# Patient Record
Sex: FEMALE | Race: BLACK OR AFRICAN AMERICAN | Employment: OTHER | URBAN - METROPOLITAN AREA
[De-identification: names, ages, dates, MRNs, and addresses within clinical notes are randomized per-mention and may not be internally consistent; named-entity substitution may affect disease eponyms.]

---

## 2017-01-18 ENCOUNTER — HOSPITAL ENCOUNTER (OUTPATIENT)
Dept: MAMMOGRAPHY | Age: 67
Discharge: HOME | End: 2017-01-18
Attending: INTERNAL MEDICINE
Payer: MEDICARE

## 2017-01-18 DIAGNOSIS — Z12.31 VISIT FOR SCREENING MAMMOGRAM: ICD-10-CM

## 2017-01-18 PROCEDURE — 77067 SCR MAMMO BI INCL CAD: CPT

## 2017-01-19 ENCOUNTER — HOSPITAL ENCOUNTER (OUTPATIENT)
Dept: MAMMOGRAPHY | Age: 67
Discharge: HOME | End: 2017-01-19
Attending: INTERNAL MEDICINE
Payer: MEDICARE

## 2017-01-19 DIAGNOSIS — Z87.898 HX OF ABNORMAL MAMMOGRAM: ICD-10-CM

## 2017-01-19 PROCEDURE — 77065 DX MAMMO INCL CAD UNI: CPT

## 2017-02-15 RX ORDER — METFORMIN HYDROCHLORIDE 500 MG/1
TABLET, EXTENDED RELEASE ORAL
Qty: 360 TAB | Refills: 95 | Status: SHIPPED | OUTPATIENT
Start: 2017-02-15 | End: 2017-03-23 | Stop reason: SDUPTHER

## 2017-03-02 ENCOUNTER — HOSPITAL ENCOUNTER (EMERGENCY)
Age: 67
Discharge: HOME | End: 2017-03-02
Attending: FAMILY MEDICINE

## 2017-03-02 ENCOUNTER — APPOINTMENT (OUTPATIENT)
Dept: GENERAL RADIOLOGY | Age: 67
End: 2017-03-02
Attending: FAMILY MEDICINE

## 2017-03-02 VITALS
OXYGEN SATURATION: 100 % | SYSTOLIC BLOOD PRESSURE: 147 MMHG | WEIGHT: 213.3 LBS | BODY MASS INDEX: 37.79 KG/M2 | TEMPERATURE: 97.9 F | HEIGHT: 63 IN | HEART RATE: 80 BPM | DIASTOLIC BLOOD PRESSURE: 67 MMHG | RESPIRATION RATE: 18 BRPM

## 2017-03-02 DIAGNOSIS — J20.9 ACUTE BRONCHITIS, UNSPECIFIED ORGANISM: Primary | ICD-10-CM

## 2017-03-02 RX ORDER — DOXYCYCLINE 100 MG/1
100 CAPSULE ORAL EVERY 12 HOURS
Qty: 20 CAP | Refills: 0 | Status: SHIPPED | OUTPATIENT
Start: 2017-03-02 | End: 2017-03-12

## 2017-03-02 RX ORDER — ALBUTEROL SULFATE 90 UG/1
2 AEROSOL, METERED RESPIRATORY (INHALATION)
Qty: 1 INHALER | Refills: 0 | Status: SHIPPED | OUTPATIENT
Start: 2017-03-02

## 2017-03-02 NOTE — UC PROVIDER NOTE
Patient is a 73 y.o. female presenting with cough. The history is provided by the patient. Cough   This is a new problem. Episode onset: 2 weeks ago. The problem occurs every few minutes. The problem has not changed since onset. The cough is productive of sputum. There has been no fever. Associated symptoms include rhinorrhea and shortness of breath (slight over the past week). Pertinent negatives include no chest pain, no chills, no sweats, no ear congestion, no ear pain, no headaches, no sore throat, no myalgias, no wheezing, no nausea and no vomiting. She is not a smoker. Past Medical History:   Diagnosis Date    Diabetes ([address] 1995    Hypercholesterolemia     Hypertension     LBP (low back pain)     chiorpactor    Lumbar stenosis     Obesity     S/P colonoscopy 12-18-12        Past Surgical History:   Procedure Laterality Date    HX BREAST BIOPSY Right 1990    negative    HX COLONOSCOPY           Family History   Problem Relation Age of Onset    Cancer Mother     No Known Problems Father         Social History     Social History    Marital status: SINGLE     Spouse name: N/A    Number of children: N/A    Years of education: N/A     Occupational History    Not on file. Social History Main Topics    Smoking status: Never Smoker    Smokeless tobacco: Never Used    Alcohol use No    Drug use: No    Sexual activity: Not on file     Other Topics Concern    Not on file     Social History Narrative                ALLERGIES: Review of patient's allergies indicates no known allergies. Review of Systems   Constitutional: Negative for chills and fever. HENT: Positive for congestion and rhinorrhea. Negative for ear pain and sore throat. Respiratory: Positive for cough and shortness of breath (slight over the past week). Negative for wheezing. Cardiovascular: Negative for chest pain and palpitations. Gastrointestinal: Negative for abdominal pain, nausea and vomiting. Musculoskeletal: Negative for myalgias. Skin: Negative for rash. Neurological: Negative for dizziness and headaches. Vitals:    03/02/17 1030 03/02/17 1031   BP:  147/67   Pulse:  80   Resp:  18   Temp:  97.9 °F (36.6 °C)   SpO2:  100%   Weight: 96.8 kg (213 lb 4.8 oz)    Height: 5' 3\" (1.6 m)        Physical Exam   Constitutional: She appears well-developed and well-nourished. No distress. HENT:   Right Ear: Tympanic membrane, external ear and ear canal normal.   Left Ear: Tympanic membrane, external ear and ear canal normal.   Nose: Rhinorrhea present. Right sinus exhibits no maxillary sinus tenderness and no frontal sinus tenderness. Left sinus exhibits no maxillary sinus tenderness and no frontal sinus tenderness. Mouth/Throat: Oropharynx is clear and moist and mucous membranes are normal.   Cardiovascular: Normal rate, regular rhythm and normal heart sounds. Pulmonary/Chest: Effort normal and breath sounds normal. No respiratory distress. She has no wheezes. She has no rales. Lymphadenopathy:     She has cervical adenopathy. Neurological: She is alert. Skin: She is not diaphoretic. Psychiatric: She has a normal mood and affect. Her behavior is normal. Judgment and thought content normal.   Nursing note and vitals reviewed. CXR Results  (Last 48 hours)               03/02/17 1113  XR CHEST PA LAT Final result    Impression:  IMPRESSION:       There is no acute process. Narrative:  EXAM:  XR CHEST PA LAT       INDICATION:  Cough for 2 weeks. COMPARISON: None       TECHNIQUE: PA and lateral chest views       FINDINGS: The cardiomediastinal and hilar contours are within normal limits. The   pulmonary vasculature is within normal limits. The lungs and pleural spaces are clear. The visualized bones and upper abdomen   are age-appropriate.                  MDM     Differential Diagnosis; Clinical Impression; Plan:     CLINICAL IMPRESSION:  Acute bronchitis, unspecified organism  (primary encounter diagnosis)    Plan:  1. Doxycycline  2. Albuterol prn  3. PCP as needed  Amount and/or Complexity of Data Reviewed:   Tests in the radiology section of CPT®:  Ordered and reviewed  Risk of Significant Complications, Morbidity, and/or Mortality:   Presenting problems: Moderate  Diagnostic procedures: Moderate  Management options:   Moderate  Progress:   Patient progress:  Stable      Procedures

## 2017-03-02 NOTE — DISCHARGE INSTRUCTIONS
Bronchitis: Care Instructions  Your Care Instructions    Bronchitis is inflammation of the bronchial tubes, which carry air to the lungs. The tubes swell and produce mucus, or phlegm. The mucus and inflamed bronchial tubes make you cough. You may have trouble breathing. Most cases of bronchitis are caused by viruses like those that cause colds. Antibiotics usually do not help and they may be harmful. Bronchitis usually develops rapidly and lasts about 2 to 3 weeks in otherwise healthy people. Follow-up care is a key part of your treatment and safety. Be sure to make and go to all appointments, and call your doctor if you are having problems. It's also a good idea to know your test results and keep a list of the medicines you take. How can you care for yourself at home? · Take all medicines exactly as prescribed. Call your doctor if you think you are having a problem with your medicine. · Get some extra rest.  · Take an over-the-counter pain medicine, such as acetaminophen (Tylenol), ibuprofen (Advil, Motrin), or naproxen (Aleve) to reduce fever and relieve body aches. Read and follow all instructions on the label. · Do not take two or more pain medicines at the same time unless the doctor told you to. Many pain medicines have acetaminophen, which is Tylenol. Too much acetaminophen (Tylenol) can be harmful. · Take an over-the-counter cough medicine that contains dextromethorphan to help quiet a dry, hacking cough so that you can sleep. Avoid cough medicines that have more than one active ingredient. Read and follow all instructions on the label. · Breathe moist air from a humidifier, hot shower, or sink filled with hot water. The heat and moisture will thin mucus so you can cough it out. · Do not smoke. Smoking can make bronchitis worse. If you need help quitting, talk to your doctor about stop-smoking programs and medicines. These can increase your chances of quitting for good.   When should you call for help? Call 911 anytime you think you may need emergency care. For example, call if:  · You have severe trouble breathing. Call your doctor now or seek immediate medical care if:  · You have new or worse trouble breathing. · You cough up dark brown or bloody mucus (sputum). · You have a new or higher fever. · You have a new rash. Watch closely for changes in your health, and be sure to contact your doctor if:  · You cough more deeply or more often, especially if you notice more mucus or a change in the color of your mucus. · You are not getting better as expected. Where can you learn more? Go to http://king-charles.info/. Enter H333 in the search box to learn more about \"Bronchitis: Care Instructions. \"  Current as of: May 23, 2016  Content Version: 11.1  © 4299-3760 [employer]. Care instructions adapted under license by [employer] (which disclaims liability or warranty for this information). If you have questions about a medical condition or this instruction, always ask your healthcare professional. [address] any warranty or liability for your use of this information.

## 2017-03-23 ENCOUNTER — OFFICE VISIT (OUTPATIENT)
Dept: INTERNAL MEDICINE CLINIC | Age: 67
End: 2017-03-23

## 2017-03-23 VITALS
RESPIRATION RATE: 20 BRPM | OXYGEN SATURATION: 99 % | TEMPERATURE: 97.3 F | BODY MASS INDEX: 37.39 KG/M2 | HEART RATE: 84 BPM | WEIGHT: 211 LBS | DIASTOLIC BLOOD PRESSURE: 64 MMHG | HEIGHT: 63 IN | SYSTOLIC BLOOD PRESSURE: 144 MMHG

## 2017-03-23 DIAGNOSIS — Z79.4 TYPE 2 DIABETES MELLITUS WITHOUT COMPLICATION, WITH LONG-TERM CURRENT USE OF INSULIN (HCC): Primary | ICD-10-CM

## 2017-03-23 DIAGNOSIS — E11.9 TYPE 2 DIABETES MELLITUS WITHOUT COMPLICATION, WITH LONG-TERM CURRENT USE OF INSULIN (HCC): Primary | ICD-10-CM

## 2017-03-23 DIAGNOSIS — I10 ESSENTIAL HYPERTENSION: ICD-10-CM

## 2017-03-23 DIAGNOSIS — E78.00 HYPERCHOLESTEROLEMIA: ICD-10-CM

## 2017-03-23 RX ORDER — METFORMIN HYDROCHLORIDE 500 MG/1
1000 TABLET, EXTENDED RELEASE ORAL 2 TIMES DAILY WITH MEALS
Qty: 360 TAB | Refills: 95 | Status: SHIPPED | OUTPATIENT
Start: 2017-03-23 | End: 2018-03-02 | Stop reason: SDUPTHER

## 2017-03-23 RX ORDER — LANCETS
EACH MISCELLANEOUS
Qty: 1 EACH | Refills: 11 | Status: SHIPPED | OUTPATIENT
Start: 2017-03-23 | End: 2018-03-07

## 2017-03-23 RX ORDER — PNEUMOCOCCAL 13-VALENT CONJUGATE VACCINE 2.2; 2.2; 2.2; 2.2; 2.2; 4.4; 2.2; 2.2; 2.2; 2.2; 2.2; 2.2; 2.2 UG/.5ML; UG/.5ML; UG/.5ML; UG/.5ML; UG/.5ML; UG/.5ML; UG/.5ML; UG/.5ML; UG/.5ML; UG/.5ML; UG/.5ML; UG/.5ML; UG/.5ML
INJECTION, SUSPENSION INTRAMUSCULAR
Refills: 0 | COMMUNITY
Start: 2017-01-23 | End: 2017-06-11

## 2017-03-23 RX ORDER — AMLODIPINE BESYLATE 5 MG/1
5 TABLET ORAL DAILY
Qty: 90 TAB | Refills: 3 | Status: ON HOLD | OUTPATIENT
Start: 2017-03-23 | End: 2018-03-10

## 2017-03-23 RX ORDER — QUINAPRIL 40 MG/1
40 TABLET ORAL DAILY
Qty: 90 TAB | Refills: 11 | Status: SHIPPED | OUTPATIENT
Start: 2017-03-23 | End: 2018-03-02 | Stop reason: SDUPTHER

## 2017-03-23 RX ORDER — SIMVASTATIN 40 MG/1
40 TABLET, FILM COATED ORAL
Qty: 90 TAB | Refills: 10 | Status: SHIPPED | OUTPATIENT
Start: 2017-03-23 | End: 2018-03-02 | Stop reason: SDUPTHER

## 2017-03-23 RX ORDER — METOPROLOL SUCCINATE 25 MG/1
25 TABLET, EXTENDED RELEASE ORAL DAILY
Qty: 90 TAB | Refills: 11 | Status: SHIPPED | OUTPATIENT
Start: 2017-03-23 | End: 2018-03-02 | Stop reason: SDUPTHER

## 2017-03-23 RX ORDER — INSULIN GLARGINE 100 [IU]/ML
30 INJECTION, SOLUTION SUBCUTANEOUS DAILY
Qty: 3 EACH | Refills: 11 | Status: SHIPPED | OUTPATIENT
Start: 2017-03-23 | End: 2017-10-26 | Stop reason: CLARIF

## 2017-03-23 RX ORDER — PEN NEEDLE, DIABETIC 29 G X1/2"
NEEDLE, DISPOSABLE MISCELLANEOUS
Qty: 100 PEN NEEDLE | Refills: 11 | Status: SHIPPED | OUTPATIENT
Start: 2017-03-23 | End: 2018-03-02 | Stop reason: SDUPTHER

## 2017-03-23 RX ORDER — FUROSEMIDE 40 MG/1
40 TABLET ORAL DAILY
Qty: 90 TAB | Refills: 10 | Status: SHIPPED | OUTPATIENT
Start: 2017-03-23 | End: 2018-03-02 | Stop reason: SDUPTHER

## 2017-03-23 RX ORDER — HYDRALAZINE HYDROCHLORIDE 25 MG/1
25 TABLET, FILM COATED ORAL 2 TIMES DAILY
Qty: 180 TAB | Refills: 10 | Status: SHIPPED | OUTPATIENT
Start: 2017-03-23 | End: 2017-12-27

## 2017-03-23 NOTE — PROGRESS NOTES
SPORTS MEDICINE AND PRIMARY CARE  Lavell Bush MD, [address] [address]  Phone:  [phone]  Fax: [phone]      Chief Complaint   Patient presents with    Diabetes         SUBECTIVE:    Nette Castro is a 73 y.o. female Patient returns today alert and appropriate and has the capacity to give an accurate history. She is ambulatory with a known history of dyslipidemia, primary hypertension, and diabetes whose control has been good with hemoglobin A1C three months ago 6.5. Patient returns today voicing no new complaints except the fact she forgot her blood sugar sheets. Apparently they have been running pretty good. She is now on Medicare and has a new pharmacy for which we will send her prescriptions to the appropriate pharmacist.  Specific complaints are denied. Patient is seen for evaluation. Current Outpatient Prescriptions   Medication Sig Dispense Refill    PREVNAR 13, PF, 0.5 mL syrg injection TO BE ADMINISTERED BY PHARMACIST FOR IMMUNIZATION  0    metFORMIN ER (GLUCOPHAGE XR) 500 mg tablet Take 2 Tabs by mouth two (2) times daily (with meals). 360 Tab 95    furosemide (LASIX) 40 mg tablet Take 1 Tab by mouth daily. 90 Tab 10    metoprolol succinate (TOPROL-XL) 25 mg XL tablet Take 1 Tab by mouth daily. 90 Tab 11    simvastatin (ZOCOR) 40 mg tablet Take 1 Tab by mouth nightly. 90 Tab 10    SITagliptin (JANUVIA) 100 mg tablet Take 1 Tab by mouth daily. 90 Tab 3    amLODIPine (NORVASC) 5 mg tablet Take 1 Tab by mouth daily. 90 Tab 3    quinapril (ACCUPRIL) 40 mg tablet Take 1 Tab by mouth daily. 90 Tab 11    insulin glargine (LANTUS SOLOSTAR) 100 unit/mL (3 mL) pen 30 Units by SubCUTAneous route daily. 3 Each 11    hydrALAZINE (APRESOLINE) 25 mg tablet Take 1 Tab by mouth two (2) times a day.  180 Tab 10    Lancets misc delica one touch 1 Each 11    Insulin Needles, Disposable, (BD INSULIN PEN NEEDLE UF ORIG) 29 gauge x 1/2\" ndle tid 100 Pen Needle 11    glucose blood VI test strips (ONETOUCH ULTRA TEST) strip tid 100 Strip 9    albuterol (PROVENTIL HFA, VENTOLIN HFA, PROAIR HFA) 90 mcg/actuation inhaler Take 2 Puffs by inhalation every four (4) hours as needed for Wheezing. 1 Inhaler 0    FLUVIRIN 8927-7470 susp injection ADM 0.5ML IM UTD  0    ONE TOUCH DELICA 33 gauge misc       Blood-Glucose Meter (ONETOUCH ULTRA SYSTEM KIT) monitoring kit Use daily 1 Kit 0    ONETOUCH ULTRASOFT LANCETS misc USE TO TEST THREE TIMES A  Each 11    Blood-Glucose Meter (ONETOUCH ULTRA SYSTEM KIT) monitoring kit Test 3 times a day 1 Kit 0    FLUARIX QUAD 8150-2033, PF, syrg injection   0    clobetasol (TEMOVATE) 0.05 % ointment Apply 1 Tube to affected area two (2) times a day. 60 g 11    omeprazole (PRILOSEC) 40 mg capsule Take 40 mg by mouth daily.  AFLURIA [id number] 45 mcg (15 mcg x 3)/0.5 mL susp   0    HYDROcodone-acetaminophen (NORCO) 5-325 mg per tablet   0     Past Medical History:   Diagnosis Date    Diabetes ([address] 1995    Hypercholesterolemia     Hypertension     LBP (low back pain)     chiorpactor    Lumbar stenosis     Obesity     S/P colonoscopy 12-18-12     Past Surgical History:   Procedure Laterality Date    HX BREAST BIOPSY Right 1990    negative    HX COLONOSCOPY       No Known Allergies    REVIEW OF SYSTEMS:   Patient has no pain in her right knee but all of a sudden it will just give away and on two occasions she fell because it gave away. However \"I do not want to take any medicine. \"        Social History     Social History    Marital status: SINGLE     Spouse name: N/A    Number of children: N/A    Years of education: N/A     Social History Main Topics    Smoking status: Never Smoker    Smokeless tobacco: Never Used    Alcohol use No    Drug use: No    Sexual activity: Not Asked     Other Topics Concern    None     Social History Narrative    Medical History: Diverticulosis 2002DM 1995primary HTN 1995Obesity Dyslipidemia January 2012 moderate central stenosis L3-L4,    mild to moderate central stenosis at L4-L5 MRI    Gyn History: Last mammogram date 2013. Last menstrual perioddate hysterectomy  FOR FIBROIDS, PT W ITH ONE OVARY. Last    papdate . Menopausal hot flashes. Sexually active not currently sexually active. History of abnormal pap smears none. History of STDs    none. Vaginal discharge or odor none. HysterectomyDate NONE.    OB History: Total pregnancies 1. Pre term deliveries (>20-36.6 w eeks) 1. Surgical History: [facility] right breast bx , kidney stones colonoscopy     Hospitalization/Major Diagnostic Procedure: [facility] rig breast bx , kidney stones     Family History: Mother:  52 yrs, skin d/oFather: deceasedAunt: alive, lung cancerAdopted: alive    Social History: Alcohol Use Patient does not use alcohol. Smoking Status Patient is a never smoker. Marital Status: Single. Lives w ith:    alone. Education/School: has masters degree-[employer].   r  Family History   Problem Relation Age of Onset    Cancer Mother     No Known Problems Father        OBJECTIVE:  Visit Vitals    /64 (BP 1 Location: Right arm, BP Patient Position: Sitting)    Pulse 84    Temp 97.3 °F (36.3 °C) (Oral)    Resp 20    Ht 5' 3\" (1.6 m)    Wt 211 lb (95.7 kg)    LMP  (LMP Unknown)    SpO2 99%    BMI 37.38 kg/m2     ENT: perrla,  eom intact  NECK: supple. Thyroid normal  CHEST: clear to ascultation and percussion   HEART: regular rate and rhythm  ABD: soft, bowel sounds active  EXTREMITIES: no edema, pulse 1+     No visits with results within 3 Month(s) from this visit.   Latest known visit with results is:    Office Visit on 2016   Component Date Value Ref Range Status    Glucose POC 2016 122  mg/dL Final    Hemoglobin A1c 2016 6.9* 4.8 - 5.6 % Final    Comment:          Pre-diabetes: 5.7 - 6.4           Diabetes: >6.4           Glycemic control for adults with diabetes: <7.0      Estimated average glucose 12/20/2016 151  mg/dL Final          ASSESSMENT:  1. Type 2 diabetes mellitus without complication, with long-term current use of insulin ([address]    2. Hypercholesterolemia    3. Essential hypertension      Patient's medical status is clinically stable. Blood pressure is at the upper limits of normal.  At home I suspect it is much better than this. Therefore no adjustment will be made. Her blood sugars will be assessed with a hemoglobin A1C. Her BMI is 37.38 which reflects a two pound weight loss so we congratulate her. We continue to encourage physical activity for 30 minutes five days a week and suggest a knee brace for the right knee but remind her that she should be exercising and doing muscle strengthening exercises on that particular side particularly. She will return to the office in four months, sooner if she has any problems. PLAN:  .  Orders Placed This Encounter    LIPID PANEL    HEMOGLOBIN A1C WITH EAG    PREVNAR 13, PF, 0.5 mL syrg injection    metFORMIN ER (GLUCOPHAGE XR) 500 mg tablet    furosemide (LASIX) 40 mg tablet    metoprolol succinate (TOPROL-XL) 25 mg XL tablet    simvastatin (ZOCOR) 40 mg tablet    SITagliptin (JANUVIA) 100 mg tablet    amLODIPine (NORVASC) 5 mg tablet    quinapril (ACCUPRIL) 40 mg tablet    insulin glargine (LANTUS SOLOSTAR) 100 unit/mL (3 mL) pen    hydrALAZINE (APRESOLINE) 25 mg tablet    Lancets misc    Insulin Needles, Disposable, (BD INSULIN PEN NEEDLE UF ORIG) 29 gauge x 1/2\" ndle    glucose blood VI test strips (ONETOUCH ULTRA TEST) strip       Follow-up Disposition:  Return in about 4 months (around 7/23/2017). ATTENTION:   This medical record was transcribed using an electronic medical records system. Although proofread, it may and can contain electronic and spelling errors. Other human spelling and other errors may be present.   Corrections may be executed at a later time. Please feel free to contact us for any clarifications as needed.

## 2017-03-23 NOTE — MR AVS SNAPSHOT
Visit Information Date & Time Provider Department Dept. Phone Encounter #  
 3/23/2017  2:30 PM Belen Otto MD SPORTS MED AND PRIMARY CARE - [facility] [phone] [id number] Follow-up Instructions Return in about 4 months (around 7/23/2017). Follow-up and Disposition History Upcoming Health Maintenance Date Due  
 EYE EXAM RETINAL OR DILATED Q1 3/24/2016 FOOT EXAM Q1 5/10/2017 HEMOGLOBIN A1C Q6M 6/20/2017 MICROALBUMIN Q1 8/23/2017 FOBT Q 1 YEAR AGE 50-75 12/20/2017 LIPID PANEL Q1 3/23/2018 MEDICARE YEARLY EXAM 3/24/2018 GLAUCOMA SCREENING Q2Y 1/3/2019 BREAST CANCER SCRN MAMMOGRAM 1/19/2019 Pneumococcal 65+ Low/Medium Risk (2 of 2 - PPSV23) 9/18/2019 DTaP/Tdap/Td series (2 - Td) 12/20/2026 Allergies as of 3/23/2017  Review Complete On: 3/23/2017 By: Belen Otto MD  
 No Known Allergies Current Immunizations  Never Reviewed Name Date Influenza Vaccine 10/10/2016, 9/18/2014 Pneumococcal Vaccine (Unspecified Type) 9/18/2014 Not reviewed this visit You Were Diagnosed With   
  
 Codes Comments Type 2 diabetes mellitus without complication, with long-term current use of insulin (HCC)    -  Primary ICD-10-CM: E11.9, Z79.4 ICD-9-CM: 250.00, V58.67 Hypercholesterolemia     ICD-10-CM: E78.00 ICD-9-CM: 272.0 Essential hypertension     ICD-10-CM: I10 
ICD-9-CM: 401.9 Vitals BP Pulse Temp Resp Height(growth percentile) Weight(growth percentile) 144/64 (BP 1 Location: Right arm, BP Patient Position: Sitting) 84 97.3 °F (36.3 °C) (Oral) 20 5' 3\" (1.6 m) 211 lb (95.7 kg) LMP SpO2 BMI OB Status Smoking Status (LMP Unknown) 99% 37.38 kg/m2 Hysterectomy Never Smoker BMI and BSA Data Body Mass Index Body Surface Area  
 37.38 kg/m 2 2.06 m 2 Preferred Pharmacy Pharmacy Name Phone [facility] [address] [phone] Your Updated Medication List  
  
   
This list is accurate as of: 3/23/17  3:58 PM.  Always use your most recent med list.  
  
  
  
  
 AFLURIA [id number] 45 mcg (15 mcg x 3)/0.5 mL Susp Generic drug:  flu vaccine ts 2014-15 (5 yr+)  
  
 albuterol 90 mcg/actuation inhaler Commonly known as:  PROVENTIL HFA, VENTOLIN HFA, PROAIR HFA Take 2 Puffs by inhalation every four (4) hours as needed for Wheezing. amLODIPine 5 mg tablet Commonly known as:  Promise Kathia Take 1 Tab by mouth daily. * Blood-Glucose Meter monitoring kit Commonly known as:  [address]   [address] KIT Test 3 times a day * Blood-Glucose Meter monitoring kit Commonly known as:  [address]   [address] KIT Use daily  
  
 clobetasol 0.05 % ointment Commonly known as:  Fan Lawrence Apply 1 Tube to affected area two (2) times a day. FLUARIX QUAD 8087-9840 (PF) Syrg injection Generic drug:  influenza vaccine 2015-16 (36mos+)(PF)  
  
 FLUVIRIN 9022-8148 Susp injection Generic drug:  influenza vaccine 2016-17 (4 yr+) ADM 0.5ML IM UTD  
  
 furosemide 40 mg tablet Commonly known as:  LASIX Take 1 Tab by mouth daily. glucose blood VI test strips strip Commonly known as:  ONETOUCH ULTRA TEST  
tid  
  
 hydrALAZINE 25 mg tablet Commonly known as:  APRESOLINE Take 1 Tab by mouth two (2) times a day. HYDROcodone-acetaminophen 5-325 mg per tablet Commonly known as:  NORCO  
  
 insulin glargine 100 unit/mL (3 mL) pen Commonly known as:  LANTUS SOLOSTAR  
30 Units by SubCUTAneous route daily. Insulin Needles (Disposable) 29 gauge x 1/2\" Ndle Commonly known as:  BD INSULIN PEN NEEDLE UF ORIG  
tid  
  
 metFORMIN  mg tablet Commonly known as:  GLUCOPHAGE XR Take 2 Tabs by mouth two (2) times daily (with meals). metoprolol succinate 25 mg XL tablet Commonly known as:  TOPROL-XL Take 1 Tab by mouth daily. omeprazole 40 mg capsule Commonly known as:  PRILOSEC  
 Take 40 mg by mouth daily. * ONETOUCH ULTRASOFT LANCETS Misc Generic drug:  Lancets USE TO TEST THREE TIMES A DAY  
  
 * One Touch Delica 33 gauge Misc Generic drug:  lancets * Lancets Misc  
delica one touch PREVNAR 13 (PF) 0.5 mL Syrg injection Generic drug:  pneumococcal 13 dino conj dip TO BE ADMINISTERED BY PHARMACIST FOR IMMUNIZATION  
  
 quinapril 40 mg tablet Commonly known as:  ACCUPRIL Take 1 Tab by mouth daily. simvastatin 40 mg tablet Commonly known as:  ZOCOR Take 1 Tab by mouth nightly. SITagliptin 100 mg tablet Commonly known as:  Liz [place] Take 1 Tab by mouth daily. * Notice: This list has 5 medication(s) that are the same as other medications prescribed for you. Read the directions carefully, and ask your doctor or other care provider to review them with you. Prescriptions Sent to Pharmacy Refills  
 metFORMIN ER (GLUCOPHAGE XR) 500 mg tablet 95 Sig: Take 2 Tabs by mouth two (2) times daily (with meals). Class: Normal  
 Pharmacy:  [address] [facility] Ph #: [phone] Route: Oral  
 furosemide (LASIX) 40 mg tablet 10 Sig: Take 1 Tab by mouth daily. Class: Normal  
 Pharmacy:  [address] [facility] Ph #: [phone] Route: Oral  
 metoprolol succinate (TOPROL-XL) 25 mg XL tablet 11 Sig: Take 1 Tab by mouth daily. Class: Normal  
 Pharmacy:  [address] [facility] Ph #: [phone] Route: Oral  
 simvastatin (ZOCOR) 40 mg tablet 10 Sig: Take 1 Tab by mouth nightly. Class: Normal  
 Pharmacy:  [address] [facility] Ph #: [phone] Route: Oral  
 SITagliptin (JANUVIA) 100 mg tablet 3 Sig: Take 1 Tab by mouth daily. Class: Normal  
 Pharmacy:  [address] [facility] Ph #: [phone]  Route: Oral  
 amLODIPine (NORVASC) 5 mg tablet 3  
 Sig: Take 1 Tab by mouth daily. Class: Normal  
 Pharmacy: [facility] [address] Ph #: [phone] Route: Oral  
 quinapril (ACCUPRIL) 40 mg tablet 11 Sig: Take 1 Tab by mouth daily. Class: Normal  
 Pharmacy:  [address] Ph #: [phone] Route: Oral  
 insulin glargine (LANTUS SOLOSTAR) 100 unit/mL (3 mL) pen 11 Si Units by SubCUTAneous route daily. Class: Normal  
 Pharmacy:  [address] Ph #: [phone] Route: SubCUTAneous  
 hydrALAZINE (APRESOLINE) 25 mg tablet 10 Sig: Take 1 Tab by mouth two (2) times a day. Class: Normal  
 Pharmacy:  [address] Ph #: [phone] Route: Oral  
 Lancets misc 11 Sig: delica one touch Class: Normal  
 Pharmacy: [address] Ph #: [phone] Insulin Needles, Disposable, (BD INSULIN PEN NEEDLE UF ORIG) 29 gauge x 1/2\" ndle 11 Sig: tid Class: Normal  
 Pharmacy:  [address] Ph #: [phone]  
 glucose blood VI test strips (ONETOUCH ULTRA TEST) strip 9 Sig: tid Class: Normal  
 Pharmacy: [facility] [address] Ph #: [phone] We Performed the Following HEMOGLOBIN A1C WITH EAG [68012 CPT(R)] LIPID PANEL [27811 CPT(R)] DC COLLECTION VENOUS BLOOD,VENIPUNCTURE E0203003 CPT(R)] Follow-up Instructions Return in about 4 months (around 2017). Introducing [facility] [place] [facility]! Dear Sonny Stewart: Thank you for requesting a [employer] account. Our records indicate that you already have an active [employer] account. You can access your account anytime at [email or web address] Did you know that you can access your hospital and ER discharge instructions at any time in [employer]? You can also review all of your test results from your hospital stay or ER visit. Additional Information If you have questions, please visit the Frequently Asked Questions section of the [email or web address] website at [email or web address] Remember, [employer] is NOT to be used for urgent needs. For medical emergencies, dial 911. Now available from your iPhone and Android! Please provide this summary of care documentation to your next provider. Your primary care clinician is listed as Marletta Base. If you have any questions after today's visit, please call [phone].

## 2017-03-23 NOTE — PROGRESS NOTES
.1. Have you been to the ER, urgent care clinic since your last visit? Hospitalized since your last visit? Yes Where: Urgent Care    2. Have you seen or consulted any other health care providers outside of the [facility] since your last visit? Include any pap smears or colon screening.  Yes Reason for visit: Bronchitis

## 2017-03-24 LAB
CHOLEST SERPL-MCNC: 137 MG/DL (ref 100–199)
EST. AVERAGE GLUCOSE BLD GHB EST-MCNC: 146 MG/DL
HBA1C MFR BLD: 6.7 % (ref 4.8–5.6)
HDLC SERPL-MCNC: 51 MG/DL
LDLC SERPL CALC-MCNC: 61 MG/DL (ref 0–99)
TRIGL SERPL-MCNC: 123 MG/DL (ref 0–149)
VLDLC SERPL CALC-MCNC: 25 MG/DL (ref 5–40)

## 2017-04-10 RX ORDER — LANCETS
EACH MISCELLANEOUS
Qty: 300 EACH | Refills: 3 | Status: SHIPPED | OUTPATIENT
Start: 2017-04-10 | End: 2017-10-26 | Stop reason: SDUPTHER

## 2017-06-11 ENCOUNTER — HOSPITAL ENCOUNTER (EMERGENCY)
Age: 67
Discharge: HOME | End: 2017-06-11
Attending: FAMILY MEDICINE

## 2017-06-11 VITALS
BODY MASS INDEX: 37.21 KG/M2 | HEIGHT: 63 IN | OXYGEN SATURATION: 97 % | RESPIRATION RATE: 18 BRPM | WEIGHT: 210 LBS | TEMPERATURE: 98.9 F | DIASTOLIC BLOOD PRESSURE: 61 MMHG | HEART RATE: 89 BPM | SYSTOLIC BLOOD PRESSURE: 130 MMHG

## 2017-06-11 DIAGNOSIS — J20.9 ACUTE BRONCHITIS, UNSPECIFIED ORGANISM: Primary | ICD-10-CM

## 2017-06-11 RX ORDER — LEVOFLOXACIN 500 MG/1
500 TABLET, FILM COATED ORAL DAILY
Qty: 7 TAB | Refills: 0 | Status: SHIPPED | OUTPATIENT
Start: 2017-06-11 | End: 2017-06-18

## 2017-06-11 RX ORDER — CODEINE PHOSPHATE AND GUAIFENESIN 10; 100 MG/5ML; MG/5ML
5 SOLUTION ORAL
Qty: 120 ML | Refills: 0 | Status: SHIPPED | OUTPATIENT
Start: 2017-06-11 | End: 2017-07-20 | Stop reason: ALTCHOICE

## 2017-06-11 NOTE — DISCHARGE INSTRUCTIONS
Bronchitis: Care Instructions  Your Care Instructions    Bronchitis is inflammation of the bronchial tubes, which carry air to the lungs. The tubes swell and produce mucus, or phlegm. The mucus and inflamed bronchial tubes make you cough. You may have trouble breathing. Most cases of bronchitis are caused by viruses like those that cause colds. Antibiotics usually do not help and they may be harmful. Bronchitis usually develops rapidly and lasts about 2 to 3 weeks in otherwise healthy people. Follow-up care is a key part of your treatment and safety. Be sure to make and go to all appointments, and call your doctor if you are having problems. It's also a good idea to know your test results and keep a list of the medicines you take. How can you care for yourself at home? · Take all medicines exactly as prescribed. Call your doctor if you think you are having a problem with your medicine. · Get some extra rest.  · Take an over-the-counter pain medicine, such as acetaminophen (Tylenol), ibuprofen (Advil, Motrin), or naproxen (Aleve) to reduce fever and relieve body aches. Read and follow all instructions on the label. · Do not take two or more pain medicines at the same time unless the doctor told you to. Many pain medicines have acetaminophen, which is Tylenol. Too much acetaminophen (Tylenol) can be harmful. · Take an over-the-counter cough medicine that contains dextromethorphan to help quiet a dry, hacking cough so that you can sleep. Avoid cough medicines that have more than one active ingredient. Read and follow all instructions on the label. · Breathe moist air from a humidifier, hot shower, or sink filled with hot water. The heat and moisture will thin mucus so you can cough it out. · Do not smoke. Smoking can make bronchitis worse. If you need help quitting, talk to your doctor about stop-smoking programs and medicines. These can increase your chances of quitting for good.   When should you call for help? Call 911 anytime you think you may need emergency care. For example, call if:  · You have severe trouble breathing. Call your doctor now or seek immediate medical care if:  · You have new or worse trouble breathing. · You cough up dark brown or bloody mucus (sputum). · You have a new or higher fever. · You have a new rash. Watch closely for changes in your health, and be sure to contact your doctor if:  · You cough more deeply or more often, especially if you notice more mucus or a change in the color of your mucus. · You are not getting better as expected. Where can you learn more? Go to http://king-charles.info/. Enter H333 in the search box to learn more about \"Bronchitis: Care Instructions. \"  Current as of: May 23, 2016  Content Version: 11.2  © 7914-7802 [employer]. Care instructions adapted under license by [employer] (which disclaims liability or warranty for this information). If you have questions about a medical condition or this instruction, always ask your healthcare professional. [address] any warranty or liability for your use of this information.

## 2017-06-11 NOTE — UC PROVIDER NOTE
Patient is a 73 y.o. female presenting with cough. The history is provided by the patient. Cough   The current episode started more than 1 week ago. The problem occurs every few minutes. The problem has not changed since onset. The cough is non-productive. There has been no fever. Associated symptoms include headaches, rhinorrhea, sore throat and nausea. Pertinent negatives include no chest pain, no chills, no sweats, no ear pain and no wheezing. She has tried nothing for the symptoms. She is not a smoker. Past Medical History:   Diagnosis Date    Diabetes ([address] 1995    Hypercholesterolemia     Hypertension     LBP (low back pain)     chiorpactor    Lumbar stenosis     Obesity     S/P colonoscopy 12-18-12        Past Surgical History:   Procedure Laterality Date    HX BREAST BIOPSY Right 1990    negative    HX COLONOSCOPY           Family History   Problem Relation Age of Onset    Cancer Mother     No Known Problems Father         Social History     Social History    Marital status: SINGLE     Spouse name: N/A    Number of children: N/A    Years of education: N/A     Occupational History    Not on file. Social History Main Topics    Smoking status: Never Smoker    Smokeless tobacco: Never Used    Alcohol use No    Drug use: No    Sexual activity: Not on file     Other Topics Concern    Not on file     Social History Narrative    Medical History: Diverticulosis 2002DM 1995primary HTN 1995Obesity 1995Dyslipidemia 1995January 9, 2012 moderate central stenosis L3-L4,    mild to moderate central stenosis at L4-L5 MRI    Gyn History: Last mammogram date 11/26/2013. Last menstrual perioddate hysterectomy 1992 FOR FIBROIDS, PT W ITH ONE OVARY. Last    papdate 2012. Menopausal hot flashes. Sexually active not currently sexually active. History of abnormal pap smears none. History of STDs    none. Vaginal discharge or odor none. HysterectomyDate NONE.    OB History: Total pregnancies 1.  Pre term deliveries (>20-36.6 w eeks) 1. Surgical History: [facility] right breast bx , kidney stones colonoscopy     Hospitalization/Major Diagnostic Procedure: [facility] right breast bx , rajanimaritza lora     Family History: Mother:  52 yrs, skin d/oFather: deceasedAunt: alive, lung cancerAdopted: alive    Social History: Alcohol Use Patient does not use alcohol. Smoking Status Patient is a never smoker. Marital Status: Single. Lives w ith:    alone. Education/School: has masters degree-[employer]. ALLERGIES: Review of patient's allergies indicates no known allergies. Review of Systems   Constitutional: Negative for chills. HENT: Positive for rhinorrhea and sore throat. Negative for ear pain. Respiratory: Positive for cough. Negative for wheezing. Cardiovascular: Negative for chest pain. Gastrointestinal: Positive for nausea. Neurological: Positive for headaches. Vitals:    17 0822   BP: 130/61   Pulse: 89   Resp: 18   Temp: 98.9 °F (37.2 °C)   SpO2: 97%   Weight: 95.3 kg (210 lb)   Height: 5' 3\" (1.6 m)       Physical Exam   Constitutional: She is oriented to person, place, and time. She appears well-developed and well-nourished. HENT:   Right Ear: External ear normal.   Left Ear: External ear normal.   Eyes: Conjunctivae and EOM are normal.   Neck: Normal range of motion. Neck supple. Cardiovascular: Normal rate, regular rhythm and normal heart sounds. Pulmonary/Chest: Effort normal and breath sounds normal.   Lymphadenopathy:     She has no cervical adenopathy. Neurological: She is alert and oriented to person, place, and time. Skin: Skin is warm and dry. Psychiatric: She has a normal mood and affect. Her behavior is normal. Judgment and thought content normal.   Nursing note and vitals reviewed.       MDM     Differential Diagnosis; Clinical Impression; Plan:     CLINICAL IMPRESSION:  Acute bronchitis, unspecified organism  (primary encounter diagnosis)    Plan:  1. levaquin  2. Robitussin AC  3. Risk of Significant Complications, Morbidity, and/or Mortality:   Presenting problems: Moderate  Diagnostic procedures: Moderate  Management options:   Moderate  Progress:   Patient progress:  Stable      Procedures

## 2017-07-20 ENCOUNTER — OFFICE VISIT (OUTPATIENT)
Dept: INTERNAL MEDICINE CLINIC | Age: 67
End: 2017-07-20

## 2017-07-20 VITALS
TEMPERATURE: 98.6 F | OXYGEN SATURATION: 96 % | HEART RATE: 79 BPM | BODY MASS INDEX: 37.74 KG/M2 | SYSTOLIC BLOOD PRESSURE: 141 MMHG | DIASTOLIC BLOOD PRESSURE: 60 MMHG | WEIGHT: 213 LBS | HEIGHT: 63 IN | RESPIRATION RATE: 22 BRPM

## 2017-07-20 DIAGNOSIS — Z13.39 SCREENING FOR ALCOHOLISM: ICD-10-CM

## 2017-07-20 DIAGNOSIS — E66.09 NON MORBID OBESITY DUE TO EXCESS CALORIES: ICD-10-CM

## 2017-07-20 DIAGNOSIS — E11.9 TYPE 2 DIABETES MELLITUS WITHOUT COMPLICATION, WITH LONG-TERM CURRENT USE OF INSULIN (HCC): ICD-10-CM

## 2017-07-20 DIAGNOSIS — M48.061 LUMBAR STENOSIS: ICD-10-CM

## 2017-07-20 DIAGNOSIS — Z00.00 ROUTINE GENERAL MEDICAL EXAMINATION AT A HEALTH CARE FACILITY: Primary | ICD-10-CM

## 2017-07-20 DIAGNOSIS — E78.00 HYPERCHOLESTEROLEMIA: ICD-10-CM

## 2017-07-20 DIAGNOSIS — I10 ESSENTIAL HYPERTENSION: ICD-10-CM

## 2017-07-20 DIAGNOSIS — Z79.4 TYPE 2 DIABETES MELLITUS WITHOUT COMPLICATION, WITH LONG-TERM CURRENT USE OF INSULIN (HCC): ICD-10-CM

## 2017-07-20 RX ORDER — LANCETS 30 GAUGE
EACH MISCELLANEOUS
Refills: 3 | COMMUNITY
Start: 2017-07-14 | End: 2018-03-07

## 2017-07-20 NOTE — MR AVS SNAPSHOT
Visit Information Date & Time Provider Department Dept. Phone Encounter #  
 7/20/2017  2:30 PM Tree Lowry MD [facility] - [facility] [phone] [id number] Follow-up Instructions Return in about 3 months (around 10/20/2017). Follow-up and Disposition History Upcoming Health Maintenance Date Due  
 EYE EXAM RETINAL OR DILATED Q1 3/24/2016 INFLUENZA AGE 9 TO ADULT 8/1/2017 MICROALBUMIN Q1 8/23/2017 HEMOGLOBIN A1C Q6M 9/23/2017 FOBT Q 1 YEAR AGE 50-75 12/20/2017 LIPID PANEL Q1 3/23/2018 MEDICARE YEARLY EXAM 3/24/2018 FOOT EXAM Q1 7/20/2018 GLAUCOMA SCREENING Q2Y 1/3/2019 BREAST CANCER SCRN MAMMOGRAM 1/19/2019 Pneumococcal 65+ Low/Medium Risk (2 of 2 - PPSV23) 9/18/2019 DTaP/Tdap/Td series (2 - Td) 12/20/2026 Allergies as of 7/20/2017  Review Complete On: 7/20/2017 By: Tree Lowry MD  
 No Known Allergies Current Immunizations  Never Reviewed Name Date Influenza Vaccine 10/10/2016, 9/18/2014 Pneumococcal Vaccine (Unspecified Type) 9/18/2014 Not reviewed this visit You Were Diagnosed With   
  
 Codes Comments Routine general medical examination at a health care facility    -  Primary ICD-10-CM: Z00.00 ICD-9-CM: V70.0 Screening for alcoholism     ICD-10-CM: Z13.89 ICD-9-CM: V79.1 Type 2 diabetes mellitus without complication, with long-term current use of insulin (HCC)     ICD-10-CM: E11.9, Z79.4 ICD-9-CM: 250.00, V58.67 Hypercholesterolemia     ICD-10-CM: E78.00 ICD-9-CM: 272.0 Essential hypertension     ICD-10-CM: I10 
ICD-9-CM: 401.9 Non morbid obesity due to excess calories     ICD-10-CM: E66.09 
ICD-9-CM: 278.00 Lumbar stenosis     ICD-10-CM: M48.06 
ICD-9-CM: 724.02 Vitals BP Pulse Temp Resp Height(growth percentile) Weight(growth percentile)  141/60 (BP 1 Location: Left arm, BP Patient Position: Sitting) 79 98.6 °F (37 °C) (Oral) 22 5' 3\" (1.6 m) 213 lb (96.6 kg) LMP SpO2 BMI OB Status Smoking Status (LMP Unknown) 96% 37.73 kg/m2 Hysterectomy Never Smoker BMI and BSA Data Body Mass Index Body Surface Area  
 37.73 kg/m 2 2.07 m 2 Preferred Pharmacy Pharmacy Name Phone [id number] Umer Wynn [address] [place] [address] [phone] Your Updated Medication List  
  
   
This list is accurate as of: 7/20/17  4:06 PM.  Always use your most recent med list.  
  
  
  
  
 AFLURIA [id number] 45 mcg (15 mcg x 3)/0.5 mL Susp Generic drug:  flu vaccine [id number](5 yr,up)  
  
 albuterol 90 mcg/actuation inhaler Commonly known as:  PROVENTIL HFA, VENTOLIN HFA, PROAIR HFA Take 2 Puffs by inhalation every four (4) hours as needed for Wheezing. amLODIPine 5 mg tablet Commonly known as:  Sundra [place] Take 1 Tab by mouth daily. * Blood-Glucose Meter monitoring kit Commonly known as:  [address]   [address] KIT Test 3 times a day * Blood-Glucose Meter monitoring kit Commonly known as:  [address]   [address] KIT Use daily  
  
 clobetasol 0.05 % ointment Commonly known as:  Lisabeth Puls Apply 1 Tube to affected area two (2) times a day. furosemide 40 mg tablet Commonly known as:  LASIX Take 1 Tab by mouth daily. glucose blood VI test strips strip Commonly known as:  ONETOUCH ULTRA TEST  
tiuse to test blood sugar three times a day. dx.e11.9  
  
 hydrALAZINE 25 mg tablet Commonly known as:  APRESOLINE Take 1 Tab by mouth two (2) times a day. insulin glargine 100 unit/mL (3 mL) Inpn Commonly known as:  LANTUS SOLOSTAR  
30 Units by SubCUTAneous route daily. Insulin Needles (Disposable) 29 gauge x 1/2\" Ndle Commonly known as:  BD INSULIN PEN NEEDLE UF ORIG  
tid  
  
 metFORMIN  mg tablet Commonly known as:  GLUCOPHAGE XR Take 2 Tabs by mouth two (2) times daily (with meals). metoprolol succinate 25 mg XL tablet Commonly known as:  TOPROL-XL Take 1 Tab by mouth daily. omeprazole 40 mg capsule Commonly known as:  PRILOSEC Take 40 mg by mouth daily. * One Touch Delica 33 gauge Misc Generic drug:  lancets * Lancets Misc  
delica one touch * Lancets Misc Commonly known as:  ONETOUCH ULTRASOFT LANCETS  
USE TO TEST THREE TIMES A DAY. Dx.e11.9  
  
 * ONETOUCH DELICA LANCETS 30 gauge Misc Generic drug:  lancets USE TO TEST BLOOD SUGAR 3 TIMES A DAY  
  
 quinapril 40 mg tablet Commonly known as:  ACCUPRIL Take 1 Tab by mouth daily. simvastatin 40 mg tablet Commonly known as:  ZOCOR Take 1 Tab by mouth nightly. * Notice: This list has 6 medication(s) that are the same as other medications prescribed for you. Read the directions carefully, and ask your doctor or other care provider to review them with you. We Performed the Following CBC WITH AUTOMATED DIFF [69485 CPT(R)] HEMOGLOBIN A1C WITH EAG [56808 CPT(R)] LIPID PANEL [32428 CPT(R)] METABOLIC PANEL, COMPREHENSIVE [15288 CPT(R)] RI COLLECTION VENOUS BLOOD,VENIPUNCTURE [id number] CPT(R)] TSH 3RD GENERATION [11802 CPT(R)] URINALYSIS W/ RFLX MICROSCOPIC [16398 CPT(R)] Follow-up Instructions Return in about 3 months (around 10/20/2017). To-Do List   
 07/21/2017 1:00 PM  
  [facility] [address] [place] [address] ([phone]) Shower or bathe using soap and water. Do not use deodorant, powder, perfumes, or lotion the day of your exam.  If your prior mammograms were not performed at [facility] 6 please bring films with you or forward prior images 2 days before your procedure. If patient is not a callback diagnostic, the patient must have an order/script from the physician for the diagnostic.  Please bring it on the day of the mammogram or have it faxed to the department. [facility]  [phone] [facility] [address] [place]  [phone] [facility] [phone] [facility] [address] [facility] [phone] Neha Dust [phone] Introducing [facility] SERVICES! Dear Sandoval Cramer: Thank you for requesting a scenios account. Our records indicate that you already have an active scenios account. You can access your account anytime at [email or web address] Did you know that you can access your hospital and ER discharge instructions at any time in scenios? You can also review all of your test results from your hospital stay or ER visit. Additional Information If you have questions, please visit the Frequently Asked Questions section of the scenios website at [email or web address] Remember, scenios is NOT to be used for urgent needs. For medical emergencies, dial 911. Now available from your iPhone and Android! Please provide this summary of care documentation to your next provider. Your primary care clinician is listed as Lisbet Garcia. If you have any questions after today's visit, please call [phone].

## 2017-07-20 NOTE — PROGRESS NOTES
.1. Have you been to the ER, urgent care clinic since your last visit? Hospitalized since your last visit? Yes Where: Urgent Care    2. Have you seen or consulted any other health care providers outside of the [address] [place] [address] since your last visit? Include any pap smears or colon screening. Yes Reason for visit: Bronchitis    This is a \"Welcome to [employer]"  Initial Preventive Physical Examination (IPPE) providing Personalized Prevention Plan Services (Performed in the first 12 months of enrollment)    I have reviewed the patient's medical history in detail and updated the computerized patient record. HistoryPatient returns today alert, appropriate, ambulatory, has the capacity to give an accurate history. She has made a decision to stop the Januvia due to cost and we don't disagree as she is making other decisions likewise. Specific complaints are denied and patient is seen for evaluation. Past Medical History:   Diagnosis Date    Diabetes ([address] 1995    Hypercholesterolemia     Hypertension     LBP (low back pain)     chiorpactor    Lumbar stenosis     Obesity     S/P colonoscopy 12-18-12      Past Surgical History:   Procedure Laterality Date    HX BREAST BIOPSY Right 1990    negative    HX COLONOSCOPY       Current Outpatient Prescriptions   Medication Sig Dispense Refill    ONETOUCH DELICA LANCETS 30 gauge misc USE TO TEST BLOOD SUGAR 3 TIMES A DAY  3    glucose blood VI test strips (ONETOUCH ULTRA TEST) strip tiuse to test blood sugar three times a day. dx.e11.9 300 Strip 3    Lancets (ONETOUCH ULTRASOFT LANCETS) misc USE TO TEST THREE TIMES A DAY. Dx.e11.9 300 Each 3    metFORMIN ER (GLUCOPHAGE XR) 500 mg tablet Take 2 Tabs by mouth two (2) times daily (with meals). 360 Tab 95    furosemide (LASIX) 40 mg tablet Take 1 Tab by mouth daily. 90 Tab 10    metoprolol succinate (TOPROL-XL) 25 mg XL tablet Take 1 Tab by mouth daily.  90 Tab 11    simvastatin (ZOCOR) 40 mg tablet Take 1 Tab by mouth nightly. 90 Tab 10    amLODIPine (NORVASC) 5 mg tablet Take 1 Tab by mouth daily. 90 Tab 3    quinapril (ACCUPRIL) 40 mg tablet Take 1 Tab by mouth daily. 90 Tab 11    insulin glargine (LANTUS SOLOSTAR) 100 unit/mL (3 mL) pen 30 Units by SubCUTAneous route daily. 3 Each 11    hydrALAZINE (APRESOLINE) 25 mg tablet Take 1 Tab by mouth two (2) times a day. 180 Tab 10    Lancets misc delica one touch 1 Each 11    Insulin Needles, Disposable, (BD INSULIN PEN NEEDLE UF ORIG) 29 gauge x 1/2\" ndle tid 100 Pen Needle 11    ONE TOUCH DELICA 33 gauge misc       Blood-Glucose Meter (ONETOUCH ULTRA SYSTEM KIT) monitoring kit Test 3 times a day 1 Kit 0    clobetasol (TEMOVATE) 0.05 % ointment Apply 1 Tube to affected area two (2) times a day. 60 g 11    omeprazole (PRILOSEC) 40 mg capsule Take 40 mg by mouth daily.  albuterol (PROVENTIL HFA, VENTOLIN HFA, PROAIR HFA) 90 mcg/actuation inhaler Take 2 Puffs by inhalation every four (4) hours as needed for Wheezing. 1 Inhaler 0    Blood-Glucose Meter (ONETOUCH ULTRA SYSTEM KIT) monitoring kit Use daily 1 Kit 0    AFLURIA [id number] 45 mcg (15 mcg x 3)/0.5 mL susp   0     No Known Allergies  Family History   Problem Relation Age of Onset    Cancer Mother     No Known Problems Father      Social History   Substance Use Topics    Smoking status: Never Smoker    Smokeless tobacco: Never Used    Alcohol use No     Diet, Lifestyle: generally follows a low fat low cholesterol diet    Exercise level: very active    Depression Risk Screen     PHQ over the last two weeks 7/20/2017   PHQ Not Done Patient Decline   Little interest or pleasure in doing things Not at all   Feeling down, depressed or hopeless Not at all   Total Score PHQ 2 0     Alcohol Risk Screen   On any occasion during the past 3 months, have you had more than 3 drinks containing alcohol? No    Do you average more than 7 drinks per week?   No      Functional Ability and Level of Safety Hearing Loss   normal-to-mild    Activities of Daily Living   Self-care     Fall Risk Screen   Fall Risk Assessment, last 12 mths 7/20/2017   Able to walk? Yes   Fall in past 12 months? No     Abuse Screen   Patient is not abused    Review of Systems   A comprehensive review of systems was negative except for that written in the HPI. Physical Examination     No exam data present     Visit Vitals    /60 (BP 1 Location: Left arm, BP Patient Position: Sitting)    Pulse 79    Temp 98.6 °F (37 °C) (Oral)    Resp 22    Ht 5' 3\" (1.6 m)    Wt 213 lb (96.6 kg)    LMP  (LMP Unknown)    SpO2 96%    BMI 37.73 kg/m2     General:  Alert, cooperative, no distress, appears stated age. Head:  Normocephalic, without obvious abnormality, atraumatic. Eyes:  Conjunctivae/corneas clear. PERRL, EOMs intact. Fundi benign. Ears:  Normal TMs and external ear canals both ears. Nose: Nares normal. Septum midline. Mucosa normal. No drainage or sinus tenderness. Throat: Lips, mucosa, and tongue normal. Teeth and gums normal.   Neck: Supple, symmetrical, trachea midline, no adenopathy, thyroid: no enlargement/tenderness/nodules, no carotid bruit and no JVD. Back:   Symmetric, no curvature. ROM normal. No CVA tenderness. Lungs:   Clear to auscultation bilaterally. Chest wall:  No tenderness or deformity. Heart:  Regular rate and rhythm, S1, S2 normal, no murmur, click, rub or gallop. Breast Exam:  No tenderness, masses, or nipple abnormality. Abdomen:   Soft, non-tender. Bowel sounds normal. No masses,  No organomegaly. Genitalia:  Normal female without lesion, discharge or tenderness. Rectal:  Normal tone,  no masses or tenderness  Guaiac negative stool. Extremities: Extremities normal, atraumatic, no cyanosis or edema. Pulses: 2+ and symmetric all extremities. Skin: Skin color, texture, turgor normal. No rashes or lesions.    Lymph nodes: Cervical, supraclavicular, and axillary nodes normal. Neurologic: CNII-XII intact. Normal strength, sensation and reflexes throughout. EKG Screening: unchanged from previous tracings. Patient Care Team:  Jene Cogan, MD as PCP - General (Internal Medicine)     End-of-life planning  Advanced Directive discussed and documented: YES      Assessment/Plan   Education and counseling provided:  Are appropriate based on today's review and evaluation    ICD-10-CM ICD-9-CM    1. Routine general medical examination at a health care facility Z00.00 V70.0    2. Screening for alcoholism Z13.89 V79.1    3. Type 2 diabetes mellitus without complication, with long-term current use of insulin (HCC) E11.9 250.00 URINALYSIS W/ RFLX MICROSCOPIC    Z79.4 V58.67 CBC WITH AUTOMATED DIFF      METABOLIC PANEL, COMPREHENSIVE      LIPID PANEL      TSH 3RD GENERATION      OR COLLECTION VENOUS BLOOD,VENIPUNCTURE      HEMOGLOBIN A1C WITH EAG   4. Hypercholesterolemia E78.00 272.0    5. Essential hypertension I10 401.9    6. Non morbid obesity due to excess calories E66.09 278.00    7. Lumbar stenosis M48.06 724.02    . Advance Care Planning (ACP) Provider Conversation Snapshot    Date of ACP Conversation: 07/20/17  Persons included in Conversation:  patient  Length of ACP Conversation in minutes:  <16 minutes (Non-Billable)    Authorized Decision Maker (if patient is incapable of making informed decisions): This person is:   Healthcare Agent/Medical Power of  under Advance Directive  Mel mena        For Patients with Decision Making Capacity:   Values/Goals: Exploration of values, goals, and preferences if recovery is not expected, even with continued medical treatment in the event of:  Imminent death  Severe, permanent brain injury  \"In these circumstances, what matters most to you? \"  Care focused more on comfort or quality of life.     Conversation Outcomes / Follow-Up Plan:   Entered DNR order (If yes, complete Durable DNR form)    Patient's medical status is stable. Her blood sugar control has been fair. Her Hgb A1c is generally less than 7, which is commendable. We advised her that if she stops her  she will have to be more attentive to her diet and she assures me that she will, which will be proven with her next Hgb A1c. Blood pressure control is adequate. Her BMI remains in the obese category and represents a 3 pound weight gain, which does not speak towards what she told me she was going to do. I am sure this  in the future and I will look forward to seeing her next weight in three months. She will return to the office in three months. Will send the results of the laboratory studies.

## 2017-07-21 ENCOUNTER — HOSPITAL ENCOUNTER (OUTPATIENT)
Dept: MAMMOGRAPHY | Age: 67
Discharge: HOME | End: 2017-07-21
Attending: INTERNAL MEDICINE
Payer: MEDICARE

## 2017-07-21 DIAGNOSIS — R92.8 FOLLOW-UP EXAMINATION OF ABNORMAL MAMMOGRAM: ICD-10-CM

## 2017-07-21 LAB
ALBUMIN SERPL-MCNC: 4.2 G/DL (ref 3.6–4.8)
ALBUMIN/GLOB SERPL: 1.6 {RATIO} (ref 1.2–2.2)
ALP SERPL-CCNC: 46 IU/L (ref 39–117)
ALT SERPL-CCNC: 14 IU/L (ref 0–32)
APPEARANCE UR: CLEAR
AST SERPL-CCNC: 15 IU/L (ref 0–40)
BACTERIA #/AREA URNS HPF: ABNORMAL /[HPF]
BASOPHILS # BLD AUTO: 0 X10E3/UL (ref 0–0.2)
BASOPHILS NFR BLD AUTO: 0 %
BILIRUB SERPL-MCNC: 0.2 MG/DL (ref 0–1.2)
BILIRUB UR QL STRIP: NEGATIVE
BUN SERPL-MCNC: 18 MG/DL (ref 8–27)
BUN/CREAT SERPL: 20 (ref 12–28)
CALCIUM SERPL-MCNC: 9.8 MG/DL (ref 8.7–10.3)
CASTS URNS QL MICRO: ABNORMAL /LPF
CHLORIDE SERPL-SCNC: 104 MMOL/L (ref 96–106)
CHOLEST SERPL-MCNC: 134 MG/DL (ref 100–199)
CO2 SERPL-SCNC: 24 MMOL/L (ref 18–29)
COLOR UR: YELLOW
CREAT SERPL-MCNC: 0.92 MG/DL (ref 0.57–1)
CRYSTALS URNS MICRO: ABNORMAL
EOSINOPHIL # BLD AUTO: 0.1 X10E3/UL (ref 0–0.4)
EOSINOPHIL NFR BLD AUTO: 2 %
EPI CELLS #/AREA URNS HPF: ABNORMAL /HPF
ERYTHROCYTE [DISTWIDTH] IN BLOOD BY AUTOMATED COUNT: 15.8 % (ref 12.3–15.4)
EST. AVERAGE GLUCOSE BLD GHB EST-MCNC: 143 MG/DL
GLOBULIN SER CALC-MCNC: 2.6 G/DL (ref 1.5–4.5)
GLUCOSE SERPL-MCNC: 117 MG/DL (ref 65–99)
GLUCOSE UR QL: NEGATIVE
HBA1C MFR BLD: 6.6 % (ref 4.8–5.6)
HCT VFR BLD AUTO: 39 % (ref 34–46.6)
HDLC SERPL-MCNC: 51 MG/DL
HGB BLD-MCNC: 12.8 G/DL (ref 11.1–15.9)
HGB UR QL STRIP: NEGATIVE
IMM GRANULOCYTES # BLD: 0 X10E3/UL (ref 0–0.1)
IMM GRANULOCYTES NFR BLD: 0 %
KETONES UR QL STRIP: NEGATIVE
LDLC SERPL CALC-MCNC: 63 MG/DL (ref 0–99)
LEUKOCYTE ESTERASE UR QL STRIP: ABNORMAL
LYMPHOCYTES # BLD AUTO: 1.9 X10E3/UL (ref 0.7–3.1)
LYMPHOCYTES NFR BLD AUTO: 25 %
MCH RBC QN AUTO: 28.6 PG (ref 26.6–33)
MCHC RBC AUTO-ENTMCNC: 32.8 G/DL (ref 31.5–35.7)
MCV RBC AUTO: 87 FL (ref 79–97)
MICRO URNS: ABNORMAL
MONOCYTES # BLD AUTO: 0.6 X10E3/UL (ref 0.1–0.9)
MONOCYTES NFR BLD AUTO: 8 %
MUCOUS THREADS URNS QL MICRO: PRESENT
NEUTROPHILS # BLD AUTO: 5.1 X10E3/UL (ref 1.4–7)
NEUTROPHILS NFR BLD AUTO: 65 %
NITRITE UR QL STRIP: NEGATIVE
PH UR STRIP: 6.5 [PH] (ref 5–7.5)
PLATELET # BLD AUTO: 311 X10E3/UL (ref 150–379)
POTASSIUM SERPL-SCNC: 4.5 MMOL/L (ref 3.5–5.2)
PROT SERPL-MCNC: 6.8 G/DL (ref 6–8.5)
PROT UR QL STRIP: ABNORMAL
RBC # BLD AUTO: 4.48 X10E6/UL (ref 3.77–5.28)
RBC #/AREA URNS HPF: ABNORMAL /HPF
SODIUM SERPL-SCNC: 143 MMOL/L (ref 134–144)
SP GR UR: >=1.03 (ref 1–1.03)
TRIGL SERPL-MCNC: 98 MG/DL (ref 0–149)
TSH SERPL DL<=0.005 MIU/L-ACNC: 1.19 UIU/ML (ref 0.45–4.5)
UNIDENT CRYS URNS QL MICRO: PRESENT
UROBILINOGEN UR STRIP-MCNC: 0.2 MG/DL (ref 0.2–1)
VLDLC SERPL CALC-MCNC: 20 MG/DL (ref 5–40)
WBC # BLD AUTO: 7.7 X10E3/UL (ref 3.4–10.8)
WBC #/AREA URNS HPF: ABNORMAL /HPF

## 2017-07-21 PROCEDURE — 77065 DX MAMMO INCL CAD UNI: CPT

## 2017-08-23 ENCOUNTER — HOSPITAL ENCOUNTER (EMERGENCY)
Age: 67
Discharge: HOME | End: 2017-08-23
Attending: FAMILY MEDICINE

## 2017-08-23 VITALS
HEIGHT: 63 IN | DIASTOLIC BLOOD PRESSURE: 81 MMHG | SYSTOLIC BLOOD PRESSURE: 185 MMHG | HEART RATE: 81 BPM | WEIGHT: 208 LBS | OXYGEN SATURATION: 97 % | BODY MASS INDEX: 36.86 KG/M2 | RESPIRATION RATE: 18 BRPM | TEMPERATURE: 97.1 F

## 2017-08-23 DIAGNOSIS — M17.11 PRIMARY OSTEOARTHRITIS OF RIGHT KNEE: ICD-10-CM

## 2017-08-23 DIAGNOSIS — M25.461 KNEE EFFUSION, RIGHT: Primary | ICD-10-CM

## 2017-08-23 RX ORDER — PREDNISONE 10 MG/1
TABLET ORAL
Qty: 21 TAB | Refills: 0 | Status: SHIPPED | OUTPATIENT
Start: 2017-08-23 | End: 2017-12-27

## 2017-08-23 RX ORDER — DICLOFENAC SODIUM 50 MG/1
50 TABLET, DELAYED RELEASE ORAL 2 TIMES DAILY
Qty: 20 TAB | Refills: 0 | Status: SHIPPED | OUTPATIENT
Start: 2017-08-23 | End: 2017-09-28

## 2017-08-23 NOTE — DISCHARGE INSTRUCTIONS
Knee Arthritis: Care Instructions  Your Care Instructions  Knee arthritis is a breakdown of the cartilage that cushions your knee joint. When the cartilage wears down, your bones rub against each other. This causes pain and stiffness. Knee arthritis tends to get worse with time. Treatment for knee arthritis involves reducing pain, making the leg muscles stronger, and staying at a healthy body weight. The treatment usually does not improve the health of the cartilage, but it can reduce pain and improve how well your knee works. You can take simple measures to protect your knee joints, ease your pain, and help you stay active. Follow-up care is a key part of your treatment and safety. Be sure to make and go to all appointments, and call your doctor if you are having problems. It's also a good idea to know your test results and keep a list of the medicines you take. How can you care for yourself at home? · Know that knee arthritis will cause more pain on some days than on others. · Stay at a healthy weight. Lose weight if you are overweight. When you stand up, the pressure on your knees from every pound of body weight is multiplied four times. So if you lose 10 pounds, you will reduce the pressure on your knees by 40 pounds. · Talk to your doctor or physical therapist about exercises that will help ease joint pain. ¨ Stretch to help prevent stiffness and to prevent injury before you exercise. You may enjoy gentle forms of yoga to help keep your knee joints and muscles flexible. ¨ Walk instead of jog. ¨ Ride a bike. This makes your thigh muscles stronger and takes pressure off your knee. ¨ Wear well-fitting and comfortable shoes. ¨ Exercise in chest-deep water. This can help you exercise longer with less pain. ¨ Avoid exercises that include squatting or kneeling. They can put a lot of strain on your knees.   ¨ Talk to your doctor to make sure that the exercise you do is not making the arthritis worse.  · Do not sit for long periods of time. Try to walk once in a while to keep your knee from getting stiff. · Ask your doctor or physical therapist whether shoe inserts may reduce your arthritis pain. · If you can afford it, get new athletic shoes at least every year. This can help reduce the strain on your knees. · Use a device to help you do everyday activities. ¨ A cane or walking stick can help you keep your balance when you walk. Hold the cane or walking stick in the hand opposite the painful knee. ¨ If you feel like you may fall when you walk, try using crutches or a front-wheeled walker. These can prevent falls that could cause more damage to your knee. ¨ A knee brace may help keep your knee stable and prevent pain. ¨ You also can use other things to make life easier, such as a higher toilet seat and handrails in the bathtub or shower. · Take pain medicines exactly as directed. ¨ Do not wait until you are in severe pain. You will get better results if you take it sooner. ¨ If you are not taking a prescription pain medicine, take an over-the-counter medicine such as acetaminophen (Tylenol), ibuprofen (Advil, Motrin), or naproxen (Aleve). Read and follow all instructions on the label. ¨ Do not take two or more pain medicines at the same time unless the doctor told you to. Many pain medicines have acetaminophen, which is Tylenol. Too much acetaminophen (Tylenol) can be harmful. ¨ Tell your doctor if you take a blood thinner, have diabetes, or have allergies to shellfish. · Ask your doctor if you might benefit from a shot of steroid medicine into your knee. This may provide pain relief for several months. · Many people take the supplements glucosamine and chondroitin for osteoarthritis. Some people feel they help, but the medical research does not show that they work. Talk to your doctor before you take these supplements. When should you call for help?   Call your doctor now or seek immediate medical care if:  · You have sudden swelling, warmth, or pain in your knee. · You have knee pain and a fever or rash. · You have such bad pain that you cannot use your knee. Watch closely for changes in your health, and be sure to contact your doctor if you have any problems. Where can you learn more? Go to http://king-charles.info/. Enter S890 in the search box to learn more about \"Knee Arthritis: Care Instructions. \"  Current as of: October 31, 2016  Content Version: 11.3  © 6157-7963 [employer]. Care instructions adapted under license by [employer] (which disclaims liability or warranty for this information). If you have questions about a medical condition or this instruction, always ask your healthcare professional. [address] any warranty or liability for your use of this information. Knee Arthritis: Exercises  Your Care Instructions  Here are some examples of exercises for knee arthritis. Start each exercise slowly. Ease off the exercise if you start to have pain. Your doctor or physical therapist will tell you when you can start these exercises and which ones will work best for you. How to do the exercises  Knee flexion with heel slide    1. Lie on your back with your knees bent. 2. Slide your heel back by bending your affected knee as far as you can. Then hook your other foot around your ankle to help pull your heel even farther back. 3. Hold for about 6 seconds, then rest for up to 10 seconds. 4. Repeat 8 to 12 times. 5. Switch legs and repeat steps 1 through 4, even if only one knee is sore. Quad sets    1. Sit with your affected leg straight and supported on the floor or a firm bed. Place a small, rolled-up towel under your knee. Your other leg should be bent, with that foot flat on the floor. 2. Tighten the thigh muscles of your affected leg by pressing the back of your knee down into the towel.   3. Hold for about 6 seconds, then rest for up to 10 seconds. 4. Repeat 8 to 12 times. 5. Switch legs and repeat steps 1 through 4, even if only one knee is sore. Straight-leg raises to the front    1. Lie on your back with your good knee bent so that your foot rests flat on the floor. Your affected leg should be straight. Make sure that your low back has a normal curve. You should be able to slip your hand in between the floor and the small of your back, with your palm touching the floor and your back touching the back of your hand. 2. Tighten the thigh muscles in your affected leg by pressing the back of your knee flat down to the floor. Hold your knee straight. 3. Keeping the thigh muscles tight and your leg straight, lift your affected leg up so that your heel is about 12 inches off the floor. Hold for about 6 seconds, then lower slowly. 4. Relax for up to 10 seconds between repetitions. 5. Repeat 8 to 12 times. 6. Switch legs and repeat steps 1 through 5, even if only one knee is sore. Active knee flexion    1. Lie on your stomach with your knees straight. If your kneecap is uncomfortable, roll up a washcloth and put it under your leg just above your kneecap. 2. Lift the foot of your affected leg by bending the knee so that you bring the foot up toward your buttock. If this motion hurts, try it without bending your knee quite as far. This may help you avoid any painful motion. 3. Slowly move your leg up and down. 4. Repeat 8 to 12 times. 5. Switch legs and repeat steps 1 through 4, even if only one knee is sore. Quadriceps stretch (facedown)    1. Lie flat on your stomach, and rest your face on the floor. 2. Wrap a towel or belt strap around the lower part of your affected leg. Then use the towel or belt strap to slowly pull your heel toward your buttock until you feel a stretch. 3. Hold for about 15 to 30 seconds, then relax your leg against the towel or belt strap. 4. Repeat 2 to 4 times.   5. Switch legs and repeat steps 1 through 4, even if only one knee is sore. Stationary exercise bike    If you do not have a stationary exercise bike at home, you can find one to ride at your local health club or community center. 1. Adjust the height of the bike seat so that your knee is slightly bent when your leg is extended downward. If your knee hurts when the pedal reaches the top, you can raise the seat so that your knee does not bend as much. 2. Start slowly. At first, try to do 5 to 10 minutes of cycling with little to no resistance. Then increase your time and the resistance bit by bit until you can do 20 to 30 minutes without pain. 3. If you start to have pain, rest your knee until your pain gets back to the level that is normal for you. Or cycle for less time or with less effort. Follow-up care is a key part of your treatment and safety. Be sure to make and go to all appointments, and call your doctor if you are having problems. It's also a good idea to know your test results and keep a list of the medicines you take. Where can you learn more? Go to http://king-charles.info/. Enter C159 in the search box to learn more about \"Knee Arthritis: Exercises. \"  Current as of: March 21, 2017  Content Version: 11.3  © 7121-1859 [employer]. Care instructions adapted under license by [email or web address] (which disclaims liability or warranty for this information). If you have questions about a medical condition or this instruction, always ask your healthcare professional. [place] [address] any warranty or liability for your use of this information.

## 2017-08-23 NOTE — UC PROVIDER NOTE
Patient is a 73 y.o. female presenting with knee pain. The history is provided by the patient. Knee Pain    This is a recurrent problem. The current episode started more than 1 week ago. The problem occurs daily. The problem has been gradually worsening. The pain is present in the right knee. The quality of the pain is described as aching. The pain is at a severity of 8/10. The pain is moderate. Associated symptoms include limited range of motion and stiffness. Associated symptoms comments: And swollen . The symptoms are aggravated by activity, movement and palpation. She has tried nothing for the symptoms. There has been no history of extremity trauma. Past Medical History:   Diagnosis Date    Diabetes ([address] 1995    Hypercholesterolemia     Hypertension     LBP (low back pain)     chiorpactor    Lumbar stenosis     Obesity     S/P colonoscopy 12-18-12        Past Surgical History:   Procedure Laterality Date    HX BREAST BIOPSY Right 1990    negative    HX COLONOSCOPY           Family History   Problem Relation Age of Onset    Cancer Mother     No Known Problems Father         Social History     Social History    Marital status: SINGLE     Spouse name: N/A    Number of children: N/A    Years of education: N/A     Occupational History    Not on file. Social History Main Topics    Smoking status: Never Smoker    Smokeless tobacco: Never Used    Alcohol use No    Drug use: No    Sexual activity: Not on file     Other Topics Concern    Not on file     Social History Narrative    Medical History: Diverticulosis 2002DM 1995primary HTN 1995Obesity 1995Dyslipidemia 1995January 9, 2012 moderate central stenosis L3-L4,    mild to moderate central stenosis at L4-L5 MRI    Gyn History: Last mammogram date 11/26/2013. Last menstrual perioddate hysterectomy 1992 FOR FIBROIDS, PT W ITH ONE OVARY. Last    papdate 2012. Menopausal hot flashes. Sexually active not currently sexually active.  History of abnormal pap smears none. History of STDs    none. Vaginal discharge or odor none. HysterectomyDate NONE.    OB History: Total pregnancies 1. Pre term deliveries (>20-36.6 w eeks) 1. Surgical History: [facility] right breast bx , rajanifelicitamaritza lora colonoscopy     Hospitalization/Major Diagnostic Procedure: [facility] right breast bx , rajanifelicitamaritza lora     Family History: Mother:  52 yrs, skin d/oFather: deceasedAunt: alive, lung cancerAdopted: alive    Social History: Alcohol Use Patient does not use alcohol. Smoking Status Patient is a never smoker. Marital Status: Single. Lives w ith:    alone. Education/School: has masters degree-[employer]. ALLERGIES: Review of patient's allergies indicates no known allergies. Review of Systems   Musculoskeletal: Positive for stiffness. All other systems reviewed and are negative. Vitals:    17 0837   BP: 185/81   Pulse: 81   Resp: 18   Temp: 97.1 °F (36.2 °C)   SpO2: 97%   Weight: 94.3 kg (208 lb)   Height: 5' 3\" (1.6 m)       Physical Exam   Constitutional: No distress. Musculoskeletal:        Right knee: She exhibits decreased range of motion, swelling, effusion and abnormal alignment. She exhibits no erythema, normal patellar mobility, normal meniscus and no MCL laxity. Tenderness found. Medial joint line and lateral joint line tenderness noted. Nursing note and vitals reviewed. MDM     Differential Diagnosis; Clinical Impression; Plan:     CLINICAL IMPRESSION:  Knee effusion, right  (primary encounter diagnosis)  Primary osteoarthritis of right knee      DDX    Plan:    RICE  Knee brace- self exercise  Prednisone- followed by NSAID  Follow with ortho  Amount and/or Complexity of Data Reviewed:   Tests in the radiology section of CPT®:  Reviewed   Review and summarize past medical records:  Yes  Risk of Significant Complications, Morbidity, and/or Mortality:   Presenting problems:   Moderate  Management options: Moderate  Progress:   Patient progress:  Stable      Procedures

## 2017-09-28 ENCOUNTER — OFFICE VISIT (OUTPATIENT)
Dept: INTERNAL MEDICINE CLINIC | Age: 67
End: 2017-09-28

## 2017-09-28 VITALS
WEIGHT: 213.7 LBS | HEIGHT: 63 IN | RESPIRATION RATE: 18 BRPM | DIASTOLIC BLOOD PRESSURE: 67 MMHG | SYSTOLIC BLOOD PRESSURE: 107 MMHG | BODY MASS INDEX: 37.86 KG/M2 | HEART RATE: 71 BPM | TEMPERATURE: 98.1 F

## 2017-09-28 DIAGNOSIS — M17.11 PRIMARY OSTEOARTHRITIS OF RIGHT KNEE: Primary | ICD-10-CM

## 2017-09-28 DIAGNOSIS — E66.09 NON MORBID OBESITY DUE TO EXCESS CALORIES: ICD-10-CM

## 2017-09-28 DIAGNOSIS — I10 ESSENTIAL HYPERTENSION: ICD-10-CM

## 2017-09-28 RX ORDER — CELECOXIB 200 MG/1
200 CAPSULE ORAL 2 TIMES DAILY
Qty: 60 CAP | Refills: 2 | Status: SHIPPED | OUTPATIENT
Start: 2017-09-28 | End: 2017-12-16 | Stop reason: SDUPTHER

## 2017-09-28 NOTE — PROGRESS NOTES
1. Have you been to the ER, urgent care clinic since your last visit? Hospitalized since your last visit? Yes When: 8-23-17 [facility] urgent care  for right knee    2. Have you seen or consulted any other health care providers outside of the [address] [place] [address] since your last visit? Include any pap smears or colon screening.  No

## 2017-09-28 NOTE — MR AVS SNAPSHOT
Visit Information Date & Time Provider Department Dept. Phone Encounter #  
 9/28/2017  9:15 AM [facility] [phone] [id number] Follow-up Instructions Return in about 4 weeks (around 10/26/2017). Follow-up and Disposition History Your Appointments 10/26/2017  2:30 PM  
Any with Emily Sims MD  
[address] ([address] Appt Note: 3 month f/u  
 [address] [facility], [place] [address]  
  
   
 [address] [facility], [place] [address] Upcoming Health Maintenance Date Due  
 EYE EXAM RETINAL OR DILATED Q1 3/24/2016 INFLUENZA AGE 9 TO ADULT 8/1/2017 MICROALBUMIN Q1 8/23/2017 FOBT Q 1 YEAR AGE 50-75 12/20/2017 HEMOGLOBIN A1C Q6M 1/20/2018 FOOT EXAM Q1 7/20/2018 LIPID PANEL Q1 7/20/2018 MEDICARE YEARLY EXAM 7/21/2018 GLAUCOMA SCREENING Q2Y 1/3/2019 BREAST CANCER SCRN MAMMOGRAM 7/21/2019 Pneumococcal 65+ Low/Medium Risk (2 of 2 - PPSV23) 9/18/2019 DTaP/Tdap/Td series (2 - Td) 12/20/2026 Allergies as of 9/28/2017  Review Complete On: 9/28/2017 By: Emily Sims MD  
 No Known Allergies Current Immunizations  Never Reviewed Name Date Influenza Vaccine 10/10/2016, 9/18/2014 Pneumococcal Vaccine (Unspecified Type) 9/18/2014 Not reviewed this visit You Were Diagnosed With   
  
 Codes Comments Primary osteoarthritis of right knee    -  Primary ICD-10-CM: M17.11 ICD-9-CM: 715.16 Essential hypertension     ICD-10-CM: I10 
ICD-9-CM: 401.9 Non morbid obesity due to excess calories     ICD-10-CM: E66.09 
ICD-9-CM: 278.00 Vitals BP Pulse Temp Resp Height(growth percentile) Weight(growth percentile) 107/67 71 98.1 °F (36.7 °C) (Oral) 18 5' 3\" (1.6 m) 213 lb 11.2 oz (96.9 kg) LMP BMI OB Status Smoking Status (LMP Unknown) 37.86 kg/m2 Hysterectomy Never Smoker BMI and BSA Data Body Mass Index Body Surface Area  
 37.86 kg/m 2 2.08 m 2 Preferred Pharmacy Pharmacy Name Phone [id number] [phone] Your Updated Medication List  
  
   
This list is accurate as of: 9/28/17 10:51 AM.  Always use your most recent med list.  
  
  
  
  
 AFLURIA [id number] 45 mcg (15 mcg x 3)/0.5 mL Susp Generic drug:  flu vaccine [id number](5 yr,up)  
  
 albuterol 90 mcg/actuation inhaler Commonly known as:  PROVENTIL HFA, VENTOLIN HFA, PROAIR HFA Take 2 Puffs by inhalation every four (4) hours as needed for Wheezing. amLODIPine 5 mg tablet Commonly known as:  Nette Raddle Take 1 Tab by mouth daily. * Blood-Glucose Meter monitoring kit Commonly known as:  [address]   [address] KIT Test 3 times a day * Blood-Glucose Meter monitoring kit Commonly known as:  [address]   [address] KIT Use daily  
  
 celecoxib 200 mg capsule Commonly known as:  CELEBREX Take 1 Cap by mouth two (2) times a day for 90 days. clobetasol 0.05 % ointment Commonly known as:  Alonna Gear Apply 1 Tube to affected area two (2) times a day. furosemide 40 mg tablet Commonly known as:  LASIX Take 1 Tab by mouth daily. glucose blood VI test strips strip Commonly known as:  ONETOUCH ULTRA TEST  
tiuse to test blood sugar three times a day. dx.e11.9  
  
 hydrALAZINE 25 mg tablet Commonly known as:  APRESOLINE Take 1 Tab by mouth two (2) times a day. insulin glargine 100 unit/mL (3 mL) Inpn Commonly known as:  LANTUS SOLOSTAR  
30 Units by SubCUTAneous route daily. Insulin Needles (Disposable) 29 gauge x 1/2\" Ndle Commonly known as:  BD INSULIN PEN NEEDLE UF ORIG  
tid  
  
 metFORMIN  mg tablet Commonly known as:  GLUCOPHAGE XR Take 2 Tabs by mouth two (2) times daily (with meals). metoprolol succinate 25 mg XL tablet Commonly known as:  TOPROL-XL Take 1 Tab by mouth daily. omeprazole 40 mg capsule Commonly known as:  PRILOSEC Take 40 mg by mouth daily. * One Touch Delica 33 gauge Misc Generic drug:  lancets * Lancets Misc  
delica one touch * Lancets Misc Commonly known as:  ONETOUCH ULTRASOFT LANCETS  
USE TO TEST THREE TIMES A DAY. Dx.e11.9  
  
 * ONETOUCH DELICA LANCETS 30 gauge Misc Generic drug:  lancets USE TO TEST BLOOD SUGAR 3 TIMES A DAY  
  
 predniSONE 10 mg dose pack Commonly known as:  STERAPRED DS As directed  
  
 quinapril 40 mg tablet Commonly known as:  ACCUPRIL Take 1 Tab by mouth daily. simvastatin 40 mg tablet Commonly known as:  ZOCOR Take 1 Tab by mouth nightly. * Notice: This list has 6 medication(s) that are the same as other medications prescribed for you. Read the directions carefully, and ask your doctor or other care provider to review them with you. Prescriptions Sent to Pharmacy Refills  
 celecoxib (CELEBREX) 200 mg capsule 2 Sig: Take 1 Cap by mouth two (2) times a day for 90 days. Class: Normal  
 Pharmacy: [address] [place] [address] #: [phone] Route: Oral  
  
We Performed the Following REFERRAL TO PHYSICAL THERAPY [BEN Custom] Comments:  
 Please evaluate patient for knee pain. Follow-up Instructions Return in about 4 weeks (around 10/26/2017). Referral Information Referral ID Referred By Referred To  
  
 [id number] Bryan VILLASENOR Not Available Visits Status Start Date End Date 1 New Request 9/28/17 9/28/18 If your referral has a status of pending review or denied, additional information will be sent to support the outcome of this decision. Introducing [facility]! Dear Ruiz Ho: Thank you for requesting a [employer] account.   Our records indicate that you already have an active [employer] account. You can access your account anytime at [email or web address] Did you know that you can access your hospital and ER discharge instructions at any time in [employer]? You can also review all of your test results from your hospital stay or ER visit. Additional Information If you have questions, please visit the Frequently Asked Questions section of the [employer] website at [email or web address] Remember, [employer] is NOT to be used for urgent needs. For medical emergencies, dial 911. Now available from your iPhone and Android! Please provide this summary of care documentation to your next provider. Your primary care clinician is listed as Yolanda Kohli. If you have any questions after today's visit, please call [phone].

## 2017-09-28 NOTE — PROGRESS NOTES
SPORTS MEDICINE AND PRIMARY CARE  Ila Steele MD, [address] [facility],[address] [address]  Phone:  [phone]  Fax: [phone]      Chief Complaint   Patient presents with    Knee Pain     right f/u         SUBECTIVE:    Tyler Koo is a 73 y.o. female Patient returns today alert, appropriate, ambulatory and has the capacity to give an accurate history. She has a known history of diabetes, dyslipidemia, primary hypertension, lumbar stenosis, and is seen for evaluation. Since we last saw her she was seen at urgent care on 08/23/17 by Yasmine Nelson M.D., who gave her prednisone to take, followed by an NSAID and a knee brace and  exercise. He suggested follow up with orthopedics. Patient comes in today continuing to have challenges with her knee and is seen for evaluation. Current Outpatient Prescriptions   Medication Sig Dispense Refill    celecoxib (CELEBREX) 200 mg capsule Take 1 Cap by mouth two (2) times a day for 90 days. 60 Cap 2    ONETOUCH DELICA LANCETS 30 gauge misc USE TO TEST BLOOD SUGAR 3 TIMES A DAY  3    glucose blood VI test strips (ONETOUCH ULTRA TEST) strip tiuse to test blood sugar three times a day. dx.e11.9 300 Strip 3    Lancets (ONETOUCH ULTRASOFT LANCETS) misc USE TO TEST THREE TIMES A DAY. Dx.e11.9 300 Each 3    metFORMIN ER (GLUCOPHAGE XR) 500 mg tablet Take 2 Tabs by mouth two (2) times daily (with meals). 360 Tab 95    furosemide (LASIX) 40 mg tablet Take 1 Tab by mouth daily. 90 Tab 10    metoprolol succinate (TOPROL-XL) 25 mg XL tablet Take 1 Tab by mouth daily. 90 Tab 11    simvastatin (ZOCOR) 40 mg tablet Take 1 Tab by mouth nightly. 90 Tab 10    amLODIPine (NORVASC) 5 mg tablet Take 1 Tab by mouth daily. 90 Tab 3    quinapril (ACCUPRIL) 40 mg tablet Take 1 Tab by mouth daily. 90 Tab 11    insulin glargine (LANTUS SOLOSTAR) 100 unit/mL (3 mL) pen 30 Units by SubCUTAneous route daily.  3 Each 11    hydrALAZINE (APRESOLINE) 25 mg tablet Take 1 Tab by mouth two (2) times a day. 180 Tab 10    Lancets misc delica one touch 1 Each 11    Insulin Needles, Disposable, (BD INSULIN PEN NEEDLE UF ORIG) 29 gauge x 1/2\" ndle tid 100 Pen Needle 11    albuterol (PROVENTIL HFA, VENTOLIN HFA, PROAIR HFA) 90 mcg/actuation inhaler Take 2 Puffs by inhalation every four (4) hours as needed for Wheezing. 1 Inhaler 0    ONE TOUCH DELICA 33 gauge misc       Blood-Glucose Meter (ONETOUCH ULTRA SYSTEM KIT) monitoring kit Use daily 1 Kit 0    Blood-Glucose Meter (ONETOUCH ULTRA SYSTEM KIT) monitoring kit Test 3 times a day 1 Kit 0    omeprazole (PRILOSEC) 40 mg capsule Take 40 mg by mouth daily.  AFLURIA [id number] 45 mcg (15 mcg x 3)/0.5 mL susp   0    predniSONE (STERAPRED DS) 10 mg dose pack As directed 21 Tab 0    clobetasol (TEMOVATE) 0.05 % ointment Apply 1 Tube to affected area two (2) times a day. 60 g 11     Past Medical History:   Diagnosis Date    Diabetes ([address] 1995    DJD (degenerative joint disease) of knee     Hypercholesterolemia     Hypertension     LBP (low back pain)     chiorpactor    Lumbar stenosis     Obesity     S/P colonoscopy 12-18-12     Past Surgical History:   Procedure Laterality Date    HX BREAST BIOPSY Right 1990    negative    HX COLONOSCOPY       No Known Allergies    REVIEW OF SYSTEMS:   No trauma, no falls. Social History     Social History    Marital status: SINGLE     Spouse name: N/A    Number of children: N/A    Years of education: N/A     Social History Main Topics    Smoking status: Never Smoker    Smokeless tobacco: Never Used    Alcohol use No    Drug use: No    Sexual activity: Not Asked     Other Topics Concern    None     Social History Narrative    Medical History: Diverticulosis 2002DM 1995primary HTN 1995Obesity 1995Dyslipidemia 1995January 9, 2012 moderate central stenosis L3-L4,    mild to moderate central stenosis at L4-L5 MRI    Gyn History: Last mammogram date 11/26/2013.  Last menstrual perioddate hysterectomy  FOR FIBROIDS, PT W ITH ONE OVARY. Last    papdate . Menopausal hot flashes. Sexually active not currently sexually active. History of abnormal pap smears none. History of STDs    none. Vaginal discharge or odor none. HysterectomyDate NONE.    OB History: Total pregnancies 1. Pre term deliveries (>20-36.6 w eeks) 1. Surgical History: [facility] right breast bx , kidney stones colonoscopy     Hospitalization/Major Diagnostic Procedure: [facility] rig breast bx , kidney stones     Family History: Mother:  52 yrs, skin d/oFather: deceasedAunt: alive, lung cancerAdopted: alive    Social History: Alcohol Use Patient does not use alcohol. Smoking Status Patient is a never smoker. Marital Status: Single. Lives w ith:    alone. Education/School: has masters degree-[employer].   r  Family History   Problem Relation Age of Onset    Cancer Mother     No Known Problems Father        OBJECTIVE:  Visit Vitals    /67    Pulse 71    Temp 98.1 °F (36.7 °C) (Oral)    Resp 18    Ht 5' 3\" (1.6 m)    Wt 213 lb 11.2 oz (96.9 kg)    LMP  (LMP Unknown)    BMI 37.86 kg/m2     ENT: perrla,  eom intact  NECK: supple.  Thyroid normal  CHEST: clear to ascultation and percussion   HEART: regular rate and rhythm  ABD: soft, bowel sounds active  EXTREMITIES: no edema, pulse 1+     Office Visit on 2017   Component Date Value Ref Range Status    Specific Gravity 2017      >=1.030* 1.005 - 1.030 Final    pH (UA) 2017 6.5  5.0 - 7.5 Final    Color 2017 Yellow  Yellow Final    Appearance 2017 Clear  Clear Final    Leukocyte Esterase 2017 1+* Negative Final    Protein 2017 Trace  Negative/Trace Final    Glucose 2017 Negative  Negative Final    Ketone 2017 Negative  Negative Final    Blood 2017 Negative  Negative Final    Bilirubin 2017 Negative  Negative Final    Urobilinogen 2017 0.2  0.2 - 1.0 mg/dL Final    Nitrites 07/20/2017 Negative  Negative Final    Microscopic Examination 07/20/2017 See additional order   Final    Microscopic was indicated and was performed.  WBC 07/20/2017 7.7  3.4 - 10.8 x10E3/uL Final    RBC 07/20/2017 4.48  3.77 - 5.28 x10E6/uL Final    HGB 07/20/2017 12.8  11.1 - 15.9 g/dL Final    HCT 07/20/2017 39.0  34.0 - 46.6 % Final    MCV 07/20/2017 87  79 - 97 fL Final    MCH 07/20/2017 28.6  26.6 - 33.0 pg Final    MCHC 07/20/2017 32.8  31.5 - 35.7 g/dL Final    RDW 07/20/2017 15.8* 12.3 - 15.4 % Final    PLATELET 40/27/5811 716  150 - 379 x10E3/uL Final    NEUTROPHILS 07/20/2017 65  % Final    Lymphocytes 07/20/2017 25  % Final    MONOCYTES 07/20/2017 8  % Final    EOSINOPHILS 07/20/2017 2  % Final    BASOPHILS 07/20/2017 0  % Final    ABS. NEUTROPHILS 07/20/2017 5.1  1.4 - 7.0 x10E3/uL Final    Abs Lymphocytes 07/20/2017 1.9  0.7 - 3.1 x10E3/uL Final    ABS. MONOCYTES 07/20/2017 0.6  0.1 - 0.9 x10E3/uL Final    ABS. EOSINOPHILS 07/20/2017 0.1  0.0 - 0.4 x10E3/uL Final    ABS. BASOPHILS 07/20/2017 0.0  0.0 - 0.2 x10E3/uL Final    IMMATURE GRANULOCYTES 07/20/2017 0  % Final    ABS. IMM.  GRANS. 07/20/2017 0.0  0.0 - 0.1 x10E3/uL Final    Glucose 07/20/2017 117* 65 - 99 mg/dL Final    BUN 07/20/2017 18  8 - 27 mg/dL Final    Creatinine 07/20/2017 0.92  0.57 - 1.00 mg/dL Final    GFR est non-AA 07/20/2017 66  >59 mL/min/1.73 Final    GFR est AA 07/20/2017 76  >59 mL/min/1.73 Final    BUN/Creatinine ratio 07/20/2017 20  12 - 28 Final    Sodium 07/20/2017 143  134 - 144 mmol/L Final    Potassium 07/20/2017 4.5  3.5 - 5.2 mmol/L Final    Chloride 07/20/2017 104  96 - 106 mmol/L Final    CO2 07/20/2017 24  18 - 29 mmol/L Final    Calcium 07/20/2017 9.8  8.7 - 10.3 mg/dL Final    Protein, total 07/20/2017 6.8  6.0 - 8.5 g/dL Final    Albumin 07/20/2017 4.2  3.6 - 4.8 g/dL Final    GLOBULIN, TOTAL 07/20/2017 2.6  1.5 - 4.5 g/dL Final    A-G Ratio 07/20/2017 1.6  1.2 - 2.2 Final    Bilirubin, total 07/20/2017 0.2  0.0 - 1.2 mg/dL Final    Alk. phosphatase 07/20/2017 46  39 - 117 IU/L Final    AST (SGOT) 07/20/2017 15  0 - 40 IU/L Final    ALT (SGPT) 07/20/2017 14  0 - 32 IU/L Final    Cholesterol, total 07/20/2017 134  100 - 199 mg/dL Final    Triglyceride 07/20/2017 98  0 - 149 mg/dL Final    HDL Cholesterol 07/20/2017 51  >39 mg/dL Final    VLDL, calculated 07/20/2017 20  5 - 40 mg/dL Final    LDL, calculated 07/20/2017 63  0 - 99 mg/dL Final    TSH 07/20/2017 1.190  0.450 - 4.500 uIU/mL Final    Hemoglobin A1c 07/20/2017 6.6* 4.8 - 5.6 % Final    Comment:          Pre-diabetes: 5.7 - 6.4           Diabetes: >6.4           Glycemic control for adults with diabetes: <7.0      Estimated average glucose 07/20/2017 143  mg/dL Final    WBC 07/20/2017 11-30* 0 - 5 /hpf Final    RBC 07/20/2017 None seen  0 - 2 /hpf Final    Epithelial cells 07/20/2017 0-10  0 - 10 /hpf Final    Casts 07/20/2017 None seen  None seen /lpf Final    Crystals 07/20/2017 Present* N/A Final    Crystal type 07/20/2017 Calcium Oxalate  N/A Final    Mucus 07/20/2017 Present  Not Estab. Final    Bacteria 07/20/2017 Few  None seen/Few Final          ASSESSMENT:  1. Primary osteoarthritis of right knee    2. Essential hypertension    3. Non morbid obesity due to excess calories      Patient has crepitation of ROM of the knee, but no pain on stressing the knee. I suggest NSAID, as well as physical therapy. We suggest she avoid steps and that she actively work on weight reduction. We offer orthopedic referral.  She'd like to try our plan first.  We agree. Blood pressure control is at goal.     Her BMI is disappointing, but she uses the excuse of no exercise and prednisone as the reason for the weight gain. She'll return to see me as scheduled.         PLAN:  .  Orders Placed This Encounter    REFERRAL TO PHYSICAL THERAPY    celecoxib (CELEBREX) 200 mg capsule Follow-up Disposition:  Return in about 4 weeks (around 10/26/2017). ATTENTION:   This medical record was transcribed using an electronic medical records system. Although proofread, it may and can contain electronic and spelling errors. Other human spelling and other errors may be present. Corrections may be executed at a later time. Please feel free to contact us for any clarifications as needed.

## 2017-10-26 ENCOUNTER — OFFICE VISIT (OUTPATIENT)
Dept: INTERNAL MEDICINE CLINIC | Age: 67
End: 2017-10-26

## 2017-10-26 VITALS
BODY MASS INDEX: 38.27 KG/M2 | OXYGEN SATURATION: 100 % | HEIGHT: 63 IN | DIASTOLIC BLOOD PRESSURE: 72 MMHG | RESPIRATION RATE: 22 BRPM | SYSTOLIC BLOOD PRESSURE: 151 MMHG | TEMPERATURE: 95.6 F | WEIGHT: 216 LBS | HEART RATE: 59 BPM

## 2017-10-26 DIAGNOSIS — E11.9 TYPE 2 DIABETES MELLITUS WITHOUT COMPLICATION, WITH LONG-TERM CURRENT USE OF INSULIN (HCC): Primary | ICD-10-CM

## 2017-10-26 DIAGNOSIS — Z79.4 TYPE 2 DIABETES MELLITUS WITHOUT COMPLICATION, WITH LONG-TERM CURRENT USE OF INSULIN (HCC): Primary | ICD-10-CM

## 2017-10-26 RX ORDER — DICLOFENAC SODIUM 50 MG/1
TABLET, DELAYED RELEASE ORAL
Refills: 0 | COMMUNITY
Start: 2017-08-23 | End: 2017-12-16

## 2017-10-26 RX ORDER — LANCETS
EACH MISCELLANEOUS
Qty: 300 EACH | Refills: 11 | Status: SHIPPED | OUTPATIENT
Start: 2017-10-26 | End: 2018-03-07

## 2017-10-26 RX ORDER — INSULIN GLARGINE 100 [IU]/ML
30 INJECTION, SOLUTION SUBCUTANEOUS
Qty: 10 ADJUSTABLE DOSE PRE-FILLED PEN SYRINGE | Refills: 11 | Status: SHIPPED | OUTPATIENT
Start: 2017-10-26 | End: 2017-11-01 | Stop reason: SDUPTHER

## 2017-10-26 NOTE — PROGRESS NOTES
SPORTS MEDICINE AND PRIMARY CARE  Melissa Otero MD, [address] [place] [address],[address] [address]  Phone:  [phone]  Fax: [phone]      Chief Complaint   Patient presents with    Diabetes         SUBECTIVE:    Chelsi Motley is a 73 y.o. female The patient returns today alert, appropriate and ambulatory. She has a known history of DJD of the knee for which she is trying Prednisone, brace, physical therapy and recently Celebrex, diabetes, primary hypertension, obesity. The patient returns today wanting refills on her medications. She needs a letter for her complex, so she does not have to go up and down steps. She is going to physical therapy with some improvement. Current Outpatient Prescriptions   Medication Sig Dispense Refill    insulin glargine (BASAGLAR KWIKPEN) 100 unit/mL (3 mL) inpn 30 Units by SubCUTAneous route nightly. 10 Adjustable Dose Pre-filled Pen Syringe 11    Lancets (ONETOUCH ULTRASOFT LANCETS) misc USE TO TEST THREE TIMES A DAY. Dx.e11.9 300 Each 11    celecoxib (CELEBREX) 200 mg capsule Take 1 Cap by mouth two (2) times a day for 90 days. 60 Cap 2    ONETOUCH DELICA LANCETS 30 gauge misc USE TO TEST BLOOD SUGAR 3 TIMES A DAY  3    glucose blood VI test strips (ONETOUCH ULTRA TEST) strip tiuse to test blood sugar three times a day. dx.e11.9 300 Strip 3    metFORMIN ER (GLUCOPHAGE XR) 500 mg tablet Take 2 Tabs by mouth two (2) times daily (with meals). 360 Tab 95    furosemide (LASIX) 40 mg tablet Take 1 Tab by mouth daily. 90 Tab 10    metoprolol succinate (TOPROL-XL) 25 mg XL tablet Take 1 Tab by mouth daily. 90 Tab 11    simvastatin (ZOCOR) 40 mg tablet Take 1 Tab by mouth nightly. 90 Tab 10    amLODIPine (NORVASC) 5 mg tablet Take 1 Tab by mouth daily. 90 Tab 3    quinapril (ACCUPRIL) 40 mg tablet Take 1 Tab by mouth daily. 90 Tab 11    hydrALAZINE (APRESOLINE) 25 mg tablet Take 1 Tab by mouth two (2) times a day.  [address] [facility] Anish one touch 1 Each 11    Insulin Needles, Disposable, (BD INSULIN PEN NEEDLE UF ORIG) 29 gauge x 1/2\" ndle tid 100 Pen Needle 11    albuterol (PROVENTIL HFA, VENTOLIN HFA, PROAIR HFA) 90 mcg/actuation inhaler Take 2 Puffs by inhalation every four (4) hours as needed for Wheezing. 1 Inhaler 0    ONE TOUCH DELICA 33 gauge misc       Blood-Glucose Meter (ONETOUCH ULTRA SYSTEM KIT) monitoring kit Use daily 1 Kit 0    Blood-Glucose Meter (ONETOUCH ULTRA SYSTEM KIT) monitoring kit Test 3 times a day 1 Kit 0    clobetasol (TEMOVATE) 0.05 % ointment Apply 1 Tube to affected area two (2) times a day. 60 g 11    omeprazole (PRILOSEC) 40 mg capsule Take 40 mg by mouth daily.  FLUZONE HIGH-DOSE 2017-18, PF, syrg injection ADMINISTERED BY PHARMACIST  0    diclofenac EC (VOLTAREN) 50 mg EC tablet TAKE 1 TAB BY MOUTH TWO (2) TIMES A DAY. START AFTER COMPLETING PREDNISONE  0    predniSONE (STERAPRED DS) 10 mg dose pack As directed 21 Tab 0    AFLURIA [id number] 45 mcg (15 mcg x 3)/0.5 mL susp   0     Past Medical History:   Diagnosis Date    Diabetes ([address] 1995    DJD (degenerative joint disease) of knee     Hypercholesterolemia     Hypertension     LBP (low back pain)     chiorpactor    Lumbar stenosis     Obesity     S/P colonoscopy 12-18-12     Past Surgical History:   Procedure Laterality Date    HX BREAST BIOPSY Right 1990    negative    HX COLONOSCOPY       No Known Allergies    REVIEW OF SYSTEMS:   Most of the equipment she used at physical therapy she has at her fitness center and we will encourage her to do that.              Social History     Social History    Marital status: SINGLE     Spouse name: N/A    Number of children: N/A    Years of education: N/A     Social History Main Topics    Smoking status: Never Smoker    Smokeless tobacco: Never Used    Alcohol use No    Drug use: No    Sexual activity: Not Asked     Other Topics Concern    None     Social History Narrative    Medical History: Diverticulosis 2002DM 1995primary HTN 1995Obesity Dyslipidemia 1995January 2012 moderate central stenosis L3-L4,    mild to moderate central stenosis at L4-L5 MRI    Gyn History: Last mammogram date 2013. Last menstrual perioddate hysterectomy  FOR FIBROIDS, PT W ITH ONE OVARY. Last    papdate . Menopausal hot flashes. Sexually active not currently sexually active. History of abnormal pap smears none. History of STDs    none. Vaginal discharge or odor none. HysterectomyDate NONE.    OB History: Total pregnancies 1. Pre term deliveries (>20-36.6 w eeks) 1. Surgical History: [facility] right breast bx , kidney stones colonoscopy     Hospitalization/Major Diagnostic Procedure: [facility] rig breast bx , kidney stones     Family History: Mother:  52 yrs, skin d/oFather: deceasedAunt: alive, lung cancerAdopted: alive    Social History: Alcohol Use Patient does not use alcohol. Smoking Status Patient is a never smoker. Marital Status: Single. Lives w ith:    alone. Education/School: has masters degree-[employer].   r  Family History   Problem Relation Age of Onset    Cancer Mother     No Known Problems Father        OBJECTIVE:  Visit Vitals    /72 (BP 1 Location: Right arm, BP Patient Position: Sitting)    Pulse (!) 59    Temp 95.6 °F (35.3 °C) (Oral)    Resp 22    Ht 5' 3\" (1.6 m)    Wt 216 lb (98 kg)    LMP  (LMP Unknown)    SpO2 100%    BMI 38.26 kg/m2     ENT: perrla,  eom intact  NECK: supple. Thyroid normal  CHEST: clear to ascultation and percussion   HEART: regular rate and rhythm  ABD: soft, bowel sounds active  EXTREMITIES: no edema, pulse 1+     No visits with results within 3 Month(s) from this visit.   Latest known visit with results is:    Office Visit on 2017   Component Date Value Ref Range Status    Specific Gravity 2017      >=1.030* 1.005 - 1.030 Final    pH (UA) 2017 6.5  5.0 - 7.5 Final    Color 2017 Yellow  Yellow Final    Appearance 07/20/2017 Clear  Clear Final    Leukocyte Esterase 07/20/2017 1+* Negative Final    Protein 07/20/2017 Trace  Negative/Trace Final    Glucose 07/20/2017 Negative  Negative Final    Ketone 07/20/2017 Negative  Negative Final    Blood 07/20/2017 Negative  Negative Final    Bilirubin 07/20/2017 Negative  Negative Final    Urobilinogen 07/20/2017 0.2  0.2 - 1.0 mg/dL Final    Nitrites 07/20/2017 Negative  Negative Final    Microscopic Examination 07/20/2017 See additional order   Final    Microscopic was indicated and was performed.  WBC 07/20/2017 7.7  3.4 - 10.8 x10E3/uL Final    RBC 07/20/2017 4.48  3.77 - 5.28 x10E6/uL Final    HGB 07/20/2017 12.8  11.1 - 15.9 g/dL Final    HCT 07/20/2017 39.0  34.0 - 46.6 % Final    MCV 07/20/2017 87  79 - 97 fL Final    MCH 07/20/2017 28.6  26.6 - 33.0 pg Final    MCHC 07/20/2017 32.8  31.5 - 35.7 g/dL Final    RDW 07/20/2017 15.8* 12.3 - 15.4 % Final    PLATELET 53/20/6406 422  150 - 379 x10E3/uL Final    NEUTROPHILS 07/20/2017 65  % Final    Lymphocytes 07/20/2017 25  % Final    MONOCYTES 07/20/2017 8  % Final    EOSINOPHILS 07/20/2017 2  % Final    BASOPHILS 07/20/2017 0  % Final    ABS. NEUTROPHILS 07/20/2017 5.1  1.4 - 7.0 x10E3/uL Final    Abs Lymphocytes 07/20/2017 1.9  0.7 - 3.1 x10E3/uL Final    ABS. MONOCYTES 07/20/2017 0.6  0.1 - 0.9 x10E3/uL Final    ABS. EOSINOPHILS 07/20/2017 0.1  0.0 - 0.4 x10E3/uL Final    ABS. BASOPHILS 07/20/2017 0.0  0.0 - 0.2 x10E3/uL Final    IMMATURE GRANULOCYTES 07/20/2017 0  % Final    ABS. IMM.  GRANS. 07/20/2017 0.0  0.0 - 0.1 x10E3/uL Final    Glucose 07/20/2017 117* 65 - 99 mg/dL Final    BUN 07/20/2017 18  8 - 27 mg/dL Final    Creatinine 07/20/2017 0.92  0.57 - 1.00 mg/dL Final    GFR est non-AA 07/20/2017 66  >59 mL/min/1.73 Final    GFR est AA 07/20/2017 76  >59 mL/min/1.73 Final    BUN/Creatinine ratio 07/20/2017 20  12 - 28 Final    Sodium 07/20/2017 143 134 - 144 mmol/L Final    Potassium 07/20/2017 4.5  3.5 - 5.2 mmol/L Final    Chloride 07/20/2017 104  96 - 106 mmol/L Final    CO2 07/20/2017 24  18 - 29 mmol/L Final    Calcium 07/20/2017 9.8  8.7 - 10.3 mg/dL Final    Protein, total 07/20/2017 6.8  6.0 - 8.5 g/dL Final    Albumin 07/20/2017 4.2  3.6 - 4.8 g/dL Final    GLOBULIN, TOTAL 07/20/2017 2.6  1.5 - 4.5 g/dL Final    A-G Ratio 07/20/2017 1.6  1.2 - 2.2 Final    Bilirubin, total 07/20/2017 0.2  0.0 - 1.2 mg/dL Final    Alk. phosphatase 07/20/2017 46  39 - 117 IU/L Final    AST (SGOT) 07/20/2017 15  0 - 40 IU/L Final    ALT (SGPT) 07/20/2017 14  0 - 32 IU/L Final    Cholesterol, total 07/20/2017 134  100 - 199 mg/dL Final    Triglyceride 07/20/2017 98  0 - 149 mg/dL Final    HDL Cholesterol 07/20/2017 51  >39 mg/dL Final    VLDL, calculated 07/20/2017 20  5 - 40 mg/dL Final    LDL, calculated 07/20/2017 63  0 - 99 mg/dL Final    TSH 07/20/2017 1.190  0.450 - 4.500 uIU/mL Final    Hemoglobin A1c 07/20/2017 6.6* 4.8 - 5.6 % Final    Comment:          Pre-diabetes: 5.7 - 6.4           Diabetes: >6.4           Glycemic control for adults with diabetes: <7.0      Estimated average glucose 07/20/2017 143  mg/dL Final    WBC 07/20/2017 11-30* 0 - 5 /hpf Final    RBC 07/20/2017 None seen  0 - 2 /hpf Final    Epithelial cells 07/20/2017 0-10  0 - 10 /hpf Final    Casts 07/20/2017 None seen  None seen /lpf Final    Crystals 07/20/2017 Present* N/A Final    Crystal type 07/20/2017 Calcium Oxalate  N/A Final    Mucus 07/20/2017 Present  Not Estab. Final    Bacteria 07/20/2017 Few  None seen/Few Final          ASSESSMENT:  1. Type 2 diabetes mellitus without complication, with long-term current use of insulin ([address]      Patient completed six sessions of physical therapy. We encouraged her to continue the exercise program they outlined.     Blood pressure is at the upper limits of normal.  Will ask her to monitor blood pressure at home to be sure it drops down to 130/80 or less. BMI still is in the obesity category and she is working on it. She'll return to the office in three months. PLAN:  .  Orders Placed This Encounter    FLUZONE HIGH-DOSE 2017-18, PF, syrg injection    diclofenac EC (VOLTAREN) 50 mg EC tablet    insulin glargine (BASAGLAR KWIKPEN) 100 unit/mL (3 mL) inpn    Lancets (ONETOUCH ULTRASOFT LANCETS) misc       Follow-up Disposition:  Return in about 3 months (around 1/26/2018). ATTENTION:   This medical record was transcribed using an electronic medical records system. Although proofread, it may and can contain electronic and spelling errors. Other human spelling and other errors may be present. Corrections may be executed at a later time. Please feel free to contact us for any clarifications as needed.

## 2017-10-26 NOTE — MR AVS SNAPSHOT
Visit Information Date & Time Provider Department Dept. Phone Encounter #  
 10/26/2017  2:30 PM Yosef Salcedo MD [facility] - [facility] [phone] [id number] Follow-up Instructions Return in about 3 months (around 1/26/2018). Follow-up and Disposition History Upcoming Health Maintenance Date Due FOBT Q 1 YEAR AGE 50-75 12/20/2017 EYE EXAM RETINAL OR DILATED Q1 4/26/2018* HEMOGLOBIN A1C Q6M 1/20/2018 FOOT EXAM Q1 7/20/2018 LIPID PANEL Q1 7/20/2018 MEDICARE YEARLY EXAM 7/21/2018 MICROALBUMIN Q1 10/26/2018 GLAUCOMA SCREENING Q2Y 1/3/2019 BREAST CANCER SCRN MAMMOGRAM 7/21/2019 Pneumococcal 65+ Low/Medium Risk (2 of 2 - PPSV23) 9/18/2019 DTaP/Tdap/Td series (2 - Td) 12/20/2026 *Topic was postponed. The date shown is not the original due date. Allergies as of 10/26/2017  Review Complete On: 10/26/2017 By: Yosef Salcedo MD  
 No Known Allergies Current Immunizations  Never Reviewed Name Date Influenza Vaccine 10/10/2016, 9/18/2014 Pneumococcal Vaccine (Unspecified Type) 9/18/2014 Not reviewed this visit You Were Diagnosed With   
  
 Codes Comments Type 2 diabetes mellitus without complication, with long-term current use of insulin (HCC)    -  Primary ICD-10-CM: E11.9, Z79.4 ICD-9-CM: 250.00, V58.67 Vitals BP Pulse Temp Resp Height(growth percentile) Weight(growth percentile) 151/72 (BP 1 Location: Right arm, BP Patient Position: Sitting) (!) 59 95.6 °F (35.3 °C) (Oral) 22 5' 3\" (1.6 m) 216 lb (98 kg) LMP SpO2 BMI OB Status Smoking Status (LMP Unknown) 100% 38.26 kg/m2 Hysterectomy Never Smoker BMI and BSA Data Body Mass Index Body Surface Area  
 38.26 kg/m 2 2.09 m 2 Preferred Pharmacy Pharmacy Name Phone [id number] Riki Omal[facility], [facility] [phone] Your Updated Medication List  
  
   
 This list is accurate as of: 10/26/17  3:26 PM.  Always use your most recent med list.  
  
  
  
  
 AFLURIA [id number] 45 mcg (15 mcg x 3)/0.5 mL Susp Generic drug:  flu vaccine ts2014-15(5 yr,up)  
  
 albuterol 90 mcg/actuation inhaler Commonly known as:  PROVENTIL HFA, VENTOLIN HFA, PROAIR HFA Take 2 Puffs by inhalation every four (4) hours as needed for Wheezing. amLODIPine 5 mg tablet Commonly known as:  Akila Pace Take 1 Tab by mouth daily. * Blood-Glucose Meter monitoring kit Commonly known as:  [address]   [address] KIT Test 3 times a day * Blood-Glucose Meter monitoring kit Commonly known as:  [address]   [address] KIT Use daily  
  
 celecoxib 200 mg capsule Commonly known as:  CELEBREX Take 1 Cap by mouth two (2) times a day for 90 days. clobetasol 0.05 % ointment Commonly known as:  Marthenia Alexia Apply 1 Tube to affected area two (2) times a day. diclofenac EC 50 mg EC tablet Commonly known as:  VOLTAREN  
TAKE 1 TAB BY MOUTH TWO (2) TIMES A DAY. START AFTER COMPLETING PREDNISONE FLUZONE HIGH-DOSE 2017-18 (PF) Syrg injection Generic drug:  influenza vaccine 2017-18 (65 yrs+)(PF)  
ADMINISTERED BY PHARMACIST  
  
 furosemide 40 mg tablet Commonly known as:  LASIX Take 1 Tab by mouth daily. glucose blood VI test strips strip Commonly known as:  ONETOUCH ULTRA TEST  
tiuse to test blood sugar three times a day. dx.e11.9  
  
 hydrALAZINE 25 mg tablet Commonly known as:  APRESOLINE Take 1 Tab by mouth two (2) times a day. insulin glargine 100 unit/mL (3 mL) Inpn Commonly known as:  BASAGLAR KWIKPEN  
30 Units by SubCUTAneous route nightly. Insulin Needles (Disposable) 29 gauge x 1/2\" Ndle Commonly known as:  BD INSULIN PEN NEEDLE UF ORIG  
tid  
  
 metFORMIN  mg tablet Commonly known as:  GLUCOPHAGE XR Take 2 Tabs by mouth two (2) times daily (with meals). metoprolol succinate 25 mg XL tablet Commonly known as:  TOPROL-XL Take 1 Tab by mouth daily. omeprazole 40 mg capsule Commonly known as:  PRILOSEC Take 40 mg by mouth daily. * One Touch Delica 33 gauge Misc Generic drug:  lancets * Lancets Misc  
delica one touch  
  
 * ONETOUCH DELICA LANCETS 30 gauge Misc Generic drug:  lancets USE TO TEST BLOOD SUGAR 3 TIMES A DAY * Lancets Misc Commonly known as:  ONETOUCH ULTRASOFT LANCETS  
USE TO TEST THREE TIMES A DAY. Dx.e11.9  
  
 predniSONE 10 mg dose pack Commonly known as:  STERAPRED DS As directed  
  
 quinapril 40 mg tablet Commonly known as:  ACCUPRIL Take 1 Tab by mouth daily. simvastatin 40 mg tablet Commonly known as:  ZOCOR Take 1 Tab by mouth nightly. * Notice: This list has 6 medication(s) that are the same as other medications prescribed for you. Read the directions carefully, and ask your doctor or other care provider to review them with you. Prescriptions Sent to Pharmacy Refills  
 insulin glargine (BASAGLAR KWIKPEN) 100 unit/mL (3 mL) inpn 11 Si Units by SubCUTAneous route nightly. Class: Normal  
 Pharmacy: [address], [address] [place] [address] Ph #: [phone] Route: SubCUTAneous Lancets (ONETOUCH ULTRASOFT LANCETS) misc 11 Sig: USE TO TEST THREE TIMES A DAY. Dx.e11.9 Class: Normal  
 Pharmacy: [address] [place] [address] Ph #: [phone] Follow-up Instructions Return in about 3 months (around 2018). Introducing [facility] & HEALTH SERVICES! Dear Laurie Cueto: Thank you for requesting a [employer] account. Our records indicate that you already have an active Cogn[employer] account. You can access your account anytime at [email or web address] Did you know that you can access your hospital and ER discharge instructions at any time in CogniFit?   You can also review all of your test results from your hospital stay or ER visit. Additional Information If you have questions, please visit the Frequently Asked Questions section of the [employer] website at [email or web address] Remember, [employer] is NOT to be used for urgent needs. For medical emergencies, dial 911. Now available from your iPhone and Android! Please provide this summary of care documentation to your next provider. Your primary care clinician is listed as August Beer. If you have any questions after today's visit, please call [phone].

## 2017-11-01 RX ORDER — INSULIN GLARGINE 100 [IU]/ML
30 INJECTION, SOLUTION SUBCUTANEOUS
Qty: 15 PEN | Refills: 3 | Status: SHIPPED | OUTPATIENT
Start: 2017-11-01 | End: 2018-01-25 | Stop reason: SDUPTHER

## 2017-12-16 RX ORDER — CELECOXIB 200 MG/1
CAPSULE ORAL
Qty: 60 CAP | Refills: 2 | Status: SHIPPED | OUTPATIENT
Start: 2017-12-16 | End: 2017-12-27

## 2017-12-18 ENCOUNTER — APPOINTMENT (OUTPATIENT)
Dept: CT IMAGING | Age: 67
DRG: 854 | End: 2017-12-18
Attending: INTERNAL MEDICINE
Payer: MEDICARE

## 2017-12-18 ENCOUNTER — HOSPITAL ENCOUNTER (INPATIENT)
Age: 67
LOS: 9 days | Discharge: HOME HEALTH | DRG: 854 | End: 2017-12-27
Attending: INTERNAL MEDICINE | Admitting: INTERNAL MEDICINE
Payer: MEDICARE

## 2017-12-18 ENCOUNTER — OFFICE VISIT (OUTPATIENT)
Dept: INTERNAL MEDICINE CLINIC | Age: 67
End: 2017-12-18

## 2017-12-18 VITALS
HEIGHT: 63 IN | OXYGEN SATURATION: 96 % | BODY MASS INDEX: 38.09 KG/M2 | WEIGHT: 215 LBS | RESPIRATION RATE: 20 BRPM | HEART RATE: 110 BPM | SYSTOLIC BLOOD PRESSURE: 129 MMHG | DIASTOLIC BLOOD PRESSURE: 74 MMHG | TEMPERATURE: 99.2 F

## 2017-12-18 DIAGNOSIS — L03.311 CELLULITIS OF RIGHT ABDOMINAL WALL: Primary | ICD-10-CM

## 2017-12-18 DIAGNOSIS — E11.9 TYPE 2 DIABETES MELLITUS WITHOUT COMPLICATION, WITH LONG-TERM CURRENT USE OF INSULIN (HCC): ICD-10-CM

## 2017-12-18 DIAGNOSIS — I10 ESSENTIAL HYPERTENSION: ICD-10-CM

## 2017-12-18 DIAGNOSIS — Z79.4 TYPE 2 DIABETES MELLITUS WITHOUT COMPLICATION, WITH LONG-TERM CURRENT USE OF INSULIN (HCC): ICD-10-CM

## 2017-12-18 LAB
ALBUMIN SERPL-MCNC: 2.7 G/DL (ref 3.5–5)
ALBUMIN/GLOB SERPL: 0.6 {RATIO} (ref 1.1–2.2)
ALP SERPL-CCNC: 64 U/L (ref 45–117)
ALT SERPL-CCNC: 24 U/L (ref 12–78)
ANION GAP SERPL CALC-SCNC: 9 MMOL/L (ref 5–15)
AST SERPL-CCNC: 17 U/L (ref 15–37)
BASOPHILS # BLD: 0 K/UL (ref 0–0.1)
BASOPHILS NFR BLD: 0 % (ref 0–1)
BILIRUB SERPL-MCNC: 0.5 MG/DL (ref 0.2–1)
BUN SERPL-MCNC: 20 MG/DL (ref 6–20)
BUN SERPL-MCNC: 20 MG/DL (ref 6–20)
BUN/CREAT SERPL: 24 (ref 12–20)
CALCIUM SERPL-MCNC: 9 MG/DL (ref 8.5–10.1)
CHLORIDE SERPL-SCNC: 107 MMOL/L (ref 97–108)
CHOLEST SERPL-MCNC: 95 MG/DL
CO2 SERPL-SCNC: 26 MMOL/L (ref 21–32)
CREAT SERPL-MCNC: 0.8 MG/DL (ref 0.55–1.02)
CREAT SERPL-MCNC: 0.82 MG/DL (ref 0.55–1.02)
EOSINOPHIL # BLD: 0.1 K/UL (ref 0–0.4)
EOSINOPHIL NFR BLD: 1 % (ref 0–7)
ERYTHROCYTE [DISTWIDTH] IN BLOOD BY AUTOMATED COUNT: 14.4 % (ref 11.5–14.5)
GLOBULIN SER CALC-MCNC: 4.6 G/DL (ref 2–4)
GLUCOSE BLD STRIP.AUTO-MCNC: 154 MG/DL (ref 65–100)
GLUCOSE BLD STRIP.AUTO-MCNC: 169 MG/DL (ref 65–100)
GLUCOSE SERPL-MCNC: 181 MG/DL (ref 65–100)
HCT VFR BLD AUTO: 34.4 % (ref 35–47)
HDLC SERPL-MCNC: 37 MG/DL
HDLC SERPL: 2.6 {RATIO} (ref 0–5)
HGB BLD-MCNC: 11.4 G/DL (ref 11.5–16)
LDLC SERPL CALC-MCNC: 43.4 MG/DL (ref 0–100)
LIPID PROFILE,FLP: NORMAL
LYMPHOCYTES # BLD: 1.4 K/UL (ref 0.8–3.5)
LYMPHOCYTES NFR BLD: 10 % (ref 12–49)
MCH RBC QN AUTO: 28.5 PG (ref 26–34)
MCHC RBC AUTO-ENTMCNC: 33.1 G/DL (ref 30–36.5)
MCV RBC AUTO: 86 FL (ref 80–99)
MONOCYTES # BLD: 1.5 K/UL (ref 0–1)
MONOCYTES NFR BLD: 10 % (ref 5–13)
NEUTS SEG # BLD: 11.4 K/UL (ref 1.8–8)
NEUTS SEG NFR BLD: 79 % (ref 32–75)
PLATELET # BLD AUTO: 250 K/UL (ref 150–400)
POTASSIUM SERPL-SCNC: 3.3 MMOL/L (ref 3.5–5.1)
PROT SERPL-MCNC: 7.3 G/DL (ref 6.4–8.2)
RBC # BLD AUTO: 4 M/UL (ref 3.8–5.2)
SERVICE CMNT-IMP: ABNORMAL
SERVICE CMNT-IMP: ABNORMAL
SODIUM SERPL-SCNC: 142 MMOL/L (ref 136–145)
TRIGL SERPL-MCNC: 73 MG/DL (ref ?–150)
VLDLC SERPL CALC-MCNC: 14.6 MG/DL
WBC # BLD AUTO: 14.4 K/UL (ref 3.6–11)

## 2017-12-18 PROCEDURE — 36415 COLL VENOUS BLD VENIPUNCTURE: CPT | Performed by: INTERNAL MEDICINE

## 2017-12-18 PROCEDURE — 80061 LIPID PANEL: CPT | Performed by: INTERNAL MEDICINE

## 2017-12-18 PROCEDURE — 74011636320 HC RX REV CODE- 636/320: Performed by: INTERNAL MEDICINE

## 2017-12-18 PROCEDURE — 74011250637 HC RX REV CODE- 250/637: Performed by: INTERNAL MEDICINE

## 2017-12-18 PROCEDURE — 74177 CT ABD & PELVIS W/CONTRAST: CPT

## 2017-12-18 PROCEDURE — 87040 BLOOD CULTURE FOR BACTERIA: CPT | Performed by: INTERNAL MEDICINE

## 2017-12-18 PROCEDURE — 74011636637 HC RX REV CODE- 636/637: Performed by: INTERNAL MEDICINE

## 2017-12-18 PROCEDURE — 93005 ELECTROCARDIOGRAM TRACING: CPT

## 2017-12-18 PROCEDURE — 85025 COMPLETE CBC W/AUTO DIFF WBC: CPT | Performed by: INTERNAL MEDICINE

## 2017-12-18 PROCEDURE — 74011250636 HC RX REV CODE- 250/636: Performed by: INTERNAL MEDICINE

## 2017-12-18 PROCEDURE — 77010033678 HC OXYGEN DAILY

## 2017-12-18 PROCEDURE — 65270000029 HC RM PRIVATE

## 2017-12-18 PROCEDURE — 80053 COMPREHEN METABOLIC PANEL: CPT | Performed by: INTERNAL MEDICINE

## 2017-12-18 PROCEDURE — 74011000258 HC RX REV CODE- 258: Performed by: INTERNAL MEDICINE

## 2017-12-18 PROCEDURE — 84520 ASSAY OF UREA NITROGEN: CPT | Performed by: INTERNAL MEDICINE

## 2017-12-18 PROCEDURE — 82962 GLUCOSE BLOOD TEST: CPT

## 2017-12-18 PROCEDURE — 82540 ASSAY OF CREATINE: CPT | Performed by: INTERNAL MEDICINE

## 2017-12-18 RX ORDER — VANCOMYCIN 2 GRAM/500 ML IN 0.9 % SODIUM CHLORIDE INTRAVENOUS
2000 ONCE
Status: COMPLETED | OUTPATIENT
Start: 2017-12-18 | End: 2017-12-18

## 2017-12-18 RX ORDER — MAGNESIUM SULFATE 100 %
4 CRYSTALS MISCELLANEOUS AS NEEDED
Status: DISCONTINUED | OUTPATIENT
Start: 2017-12-18 | End: 2017-12-27 | Stop reason: HOSPADM

## 2017-12-18 RX ORDER — HYDROCODONE BITARTRATE AND ACETAMINOPHEN 5; 325 MG/1; MG/1
1 TABLET ORAL
Status: DISCONTINUED | OUTPATIENT
Start: 2017-12-18 | End: 2017-12-27 | Stop reason: HOSPADM

## 2017-12-18 RX ORDER — DEXTROSE 50 % IN WATER (D50W) INTRAVENOUS SYRINGE
12.5-25 AS NEEDED
Status: DISCONTINUED | OUTPATIENT
Start: 2017-12-18 | End: 2017-12-27 | Stop reason: HOSPADM

## 2017-12-18 RX ORDER — SODIUM CHLORIDE 0.9 % (FLUSH) 0.9 %
5-10 SYRINGE (ML) INJECTION AS NEEDED
Status: DISCONTINUED | OUTPATIENT
Start: 2017-12-18 | End: 2017-12-27 | Stop reason: HOSPADM

## 2017-12-18 RX ORDER — POTASSIUM CHLORIDE AND SODIUM CHLORIDE 450; 150 MG/100ML; MG/100ML
INJECTION, SOLUTION INTRAVENOUS CONTINUOUS
Status: DISCONTINUED | OUTPATIENT
Start: 2017-12-18 | End: 2017-12-18 | Stop reason: CLARIF

## 2017-12-18 RX ORDER — INSULIN GLARGINE 100 [IU]/ML
20 INJECTION, SOLUTION SUBCUTANEOUS
Status: DISCONTINUED | OUTPATIENT
Start: 2017-12-18 | End: 2017-12-19

## 2017-12-18 RX ORDER — PANTOPRAZOLE SODIUM 40 MG/1
40 TABLET, DELAYED RELEASE ORAL
Status: DISCONTINUED | OUTPATIENT
Start: 2017-12-18 | End: 2017-12-20 | Stop reason: SDUPTHER

## 2017-12-18 RX ORDER — VANCOMYCIN HYDROCHLORIDE
1250
Status: DISCONTINUED | OUTPATIENT
Start: 2017-12-19 | End: 2017-12-19 | Stop reason: DRUGHIGH

## 2017-12-18 RX ORDER — INSULIN LISPRO 100 [IU]/ML
INJECTION, SOLUTION INTRAVENOUS; SUBCUTANEOUS
Status: DISCONTINUED | OUTPATIENT
Start: 2017-12-18 | End: 2017-12-27 | Stop reason: HOSPADM

## 2017-12-18 RX ORDER — AMLODIPINE BESYLATE 5 MG/1
5 TABLET ORAL DAILY
Status: DISCONTINUED | OUTPATIENT
Start: 2017-12-19 | End: 2017-12-27 | Stop reason: HOSPADM

## 2017-12-18 RX ORDER — ACETAMINOPHEN 325 MG/1
650 TABLET ORAL
Status: DISCONTINUED | OUTPATIENT
Start: 2017-12-18 | End: 2017-12-27 | Stop reason: HOSPADM

## 2017-12-18 RX ORDER — SODIUM CHLORIDE 0.9 % (FLUSH) 0.9 %
5-10 SYRINGE (ML) INJECTION EVERY 8 HOURS
Status: DISCONTINUED | OUTPATIENT
Start: 2017-12-18 | End: 2017-12-27 | Stop reason: HOSPADM

## 2017-12-18 RX ORDER — SIMVASTATIN 40 MG/1
40 TABLET, FILM COATED ORAL
Status: DISCONTINUED | OUTPATIENT
Start: 2017-12-18 | End: 2017-12-27 | Stop reason: HOSPADM

## 2017-12-18 RX ORDER — LISINOPRIL 20 MG/1
20 TABLET ORAL DAILY
Status: DISCONTINUED | OUTPATIENT
Start: 2017-12-18 | End: 2017-12-19

## 2017-12-18 RX ORDER — SODIUM CHLORIDE 0.9 % (FLUSH) 0.9 %
10 SYRINGE (ML) INJECTION
Status: COMPLETED | OUTPATIENT
Start: 2017-12-18 | End: 2017-12-18

## 2017-12-18 RX ORDER — ENOXAPARIN SODIUM 100 MG/ML
40 INJECTION SUBCUTANEOUS EVERY 24 HOURS
Status: DISCONTINUED | OUTPATIENT
Start: 2017-12-18 | End: 2017-12-27 | Stop reason: HOSPADM

## 2017-12-18 RX ORDER — MORPHINE SULFATE 2 MG/ML
2 INJECTION, SOLUTION INTRAMUSCULAR; INTRAVENOUS
Status: DISCONTINUED | OUTPATIENT
Start: 2017-12-18 | End: 2017-12-27 | Stop reason: HOSPADM

## 2017-12-18 RX ORDER — METOPROLOL SUCCINATE 25 MG/1
25 TABLET, EXTENDED RELEASE ORAL DAILY
Status: DISCONTINUED | OUTPATIENT
Start: 2017-12-18 | End: 2017-12-20

## 2017-12-18 RX ADMIN — SODIUM CHLORIDE: 450 INJECTION, SOLUTION INTRAVENOUS at 18:16

## 2017-12-18 RX ADMIN — Medication 10 ML: at 19:10

## 2017-12-18 RX ADMIN — INSULIN LISPRO 2 UNITS: 100 INJECTION, SOLUTION INTRAVENOUS; SUBCUTANEOUS at 17:31

## 2017-12-18 RX ADMIN — VANCOMYCIN HYDROCHLORIDE 2000 MG: 10 INJECTION, POWDER, LYOPHILIZED, FOR SOLUTION INTRAVENOUS at 18:18

## 2017-12-18 RX ADMIN — ENOXAPARIN SODIUM 40 MG: 40 INJECTION SUBCUTANEOUS at 18:25

## 2017-12-18 RX ADMIN — IOPAMIDOL 100 ML: 755 INJECTION, SOLUTION INTRAVENOUS at 19:32

## 2017-12-18 RX ADMIN — METOPROLOL SUCCINATE 25 MG: 25 TABLET, EXTENDED RELEASE ORAL at 17:32

## 2017-12-18 RX ADMIN — LISINOPRIL 20 MG: 20 TABLET ORAL at 17:32

## 2017-12-18 RX ADMIN — PANTOPRAZOLE SODIUM 40 MG: 40 TABLET, DELAYED RELEASE ORAL at 17:32

## 2017-12-18 RX ADMIN — HYDROCODONE BITARTRATE AND ACETAMINOPHEN 1 TABLET: 5; 325 TABLET ORAL at 17:32

## 2017-12-18 RX ADMIN — INSULIN GLARGINE 20 UNITS: 100 INJECTION, SOLUTION SUBCUTANEOUS at 22:50

## 2017-12-18 RX ADMIN — SODIUM CHLORIDE 100 ML: 900 INJECTION, SOLUTION INTRAVENOUS at 19:32

## 2017-12-18 RX ADMIN — SIMVASTATIN 40 MG: 40 TABLET, FILM COATED ORAL at 22:47

## 2017-12-18 RX ADMIN — CEFTRIAXONE SODIUM 1 G: 1 INJECTION, POWDER, FOR SOLUTION INTRAMUSCULAR; INTRAVENOUS at 18:18

## 2017-12-18 RX ADMIN — Medication 10 ML: at 19:32

## 2017-12-18 NOTE — IP AVS SNAPSHOT
[address] 
[phone] Patient: Chelly Oakes MRN: [id number] MXY:[id number] About your hospitalization You were admitted on:  December 18, 2017 You last received care in the:  [facility] [id number] You were discharged on:  December 27, 2017 Why you were hospitalized Your primary diagnosis was:  Cellulitis Of Right Abdominal Wall Your diagnoses also included:  Diabetes (Hcc), Hypertension, Low Back Pain Things You Need To Do (next 8 weeks) Follow up with Brenda Verdugo in 1 day(s) Home Health Skilled Nursing for Wound Care. Please call P#[phone] if you do not hear from [facility] by 11AM the day after discharge. Phone:  [phone] Where:  [address], [place] [address] [address] Tuesday Jan 02, 2018 TRANSITIONAL CARE MANAGEMENT with Macy Chavez MD at  3:00 PM  
Where:  [address] ([address]) Thursday Jan 25, 2018 Any with Macy Chavez MD at  2:30 PM  
[address]  [address] ([address]) Discharge Orders None A check tierney indicates which time of day the medication should be taken. My Medications STOP taking these medications   
 celecoxib 200 mg capsule Commonly known as:  CELEBREX  
   
  
 clobetasol 0.05 % ointment Commonly known as:  TEMOVATE  
   
  
 hydrALAZINE 25 mg tablet Commonly known as:  APRESOLINE  
   
  
 predniSONE 10 mg dose pack Commonly known as:  STERAPRED DS  
   
  
  
TAKE these medications as instructed Instructions Each Dose to Equal  
 Morning Noon Evening Bedtime AFLURIA [id number] 45 mcg (15 mcg x 3)/0.5 mL Susp Generic drug:  flu vaccine ts2014-15(5 yr,up) Your last dose was: Your next dose is:    
   
   
      
   
   
   
  
 albuterol 90 mcg/actuation inhaler Commonly known as:  PROVENTIL HFA, VENTOLIN HFA, PROAIR HFA Your last dose was: Your next dose is: Take 2 Puffs by inhalation every four (4) hours as needed for Wheezing. 2 Puff  
    
   
   
   
  
 amLODIPine 5 mg tablet Commonly known as:  Alice Mccloud Your last dose was: Your next dose is: Take 1 Tab by mouth daily. 5 mg * Blood-Glucose Meter monitoring kit Commonly known as:  [address]   [address] KIT Your last dose was: Your next dose is:    
   
   
 Test 3 times a day * Blood-Glucose Meter monitoring kit Commonly known as:  [address]   [address] KIT Your last dose was: Your next dose is:    
   
   
 Use daily  
     
   
   
   
  
 cephALEXin 500 mg capsule Commonly known as:  Blair Ritchie Your last dose was: Your next dose is: Take 1 Cap by mouth four (4) times daily for 6 days. 500 mg  
    
   
   
   
  
 collagenase 250 unit/gram ointment Commonly known as:  SANTYL Your last dose was: Your next dose is:    
   
   
 Apply  to affected area daily. FLUZONE HIGH-DOSE 2017-18 (PF) Syrg injection Generic drug:  influenza vaccine 2017-18 (65 yrs+)(PF)  
   
Your last dose was: Your next dose is:    
   
   
 ADMINISTERED BY PHARMACIST  
     
   
   
   
  
 furosemide 40 mg tablet Commonly known as:  LASIX Your last dose was: Your next dose is: Take 1 Tab by mouth daily. 40 mg  
    
   
   
   
  
 glucose blood VI test strips strip Commonly known as:  ONETOUCH ULTRA TEST Your last dose was: Your next dose is:    
   
   
 tiuse to test blood sugar three times a day. dx.e11.9 HYDROcodone-acetaminophen 5-325 mg per tablet Commonly known as:  Flower Mura Your last dose was: Your next dose is: Take 1 Tab by mouth every six (6) hours as needed. Max Daily Amount: 4 Tabs. Indications: Pain 1 Tab  
    
   
   
   
  
 insulin glargine 100 unit/mL (3 mL) Inpn Commonly known asOlen Mcburney Your last dose was: Your next dose is:    
   
   
 30 Units by SubCUTAneous route nightly. 30 Units Insulin Needles (Disposable) 29 gauge x 1/2\" Ndle Commonly known as:  BD INSULIN PEN NEEDLE UF ORIG Your last dose was: Your next dose is:    
   
   
 tid metFORMIN  mg tablet Commonly known as:  GLUCOPHAGE XR Your last dose was: Your next dose is: Take 2 Tabs by mouth two (2) times daily (with meals). 1000 mg  
    
   
   
   
  
 metoprolol succinate 25 mg XL tablet Commonly known as:  TOPROL-XL Your last dose was: Your next dose is: Take 1 Tab by mouth daily. 25 mg  
    
   
   
   
  
 omeprazole 40 mg capsule Commonly known as:  PRILOSEC Your last dose was: Your next dose is: Take 40 mg by mouth daily. 40 mg  
    
   
   
   
  
 * One Touch Delica 33 gauge Misc Generic drug:  lancets Your last dose was: Your next dose is: * Lancets Misc Your last dose was: Your next dose is:    
   
   
 delica one touch  
     
   
   
   
  
 * Prudence Estela LANCETS 30 gauge Misc Generic drug:  lancets Your last dose was: Your next dose is:    
   
   
 USE TO TEST BLOOD SUGAR 3 TIMES A DAY * Lancets Misc Commonly known as:  ONETOUCH ULTRASOFT LANCETS Your last dose was: Your next dose is:    
   
   
 USE TO TEST THREE TIMES A DAY. Dx.e11.9  
     
   
   
   
  
 quinapril 40 mg tablet Commonly known as:  ACCUPRIL Your last dose was: Your next dose is: Take 1 Tab by mouth daily. 40 mg  
    
   
   
   
  
 simvastatin 40 mg tablet Commonly known as:  ZOCOR Your last dose was: Your next dose is: Take 1 Tab by mouth nightly. 40 mg  
    
   
   
   
  
 * Notice: This list has 6 medication(s) that are the same as other medications prescribed for you. Read the directions carefully, and ask your doctor or other care provider to review them with you. Where to Get Your Medications Information on where to get these meds will be given to you by the nurse or doctor. ! Ask your nurse or doctor about these medications  
  cephALEXin 500 mg capsule  
 collagenase 250 unit/gram ointment HYDROcodone-acetaminophen 5-325 mg per tablet Discharge Instructions Discharge Instructions PATIENT ID: Inge Hatchet MRN: [id number] YOB: 1950 DATE OF ADMISSION: 12/18/2017  4:07 PM   
DATE OF DISCHARGE: 12/27/2017 PRIMARY CARE PROVIDER: Jessica Mcclure MD  
 
ATTENDING PHYSICIAN: Azael Petersen MD 
DISCHARGING PROVIDER: Azael Petersen MD   
To contact this individual call [phone] and ask the  to page. If unavailable ask to be transferred the Adult Hospitalist Department. DISCHARGE DIAGNOSES Abdominal wall abscess and cellulitis CONSULTATIONS: IP CONSULT TO INFECTIOUS DISEASES 
IP CONSULT TO GENERAL SURGERY 
 
PROCEDURES/SURGERIES:  
Incision and drainage of abdominal wall abscess PENDING TEST RESULTS:  
At the time of discharge the following test results are still pending: Cultures from abscess FOLLOW UP APPOINTMENTS:  
Follow-up Information Follow up With Details Comments Contact Info Jessica Mcclure MD On 1/2/2018 For discharge follow up 3:00PM  [place] [address] 
[phone] [facility] 227 In 1 day [address] [place] [address] for Wound Care.   Please call P#[phone] if you do not hear from [facility] by 11AM the day after discharge. [address] [address] 
[phone] Follow-up in 10-14 days with Dr. Dilia Sullivan, General Surgery. Our office is located at [facility] in the [facility]. Call office at [phone] to schedule appointment. ADDITIONAL CARE RECOMMENDATIONS: As per surgery and wound care instructions per wound care. Check blood sugars before meals and at bed time if blood sugars>250 or <80 on 2 consecutive readings call doctor. DIET: Diabetic, check your blood sugars and  
Oral Nutritional Supplements:    
 
ACTIVITY: As tolerated WOUND CARE: Per wound care nurse EQUIPMENT needed:  
 
 
  
 SNF/Inpatient Rehab/LTAC Independent/assisted living Hospice Other: CDMP Checked:  
Yes PROBLEM LIST Updated: 
Yes Signed:  
Yusuf Webber MD 
12/27/2017 
11:36 AM 
 
ACO Transitions of Care Introducing [employer] [address] offers a voluntary care coordination program to provide high quality service and care to [facility] fee-for-service beneficiaries.   
 
Maureen Robledo was designed to help you enhance your health and well-being through the following services: ? Transitions of Care  support for individuals who are transitioning from one care setting to another (example: Hospital to home). ? Chronic and Complex Care Coordination  support for individuals and caregivers of those with serious or chronic illnesses or with more than one chronic (ongoing) condition and those who take a number of different medications. If you meet specific medical criteria, a [facility] may call you directly to coordinate your care with your primary care physician and your other care providers. For questions about the [facility] programs, please, contact your physicians office. For general questions or additional information about Accountable Care Organizations: 
Please visit www.medicare.gov/acos. html or call 1-800-MEDICARE (7-240.122.7757) TTY users should call 6-303.680.2403. Introducing hospitals & HEALTH SERVICES! Dear Miladys Perez: Thank you for requesting a [employer] Network account. Our records indicate that you already have an active [employer] account. You can access your account anytime at [email or web address] Did you know that you can access your hospital and ER discharge instructions at any time in [employer] Network? You can also review all of your test results from your hospital stay or ER visit. Additional Information If you have questions, please visit the Frequently Asked Questions section of the [employer] website at [email or web address] Remember, [employer] Network is NOT to be used for urgent needs. For medical emergencies, dial 911. Now available from your iPhone and Android! Providers Seen During Your Hospitalization Provider Specialty Primary office phone Cary Webster MD Internal Medicine [phone] Melanie Holly MD Internal Medicine [phone] Prasad Quintanilla MD Hospitalist [phone] Your Primary Care Physician (PCP) Primary Care Physician Office Phone Office Fax Gabriele Mcneal [phone] You are allergic to the following No active allergies Recent Documentation Weight BMI OB Status Smoking Status 97.5 kg 38.08 kg/m2 Hysterectomy Never Smoker Emergency Contacts Name Discharge Info Relation Home Work Mobile [address] CAREGIVER [3] Friend [5] [phone] Patient Belongings The following personal items are in your possession at time of discharge: 
  Dental Appliances: None  Visual Aid: Glasses, With patient      Home Medications: None   Jewelry: None  Clothing: At bedside    Other Valuables: Cell Phone Please provide this summary of care documentation to your next provider. Signatures-by signing, you are acknowledging that this After Visit Summary has been reviewed with you and you have received a copy. Patient Signature:  ____________________________________________________________ Date:  ____________________________________________________________  
  
Herminia Benitez Provider Signature:  ____________________________________________________________ Date:  ____________________________________________________________

## 2017-12-18 NOTE — PROGRESS NOTES
SPORTS MEDICINE AND PRIMARY CARE  Antoinette Adler MD, [address] [facility],[address] [address]  Phone:  [phone]  Fax: [phone]      Chief Complaint   Patient presents with    Diabetes         SUBECTIVE:    Bhaskar Hinton is a 73 y.o. female Patient returns today with known history of diabetes, whose control is excellent with Hgb A1c in the 6 range, degenerative joint disease, , primary hypertension,  back pain, lumbar stenosis, and is seen for evaluation. She notes loss of control of her blood sugar and marked anorexia and is seen for evaluation. Current Outpatient Prescriptions   Medication Sig Dispense Refill    celecoxib (CELEBREX) 200 mg capsule TAKE 1 CAP BY MOUTH TWO (2) TIMES A DAY FOR 90 DAYS. 60 Cap 2    insulin glargine (BASAGLAR KWIKPEN) 100 unit/mL (3 mL) inpn 30 Units by SubCUTAneous route nightly. 15 Pen 3    FLUZONE HIGH-DOSE 2017-18, PF, syrg injection ADMINISTERED BY PHARMACIST  0    Lancets (ONETOUCH ULTRASOFT LANCETS) misc USE TO TEST THREE TIMES A DAY. Dx.e11.9 300 Each 11    ONETOUCH DELICA LANCETS 30 gauge misc USE TO TEST BLOOD SUGAR 3 TIMES A DAY  3    glucose blood VI test strips (ONETOUCH ULTRA TEST) strip tiuse to test blood sugar three times a day. dx.e11.9 300 Strip 3    metFORMIN ER (GLUCOPHAGE XR) 500 mg tablet Take 2 Tabs by mouth two (2) times daily (with meals). 360 Tab 95    furosemide (LASIX) 40 mg tablet Take 1 Tab by mouth daily. 90 Tab 10    metoprolol succinate (TOPROL-XL) 25 mg XL tablet Take 1 Tab by mouth daily. 90 Tab 11    simvastatin (ZOCOR) 40 mg tablet Take 1 Tab by mouth nightly. 90 Tab 10    amLODIPine (NORVASC) 5 mg tablet Take 1 Tab by mouth daily. 90 Tab 3    quinapril (ACCUPRIL) 40 mg tablet Take 1 Tab by mouth daily. 90 Tab 11    hydrALAZINE (APRESOLINE) 25 mg tablet Take 1 Tab by mouth two (2) times a day.  180 Tab 10    Lancets misc delica one touch 1 Each 11    Insulin Needles, Disposable, (BD INSULIN PEN NEEDLE UF ORIG) 29 gauge x 1/2\" ndle tid 100 Pen Needle 11    albuterol (PROVENTIL HFA, VENTOLIN HFA, PROAIR HFA) 90 mcg/actuation inhaler Take 2 Puffs by inhalation every four (4) hours as needed for Wheezing. 1 Inhaler 0    ONE TOUCH DELICA 33 gauge misc       Blood-Glucose Meter (ONETOUCH ULTRA SYSTEM KIT) monitoring kit Use daily 1 Kit 0    Blood-Glucose Meter (ONETOUCH ULTRA SYSTEM KIT) monitoring kit Test 3 times a day 1 Kit 0    clobetasol (TEMOVATE) 0.05 % ointment Apply 1 Tube to affected area two (2) times a day. 60 g 11    omeprazole (PRILOSEC) 40 mg capsule Take 40 mg by mouth daily.  AFLURIA 4853-7253 45 mcg (15 mcg x 3)/0.5 mL susp   0    predniSONE (STERAPRED DS) 10 mg dose pack As directed 21 Tab 0     Past Medical History:   Diagnosis Date    Diabetes ([address] 1995    DJD (degenerative joint disease) of knee     Hypercholesterolemia     Hypertension     LBP (low back pain)     chiorpactor    Lumbar stenosis     Obesity     S/P colonoscopy 12-18-12     Past Surgical History:   Procedure Laterality Date    HX BREAST BIOPSY Right 1990    negative    HX COLONOSCOPY       No Known Allergies    REVIEW OF SYSTEMS:   No chills, no fever. Social History     Social History    Marital status: SINGLE     Spouse name: N/A    Number of children: N/A    Years of education: N/A     Social History Main Topics    Smoking status: Never Smoker    Smokeless tobacco: Never Used    Alcohol use No    Drug use: No    Sexual activity: Not Asked     Other Topics Concern    None     Social History Narrative    Medical History: Diverticulosis 2002DM 1995primary HTN 1995Obesity 1995Dyslipidemia 1995January 9, 2012 moderate central stenosis L3-L4,    mild to moderate central stenosis at L4-L5 MRI    Gyn History: Last mammogram date 11/26/2013. Last menstrual perioddate hysterectomy 1992 FOR FIBROIDS, PT W ITH ONE OVARY. Last    papdate 2012. Menopausal hot flashes.  Sexually active not currently sexually active. History of abnormal pap smears none. History of STDs    none. Vaginal discharge or odor none. HysterectomyDate NONE.    OB History: Total pregnancies 1. Pre term deliveries (>20-36.6 w eeks) 1. Surgical History: [facility] right breast bx , kasimaritza lora colonoscopy     Hospitalization/Major Diagnostic Procedure: [facility] right breast bx , kidney stones     Family History: Mother:  52 yrs, skin d/oFather: deceasedAunt: alive, lung cancerAdopted: alive    Social History: Alcohol Use Patient does not use alcohol. Smoking Status Patient is a never smoker. Marital Status: Single. Lives w ith:    alone. Education/School: has masters degree-[employer].   r  Family History   Problem Relation Age of Onset    Cancer Mother     No Known Problems Father        OBJECTIVE:  Visit Vitals    /74    Pulse (!) 110    Temp 99.2 °F (37.3 °C) (Oral)    Resp 20    Ht 5' 3\" (1.6 m)    Wt 215 lb (97.5 kg)    LMP  (LMP Unknown)    SpO2 96%    BMI 38.09 kg/m2     ENT: perrla,  eom intact  NECK: supple. Thyroid normal  CHEST: clear to ascultation and percussion   HEART: regular rate and rhythm  ABD: soft, bowel sounds active  EXTREMITIES: no edema, pulse 1+     No visits with results within 3 Month(s) from this visit.   Latest known visit with results is:    Office Visit on 2017   Component Date Value Ref Range Status    Specific Gravity 2017      >=1.030* 1.005 - 1.030 Final    pH (UA) 2017 6.5  5.0 - 7.5 Final    Color 2017 Yellow  Yellow Final    Appearance 2017 Clear  Clear Final    Leukocyte Esterase 2017 1+* Negative Final    Protein 2017 Trace  Negative/Trace Final    Glucose 2017 Negative  Negative Final    Ketone 2017 Negative  Negative Final    Blood 2017 Negative  Negative Final    Bilirubin 2017 Negative  Negative Final    Urobilinogen 2017 0.2  0.2 - 1.0 mg/dL Final    Nitrites 2017 Negative  Negative Final    Microscopic Examination 07/20/2017 See additional order   Final    Microscopic was indicated and was performed.  WBC 07/20/2017 7.7  3.4 - 10.8 x10E3/uL Final    RBC 07/20/2017 4.48  3.77 - 5.28 x10E6/uL Final    HGB 07/20/2017 12.8  11.1 - 15.9 g/dL Final    HCT 07/20/2017 39.0  34.0 - 46.6 % Final    MCV 07/20/2017 87  79 - 97 fL Final    MCH 07/20/2017 28.6  26.6 - 33.0 pg Final    MCHC 07/20/2017 32.8  31.5 - 35.7 g/dL Final    RDW 07/20/2017 15.8* 12.3 - 15.4 % Final    PLATELET 85/81/0664 796  150 - 379 x10E3/uL Final    NEUTROPHILS 07/20/2017 65  % Final    Lymphocytes 07/20/2017 25  % Final    MONOCYTES 07/20/2017 8  % Final    EOSINOPHILS 07/20/2017 2  % Final    BASOPHILS 07/20/2017 0  % Final    ABS. NEUTROPHILS 07/20/2017 5.1  1.4 - 7.0 x10E3/uL Final    Abs Lymphocytes 07/20/2017 1.9  0.7 - 3.1 x10E3/uL Final    ABS. MONOCYTES 07/20/2017 0.6  0.1 - 0.9 x10E3/uL Final    ABS. EOSINOPHILS 07/20/2017 0.1  0.0 - 0.4 x10E3/uL Final    ABS. BASOPHILS 07/20/2017 0.0  0.0 - 0.2 x10E3/uL Final    IMMATURE GRANULOCYTES 07/20/2017 0  % Final    ABS. IMM.  GRANS. 07/20/2017 0.0  0.0 - 0.1 x10E3/uL Final    Glucose 07/20/2017 117* 65 - 99 mg/dL Final    BUN 07/20/2017 18  8 - 27 mg/dL Final    Creatinine 07/20/2017 0.92  0.57 - 1.00 mg/dL Final    GFR est non-AA 07/20/2017 66  >59 mL/min/1.73 Final    GFR est AA 07/20/2017 76  >59 mL/min/1.73 Final    BUN/Creatinine ratio 07/20/2017 20  12 - 28 Final    Sodium 07/20/2017 143  134 - 144 mmol/L Final    Potassium 07/20/2017 4.5  3.5 - 5.2 mmol/L Final    Chloride 07/20/2017 104  96 - 106 mmol/L Final    CO2 07/20/2017 24  18 - 29 mmol/L Final    Calcium 07/20/2017 9.8  8.7 - 10.3 mg/dL Final    Protein, total 07/20/2017 6.8  6.0 - 8.5 g/dL Final    Albumin 07/20/2017 4.2  3.6 - 4.8 g/dL Final    GLOBULIN, TOTAL 07/20/2017 2.6  1.5 - 4.5 g/dL Final    A-G Ratio 07/20/2017 1.6  1.2 - 2.2 Final    Bilirubin, total 07/20/2017 0.2  0.0 - 1.2 mg/dL Final    Alk. phosphatase 07/20/2017 46  39 - 117 IU/L Final    AST (SGOT) 07/20/2017 15  0 - 40 IU/L Final    ALT (SGPT) 07/20/2017 14  0 - 32 IU/L Final    Cholesterol, total 07/20/2017 134  100 - 199 mg/dL Final    Triglyceride 07/20/2017 98  0 - 149 mg/dL Final    HDL Cholesterol 07/20/2017 51  >39 mg/dL Final    VLDL, calculated 07/20/2017 20  5 - 40 mg/dL Final    LDL, calculated 07/20/2017 63  0 - 99 mg/dL Final    TSH 07/20/2017 1.190  0.450 - 4.500 uIU/mL Final    Hemoglobin A1c 07/20/2017 6.6* 4.8 - 5.6 % Final    Comment:          Pre-diabetes: 5.7 - 6.4           Diabetes: >6.4           Glycemic control for adults with diabetes: <7.0      Estimated average glucose 07/20/2017 143  mg/dL Final    WBC 07/20/2017 11-30* 0 - 5 /hpf Final    RBC 07/20/2017 None seen  0 - 2 /hpf Final    Epithelial cells 07/20/2017 0-10  0 - 10 /hpf Final    Casts 07/20/2017 None seen  None seen /lpf Final    Crystals 07/20/2017 Present* N/A Final    Crystal type 07/20/2017 Calcium Oxalate  N/A Final    Mucus 07/20/2017 Present  Not Estab. Final    Bacteria 07/20/2017 Few  None seen/Few Final          ASSESSMENT:  No diagnosis found. Patient has an abdominal wall cellulitis. She'll be [facility] to [facility].  We placed her on broad spectrum antibiotics. Will ask for a CT scan of the abdomen and pelvis of the abdominal wall, looking for an area for drainage. Pending that result, surgical consult may be required. PLAN:  . No orders of the defined types were placed in this encounter. Follow-up Disposition:  Return in about 2 weeks (around 1/1/2018). ATTENTION:   This medical record was transcribed using an electronic medical records system. Although proofread, it may and can contain electronic and spelling errors. Other human spelling and other errors may be present. Corrections may be executed at a later time.   Please feel free to contact us for any clarifications as needed.

## 2017-12-18 NOTE — IP AVS SNAPSHOT
[address] 
[phone] Patient: Ailyn Mayers MRN: [id number] LPV:09[birth year]1842 My Medications STOP taking these medications   
 celecoxib 200 mg capsule Commonly known as:  CELEBREX  
   
  
 clobetasol 0.05 % ointment Commonly known as:  TEMOVATE  
   
  
 hydrALAZINE 25 mg tablet Commonly known as:  APRESOLINE  
   
  
 predniSONE 10 mg dose pack Commonly known as:  STERAPRED DS  
   
  
  
TAKE these medications as instructed Instructions Each Dose to Equal  
 Morning Noon Evening Bedtime AFLURIA 4614-0148 45 mcg (15 mcg x 3)/0.5 mL Susp Generic drug:  flu vaccine [id number](5 yr,up) Your last dose was: Your next dose is:    
   
   
      
   
   
   
  
 albuterol 90 mcg/actuation inhaler Commonly known as:  PROVENTIL HFA, VENTOLIN HFA, PROAIR HFA Your last dose was: Your next dose is: Take 2 Puffs by inhalation every four (4) hours as needed for Wheezing. 2 Puff  
    
   
   
   
  
 amLODIPine 5 mg tablet Commonly known as:  Tawnya Nuñez Your last dose was: Your next dose is: Take 1 Tab by mouth daily. 5 mg * Blood-Glucose Meter monitoring kit Commonly known as:  [address]   [address] [place] Your last dose was: Your next dose is:    
   
   
 Test 3 times a day * Blood-Glucose Meter monitoring kit Commonly known as:  [address]   [address] [place] Your last dose was: Your next dose is:    
   
   
 Use daily  
     
   
   
   
  
 cephALEXin 500 mg capsule Commonly known as:  Jessica Ku Your last dose was: Your next dose is: Take 1 Cap by mouth four (4) times daily for 6 days. 500 mg  
    
   
   
   
  
 collagenase 250 unit/gram ointment Commonly known as:  SANTYL Your last dose was: Your next dose is: Apply  to affected area daily. FLUZONE HIGH-DOSE 2017-18 (PF) Syrg injection Generic drug:  influenza vaccine 2017-18 (65 yrs+)(PF)  
   
Your last dose was: Your next dose is:    
   
   
 ADMINISTERED BY PHARMACIST  
     
   
   
   
  
 furosemide 40 mg tablet Commonly known as:  LASIX Your last dose was: Your next dose is: Take 1 Tab by mouth daily. 40 mg  
    
   
   
   
  
 glucose blood VI test strips strip Commonly known as:  ONETOUCH ULTRA TEST Your last dose was: Your next dose is:    
   
   
 tiuse to test blood sugar three times a day. dx.e11.9 HYDROcodone-acetaminophen 5-325 mg per tablet Commonly known as:  Martha Fothergill Your last dose was: Your next dose is: Take 1 Tab by mouth every six (6) hours as needed. Max Daily Amount: 4 Tabs. Indications: Pain 1 Tab  
    
   
   
   
  
 insulin glargine 100 unit/mL (3 mL) Inpn Commonly known asOuida Grams Your last dose was: Your next dose is:    
   
   
 30 Units by SubCUTAneous route nightly. 30 Units Insulin Needles (Disposable) 29 gauge x 1/2\" Ndle Commonly known as:  BD INSULIN PEN NEEDLE UF ORIG Your last dose was: Your next dose is:    
   
   
 tid metFORMIN  mg tablet Commonly known as:  GLUCOPHAGE XR Your last dose was: Your next dose is: Take 2 Tabs by mouth two (2) times daily (with meals). 1000 mg  
    
   
   
   
  
 metoprolol succinate 25 mg XL tablet Commonly known as:  TOPROL-XL Your last dose was: Your next dose is: Take 1 Tab by mouth daily. 25 mg  
    
   
   
   
  
 omeprazole 40 mg capsule Commonly known as:  PRILOSEC Your last dose was: Your next dose is: Take 40 mg by mouth daily.   
 40 mg  
    
   
   
   
  
 * One Touch Delica 33 gauge Misc Generic drug:  lancets Your last dose was: Your next dose is: * Lancets Misc Your last dose was: Your next dose is:    
   
   
 delica one touch  
     
   
   
   
  
 * Rayo Canard LANCETS 30 gauge Misc Generic drug:  lancets Your last dose was: Your next dose is:    
   
   
 USE TO TEST BLOOD SUGAR 3 TIMES A DAY * Lancets Misc Commonly known as:  ONETOUCH ULTRASOFT LANCETS Your last dose was: Your next dose is:    
   
   
 USE TO TEST THREE TIMES A DAY. Dx.e11.9  
     
   
   
   
  
 quinapril 40 mg tablet Commonly known as:  ACCUPRIL Your last dose was: Your next dose is: Take 1 Tab by mouth daily. 40 mg  
    
   
   
   
  
 simvastatin 40 mg tablet Commonly known as:  ZOCOR Your last dose was: Your next dose is: Take 1 Tab by mouth nightly. 40 mg  
    
   
   
   
  
 * Notice: This list has 6 medication(s) that are the same as other medications prescribed for you. Read the directions carefully, and ask your doctor or other care provider to review them with you. Where to Get Your Medications Information on where to get these meds will be given to you by the nurse or doctor. ! Ask your nurse or doctor about these medications  
  cephALEXin 500 mg capsule  
 collagenase 250 unit/gram ointment HYDROcodone-acetaminophen 5-325 mg per tablet

## 2017-12-19 LAB
ANION GAP SERPL CALC-SCNC: 10 MMOL/L (ref 5–15)
ATRIAL RATE: 104 BPM
BASOPHILS # BLD: 0 K/UL (ref 0–0.1)
BASOPHILS NFR BLD: 0 % (ref 0–1)
BNP SERPL-MCNC: 251 PG/ML (ref 0–125)
BUN SERPL-MCNC: 16 MG/DL (ref 6–20)
BUN/CREAT SERPL: 24 (ref 12–20)
CALCIUM SERPL-MCNC: 8.3 MG/DL (ref 8.5–10.1)
CALCULATED P AXIS, ECG09: 79 DEGREES
CALCULATED R AXIS, ECG10: 27 DEGREES
CALCULATED T AXIS, ECG11: 45 DEGREES
CHLORIDE SERPL-SCNC: 108 MMOL/L (ref 97–108)
CO2 SERPL-SCNC: 22 MMOL/L (ref 21–32)
CREAT SERPL-MCNC: 0.68 MG/DL (ref 0.55–1.02)
DIAGNOSIS, 93000: NORMAL
EOSINOPHIL # BLD: 0.1 K/UL (ref 0–0.4)
EOSINOPHIL NFR BLD: 1 % (ref 0–7)
ERYTHROCYTE [DISTWIDTH] IN BLOOD BY AUTOMATED COUNT: 14.4 % (ref 11.5–14.5)
EST. AVERAGE GLUCOSE BLD GHB EST-MCNC: 148 MG/DL
GLUCOSE BLD STRIP.AUTO-MCNC: 182 MG/DL (ref 65–100)
GLUCOSE BLD STRIP.AUTO-MCNC: 184 MG/DL (ref 65–100)
GLUCOSE BLD STRIP.AUTO-MCNC: 202 MG/DL (ref 65–100)
GLUCOSE BLD STRIP.AUTO-MCNC: 255 MG/DL (ref 65–100)
GLUCOSE SERPL-MCNC: 176 MG/DL (ref 65–100)
HBA1C MFR BLD: 6.8 % (ref 4.2–6.3)
HCT VFR BLD AUTO: 32.5 % (ref 35–47)
HGB BLD-MCNC: 10.6 G/DL (ref 11.5–16)
LACTATE SERPL-SCNC: 0.8 MMOL/L (ref 0.4–2)
LYMPHOCYTES # BLD: 1.1 K/UL (ref 0.8–3.5)
LYMPHOCYTES NFR BLD: 9 % (ref 12–49)
MCH RBC QN AUTO: 28.1 PG (ref 26–34)
MCHC RBC AUTO-ENTMCNC: 32.6 G/DL (ref 30–36.5)
MCV RBC AUTO: 86.2 FL (ref 80–99)
MONOCYTES # BLD: 1.5 K/UL (ref 0–1)
MONOCYTES NFR BLD: 13 % (ref 5–13)
NEUTS SEG # BLD: 9 K/UL (ref 1.8–8)
NEUTS SEG NFR BLD: 77 % (ref 32–75)
P-R INTERVAL, ECG05: 120 MS
PLATELET # BLD AUTO: 233 K/UL (ref 150–400)
POTASSIUM SERPL-SCNC: 3.5 MMOL/L (ref 3.5–5.1)
Q-T INTERVAL, ECG07: 328 MS
QRS DURATION, ECG06: 66 MS
QTC CALCULATION (BEZET), ECG08: 431 MS
RBC # BLD AUTO: 3.77 M/UL (ref 3.8–5.2)
SERVICE CMNT-IMP: ABNORMAL
SODIUM SERPL-SCNC: 140 MMOL/L (ref 136–145)
VENTRICULAR RATE, ECG03: 104 BPM
WBC # BLD AUTO: 11.7 K/UL (ref 3.6–11)

## 2017-12-19 PROCEDURE — 77010033678 HC OXYGEN DAILY

## 2017-12-19 PROCEDURE — 80048 BASIC METABOLIC PNL TOTAL CA: CPT | Performed by: INTERNAL MEDICINE

## 2017-12-19 PROCEDURE — G8979 MOBILITY GOAL STATUS: HCPCS

## 2017-12-19 PROCEDURE — 74011636637 HC RX REV CODE- 636/637: Performed by: INTERNAL MEDICINE

## 2017-12-19 PROCEDURE — 87040 BLOOD CULTURE FOR BACTERIA: CPT | Performed by: INTERNAL MEDICINE

## 2017-12-19 PROCEDURE — 97161 PT EVAL LOW COMPLEX 20 MIN: CPT

## 2017-12-19 PROCEDURE — 82962 GLUCOSE BLOOD TEST: CPT

## 2017-12-19 PROCEDURE — G8978 MOBILITY CURRENT STATUS: HCPCS

## 2017-12-19 PROCEDURE — 74011250637 HC RX REV CODE- 250/637: Performed by: INTERNAL MEDICINE

## 2017-12-19 PROCEDURE — 83036 HEMOGLOBIN GLYCOSYLATED A1C: CPT | Performed by: INTERNAL MEDICINE

## 2017-12-19 PROCEDURE — 83605 ASSAY OF LACTIC ACID: CPT | Performed by: INTERNAL MEDICINE

## 2017-12-19 PROCEDURE — 74011250636 HC RX REV CODE- 250/636: Performed by: INTERNAL MEDICINE

## 2017-12-19 PROCEDURE — 36415 COLL VENOUS BLD VENIPUNCTURE: CPT | Performed by: INTERNAL MEDICINE

## 2017-12-19 PROCEDURE — 97116 GAIT TRAINING THERAPY: CPT

## 2017-12-19 PROCEDURE — 74011000258 HC RX REV CODE- 258: Performed by: INTERNAL MEDICINE

## 2017-12-19 PROCEDURE — 65270000029 HC RM PRIVATE

## 2017-12-19 PROCEDURE — 85025 COMPLETE CBC W/AUTO DIFF WBC: CPT | Performed by: INTERNAL MEDICINE

## 2017-12-19 PROCEDURE — 83880 ASSAY OF NATRIURETIC PEPTIDE: CPT | Performed by: INTERNAL MEDICINE

## 2017-12-19 RX ORDER — LISINOPRIL 20 MG/1
40 TABLET ORAL DAILY
Status: DISCONTINUED | OUTPATIENT
Start: 2017-12-19 | End: 2017-12-27 | Stop reason: HOSPADM

## 2017-12-19 RX ORDER — INSULIN GLARGINE 100 [IU]/ML
26 INJECTION, SOLUTION SUBCUTANEOUS
Status: DISCONTINUED | OUTPATIENT
Start: 2017-12-19 | End: 2017-12-20

## 2017-12-19 RX ORDER — VANCOMYCIN/0.9 % SOD CHLORIDE 1.5G/250ML
1500 PLASTIC BAG, INJECTION (ML) INTRAVENOUS
Status: DISCONTINUED | OUTPATIENT
Start: 2017-12-20 | End: 2017-12-21 | Stop reason: ALTCHOICE

## 2017-12-19 RX ADMIN — Medication 10 ML: at 06:39

## 2017-12-19 RX ADMIN — SODIUM CHLORIDE: 450 INJECTION, SOLUTION INTRAVENOUS at 23:09

## 2017-12-19 RX ADMIN — HYDROCODONE BITARTRATE AND ACETAMINOPHEN 1 TABLET: 5; 325 TABLET ORAL at 01:34

## 2017-12-19 RX ADMIN — SIMVASTATIN 40 MG: 40 TABLET, FILM COATED ORAL at 21:09

## 2017-12-19 RX ADMIN — PANTOPRAZOLE SODIUM 40 MG: 40 TABLET, DELAYED RELEASE ORAL at 06:39

## 2017-12-19 RX ADMIN — Medication 10 ML: at 13:15

## 2017-12-19 RX ADMIN — LISINOPRIL 40 MG: 20 TABLET ORAL at 09:03

## 2017-12-19 RX ADMIN — PIPERACILLIN SODIUM AND TAZOBACTAM SODIUM 3.38 G: .375; 3 INJECTION, POWDER, LYOPHILIZED, FOR SOLUTION INTRAVENOUS at 07:02

## 2017-12-19 RX ADMIN — VANCOMYCIN HYDROCHLORIDE 1250 MG: 10 INJECTION, POWDER, LYOPHILIZED, FOR SOLUTION INTRAVENOUS at 11:25

## 2017-12-19 RX ADMIN — Medication 1 CAPSULE: at 09:03

## 2017-12-19 RX ADMIN — INSULIN LISPRO 2 UNITS: 100 INJECTION, SOLUTION INTRAVENOUS; SUBCUTANEOUS at 07:08

## 2017-12-19 RX ADMIN — METOPROLOL SUCCINATE 25 MG: 25 TABLET, EXTENDED RELEASE ORAL at 09:03

## 2017-12-19 RX ADMIN — HYDROCODONE BITARTRATE AND ACETAMINOPHEN 1 TABLET: 5; 325 TABLET ORAL at 22:03

## 2017-12-19 RX ADMIN — ACETAMINOPHEN 650 MG: 325 TABLET ORAL at 15:41

## 2017-12-19 RX ADMIN — SODIUM CHLORIDE: 450 INJECTION, SOLUTION INTRAVENOUS at 16:19

## 2017-12-19 RX ADMIN — SODIUM CHLORIDE: 450 INJECTION, SOLUTION INTRAVENOUS at 02:56

## 2017-12-19 RX ADMIN — PIPERACILLIN AND TAZOBACTAM 10.12 G: 3; .375 INJECTION, POWDER, FOR SOLUTION INTRAVENOUS at 07:51

## 2017-12-19 RX ADMIN — AMLODIPINE BESYLATE 5 MG: 5 TABLET ORAL at 09:03

## 2017-12-19 RX ADMIN — INSULIN LISPRO 3 UNITS: 100 INJECTION, SOLUTION INTRAVENOUS; SUBCUTANEOUS at 16:30

## 2017-12-19 RX ADMIN — INSULIN LISPRO 2 UNITS: 100 INJECTION, SOLUTION INTRAVENOUS; SUBCUTANEOUS at 12:26

## 2017-12-19 RX ADMIN — Medication 10 ML: at 21:09

## 2017-12-19 RX ADMIN — ENOXAPARIN SODIUM 40 MG: 40 INJECTION SUBCUTANEOUS at 17:36

## 2017-12-19 RX ADMIN — INSULIN GLARGINE 26 UNITS: 100 INJECTION, SOLUTION SUBCUTANEOUS at 22:03

## 2017-12-19 NOTE — PROGRESS NOTES
Primary Nurse Jeyson Hahn RN and Maria Brown RN performed a dual skin assessment on this patient No impairment noted  Edgard score is 22

## 2017-12-19 NOTE — H&P
[address]  ACUTE CARE HISTORY AND PHYSICAL    Rolando Hardy  MR#: [id number]  : 1950  ACCOUNT #: [de-identified]   DATE OF SERVICE: 2017    CHIEF COMPLAINT:  Abdominal pain. HISTORY OF PRESENT ILLNESS:  A 28-year-old black female in usual state of health until 2 weeks prior to admission. At that time, she noted the onset of anorexia, loss of control of her blood sugar, which previously had been consistently less than 140, now had blood sugars in the 200s. She also noted welling of the right side of her abdomen. She denied chills or fever. She was seen in the office, found to have abdominal wall cellulitis, and subsequently agreed to further evaluation and care. CURRENT MEDICATIONS:  Celebrex 200 mg b.i.d., Lantus 30 units subcu at bedtime, metformin 1000 mg b.i.d., furosemide 40 mg daily, metoprolol 25 mg daily, Zocor 40 mg daily, amlodipine 5 mg daily, Accupril 40 mg daily, hydralazine 25 mg b.i.d., Prilosec 20 mg daily. PAST MEDICAL HISTORY:  Remarkable for diabetes since ; degenerative joint disease, particularly of the knees; dyslipidemia; primary hypertension; low back pain; obesity; moderate central stenosis, L3-L4; mild to moderate central stenosis, L4-5; 2017 MRI, diverticulosis. SURGICAL HISTORY:  Total abdominal hysterectomy in 1992, right breast biopsy in , kidney stones, , colonoscopy, . ALLERGIES:  NO KNOWN ALLERGIES. HABITS:  No smoking, ETOH or drug abuse. REVIEW OF SYSTEMS:  A 13-point review of systems except as noted in HPI, unremarkable. No chest pain or shortness of breath. SOCIAL HISTORY:  The patient is single. She has no children. She lives alone. She states she has a master's degree at [employer]. She is retired. FAMILY HISTORY:  Mother , 52, of a skin disorder. Father . She has an aunt with lung cancer. She was adopted.     PHYSICAL EXAMINATION  GENERAL:  Well-developed, well-nourished black female, resting quietly on a stretcher. VITAL SIGNS:  99.2, 20, 110, 129/74, 5 feet 3 inches, 215 pounds, BMI 38.09. EYES:  PERRLA, EOM intact. ENT:  Normal color and moist mucous membrane. Throat is clear. NECK:  Supple. Thyroid is palpated within normal limits. RESPIRATORY:  Chest clear to auscultation and percussion. CARDIOVASCULAR:  Heart regular rate and rhythm. Tachycardia. GASTROINTESTINAL:  Abdominal wall with induration, increased warmth, and erythema, right lower quadrant. Remainder of the examination is unremarkable. MUSCULOSKELETAL:  Tenderness, range of motion of the knees. HEMATOLOGIC:  No pathological lymph nodes are palpated. IMPRESSION  1. Abdominal wall cellulitis. 2.  Diabetes mellitus type 2.  3.  Primary hypertension. 4.  Degenerative joint disease, knees. 5.  Dyslipidemia. 6.  Lumbar spinal stenosis. PLAN   1. Patient will be admitted to the medical floor. 2.  She will be placed on vancomycin and Zosyn for abdominal wall induration. Will ask for a CT of the abdomen and pelvis to look for areas of fluctuance within the area of induration. 3.  Will ask for a surgical consultation if drainable area noted on ct    4. Will titrate her insulin with basal bolus via sliding scale. 5.  Blood pressure could be controlled with her home meds, again titrating to induce a normotensive response. 6.  She will receive IV fluids and appropriate analgesics.       Jun Wade MD       [facility] [place] / [facility]  D: 12/18/2017 17:37     T: 12/18/2017 21:28  JOB #: [id number]

## 2017-12-19 NOTE — PROGRESS NOTES
Hospital Progress Note    NAME:  Jimena Chris   :   1950   MRN:  [id number]     Date/Time:  2017 6:53 AM    Plan:   1. Iv ab  2. Control bs   3.  Titrate bp meds  Risk of Deterioration: Low  []           Moderate  [x]           High  []                 Assessment:   Principal Problem:    Cellulitis of right abdominal wall (2017) vanc and zosyn - no drainable collection    Active Problems:    Diabetes (HCC) () titrate insulin      Hypertension () titrate meds      Low back pain ()      analgesics       Subjective:     feeling better  11 Point Review of Systems:   Negative except lbp    []            Unable to obtain ROS due to:       []            mental status change []            sedated []            intubated     Social History   Substance Use Topics    Smoking status: Never Smoker    Smokeless tobacco: Never Used    Alcohol use No     Medications reviewed:  Current Facility-Administered Medications   Medication Dose Route Frequency    lactobac ac& pc-s.therm-b.anim (JERRICA Q/RISAQUAD)  1 Cap Oral DAILY    lisinopril (PRINIVIL, ZESTRIL) tablet 40 mg  40 mg Oral DAILY    insulin glargine (LANTUS) injection 26 Units  26 Units SubCUTAneous QHS    piperacillin-tazobactam (ZOSYN) 3.375 g in  ml premix/cmpd  3.375 g IntraVENous NOW    And    piperacillin-tazobactam (ZOSYN) 10.125 g in  mL infusion  10.125 g IntraVENous Q24H    amLODIPine (NORVASC) tablet 5 mg  5 mg Oral DAILY    metoprolol succinate (TOPROL-XL) XL tablet 25 mg  25 mg Oral DAILY    pantoprazole (PROTONIX) tablet 40 mg  40 mg Oral ACB    simvastatin (ZOCOR) tablet 40 mg  40 mg Oral QHS    insulin lispro (HUMALOG) injection   SubCUTAneous TIDAC    glucose chewable tablet 16 g  4 Tab Oral PRN    dextrose (D50W) injection syrg 12.5-25 g  12.5-25 g IntraVENous PRN    glucagon (GLUCAGEN) injection 1 mg  1 mg IntraMUSCular PRN    sodium chloride (NS) flush 5-10 mL  5-10 mL IntraVENous Q8H    sodium chloride (NS) flush 5-10 mL  5-10 mL IntraVENous PRN    acetaminophen (TYLENOL) tablet 650 mg  650 mg Oral Q4H PRN    HYDROcodone-acetaminophen (NORCO) 5-325 mg per tablet 1 Tab  1 Tab Oral Q4H PRN    morphine injection 2 mg  2 mg IntraVENous Q3H PRN    enoxaparin (LOVENOX) injection 40 mg  40 mg SubCUTAneous Q24H    0.45% sodium chloride 1,000 mL with potassium chloride 20 mEq infusion   IntraVENous CONTINUOUS    vancomycin - pharmacy to dose   Other Rx Dosing/Monitoring    vancomycin (VANCOCIN) 1250 mg in  ml infusion  1,250 mg IntraVENous Q16H        Objective:   Vitals:  Visit Vitals    /78 (BP 1 Location: Right arm, BP Patient Position: At rest)    Pulse 94    Temp 99.6 °F (37.6 °C)    Resp 18    LMP  (LMP Unknown)    SpO2 98%     Temp (24hrs), Av.6 °F (37.6 °C), Min:99.2 °F (37.3 °C), Max:100.2 °F (37.9 °C)    O2 Flow Rate (L/min): 2 l/min O2 Device: Nasal cannula    Last 24hr Input/Output:    Intake/Output Summary (Last 24 hours) at 17 0653  Last data filed at 17 0351   Gross per 24 hour   Intake           1607.5 ml   Output              325 ml   Net           1282.5 ml        PHYSICAL EXAM:  General:    Alert, cooperative, no distress, appears stated age. Head:   Normocephalic, without obvious abnormality, atraumatic. Eyes:   Conjunctivae/corneas clear. PERRLA  Nose:  Nares normal. No drainage or sinus tenderness. Throat:    Lips, mucosa, and tongue normal.  No Thrush  Neck:  Supple, symmetrical,  no adenopathy, thyroid: non tender    no carotid bruit and no JVD. Back:    Symmetric,  No CVA tenderness. Lungs:   Clear to auscultation bilaterally. No Wheezing or Rhonchi. No rales. Chest wall:  No tenderness or deformity. No Accessory muscle use. Heart:   Regular rate and rhythm,  no murmur, rub or gallop. Abdomen:   Soft, non-tender. Not distended. Bowel sounds normal. Large area of erythema,increased warmth, and induration rlq,tender  .        Lab Data Reviewed:    Recent Labs      12/19/17 0257 12/18/17 1801   WBC  11.7*  14.4*   HGB  10.6*  11.4*   HCT  32.5*  34.4*   PLT  233  250     Recent Labs      12/19/17 0257 12/18/17 1802 12/18/17 1801   NA  140   --   142   K  3.5   --   3.3*   CL  108   --   107   CO2  22   --   26   GLU  176*   --   181*   BUN  16  20  20   CREA  0.68   --   0.80  0.82   CA  8.3*   --   9.0   ALB   --    --   2.7*   TBILI   --    --   0.5   SGOT   --    --   17   ALT   --    --   24     Lab Results   Component Value Date/Time    Glucose (POC) 154 12/18/2017 08:51 PM    Glucose (POC) 169 12/18/2017 05:00 PM    Glucose  12/20/2016 03:04 PM    Glucose  05/10/2016 03:01 PM    Glucose  02/02/2016 03:09 PM    Glucose  10/29/2015 04:26 PM    Glucose  09/18/2014 05:21 PM       ___________________________________________________  ___________________________________________________    Attending Physician: Willy Bartholomew MD

## 2017-12-19 NOTE — PROGRESS NOTES
Day #2 of Vancomycin  Indication:  Abdominal wall cellulitis  Current regimen:  Vancomycin 1250mg IV q16h  Abx regimen:      ID Following ?: NO  Frequency of BMP:  daily  Recent Labs      12/19/17   0257  12/18/17   1802  12/18/17   1801   WBC  11.7*   --   14.4*   CREA  0.68   --   0.80  0.82   BUN  16  20  20   Tmax 100.2    Cultures:   12/18 paired blood pending    Goal trough = 10 - 15 mcg/mL    Recent trough history (date/time/level/dose/action taken):  None    Plan:  Renal function improved today. Change to Vancomycin 1500mg IV q16h for predicted trough ~13mcg/ml.   (Dose of 1250mg just hung - please complete this dose and switch to the new regimen tomorrow morning.)

## 2017-12-19 NOTE — PROGRESS NOTES
Spoke to Dr. Aston Sarkar in regards to pts elevated temperature, heart rate, and MEWS score. Orders for paired blood cultures and tylenol prn (see MAR) received. Updated primary RN.

## 2017-12-19 NOTE — PROGRESS NOTES
Problem: Falls - Risk of  Goal: *Absence of Falls  Document Haylee Fall Risk and appropriate interventions in the flowsheet.   Outcome: Progressing Towards Goal  Fall Risk Interventions:            Medication Interventions: Patient to call before getting OOB

## 2017-12-19 NOTE — PROGRESS NOTES
Problem: Mobility Impaired (Adult and Pediatric)  Goal: *Acute Goals and Plan of Care (Insert Text)  Physical Therapy Goals  Initiated 12/19/2017  1. Patient will move from supine to sit and sit to supine , scoot up and down and roll side to side in bed with independence within 7 day(s). 2.  Patient will transfer from bed to chair and chair to bed with independence using the least restrictive device within 7 day(s). 3.  Patient will perform sit to stand with independence on first attempt 100% within 7 day(s). 4.  Patient will ambulate with independence for 300 feet with no path deviations within 7 day(s). 5.  Patient will ascend/descend 12 stairs with rightg handrail(s) with modified independence within 7 day(s). physical Therapy EVALUATION  Patient: Nany Duenas (7 y.o. female)  Date: 12/19/2017  Primary Diagnosis: abdominal wall cellulitis  Cellulitis of right abdominal wall        Precautions: Fall       ASSESSMENT :  Based on the objective data described below, the patient presents with decreased functional independence compared to baseline with associated impaired balance, decreased tolerance to activity, and impaired gait. Chart reviewed and RN cleared patient for mobility. Patient was agreeable to participate and was received walking from bathroom back to chair. Patient lives alone in 2 story apartment with 0 OLEG and 12 steps inside to second floor. PTA patient was independent with all mobility and ADLs and did not use DME. Today patient ambulated 250 feet with CGA/Min A (to correct for path deviations) and completed 12 stairs with rest break after 6. During gait and stair training patient reported onset of fatigue however denied any adverse signs/symptoms. After returning to room, PT educated patient on expectations from therapy as well as patient's HEP outside of therapy (verbally acknowledged).  Patient ended session in chair with all needs placed within reach and RN notified of patient's status. PT recommending no services vs HHPT at discharge pending patient's progress in acute rehab and her ability to be independent with all mobility prior to discharge. Patient will benefit from skilled intervention to address the above impairments. Patients rehabilitation potential is considered to be Good  Factors which may influence rehabilitation potential include:   []         None noted  []         Mental ability/status  []         Medical condition  []         Home/family situation and support systems  []         Safety awareness  []         Pain tolerance/management  [x]         Other: activity tolerance     PLAN :  Recommendations and Planned Interventions:  []           Bed Mobility Training             []    Neuromuscular Re-Education  []           Transfer Training                   []    Orthotic/Prosthetic Training  []           Gait Training                         []    Modalities  []           Therapeutic Exercises           []    Edema Management/Control  []           Therapeutic Activities            []    Patient and Family Training/Education  []           Other (comment):    Frequency/Duration: Patient will be followed by physical therapy  2 times a week to address goals. Discharge Recommendations: Home Health vs none  Further Equipment Recommendations for Discharge: none anticipated, possibly SPC if patient does not progress with gait training     SUBJECTIVE:   Patient stated Oh no I never used anything to get around. I just get tired and hold onto things.     OBJECTIVE DATA SUMMARY:   HISTORY:    Past Medical History:   Diagnosis Date    Cellulitis of right abdominal wall 12/18/2017    Diabetes ([address] 1995    DJD (degenerative joint disease) of knee     Hypercholesterolemia     Hypertension     LBP (low back pain)     chiorpactor    Lumbar stenosis     Obesity     S/P colonoscopy 12-18-12     Past Surgical History:   Procedure Laterality Date    HX BREAST BIOPSY Right 1990 negative    HX COLONOSCOPY       Prior Level of Function/Home Situation: independent, lived alone  Personal factors and/or comorbidities impacting plan of care:     Home Situation  Home Environment: Apartment  # Steps to Enter: 0  One/Two Story Residence: Two story, live on 1st floor  # of Interior Steps: 10  Interior Rails: Right  Living Alone: Yes  Support Systems: Friends \ neighbors (living with part time)  Patient Expects to be Discharged to[de-identified] Apartment  Current DME Used/Available at Home: None  Tub or Shower Type: Tub/Shower combination    EXAMINATION/PRESENTATION/DECISION MAKING:   Critical Behavior:              Hearing: Auditory  Auditory Impairment: None  Skin:    Edema:   Range Of Motion:  AROM: Within functional limits                       Strength:    Strength: Within functional limits                    Tone & Sensation:                                  Coordination:  Coordination: Within functional limits  Vision:      Functional Mobility:  Bed Mobility:        Sit to Supine: Modified independent     Transfers:  Sit to Stand: Supervision  Stand to Sit: Supervision                       Balance:   Sitting: Intact  Standing: Impaired  Standing - Static: Good;Constant support  Standing - Dynamic : Fair  Ambulation/Gait Training:  Distance (ft): 250 Feet (ft)  Assistive Device: Gait belt;Walker, rolling  Ambulation - Level of Assistance: Contact guard assistance;Minimal assistance (increased assistance with fatigue/path dev)        Gait Abnormalities: Decreased step clearance;Trunk sway increased; Path deviations (pathdev-improved with activity, declined with fatigue)        Base of Support: Widened  Stance: Weight shift  Speed/Delphine: Pace decreased (<100 feet/min)  Step Length: Right shortened;Left shortened                     Stairs:  Number of Stairs Trained: 12  Stairs - Level of Assistance: Contact guard assistance;Minimum assistance   Rail Use: Right     Therapeutic Exercises:   Reviewed LE and UE AROM    Functional Measure:  Tinetti test:    Sitting Balance: 1  Arises: 1  Attempts to Rise: 1  Immediate Standing Balance: 1  Standing Balance: 1  Nudged: 1  Eyes Closed: 1  Turn 360 Degrees - Continuous/Discontinuous: 0  Turn 360 Degrees - Steady/Unsteady: 1  Sitting Down: 1  Balance Score: 9  Indication of Gait: 1  R Step Length/Height: 1  L Step Length/Height: 1  R Foot Clearance: 1  L Foot Clearance: 1  Step Symmetry: 1  Step Continuity: 1  Path: 1  Trunk: 1  Walking Time: 0  Gait Score: 9  Total Score: 18       Tinetti Test and G-code impairment scale:  Percentage of Impairment CH    0%   CI    1-19% CJ    20-39% CK    40-59% CL    60-79% CM    80-99% CN     100%   Tinetti  Score 0-28 28 23-27 17-22 12-16 6-11 1-5 0       Tinetti Tool Score Risk of Falls  <19 = High Fall Risk  19-24 = Moderate Fall Risk  25-28 = Low Fall Risk  Tinetti ME. Performance-Oriented Assessment of Mobility Problems in Elderly Patients. [address]; [id number]. (Scoring Description: PT Bulletin Feb. 10, 1993)    Older adults: Jeremiah Schwab et al, 2009; n = 1000 [facility] elderly evaluated with ABC, SHIRA, ADL, and IADL)  · Mean SHIRA score for males aged 69-68 years = 26.21(3.40)  · Mean SHIRA score for females age 69-68 years = 25.16(4.30)  · Mean SHIRA score for males over 80 years = 23.29(6.02)  · Mean SHIRA score for females over 80 years = 17.20(8.32)         G codes: In compliance with CMSs Claims Based Outcome Reporting, the following G-code set was chosen for this patient based on their primary functional limitation being treated: The outcome measure chosen to determine the severity of the functional limitation was the Tinetti with a score of 18/28 which was correlated with the impairment scale.     ? Mobility - Walking and Moving Around:     - CURRENT STATUS: CJ - 20%-39% impaired, limited or restricted    - GOAL STATUS: CI - 1%-19% impaired, limited or restricted    - D/C STATUS:  ---------------To be determined---------------      Physical Therapy Evaluation Charge Determination   History Examination Presentation Decision-Making   MEDIUM  Complexity : 1-2 comorbidities / personal factors will impact the outcome/ POC  LOW Complexity : 1-2 Standardized tests and measures addressing body structure, function, activity limitation and / or participation in recreation  LOW Complexity : Stable, uncomplicated  Other outcome measures Tinetti  MEDIUM      Based on the above components, the patient evaluation is determined to be of the following complexity level: LOW     Pain:                    Activity Tolerance:   Fair/Good, patient required standing rest break following ambulating 125 feet, 6 stairs, and 125 more feet   Please refer to the flowsheet for vital signs taken during this treatment. After treatment:   []         Patient left in no apparent distress sitting up in chair  [x]         Patient left in no apparent distress in bed  [x]         Call bell left within reach  [x]         Nursing notified  [x]         Caregiver present  []         Bed alarm activated    COMMUNICATION/EDUCATION:   The patients plan of care was discussed with: Registered Nurse. [x]         Fall prevention education was provided and the patient/caregiver indicated understanding. [x]         Patient/family have participated as able in goal setting and plan of care. [x]         Patient/family agree to work toward stated goals and plan of care. []         Patient understands intent and goals of therapy, but is neutral about his/her participation. []         Patient is unable to participate in goal setting and plan of care.     Thank you for this referral.  Raffi Schulte PT, DPT   Time Calculation: 22 mins

## 2017-12-19 NOTE — PROGRESS NOTES
Spiritual Care Partner Volunteer visited patient in [address] [place] [address] on 12/19/17.   Documented by:  Chaplain Duong MDiv, MS, [facility]  [phone] PR[address] (2020)

## 2017-12-19 NOTE — PROGRESS NOTES
Bedside shift change report given to [facility] HERNANDO (oncoming nurse) by Gloria Wallace (offgoing nurse). Report included the following information SBAR.

## 2017-12-19 NOTE — PROGRESS NOTES
attempted to see patient and complete cmonestop. I was unsuccessful x2 and will see in am.  Patient has been using a k-pad to abdominal wall and is on IVAB with b/p being controlled by medication.   Presentlty there is a Case management consult and I will see in am.

## 2017-12-19 NOTE — PROGRESS NOTES
Day #1 of zosyn  Indication:  SSTI  Current regimen:  3.375 gm IV q8h  Recent Labs      17   0257  17   1802  17   1801   WBC  11.7*   --   14.4*   CREA  0.68   --   0.80  0.82   BUN  16  20  20     Est CrCl: >20 ml/min  Temp (24hrs), Av.6 °F (37.6 °C), Min:99.2 °F (37.3 °C), Max:100.2 °F (37.9 °C)    Plan:   Plan: Will start Zosyn 3.375 gm IV x 1, followed by 10.125 gm IV continuous over 24 hours per [facility] [facility] P&T Committee Protocol with respect to renal function. Pharmacy will continue to monitor patient daily and will make dosage adjustments based upon changing renal function.

## 2017-12-20 ENCOUNTER — APPOINTMENT (OUTPATIENT)
Dept: CT IMAGING | Age: 67
DRG: 854 | End: 2017-12-20
Attending: INTERNAL MEDICINE
Payer: MEDICARE

## 2017-12-20 LAB
ANION GAP SERPL CALC-SCNC: 9 MMOL/L (ref 5–15)
BASOPHILS # BLD: 0 K/UL (ref 0–0.1)
BASOPHILS NFR BLD: 0 % (ref 0–1)
BUN SERPL-MCNC: 10 MG/DL (ref 6–20)
BUN/CREAT SERPL: 12 (ref 12–20)
CALCIUM SERPL-MCNC: 8 MG/DL (ref 8.5–10.1)
CHLORIDE SERPL-SCNC: 106 MMOL/L (ref 97–108)
CK MB CFR SERPL CALC: NORMAL % (ref 0–2.5)
CK MB SERPL-MCNC: <1 NG/ML (ref 5–25)
CK SERPL-CCNC: 95 U/L (ref 26–192)
CO2 SERPL-SCNC: 22 MMOL/L (ref 21–32)
CREAT SERPL-MCNC: 0.86 MG/DL (ref 0.55–1.02)
EOSINOPHIL # BLD: 0.2 K/UL (ref 0–0.4)
EOSINOPHIL NFR BLD: 1 % (ref 0–7)
ERYTHROCYTE [DISTWIDTH] IN BLOOD BY AUTOMATED COUNT: 14.2 % (ref 11.5–14.5)
GLUCOSE BLD STRIP.AUTO-MCNC: 177 MG/DL (ref 65–100)
GLUCOSE BLD STRIP.AUTO-MCNC: 186 MG/DL (ref 65–100)
GLUCOSE BLD STRIP.AUTO-MCNC: 228 MG/DL (ref 65–100)
GLUCOSE BLD STRIP.AUTO-MCNC: 231 MG/DL (ref 65–100)
GLUCOSE SERPL-MCNC: 191 MG/DL (ref 65–100)
HCT VFR BLD AUTO: 32.2 % (ref 35–47)
HGB BLD-MCNC: 10.7 G/DL (ref 11.5–16)
LYMPHOCYTES # BLD: 1.1 K/UL (ref 0.8–3.5)
LYMPHOCYTES NFR BLD: 8 % (ref 12–49)
MCH RBC QN AUTO: 28.4 PG (ref 26–34)
MCHC RBC AUTO-ENTMCNC: 33.2 G/DL (ref 30–36.5)
MCV RBC AUTO: 85.4 FL (ref 80–99)
MONOCYTES # BLD: 1.7 K/UL (ref 0–1)
MONOCYTES NFR BLD: 13 % (ref 5–13)
NEUTS SEG # BLD: 10.4 K/UL (ref 1.8–8)
NEUTS SEG NFR BLD: 78 % (ref 32–75)
PLATELET # BLD AUTO: 260 K/UL (ref 150–400)
POTASSIUM SERPL-SCNC: 3.7 MMOL/L (ref 3.5–5.1)
RBC # BLD AUTO: 3.77 M/UL (ref 3.8–5.2)
SERVICE CMNT-IMP: ABNORMAL
SODIUM SERPL-SCNC: 137 MMOL/L (ref 136–145)
TROPONIN I SERPL-MCNC: <0.04 NG/ML
WBC # BLD AUTO: 13.4 K/UL (ref 3.6–11)

## 2017-12-20 PROCEDURE — 74011000258 HC RX REV CODE- 258: Performed by: INTERNAL MEDICINE

## 2017-12-20 PROCEDURE — 65270000029 HC RM PRIVATE

## 2017-12-20 PROCEDURE — 87205 SMEAR GRAM STAIN: CPT | Performed by: INTERNAL MEDICINE

## 2017-12-20 PROCEDURE — 82962 GLUCOSE BLOOD TEST: CPT

## 2017-12-20 PROCEDURE — 80048 BASIC METABOLIC PNL TOTAL CA: CPT | Performed by: INTERNAL MEDICINE

## 2017-12-20 PROCEDURE — 74011250637 HC RX REV CODE- 250/637: Performed by: INTERNAL MEDICINE

## 2017-12-20 PROCEDURE — 84484 ASSAY OF TROPONIN QUANT: CPT | Performed by: INTERNAL MEDICINE

## 2017-12-20 PROCEDURE — 87077 CULTURE AEROBIC IDENTIFY: CPT | Performed by: INTERNAL MEDICINE

## 2017-12-20 PROCEDURE — 74011636637 HC RX REV CODE- 636/637: Performed by: INTERNAL MEDICINE

## 2017-12-20 PROCEDURE — 87186 SC STD MICRODIL/AGAR DIL: CPT | Performed by: INTERNAL MEDICINE

## 2017-12-20 PROCEDURE — 82550 ASSAY OF CK (CPK): CPT | Performed by: INTERNAL MEDICINE

## 2017-12-20 PROCEDURE — 71275 CT ANGIOGRAPHY CHEST: CPT

## 2017-12-20 PROCEDURE — 36415 COLL VENOUS BLD VENIPUNCTURE: CPT | Performed by: INTERNAL MEDICINE

## 2017-12-20 PROCEDURE — 93005 ELECTROCARDIOGRAM TRACING: CPT

## 2017-12-20 PROCEDURE — 85025 COMPLETE CBC W/AUTO DIFF WBC: CPT | Performed by: INTERNAL MEDICINE

## 2017-12-20 PROCEDURE — 74011250636 HC RX REV CODE- 250/636: Performed by: INTERNAL MEDICINE

## 2017-12-20 PROCEDURE — B246ZZZ ULTRASONOGRAPHY OF RIGHT AND LEFT HEART: ICD-10-PCS | Performed by: SPECIALIST

## 2017-12-20 PROCEDURE — 74011636320 HC RX REV CODE- 636/320: Performed by: INTERNAL MEDICINE

## 2017-12-20 PROCEDURE — 65660000000 HC RM CCU STEPDOWN

## 2017-12-20 RX ORDER — PANTOPRAZOLE SODIUM 40 MG/1
40 TABLET, DELAYED RELEASE ORAL
Status: DISCONTINUED | OUTPATIENT
Start: 2017-12-20 | End: 2017-12-27 | Stop reason: HOSPADM

## 2017-12-20 RX ORDER — INSULIN GLARGINE 100 [IU]/ML
32 INJECTION, SOLUTION SUBCUTANEOUS
Status: DISCONTINUED | OUTPATIENT
Start: 2017-12-20 | End: 2017-12-20

## 2017-12-20 RX ORDER — INSULIN GLARGINE 100 [IU]/ML
36 INJECTION, SOLUTION SUBCUTANEOUS
Status: DISCONTINUED | OUTPATIENT
Start: 2017-12-20 | End: 2017-12-21

## 2017-12-20 RX ORDER — METOPROLOL SUCCINATE 50 MG/1
50 TABLET, EXTENDED RELEASE ORAL DAILY
Status: DISCONTINUED | OUTPATIENT
Start: 2017-12-20 | End: 2017-12-27 | Stop reason: HOSPADM

## 2017-12-20 RX ORDER — FUROSEMIDE 40 MG/1
40 TABLET ORAL DAILY
Status: DISCONTINUED | OUTPATIENT
Start: 2017-12-20 | End: 2017-12-27 | Stop reason: HOSPADM

## 2017-12-20 RX ORDER — PANTOPRAZOLE SODIUM 40 MG/1
40 TABLET, DELAYED RELEASE ORAL
Status: DISCONTINUED | OUTPATIENT
Start: 2017-12-21 | End: 2017-12-20

## 2017-12-20 RX ORDER — SODIUM CHLORIDE 0.9 % (FLUSH) 0.9 %
10 SYRINGE (ML) INJECTION
Status: COMPLETED | OUTPATIENT
Start: 2017-12-20 | End: 2017-12-20

## 2017-12-20 RX ADMIN — INSULIN LISPRO 2 UNITS: 100 INJECTION, SOLUTION INTRAVENOUS; SUBCUTANEOUS at 17:59

## 2017-12-20 RX ADMIN — SODIUM CHLORIDE 100 ML: 900 INJECTION, SOLUTION INTRAVENOUS at 20:31

## 2017-12-20 RX ADMIN — INSULIN LISPRO 3 UNITS: 100 INJECTION, SOLUTION INTRAVENOUS; SUBCUTANEOUS at 12:18

## 2017-12-20 RX ADMIN — VANCOMYCIN HYDROCHLORIDE 1500 MG: 10 INJECTION, POWDER, LYOPHILIZED, FOR SOLUTION INTRAVENOUS at 21:03

## 2017-12-20 RX ADMIN — ENOXAPARIN SODIUM 40 MG: 40 INJECTION SUBCUTANEOUS at 17:29

## 2017-12-20 RX ADMIN — METOPROLOL SUCCINATE 50 MG: 50 TABLET, EXTENDED RELEASE ORAL at 08:45

## 2017-12-20 RX ADMIN — Medication 10 ML: at 05:24

## 2017-12-20 RX ADMIN — ACETAMINOPHEN 650 MG: 325 TABLET ORAL at 04:26

## 2017-12-20 RX ADMIN — ACETAMINOPHEN 650 MG: 325 TABLET ORAL at 11:00

## 2017-12-20 RX ADMIN — SIMVASTATIN 40 MG: 40 TABLET, FILM COATED ORAL at 21:03

## 2017-12-20 RX ADMIN — INSULIN LISPRO 2 UNITS: 100 INJECTION, SOLUTION INTRAVENOUS; SUBCUTANEOUS at 07:03

## 2017-12-20 RX ADMIN — PANTOPRAZOLE SODIUM 40 MG: 40 TABLET, DELAYED RELEASE ORAL at 17:29

## 2017-12-20 RX ADMIN — SODIUM CHLORIDE: 450 INJECTION, SOLUTION INTRAVENOUS at 10:57

## 2017-12-20 RX ADMIN — Medication 1 CAPSULE: at 08:34

## 2017-12-20 RX ADMIN — VANCOMYCIN HYDROCHLORIDE 1500 MG: 10 INJECTION, POWDER, LYOPHILIZED, FOR SOLUTION INTRAVENOUS at 04:23

## 2017-12-20 RX ADMIN — INSULIN GLARGINE 36 UNITS: 100 INJECTION, SOLUTION SUBCUTANEOUS at 22:30

## 2017-12-20 RX ADMIN — Medication 10 ML: at 13:52

## 2017-12-20 RX ADMIN — Medication 10 ML: at 21:03

## 2017-12-20 RX ADMIN — AMLODIPINE BESYLATE 5 MG: 5 TABLET ORAL at 08:34

## 2017-12-20 RX ADMIN — Medication 10 ML: at 20:31

## 2017-12-20 RX ADMIN — IOPAMIDOL 80 ML: 755 INJECTION, SOLUTION INTRAVENOUS at 20:31

## 2017-12-20 RX ADMIN — PIPERACILLIN AND TAZOBACTAM 10.12 G: 3; .375 INJECTION, POWDER, FOR SOLUTION INTRAVENOUS at 08:35

## 2017-12-20 RX ADMIN — LISINOPRIL 40 MG: 20 TABLET ORAL at 08:34

## 2017-12-20 RX ADMIN — PANTOPRAZOLE SODIUM 40 MG: 40 TABLET, DELAYED RELEASE ORAL at 07:03

## 2017-12-20 RX ADMIN — FUROSEMIDE 40 MG: 40 TABLET ORAL at 08:45

## 2017-12-20 NOTE — PROGRESS NOTES
Care Management Interventions  PCP Verified by CM: Yes  Palliative Care Criteria Met (RRAT>21 & CHF Dx)?: No  Mode of Transport at Discharge:  (self or neighbor or cousin)  Transition of Care Consult (CM Consult):  (f/u MD)  Wendi Signup: No  Discharge Durable Medical Equipment: No  Physical Therapy Consult: No  Occupational Therapy Consult: No  Speech Therapy Consult: No  Current Support Network: Lives Alone (friend Sumit Grajeda [phone])  Confirm Follow Up Transport:  (car)  Plan discussed with Pt/Family/Caregiver: Yes (patient)  Discharge Location  Discharge Placement: Home    met with patient and met her two cousins who were visiting. Patient has been ambulatory and mobilized x 2 today. She does not use oxygen at home and has used it via nasal cannula while in hospital.  She uses [facility] on [address] for her prescriptions. Presently patient does not have any discharge needs at this time that have been identified at this time. Patient has a neighbor who checks on her and who worked with her at [facility] for 27 years. Her other friend is Sumit Grajeda and her phone is [phone]. Patient did drive her care to the hospital and states \"I'm driving it home\". She is hopeful to be able to be discharged and f/u with pcp by end of week.

## 2017-12-20 NOTE — PROGRESS NOTES
Dr. Aston Sarkar made aware heart rate elevated to 140. (patient was coughing). Also made doctor aware that patient has a very dry hacking cough nonproductive.

## 2017-12-20 NOTE — CDMP QUERY
=>Sepsis POA secondary to abdominal wall cellulitis as evidenced by fever, tachycardia, leucocytosis, hyperglycemia  and elevated neutrophils treated with IV Vanc and IV Zosyn   =>Other Explanation of clinical findings  =>Unable to Determine (no explanation of clinical findings)    The medical record reflects the following clinical findings, treatment, and risk factors:  Risk Factors: 74 yo f admitted with abdominal wall cellulitis   Clinical Indicators: On admit:  Temp 102.4  Pulse 104 WBC 14.4 Neutrophils 79% Blood sugar 181   Treatment: IV  cc bolus, IV vanc and Zosyn     Please clarify and document your clinical opinion in the progress notes and discharge summary including the definitive and/or presumptive diagnosis, (suspected or probable), related to the above clinical findings. Please include clinical findings supporting your diagnosis.     Thank you for your time   [facility] RN/BSN, [address]  Desk:   [phone]   Other:  [phone]

## 2017-12-20 NOTE — CDMP QUERY
2) =>Hypokalemia POA As evidenced by Low K 3.3 treated with IV KCL   =>Other Explanation of clinical findings  =>Unable to Determine (no explanation of clinical findings)    The medical record reflects the following clinical findings, treatment, and risk factors:  Risk Factors: 72 yo f admitted with abdominal wall cellulitis   Clinical Indicators: 12/18 K 3.3   Treatment: IV KCl 20 janna     Please clarify and document your clinical opinion in the progress notes and discharge summary including the definitive and/or presumptive diagnosis, (suspected or probable), related to the above clinical findings. Please include clinical findings supporting your diagnosis.       Thank you for your time   [facility] RN/BSN, [address] [place] [address]  Desk:   [phone]   Other:  [phone]

## 2017-12-20 NOTE — PROGRESS NOTES
Dr. Kalpana Arciniega called to make aware patient is complaining of chest pressure. States it is  Around sternal area.  Denies pain

## 2017-12-20 NOTE — PROGRESS NOTES
Hospital Progress Note    NAME:  Nette Castro   :   1950   MRN:  [id number]     Date/Time:  2017 6:53 AM    Plan:   1. Iv ab  2. Control bs   3.  Titrate bp meds  Risk of Deterioration: Low  []           Moderate  [x]           High  []                 Assessment:   Principal Problem:    Cellulitis of right abdominal wall (2017) vanc and zosyn - no drainable collection    Active Problems:    Diabetes (HCC) () titrate insulin      Hypertension () titrate meds      Low back pain ()      analgesics       Subjective:     Continues with rlq pain   11 Point Review of Systems:   Negative except lbp    []            Unable to obtain ROS due to:       []            mental status change []            sedated []            intubated     Social History   Substance Use Topics    Smoking status: Never Smoker    Smokeless tobacco: Never Used    Alcohol use No     Medications reviewed:  Current Facility-Administered Medications   Medication Dose Route Frequency    lactobac ac& pc-s.therm-b.anim (JERRICA Q/RISAQUAD)  1 Cap Oral DAILY    lisinopril (PRINIVIL, ZESTRIL) tablet 40 mg  40 mg Oral DAILY    insulin glargine (LANTUS) injection 26 Units  26 Units SubCUTAneous QHS    piperacillin-tazobactam (ZOSYN) 10.125 g in  mL infusion  10.125 g IntraVENous Q24H    vancomycin (VANCOCIN) 1500 mg in  ml infusion  1,500 mg IntraVENous Q16H    amLODIPine (NORVASC) tablet 5 mg  5 mg Oral DAILY    metoprolol succinate (TOPROL-XL) XL tablet 25 mg  25 mg Oral DAILY    pantoprazole (PROTONIX) tablet 40 mg  40 mg Oral ACB    simvastatin (ZOCOR) tablet 40 mg  40 mg Oral QHS    insulin lispro (HUMALOG) injection   SubCUTAneous TIDAC    glucose chewable tablet 16 g  4 Tab Oral PRN    dextrose (D50W) injection syrg 12.5-25 g  12.5-25 g IntraVENous PRN    glucagon (GLUCAGEN) injection 1 mg  1 mg IntraMUSCular PRN    sodium chloride (NS) flush 5-10 mL  5-10 mL IntraVENous Q8H    sodium chloride (NS) flush 5-10 mL  5-10 mL IntraVENous PRN    acetaminophen (TYLENOL) tablet 650 mg  650 mg Oral Q4H PRN    HYDROcodone-acetaminophen (NORCO) 5-325 mg per tablet 1 Tab  1 Tab Oral Q4H PRN    morphine injection 2 mg  2 mg IntraVENous Q3H PRN    enoxaparin (LOVENOX) injection 40 mg  40 mg SubCUTAneous Q24H    0.45% sodium chloride 1,000 mL with potassium chloride 20 mEq infusion   IntraVENous CONTINUOUS    vancomycin - pharmacy to dose   Other Rx Dosing/Monitoring        Objective:   Vitals:  Visit Vitals    /73 (BP 1 Location: Left arm, BP Patient Position: At rest)    Pulse 100    Temp (!) 100.9 °F (38.3 °C)    Resp 20    LMP  (LMP Unknown)    SpO2 100%     Temp (24hrs), Av.9 °F (37.7 °C), Min:98.3 °F (36.8 °C), Max:102.4 °F (39.1 °C)    O2 Flow Rate (L/min): 2 l/min O2 Device: Nasal cannula    Last 24hr Input/Output:    Intake/Output Summary (Last 24 hours) at 17 0749  Last data filed at 17 2200   Gross per 24 hour   Intake                0 ml   Output              450 ml   Net             -450 ml        PHYSICAL EXAM:  General:    Alert, cooperative, no distress, appears stated age. Head:   Normocephalic, without obvious abnormality, atraumatic.s. Lungs:   Clear to auscultation bilaterally. No Wheezing or Rhonchi. No rales. Heart:   Regular rate and rhythm,  no murmur, rub or gallop. Abdomen:   Soft, non-tender. Not distended. Bowel sounds normal. Large area of erythema,increased warmth, and induration rlq,tender  .        Lab Data Reviewed:    Recent Labs      17   0305  17   0257  17   1801   WBC  13.4*  11.7*  14.4*   HGB  10.7*  10.6*  11.4*   HCT  32.2*  32.5*  34.4*   PLT  260  233  250     Recent Labs      17   0305  17   0257  17   1802  17   1801   NA  137  140   --   142   K  3.7  3.5   --   3.3*   CL  106  108   --   107   CO2  22  22   --   26   GLU  191*  176*   --   181*   BUN  10  16  20  20   CREA  0.86  0.68   -- 0. 80  0.82   CA  8.0*  8.3*   --   9.0   ALB   --    --    --   2.7*   TBILI   --    --    --   0.5   SGOT   --    --    --   17   ALT   --    --    --   24     Lab Results   Component Value Date/Time    Glucose (POC) 177 12/20/2017 06:12 AM    Glucose (POC) 255 12/19/2017 09:49 PM    Glucose (POC) 202 12/19/2017 05:11 PM    Glucose (POC) 182 12/19/2017 11:50 AM    Glucose (POC) 184 12/19/2017 07:00 AM       ___________________________________________________  ___________________________________________________    Attending Physician: Valery Lynn MD

## 2017-12-20 NOTE — PROGRESS NOTES
Bedside shift change report given to [facility] HERNANDO (oncoming nurse) by Paul Fabian (offgoing nurse). Report included the following information SBAR.

## 2017-12-20 NOTE — PROGRESS NOTES
Day #3 of Vancomycin  Indication:  Abdominal wall cellulitis  Current regimen:  Vancomycin 1500 mg IV q16h  Abx regimen:  vanc + zosyn    ID Following ?: NO  Frequency of BMP:  daily through   Recent Labs      17   0305  17   0257  17   1802  17   1801   WBC  13.4*  11.7*   --   14.4*   CREA  0.86  0.68   --   0.80  0.82   BUN  10  16  20  20     Est CrCl: 73 ml/min; UO: --- ml/kg/hr [not assessed]  Temp (24hrs), Av.9 °F (37.7 °C), Min:98.3 °F (36.8 °C), Max:102.4 °F (39.1 °C)    Cultures:    paired blood NGTD -prelim    Goal trough = 10 - 15 mcg/mL    Recent trough history (date/time/level/dose/action taken):  None    Plan: Continue current regimen. May consider re-reducing dose back to 1250 mg q16h tomorrow dependent on renal function and clinical picture. Pharmacy to follow patient daily and order levels / make dose adjustments as appropriate.

## 2017-12-20 NOTE — DIABETES MGMT
DTC Progress Note    Recommendations/ Comments: Chart reviewed due to hyperglycemia. Pt's blood sugars 177-255 mg/dl and pt has required 10 units of correction over the last 24 hours. Noted Lantus was increased to 32 units starting tonight. Chart reviewed on [facility]. Patient is a 73 y.o. female with known diabetes on Metformin 500 mg BID at home. A1c:   Lab Results   Component Value Date/Time    Hemoglobin A1c 6.8 12/19/2017 02:57 AM    Hemoglobin A1c 6.6 07/20/2017 04:16 PM       Recent Glucose Results: Lab Results   Component Value Date/Time     (H) 12/20/2017 03:05 AM    GLUCPOC 228 (H) 12/20/2017 11:54 AM    GLUCPOC 177 (H) 12/20/2017 06:12 AM    GLUCPOC 255 (H) 12/19/2017 09:49 PM        Lab Results   Component Value Date/Time    Creatinine 0.86 12/20/2017 03:05 AM     Estimated Creatinine Clearance: 72.5 mL/min (based on Cr of 0.86). Active Orders   Diet    DIET CARDIAC Regular        PO intake: Patient Vitals for the past 72 hrs:   % Diet Eaten   12/18/17 2030 80 %       Current hospital DM medication: correction scale Humalog, normal sensitivity and Lantus 32 units hs. Will continue to follow as needed.     Thank you  Rebecca Blevins RD, CDE

## 2017-12-20 NOTE — ROUTINE PROCESS
Pt unable to have CT done at this time due to inappropriate IV access (gauge and site). Anabella Oliva (RN) informed and once pt has correct IV in appropriate site she will call  to schedule CT.

## 2017-12-21 LAB
ANION GAP SERPL CALC-SCNC: 9 MMOL/L (ref 5–15)
ATRIAL RATE: 91 BPM
BUN SERPL-MCNC: 6 MG/DL (ref 6–20)
BUN/CREAT SERPL: 7 (ref 12–20)
CALCIUM SERPL-MCNC: 8.3 MG/DL (ref 8.5–10.1)
CALCULATED P AXIS, ECG09: -55 DEGREES
CALCULATED R AXIS, ECG10: 42 DEGREES
CALCULATED T AXIS, ECG11: 38 DEGREES
CHLORIDE SERPL-SCNC: 109 MMOL/L (ref 97–108)
CO2 SERPL-SCNC: 23 MMOL/L (ref 21–32)
CREAT SERPL-MCNC: 0.9 MG/DL (ref 0.55–1.02)
DIAGNOSIS, 93000: NORMAL
ERYTHROCYTE [DISTWIDTH] IN BLOOD BY AUTOMATED COUNT: 14.5 % (ref 11.5–14.5)
GLUCOSE BLD STRIP.AUTO-MCNC: 179 MG/DL (ref 65–100)
GLUCOSE BLD STRIP.AUTO-MCNC: 180 MG/DL (ref 65–100)
GLUCOSE BLD STRIP.AUTO-MCNC: 182 MG/DL (ref 65–100)
GLUCOSE BLD STRIP.AUTO-MCNC: 185 MG/DL (ref 65–100)
GLUCOSE SERPL-MCNC: 202 MG/DL (ref 65–100)
HCT VFR BLD AUTO: 32 % (ref 35–47)
HGB BLD-MCNC: 10.6 G/DL (ref 11.5–16)
MCH RBC QN AUTO: 28.3 PG (ref 26–34)
MCHC RBC AUTO-ENTMCNC: 33.1 G/DL (ref 30–36.5)
MCV RBC AUTO: 85.6 FL (ref 80–99)
P-R INTERVAL, ECG05: 132 MS
PLATELET # BLD AUTO: 282 K/UL (ref 150–400)
POTASSIUM SERPL-SCNC: 3.4 MMOL/L (ref 3.5–5.1)
Q-T INTERVAL, ECG07: 346 MS
QRS DURATION, ECG06: 66 MS
QTC CALCULATION (BEZET), ECG08: 425 MS
RBC # BLD AUTO: 3.74 M/UL (ref 3.8–5.2)
SERVICE CMNT-IMP: ABNORMAL
SODIUM SERPL-SCNC: 141 MMOL/L (ref 136–145)
VENTRICULAR RATE, ECG03: 91 BPM
WBC # BLD AUTO: 12.7 K/UL (ref 3.6–11)

## 2017-12-21 PROCEDURE — 65270000029 HC RM PRIVATE

## 2017-12-21 PROCEDURE — 74011250636 HC RX REV CODE- 250/636: Performed by: INTERNAL MEDICINE

## 2017-12-21 PROCEDURE — 74011000258 HC RX REV CODE- 258: Performed by: INTERNAL MEDICINE

## 2017-12-21 PROCEDURE — 65660000000 HC RM CCU STEPDOWN

## 2017-12-21 PROCEDURE — 74011636637 HC RX REV CODE- 636/637: Performed by: INTERNAL MEDICINE

## 2017-12-21 PROCEDURE — 85027 COMPLETE CBC AUTOMATED: CPT | Performed by: INTERNAL MEDICINE

## 2017-12-21 PROCEDURE — 93306 TTE W/DOPPLER COMPLETE: CPT

## 2017-12-21 PROCEDURE — 80048 BASIC METABOLIC PNL TOTAL CA: CPT | Performed by: INTERNAL MEDICINE

## 2017-12-21 PROCEDURE — 36415 COLL VENOUS BLD VENIPUNCTURE: CPT | Performed by: INTERNAL MEDICINE

## 2017-12-21 PROCEDURE — 74011250637 HC RX REV CODE- 250/637: Performed by: INTERNAL MEDICINE

## 2017-12-21 PROCEDURE — 82962 GLUCOSE BLOOD TEST: CPT

## 2017-12-21 PROCEDURE — 97116 GAIT TRAINING THERAPY: CPT

## 2017-12-21 RX ORDER — INSULIN GLARGINE 100 [IU]/ML
42 INJECTION, SOLUTION SUBCUTANEOUS
Status: DISCONTINUED | OUTPATIENT
Start: 2017-12-21 | End: 2017-12-23

## 2017-12-21 RX ORDER — CEFAZOLIN SODIUM/WATER 2 G/20 ML
2 SYRINGE (ML) INTRAVENOUS EVERY 8 HOURS
Status: DISCONTINUED | OUTPATIENT
Start: 2017-12-21 | End: 2017-12-27 | Stop reason: HOSPADM

## 2017-12-21 RX ORDER — CLINDAMYCIN PHOSPHATE 600 MG/50ML
600 INJECTION INTRAVENOUS EVERY 8 HOURS
Status: DISCONTINUED | OUTPATIENT
Start: 2017-12-21 | End: 2017-12-27 | Stop reason: HOSPADM

## 2017-12-21 RX ORDER — GUAIFENESIN 600 MG/1
600 TABLET, EXTENDED RELEASE ORAL EVERY 12 HOURS
Status: DISCONTINUED | OUTPATIENT
Start: 2017-12-21 | End: 2017-12-27 | Stop reason: HOSPADM

## 2017-12-21 RX ADMIN — CLINDAMYCIN PHOSPHATE 600 MG: 12 INJECTION, SOLUTION INTRAMUSCULAR; INTRAVENOUS at 18:47

## 2017-12-21 RX ADMIN — SODIUM CHLORIDE: 450 INJECTION, SOLUTION INTRAVENOUS at 07:22

## 2017-12-21 RX ADMIN — GUAIFENESIN 600 MG: 600 TABLET, EXTENDED RELEASE ORAL at 21:32

## 2017-12-21 RX ADMIN — FUROSEMIDE 40 MG: 40 TABLET ORAL at 09:12

## 2017-12-21 RX ADMIN — ACETAMINOPHEN 650 MG: 325 TABLET ORAL at 01:02

## 2017-12-21 RX ADMIN — AMLODIPINE BESYLATE 5 MG: 5 TABLET ORAL at 09:12

## 2017-12-21 RX ADMIN — METOPROLOL SUCCINATE 50 MG: 50 TABLET, EXTENDED RELEASE ORAL at 09:13

## 2017-12-21 RX ADMIN — LISINOPRIL 40 MG: 20 TABLET ORAL at 09:12

## 2017-12-21 RX ADMIN — Medication 2 G: at 19:31

## 2017-12-21 RX ADMIN — VANCOMYCIN HYDROCHLORIDE 1500 MG: 10 INJECTION, POWDER, LYOPHILIZED, FOR SOLUTION INTRAVENOUS at 12:29

## 2017-12-21 RX ADMIN — PANTOPRAZOLE SODIUM 40 MG: 40 TABLET, DELAYED RELEASE ORAL at 07:16

## 2017-12-21 RX ADMIN — INSULIN LISPRO 2 UNITS: 100 INJECTION, SOLUTION INTRAVENOUS; SUBCUTANEOUS at 07:16

## 2017-12-21 RX ADMIN — SIMVASTATIN 40 MG: 40 TABLET, FILM COATED ORAL at 21:32

## 2017-12-21 RX ADMIN — INSULIN GLARGINE 42 UNITS: 100 INJECTION, SOLUTION SUBCUTANEOUS at 21:52

## 2017-12-21 RX ADMIN — INSULIN LISPRO 2 UNITS: 100 INJECTION, SOLUTION INTRAVENOUS; SUBCUTANEOUS at 11:30

## 2017-12-21 RX ADMIN — INSULIN LISPRO 2 UNITS: 100 INJECTION, SOLUTION INTRAVENOUS; SUBCUTANEOUS at 17:07

## 2017-12-21 RX ADMIN — Medication 10 ML: at 21:32

## 2017-12-21 RX ADMIN — HYDROCODONE BITARTRATE AND ACETAMINOPHEN 1 TABLET: 5; 325 TABLET ORAL at 21:52

## 2017-12-21 RX ADMIN — Medication 10 ML: at 15:34

## 2017-12-21 RX ADMIN — Medication 10 ML: at 06:00

## 2017-12-21 RX ADMIN — ENOXAPARIN SODIUM 40 MG: 40 INJECTION SUBCUTANEOUS at 17:07

## 2017-12-21 RX ADMIN — PIPERACILLIN AND TAZOBACTAM 10.12 G: 3; .375 INJECTION, POWDER, FOR SOLUTION INTRAVENOUS at 07:20

## 2017-12-21 RX ADMIN — GUAIFENESIN 600 MG: 600 TABLET, EXTENDED RELEASE ORAL at 15:34

## 2017-12-21 RX ADMIN — Medication 1 CAPSULE: at 09:12

## 2017-12-21 NOTE — CONSULTS
Infectious Disease Consult    Today's Date: 12/21/2017   Admit Date: 12/18/2017    Impression:   · Severe cellulitis of abdominal wall--insulin injection site  · ?small abscess  · Leukocytosis--some improvement    Plan:   · IV antibiotic therapy  · Topical heat  · Consider ultrasound of abdominal wall to make sure no residual fluid is present    Anti-infectives:     Inpat Anti-Infectives     Start     Ordered Stop    12/22/17 0500  Vancomycin, Trough - Please draw IMMEDIATELY PRIOR to starting 0500 dose on 12/21/2017  Other,   ONCE      12/21/17 1551 12/22/17 1659    12/20/17 0300  vancomycin (VANCOCIN) 1500 mg in  ml infusion  1,500 mg,   IntraVENous,   EVERY 16 HOURS      12/19/17 1129 --    12/19/17 0800  piperacillin-tazobactam (ZOSYN) 10.125 g in  mL infusion  10.125 g,   IntraVENous,   EVERY 24 HOURS      12/19/17 0630 --    12/18/17 1701  vancomycin - pharmacy to dose  Other,   RX DOSING/MONITORING      12/18/17 1702 --          Subjective:   Date of Consultation:  December 21, 2017  Referring Physician: Dr Cari Hopkins    Patient is a 73 y.o. female admitted with pain and redness of the right abdominal wall. She states that this has been present for about 2 weeks and began at an insulin injection site. She denies fevers or chills, but her sugars have been higher than usual. She has had some spontaneous drainage of pus from the middle of an area of induration. She is on IV antibiotic therapy and we are asked to see her in consultation.      Patient Active Problem List   Diagnosis Code    Diabetes ([facility] [address] E11.9    Hypercholesterolemia E78.00    Hypertension I10    Obesity E66.9    Lumbar stenosis M48.061    Low back pain M54.5    DJD (degenerative joint disease) of knee M17.10    Cellulitis of right abdominal wall L03.311     Past Medical History:   Diagnosis Date    Cellulitis of right abdominal wall 12/18/2017    Diabetes ([facility] [address] 1995    DJD (degenerative joint disease) of knee     Hypercholesterolemia     Hypertension     LBP (low back pain)     chiorpactor    Lumbar stenosis     Obesity     S/P colonoscopy 12-18-12      Family History   Problem Relation Age of Onset    Cancer Mother     No Known Problems Father       Social History   Substance Use Topics    Smoking status: Never Smoker    Smokeless tobacco: Never Used    Alcohol use No     Past Surgical History:   Procedure Laterality Date    HX BREAST BIOPSY Right 1990    negative    HX COLONOSCOPY        Prior to Admission medications    Medication Sig Start Date End Date Taking? Authorizing Provider   celecoxib (CELEBREX) 200 mg capsule TAKE 1 CAP BY MOUTH TWO (2) TIMES A DAY FOR 90 DAYS. 12/16/17   Fly Conti MD   insulin glargine (BASAGLAR KWIKPEN) 100 unit/mL (3 mL) inpn 30 Units by SubCUTAneous route nightly. 11/1/17   Fly Conti MD   FLUZONE HIGH-DOSE 2017-18, PF, syrg injection ADMINISTERED BY PHARMACIST 9/28/17   Historical Provider   Lancets (ONETOUCH ULTRASOFT LANCETS) misc USE TO TEST THREE TIMES A DAY. Dx.e11.9 10/26/17   Fly Conti MD   predniSONE (STERAPRED DS) 10 mg dose pack As directed 8/23/17   Paula Dill MD   [facility] LANCETS 30 gauge misc USE TO TEST BLOOD SUGAR 3 TIMES A DAY 7/14/17   Historical Provider   glucose blood VI test strips (ONETOUCH ULTRA TEST) strip tiuse to test blood sugar three times a day. dx.e11.9 4/10/17   Fly Conti MD   metFORMIN ER (GLUCOPHAGE XR) 500 mg tablet Take 2 Tabs by mouth two (2) times daily (with meals). 3/23/17   Fly Conti MD   furosemide (LASIX) 40 mg tablet Take 1 Tab by mouth daily. 3/23/17   Fly Conti MD   metoprolol succinate (TOPROL-XL) 25 mg XL tablet Take 1 Tab by mouth daily. 3/23/17   Fly Conti MD   simvastatin (ZOCOR) 40 mg tablet Take 1 Tab by mouth nightly. 3/23/17   Fly Conti MD   amLODIPine (NORVASC) 5 mg tablet Take 1 Tab by mouth daily.  3/23/17   Miriam Galicia MD Dwayne   quinapril (ACCUPRIL) 40 mg tablet Take 1 Tab by mouth daily. 3/23/17   Belen Otto MD   hydrALAZINE (APRESOLINE) 25 mg tablet Take 1 Tab by mouth two (2) times a day. 3/23/17   Belen Otto MD   Lancets misc delica one touch    Belen Otto MD   Insulin Needles, Disposable, (BD INSULIN PEN NEEDLE UF ORIG) 29 gauge x 1/2\" ndle tid 3/23/17   Belen Otto MD   albuterol (PROVENTIL HFA, VENTOLIN HFA, PROAIR HFA) 90 mcg/actuation inhaler Take 2 Puffs by inhalation every four (4) hours as needed for Wheezing. 3/2/17   Isaiah Kilgore MD   [facility] 33 gauge [facility]     Historical Provider   Blood-Glucose Meter (ONETOUCH ULTRA SYSTEM KIT) monitoring kit Use daily 16   Belen Otto MD   Blood-Glucose Meter (ONETOUCH ULTRA SYSTEM KIT) monitoring kit Test 3 times a day 5/15/16   Belen Otto MD   clobetasol (TEMOVATE) 0.05 % ointment Apply 1 Tube to affected area two (2) times a day. 10/29/15   Belen Otto MD   omeprazole (PRILOSEC) 40 mg capsule Take 40 mg by mouth daily. MD William Kimble [id number] 45 mcg (15 mcg x 3)/0.5 mL susp  14   Historical Provider       No Known Allergies     Review of Systems:  Pertinent items are noted in the History of Present Illness. Objective:     Visit Vitals    /80 (BP 1 Location: Right arm, BP Patient Position: At rest)    Pulse 94    Temp 99.9 °F (37.7 °C)    Resp 18    Wt 97.5 kg (214 lb 15.2 oz)    SpO2 99%    BMI 38.08 kg/m2     Temp (24hrs), Av.3 °F (37.4 °C), Min:98.2 °F (36.8 °C), Max:100.6 °F (38.1 °C)       Lines:  Peripheral IV:            Physical Exam:  Lungs:  clear to auscultation bilaterally  Heart:  regular rate and rhythm  Abdomen:  soft, non-tender. Bowel sounds normal. No masses,  no organomegaly  Skin:  Area of erythema of the right abdomen with a 6x6 inch area of induration, very tender, but not fluctuant.     Data Review: CBC:  Recent Labs      12/21/17   0020  12/20/17   0305  12/19/17   0257   WBC  12.7*  13.4*  11.7*   GRANS   --   78*  77*   MONOS   --   13  13   EOS   --   1  1   ANEU   --   10.4*  9.0*   ABL   --   1.1  1.1   HGB  10.6*  10.7*  10.6*   HCT  32.0*  32.2*  32.5*   PLT  282  260  233       BMP:  Recent Labs      12/21/17   0020  12/20/17   0305  12/19/17   0257   CREA  0.90  0.86  0.68   BUN  6  10  16   NA  141  137  140   K  3.4*  3.7  3.5   CL  109*  106  108   CO2  23  22  22   AGAP  9  9  10   GLU  202*  191*  176*       LFTS:  Recent Labs      12/18/17   1801   TBILI  0.5   ALT  24   SGOT  17   AP  64   TP  7.3   ALB  2.7*       Microbiology:     All Micro Results     Procedure Component Value Units Date/Time    CULTURE, Fannie Melina STAIN [[id number]] Collected:  12/20/17 2213    Order Status:  Completed Specimen:  Wound from Abdomen Updated:  12/21/17 1402     Special Requests: NO SPECIAL REQUESTS        GRAM STAIN RARE WBCS SEEN         NO ORGANISMS SEEN        Culture result: NO GROWTH AFTER 14 HOURS       CULTURE, BLOOD, PAIRED [[id number]] Collected:  12/19/17 1612    Order Status:  Completed Specimen:  Blood Updated:  12/21/17 1119     Special Requests: NO SPECIAL REQUESTS        Culture result: NO GROWTH 2 DAYS       CULTURE, BLOOD, PAIRED [[id number]] Collected:  12/18/17 2343    Order Status:  Completed Specimen:  Blood Updated:  12/21/17 1119     Special Requests: NO SPECIAL REQUESTS        Culture result: NO GROWTH 2 DAYS             Imaging:   Reviewed     Signed By: Jacqueline Cutler MD     December 21, 2017

## 2017-12-21 NOTE — DIABETES MGMT
DTC Progress Note    Recommendations/ Comments: Chart reviewed due to hyperglycemia. Pt's blood sugars 179-231 mg/dl and pt has required 9 units of correction over the last 24 hours. Noted Lantus was increased to 42 units starting tonight. Chart reviewed on [facility]. Patient is a 73 y.o. female with known diabetes on Metformin 500 mg BID at home. A1c:   Lab Results   Component Value Date/Time    Hemoglobin A1c 6.8 12/19/2017 02:57 AM    Hemoglobin A1c 6.6 07/20/2017 04:16 PM       Recent Glucose Results:   Lab Results   Component Value Date/Time     (H) 12/21/2017 12:20 AM    GLUCPOC 185 (H) 12/21/2017 11:18 AM    GLUCPOC 179 (H) 12/21/2017 06:24 AM    GLUCPOC 231 (H) 12/20/2017 09:18 PM        Lab Results   Component Value Date/Time    Creatinine 0.90 12/21/2017 12:20 AM     CrCl cannot be calculated (Unknown ideal weight. ). Active Orders   Diet    DIET CARDIAC Regular        PO intake:   Patient Vitals for the past 72 hrs:   % Diet Eaten   12/18/17 2030 80 %       Current hospital DM medication: correction scale Humalog, normal sensitivity and Lantus 42 units hs. Will continue to follow as needed.     Thank you  Jose Miguel Fermin RD, CDE

## 2017-12-21 NOTE — PROGRESS NOTES
Problem: Mobility Impaired (Adult and Pediatric)  Goal: *Acute Goals and Plan of Care (Insert Text)  Physical Therapy Goals  Initiated 12/19/2017  1. Patient will move from supine to sit and sit to supine , scoot up and down and roll side to side in bed with independence within 7 day(s). 2.  Patient will transfer from bed to chair and chair to bed with independence using the least restrictive device within 7 day(s). 3.  Patient will perform sit to stand with independence on first attempt 100% within 7 day(s). 4.  Patient will ambulate with independence for 300 feet with no path deviations within 7 day(s). 5.  Patient will ascend/descend 12 stairs with rightg handrail(s) with modified independence within 7 day(s). physical Therapy TREATMENT  Patient: Gabino Levi (28 y.o. female)  Date: 12/21/2017  Diagnosis: abdominal wall cellulitis  Cellulitis of right abdominal wall Cellulitis of right abdominal wall       Precautions:      ASSESSMENT:  Patient demonstrating excellent progress toward goals. Currently patient is indep with bed mobility and transfers and only required supervision during gait due to decreased step clearance and fatigue with longer distance ambulation. Patient continues to benefit from PT 2/weekly to work to achieve full indep with ambulation and stair climbing but is cleared to get up in her room ad nhung and walk in hallway with supervision of nursing staff. Progression toward goals:  [x]    Improving appropriately and progressing toward goals  []    Improving slowly and progressing toward goals  []    Not making progress toward goals and plan of care will be adjusted     PLAN:  Patient continues to benefit from skilled intervention to address the above impairments. Continue treatment per established plan of care.   Discharge Recommendations:  None  Further Equipment Recommendations for Discharge:  None     SUBJECTIVE:   Patient stated I''m feeling a lot better, been walking around the halls.     OBJECTIVE DATA SUMMARY:   Critical Behavior:              Functional Mobility Training:  Bed Mobility:  Rolling: Independent  Supine to Sit: Independent  Sit to Supine: Independent  Scooting: Independent        Transfers:  Sit to Stand: Independent  Stand to Sit: Independent        Bed to Chair: Independent                    Balance:  Sitting: Intact  Standing: Intact  Standing - Static: Good  Standing - Dynamic : Good  Ambulation/Gait Training:  Distance (ft): 300 Feet (ft)  Assistive Device: Gait belt;Walker, rolling  Ambulation - Level of Assistance: Supervision        Gait Abnormalities: Decreased step clearance        Base of Support: Widened        Step Length: Right shortened;Left shortened                    Stairs:  Number of Stairs Trained: 12  Stairs - Level of Assistance: Supervision   Rail Use: Right     Pain:  Pain Scale 1: Numeric (0 - 10)  Pain Intensity 1: 0              Activity Tolerance:   Improved, 300' ambulation, 12 steps    Please refer to the flowsheet for vital signs taken during this treatment.   After treatment:   [x]    Patient left in no apparent distress sitting up in chair  []    Patient left in no apparent distress in bed  [x]    Call bell left within reach  [x]    Nursing notified  []    Caregiver present  []    Bed alarm activated    COMMUNICATION/COLLABORATION:   The patients plan of care was discussed with: Registered Nurse    Matt Mae, PT   Time Calculation: 20 mins

## 2017-12-21 NOTE — PROGRESS NOTES
Hospital Progress Note    NAME:  Jimena Chris   :   1950   MRN:  [id number]     Date/Time:  2017 6:53 AM    Plan:   1. Iv ab  2. Control bs   3. Titrate bp meds  4.  Ck us for drain able pocket  Risk of Deterioration: Low  []           Moderate  [x]           High  []                 Assessment:   Principal Problem:    Cellulitis of right abdominal wall (2017) vanc and zosyn - no drainable collection by ct - on exam today the areas of induration is smaller    Active Problems:    Diabetes (HCC) () titrate insulin      Hypertension () titrate meds      Low back pain ()      Analgesics      Chest pain  - none this am eval neg - added ppi      Hypokalemia POA As evidenced by Low K 3.3 treated with IV KCL       Sepsis POA secondary to abdominal wall cellulitis as evidenced by fever, tachycardia, leucocytosis, hyperglycemia  and elevated neutrophils treated with IV Vanc and IV Zosyn        Subjective:     Continues with rlq pain   11 Point Review of Systems:   Negative except no cp this am noted cough    []            Unable to obtain ROS due to:       []            mental status change []            sedated []            intubated     Social History   Substance Use Topics    Smoking status: Never Smoker    Smokeless tobacco: Never Used    Alcohol use No     Medications reviewed:  Current Facility-Administered Medications   Medication Dose Route Frequency    metoprolol succinate (TOPROL-XL) XL tablet 50 mg  50 mg Oral DAILY    furosemide (LASIX) tablet 40 mg  40 mg Oral DAILY    pantoprazole (PROTONIX) tablet 40 mg  40 mg Oral ACB    insulin glargine (LANTUS) injection 36 Units  36 Units SubCUTAneous QHS    lactobac ac& pc-s.therm-b.anim (JERRICA Q/RISAQUAD)  1 Cap Oral DAILY    lisinopril (PRINIVIL, ZESTRIL) tablet 40 mg  40 mg Oral DAILY    piperacillin-tazobactam (ZOSYN) 10.125 g in  mL infusion  10.125 g IntraVENous Q24H    vancomycin (VANCOCIN) 1500 mg in  ml infusion 1,500 mg IntraVENous Q16H    amLODIPine (NORVASC) tablet 5 mg  5 mg Oral DAILY    simvastatin (ZOCOR) tablet 40 mg  40 mg Oral QHS    insulin lispro (HUMALOG) injection   SubCUTAneous TIDAC    glucose chewable tablet 16 g  4 Tab Oral PRN    dextrose (D50W) injection syrg 12.5-25 g  12.5-25 g IntraVENous PRN    glucagon (GLUCAGEN) injection 1 mg  1 mg IntraMUSCular PRN    sodium chloride (NS) flush 5-10 mL  5-10 mL IntraVENous Q8H    sodium chloride (NS) flush 5-10 mL  5-10 mL IntraVENous PRN    acetaminophen (TYLENOL) tablet 650 mg  650 mg Oral Q4H PRN    HYDROcodone-acetaminophen (NORCO) 5-325 mg per tablet 1 Tab  1 Tab Oral Q4H PRN    morphine injection 2 mg  2 mg IntraVENous Q3H PRN    enoxaparin (LOVENOX) injection 40 mg  40 mg SubCUTAneous Q24H    0.45% sodium chloride 1,000 mL with potassium chloride 20 mEq infusion   IntraVENous CONTINUOUS    vancomycin - pharmacy to dose   Other Rx Dosing/Monitoring        Objective:   Vitals:  Visit Vitals    /80    Pulse 95    Temp 98.2 °F (36.8 °C)    Resp 18    LMP  (LMP Unknown)    SpO2 96%     Temp (24hrs), Av.2 °F (37.3 °C), Min:98.2 °F (36.8 °C), Max:100.6 °F (38.1 °C)    O2 Flow Rate (L/min): 2 l/min O2 Device: Room air    Last 24hr Input/Output:    Intake/Output Summary (Last 24 hours) at 17 [id number]  Last data filed at 17 0437   Gross per 24 hour   Intake                0 ml   Output             1900 ml   Net            -1900 ml        PHYSICAL EXAM:  General:    Alert, cooperative, no distress, appears stated age. Head:   Normocephalic, without obvious abnormality, atraumatic.s. Lungs:   Clear to auscultation bilaterally. No Wheezing or Rhonchi. No rales. Heart:   Regular rate and rhythm,  no murmur, rub or gallop. Abdomen:   Soft, non-tender. Not distended. Bowel sounds normal. Large area of erythema,increased warmth, and less induration rlq,tender, some purulent seepage   .        Lab Data Reviewed:    Recent Labs 12/20/17   0305  12/19/17   0257  12/18/17   1801   WBC  13.4*  11.7*  14.4*   HGB  10.7*  10.6*  11.4*   HCT  32.2*  32.5*  34.4*   PLT  260  233  250     Recent Labs      12/21/17   0020  12/20/17   0305  12/19/17   0257   12/18/17   1801   NA  141  137  140   --   142   K  3.4*  3.7  3.5   --   3.3*   CL  109*  106  108   --   107   CO2  23  22  22   --   26   GLU  202*  191*  176*   --   181*   BUN  6  10  16   < >  20   CREA  0.90  0.86  0.68   --   0.80  0.82   CA  8.3*  8.0*  8.3*   --   9.0   ALB   --    --    --    --   2.7*   TBILI   --    --    --    --   0.5   SGOT   --    --    --    --   17   ALT   --    --    --    --   24    < > = values in this interval not displayed.      Lab Results   Component Value Date/Time    Glucose (POC) 179 12/21/2017 06:24 AM    Glucose (POC) 231 12/20/2017 09:18 PM    Glucose (POC) 186 12/20/2017 04:39 PM    Glucose (POC) 228 12/20/2017 11:54 AM    Glucose (POC) 177 12/20/2017 06:12 AM       ___________________________________________________  ___________________________________________________    Attending Physician: Carole Smyth MD

## 2017-12-22 ENCOUNTER — APPOINTMENT (OUTPATIENT)
Dept: ULTRASOUND IMAGING | Age: 67
DRG: 854 | End: 2017-12-22
Attending: INTERNAL MEDICINE
Payer: MEDICARE

## 2017-12-22 LAB
ANION GAP SERPL CALC-SCNC: 9 MMOL/L (ref 5–15)
BUN SERPL-MCNC: 4 MG/DL (ref 6–20)
BUN/CREAT SERPL: 5 (ref 12–20)
CALCIUM SERPL-MCNC: 8.6 MG/DL (ref 8.5–10.1)
CHLORIDE SERPL-SCNC: 107 MMOL/L (ref 97–108)
CO2 SERPL-SCNC: 27 MMOL/L (ref 21–32)
CREAT SERPL-MCNC: 0.83 MG/DL (ref 0.55–1.02)
GLUCOSE BLD STRIP.AUTO-MCNC: 139 MG/DL (ref 65–100)
GLUCOSE BLD STRIP.AUTO-MCNC: 156 MG/DL (ref 65–100)
GLUCOSE BLD STRIP.AUTO-MCNC: 164 MG/DL (ref 65–100)
GLUCOSE BLD STRIP.AUTO-MCNC: 85 MG/DL (ref 65–100)
GLUCOSE SERPL-MCNC: 88 MG/DL (ref 65–100)
POTASSIUM SERPL-SCNC: 3.2 MMOL/L (ref 3.5–5.1)
SERVICE CMNT-IMP: ABNORMAL
SERVICE CMNT-IMP: NORMAL
SODIUM SERPL-SCNC: 143 MMOL/L (ref 136–145)

## 2017-12-22 PROCEDURE — 80048 BASIC METABOLIC PNL TOTAL CA: CPT | Performed by: INTERNAL MEDICINE

## 2017-12-22 PROCEDURE — 65660000000 HC RM CCU STEPDOWN

## 2017-12-22 PROCEDURE — 74011250637 HC RX REV CODE- 250/637: Performed by: INTERNAL MEDICINE

## 2017-12-22 PROCEDURE — 74011250636 HC RX REV CODE- 250/636: Performed by: INTERNAL MEDICINE

## 2017-12-22 PROCEDURE — 76705 ECHO EXAM OF ABDOMEN: CPT

## 2017-12-22 PROCEDURE — 74011636637 HC RX REV CODE- 636/637: Performed by: INTERNAL MEDICINE

## 2017-12-22 PROCEDURE — 36415 COLL VENOUS BLD VENIPUNCTURE: CPT | Performed by: INTERNAL MEDICINE

## 2017-12-22 PROCEDURE — 82962 GLUCOSE BLOOD TEST: CPT

## 2017-12-22 RX ORDER — GUAIFENESIN 100 MG/5ML
100 SOLUTION ORAL
Status: DISCONTINUED | OUTPATIENT
Start: 2017-12-22 | End: 2017-12-27 | Stop reason: HOSPADM

## 2017-12-22 RX ADMIN — PANTOPRAZOLE SODIUM 40 MG: 40 TABLET, DELAYED RELEASE ORAL at 07:44

## 2017-12-22 RX ADMIN — GUAIFENESIN 100 MG: 200 SOLUTION ORAL at 03:52

## 2017-12-22 RX ADMIN — Medication 10 ML: at 19:17

## 2017-12-22 RX ADMIN — FUROSEMIDE 40 MG: 40 TABLET ORAL at 09:39

## 2017-12-22 RX ADMIN — CLINDAMYCIN PHOSPHATE 600 MG: 12 INJECTION, SOLUTION INTRAMUSCULAR; INTRAVENOUS at 12:04

## 2017-12-22 RX ADMIN — GUAIFENESIN 600 MG: 600 TABLET, EXTENDED RELEASE ORAL at 21:35

## 2017-12-22 RX ADMIN — ENOXAPARIN SODIUM 40 MG: 40 INJECTION SUBCUTANEOUS at 18:10

## 2017-12-22 RX ADMIN — SIMVASTATIN 40 MG: 40 TABLET, FILM COATED ORAL at 21:35

## 2017-12-22 RX ADMIN — Medication 10 ML: at 21:35

## 2017-12-22 RX ADMIN — AMLODIPINE BESYLATE 5 MG: 5 TABLET ORAL at 09:39

## 2017-12-22 RX ADMIN — Medication 10 ML: at 12:54

## 2017-12-22 RX ADMIN — Medication 2 G: at 13:00

## 2017-12-22 RX ADMIN — GUAIFENESIN 600 MG: 600 TABLET, EXTENDED RELEASE ORAL at 09:39

## 2017-12-22 RX ADMIN — GUAIFENESIN 100 MG: 200 SOLUTION ORAL at 19:16

## 2017-12-22 RX ADMIN — METOPROLOL SUCCINATE 50 MG: 50 TABLET, EXTENDED RELEASE ORAL at 09:39

## 2017-12-22 RX ADMIN — ACETAMINOPHEN 650 MG: 325 TABLET ORAL at 20:06

## 2017-12-22 RX ADMIN — LISINOPRIL 40 MG: 20 TABLET ORAL at 09:38

## 2017-12-22 RX ADMIN — Medication 2 G: at 21:35

## 2017-12-22 RX ADMIN — INSULIN GLARGINE 42 UNITS: 100 INJECTION, SOLUTION SUBCUTANEOUS at 21:45

## 2017-12-22 RX ADMIN — CLINDAMYCIN PHOSPHATE 600 MG: 12 INJECTION, SOLUTION INTRAMUSCULAR; INTRAVENOUS at 03:30

## 2017-12-22 RX ADMIN — Medication 1 CAPSULE: at 09:39

## 2017-12-22 RX ADMIN — Medication 2 G: at 05:01

## 2017-12-22 RX ADMIN — CLINDAMYCIN PHOSPHATE 600 MG: 12 INJECTION, SOLUTION INTRAMUSCULAR; INTRAVENOUS at 19:17

## 2017-12-22 RX ADMIN — INSULIN LISPRO 2 UNITS: 100 INJECTION, SOLUTION INTRAVENOUS; SUBCUTANEOUS at 16:30

## 2017-12-22 RX ADMIN — Medication 10 ML: at 20:06

## 2017-12-22 RX ADMIN — Medication 10 ML: at 05:06

## 2017-12-22 NOTE — PROGRESS NOTES
ID Progress Note  2017    Subjective:     Symptoms about the same    Objective:     Antibiotics:  1. Clindamycin   2. Cefazolin      Vitals:   Visit Vitals    /71 (BP 1 Location: Right arm, BP Patient Position: Sitting)    Pulse 93    Temp 99.4 °F (37.4 °C)    Resp 18    Wt 97.5 kg (214 lb 15.2 oz)  Comment: Historic 2017    LMP  (LMP Unknown)    SpO2 98%    BMI 38.08 kg/m2        Tmax:  Temp (24hrs), Av.5 °F (37.5 °C), Min:99.3 °F (37.4 °C), Max:99.9 °F (37.7 °C)      Exam:  Area of induration about the same; still very tender to palpation and red/warm    Labs:      Recent Labs      17   0343  17   0020  17   0305   WBC   --   12.7*  13.4*   HGB   --   10.6*  10.7*   PLT   --   282  260   BUN  4*  6  10   CREA  0.83  0.90  0.86       Cultures:     No results found for: NEELA  Lab Results   Component Value Date/Time    Culture result: LIGHT POSSIBLE STREPTOCOCCUS SPECIES 2017 10:13 PM    Culture result: RARE STAPHYLOCOCCUS SPECIES 2017 10:13 PM    Culture result: NO GROWTH 3 DAYS 2017 04:12 PM       Radiology: fluid collection on ultrasound concerning for abscess    Line/Insert Date:           Assessment:     1. Soft tissue infection of the abdominal wall with associated fluid collection concerning for abscess  2. DM    Objective:     1. Continue current therapy  2. Surgical consultation  3.  Group will see intermittently over the holiday weekend, call if she specifically needs to be seen    Zari Arias MD

## 2017-12-22 NOTE — PROGRESS NOTES
Pt states she is not a Full code. She informed RN that she is a DNR and that we do not have a copy but Dr. Zurdo Carbajal has one at his office. Bedside shift change report given to IBAN Rose (oncoming nurse) by Raul Lomeli (offgoing nurse). Report included the following information SBAR, Kardex, Intake/Output and MAR.

## 2017-12-22 NOTE — PROGRESS NOTES
Bedside shift change report given to [address] [place] [address] (oncoming nurse) by Misti Roberts RN (offgoing nurse). Report included the following information SBAR, Kardex, ED Summary, Intake/Output, MAR, Accordion and Recent Results.

## 2017-12-22 NOTE — PROGRESS NOTES
Bedside shift change report given to Jayy Baez (oncoming nurse) by Alycia Kohli (offgoing nurse). Report included the following information SBAR.

## 2017-12-22 NOTE — PROGRESS NOTES
Patient c/o coughing all night. She stating mucinex is not helping. Spoke to Dr. Fatuma Garcia and he ordered Robutissin prn.  Will continue to monitor

## 2017-12-22 NOTE — PROGRESS NOTES
Note that the patient lives alone, and the plan is for her to return home at discharge. [address] Follow Up Appointment has already been scheduled: Follow-up With  Details  Why  Contact Kelsey Romansmita Favre  On 1/3/2018  For discharge follow up 3:00PM   [address] [address] [phone]     Care Management will continue to follow for any other discharge needs that may arise.     LASHA Sanders

## 2017-12-23 ENCOUNTER — ANESTHESIA (OUTPATIENT)
Dept: SURGERY | Age: 67
DRG: 854 | End: 2017-12-23
Payer: MEDICARE

## 2017-12-23 ENCOUNTER — ANESTHESIA EVENT (OUTPATIENT)
Dept: SURGERY | Age: 67
DRG: 854 | End: 2017-12-23
Payer: MEDICARE

## 2017-12-23 LAB
BACTERIA SPEC CULT: ABNORMAL
BACTERIA SPEC CULT: ABNORMAL
GLUCOSE BLD STRIP.AUTO-MCNC: 100 MG/DL (ref 65–100)
GLUCOSE BLD STRIP.AUTO-MCNC: 102 MG/DL (ref 65–100)
GLUCOSE BLD STRIP.AUTO-MCNC: 102 MG/DL (ref 65–100)
GLUCOSE BLD STRIP.AUTO-MCNC: 110 MG/DL (ref 65–100)
GLUCOSE BLD STRIP.AUTO-MCNC: 119 MG/DL (ref 65–100)
GLUCOSE BLD STRIP.AUTO-MCNC: 69 MG/DL (ref 65–100)
GLUCOSE BLD STRIP.AUTO-MCNC: 74 MG/DL (ref 65–100)
GRAM STN SPEC: ABNORMAL
GRAM STN SPEC: ABNORMAL
SERVICE CMNT-IMP: ABNORMAL
SERVICE CMNT-IMP: NORMAL

## 2017-12-23 PROCEDURE — 74011250636 HC RX REV CODE- 250/636: Performed by: INTERNAL MEDICINE

## 2017-12-23 PROCEDURE — 77030026438 HC STYL ET INTUB CARD -A: Performed by: ANESTHESIOLOGY

## 2017-12-23 PROCEDURE — 76010000138 HC OR TIME 0.5 TO 1 HR: Performed by: SURGERY

## 2017-12-23 PROCEDURE — 74011250637 HC RX REV CODE- 250/637: Performed by: INTERNAL MEDICINE

## 2017-12-23 PROCEDURE — 82962 GLUCOSE BLOOD TEST: CPT

## 2017-12-23 PROCEDURE — 87077 CULTURE AEROBIC IDENTIFY: CPT | Performed by: HOSPITALIST

## 2017-12-23 PROCEDURE — 74011250636 HC RX REV CODE- 250/636

## 2017-12-23 PROCEDURE — 74011000250 HC RX REV CODE- 250: Performed by: ANESTHESIOLOGY

## 2017-12-23 PROCEDURE — 0W9F0ZZ DRAINAGE OF ABDOMINAL WALL, OPEN APPROACH: ICD-10-PCS | Performed by: SURGERY

## 2017-12-23 PROCEDURE — 76060000032 HC ANESTHESIA 0.5 TO 1 HR: Performed by: SURGERY

## 2017-12-23 PROCEDURE — 74011000250 HC RX REV CODE- 250

## 2017-12-23 PROCEDURE — 77030011640 HC PAD GRND REM COVD -A: Performed by: SURGERY

## 2017-12-23 PROCEDURE — 74011636637 HC RX REV CODE- 636/637: Performed by: INTERNAL MEDICINE

## 2017-12-23 PROCEDURE — 77030018836 HC SOL IRR NACL ICUM -A: Performed by: SURGERY

## 2017-12-23 PROCEDURE — 65660000000 HC RM CCU STEPDOWN

## 2017-12-23 PROCEDURE — 74011000250 HC RX REV CODE- 250: Performed by: INTERNAL MEDICINE

## 2017-12-23 PROCEDURE — 77030029684 HC NEB SM VOL KT MONA -A

## 2017-12-23 PROCEDURE — 87205 SMEAR GRAM STAIN: CPT | Performed by: HOSPITALIST

## 2017-12-23 PROCEDURE — 77030008684 HC TU ET CUF COVD -B: Performed by: ANESTHESIOLOGY

## 2017-12-23 PROCEDURE — 76210000017 HC OR PH I REC 1.5 TO 2 HR: Performed by: SURGERY

## 2017-12-23 PROCEDURE — 87075 CULTR BACTERIA EXCEPT BLOOD: CPT | Performed by: HOSPITALIST

## 2017-12-23 PROCEDURE — 94640 AIRWAY INHALATION TREATMENT: CPT

## 2017-12-23 RX ORDER — SODIUM CHLORIDE 0.9 % (FLUSH) 0.9 %
5-10 SYRINGE (ML) INJECTION AS NEEDED
Status: DISCONTINUED | OUTPATIENT
Start: 2017-12-23 | End: 2017-12-23 | Stop reason: HOSPADM

## 2017-12-23 RX ORDER — SODIUM CHLORIDE 9 MG/ML
1000 INJECTION, SOLUTION INTRAVENOUS CONTINUOUS
Status: DISCONTINUED | OUTPATIENT
Start: 2017-12-23 | End: 2017-12-23 | Stop reason: HOSPADM

## 2017-12-23 RX ORDER — DEXTROSE, SODIUM CHLORIDE, SODIUM LACTATE, POTASSIUM CHLORIDE, AND CALCIUM CHLORIDE 5; .6; .31; .03; .02 G/100ML; G/100ML; G/100ML; G/100ML; G/100ML
25 INJECTION, SOLUTION INTRAVENOUS CONTINUOUS
Status: DISCONTINUED | OUTPATIENT
Start: 2017-12-23 | End: 2017-12-23 | Stop reason: HOSPADM

## 2017-12-23 RX ORDER — INSULIN GLARGINE 100 [IU]/ML
38 INJECTION, SOLUTION SUBCUTANEOUS
Status: DISCONTINUED | OUTPATIENT
Start: 2017-12-23 | End: 2017-12-27 | Stop reason: HOSPADM

## 2017-12-23 RX ORDER — ONDANSETRON 2 MG/ML
4 INJECTION INTRAMUSCULAR; INTRAVENOUS AS NEEDED
Status: DISCONTINUED | OUTPATIENT
Start: 2017-12-23 | End: 2017-12-23 | Stop reason: HOSPADM

## 2017-12-23 RX ORDER — LIDOCAINE HYDROCHLORIDE 10 MG/ML
0.1 INJECTION, SOLUTION EPIDURAL; INFILTRATION; INTRACAUDAL; PERINEURAL AS NEEDED
Status: DISCONTINUED | OUTPATIENT
Start: 2017-12-23 | End: 2017-12-23 | Stop reason: HOSPADM

## 2017-12-23 RX ORDER — MIDAZOLAM HYDROCHLORIDE 1 MG/ML
1 INJECTION, SOLUTION INTRAMUSCULAR; INTRAVENOUS AS NEEDED
Status: DISCONTINUED | OUTPATIENT
Start: 2017-12-23 | End: 2017-12-23 | Stop reason: HOSPADM

## 2017-12-23 RX ORDER — SODIUM CHLORIDE 0.9 % (FLUSH) 0.9 %
5-10 SYRINGE (ML) INJECTION EVERY 8 HOURS
Status: DISCONTINUED | OUTPATIENT
Start: 2017-12-23 | End: 2017-12-23 | Stop reason: HOSPADM

## 2017-12-23 RX ORDER — METOPROLOL TARTRATE 5 MG/5ML
INJECTION INTRAVENOUS AS NEEDED
Status: DISCONTINUED | OUTPATIENT
Start: 2017-12-23 | End: 2017-12-23 | Stop reason: HOSPADM

## 2017-12-23 RX ORDER — MIDAZOLAM HYDROCHLORIDE 1 MG/ML
0.5 INJECTION, SOLUTION INTRAMUSCULAR; INTRAVENOUS
Status: DISCONTINUED | OUTPATIENT
Start: 2017-12-23 | End: 2017-12-23 | Stop reason: HOSPADM

## 2017-12-23 RX ORDER — SODIUM CHLORIDE, SODIUM LACTATE, POTASSIUM CHLORIDE, CALCIUM CHLORIDE 600; 310; 30; 20 MG/100ML; MG/100ML; MG/100ML; MG/100ML
25 INJECTION, SOLUTION INTRAVENOUS CONTINUOUS
Status: DISCONTINUED | OUTPATIENT
Start: 2017-12-23 | End: 2017-12-23 | Stop reason: HOSPADM

## 2017-12-23 RX ORDER — FENTANYL CITRATE 50 UG/ML
25 INJECTION, SOLUTION INTRAMUSCULAR; INTRAVENOUS
Status: DISCONTINUED | OUTPATIENT
Start: 2017-12-23 | End: 2017-12-23 | Stop reason: HOSPADM

## 2017-12-23 RX ORDER — LIDOCAINE HYDROCHLORIDE 20 MG/ML
INJECTION, SOLUTION EPIDURAL; INFILTRATION; INTRACAUDAL; PERINEURAL AS NEEDED
Status: DISCONTINUED | OUTPATIENT
Start: 2017-12-23 | End: 2017-12-23 | Stop reason: HOSPADM

## 2017-12-23 RX ORDER — DIPHENHYDRAMINE HYDROCHLORIDE 50 MG/ML
12.5 INJECTION, SOLUTION INTRAMUSCULAR; INTRAVENOUS AS NEEDED
Status: DISCONTINUED | OUTPATIENT
Start: 2017-12-23 | End: 2017-12-23 | Stop reason: HOSPADM

## 2017-12-23 RX ORDER — SODIUM CHLORIDE 9 MG/ML
25 INJECTION, SOLUTION INTRAVENOUS CONTINUOUS
Status: DISCONTINUED | OUTPATIENT
Start: 2017-12-23 | End: 2017-12-23 | Stop reason: HOSPADM

## 2017-12-23 RX ORDER — ALBUTEROL SULFATE 0.83 MG/ML
2.5 SOLUTION RESPIRATORY (INHALATION)
Status: COMPLETED | OUTPATIENT
Start: 2017-12-23 | End: 2017-12-23

## 2017-12-23 RX ORDER — PROPOFOL 10 MG/ML
INJECTION, EMULSION INTRAVENOUS AS NEEDED
Status: DISCONTINUED | OUTPATIENT
Start: 2017-12-23 | End: 2017-12-23 | Stop reason: HOSPADM

## 2017-12-23 RX ORDER — FENTANYL CITRATE 50 UG/ML
50 INJECTION, SOLUTION INTRAMUSCULAR; INTRAVENOUS AS NEEDED
Status: DISCONTINUED | OUTPATIENT
Start: 2017-12-23 | End: 2017-12-23 | Stop reason: HOSPADM

## 2017-12-23 RX ORDER — FENTANYL CITRATE 50 UG/ML
INJECTION, SOLUTION INTRAMUSCULAR; INTRAVENOUS AS NEEDED
Status: DISCONTINUED | OUTPATIENT
Start: 2017-12-23 | End: 2017-12-23 | Stop reason: HOSPADM

## 2017-12-23 RX ORDER — SODIUM CHLORIDE, SODIUM LACTATE, POTASSIUM CHLORIDE, CALCIUM CHLORIDE 600; 310; 30; 20 MG/100ML; MG/100ML; MG/100ML; MG/100ML
INJECTION, SOLUTION INTRAVENOUS
Status: DISCONTINUED | OUTPATIENT
Start: 2017-12-23 | End: 2017-12-23 | Stop reason: HOSPADM

## 2017-12-23 RX ORDER — MORPHINE SULFATE 10 MG/ML
2 INJECTION, SOLUTION INTRAMUSCULAR; INTRAVENOUS
Status: DISCONTINUED | OUTPATIENT
Start: 2017-12-23 | End: 2017-12-23 | Stop reason: HOSPADM

## 2017-12-23 RX ORDER — ONDANSETRON 2 MG/ML
INJECTION INTRAMUSCULAR; INTRAVENOUS AS NEEDED
Status: DISCONTINUED | OUTPATIENT
Start: 2017-12-23 | End: 2017-12-23 | Stop reason: HOSPADM

## 2017-12-23 RX ORDER — MIDAZOLAM HYDROCHLORIDE 1 MG/ML
INJECTION, SOLUTION INTRAMUSCULAR; INTRAVENOUS AS NEEDED
Status: DISCONTINUED | OUTPATIENT
Start: 2017-12-23 | End: 2017-12-23 | Stop reason: HOSPADM

## 2017-12-23 RX ORDER — SUCCINYLCHOLINE CHLORIDE 20 MG/ML
INJECTION INTRAMUSCULAR; INTRAVENOUS AS NEEDED
Status: DISCONTINUED | OUTPATIENT
Start: 2017-12-23 | End: 2017-12-23 | Stop reason: HOSPADM

## 2017-12-23 RX ORDER — ROCURONIUM BROMIDE 10 MG/ML
INJECTION, SOLUTION INTRAVENOUS AS NEEDED
Status: DISCONTINUED | OUTPATIENT
Start: 2017-12-23 | End: 2017-12-23 | Stop reason: HOSPADM

## 2017-12-23 RX ADMIN — Medication 2 G: at 06:24

## 2017-12-23 RX ADMIN — PANTOPRAZOLE SODIUM 40 MG: 40 TABLET, DELAYED RELEASE ORAL at 08:10

## 2017-12-23 RX ADMIN — CLINDAMYCIN PHOSPHATE 600 MG: 12 INJECTION, SOLUTION INTRAMUSCULAR; INTRAVENOUS at 03:24

## 2017-12-23 RX ADMIN — FENTANYL CITRATE 50 MCG: 50 INJECTION, SOLUTION INTRAMUSCULAR; INTRAVENOUS at 12:38

## 2017-12-23 RX ADMIN — MIDAZOLAM HYDROCHLORIDE 1 MG: 1 INJECTION, SOLUTION INTRAMUSCULAR; INTRAVENOUS at 12:32

## 2017-12-23 RX ADMIN — CLINDAMYCIN PHOSPHATE 600 MG: 12 INJECTION, SOLUTION INTRAMUSCULAR; INTRAVENOUS at 19:32

## 2017-12-23 RX ADMIN — GUAIFENESIN 600 MG: 600 TABLET, EXTENDED RELEASE ORAL at 21:27

## 2017-12-23 RX ADMIN — LIDOCAINE HYDROCHLORIDE 60 MG: 20 INJECTION, SOLUTION EPIDURAL; INFILTRATION; INTRACAUDAL; PERINEURAL at 12:38

## 2017-12-23 RX ADMIN — ROCURONIUM BROMIDE 5 MG: 10 INJECTION, SOLUTION INTRAVENOUS at 12:38

## 2017-12-23 RX ADMIN — PROPOFOL 40 MG: 10 INJECTION, EMULSION INTRAVENOUS at 12:53

## 2017-12-23 RX ADMIN — DEXTROSE MONOHYDRATE 12.5 G: 25 INJECTION, SOLUTION INTRAVENOUS at 11:30

## 2017-12-23 RX ADMIN — GUAIFENESIN 600 MG: 600 TABLET, EXTENDED RELEASE ORAL at 16:49

## 2017-12-23 RX ADMIN — INSULIN GLARGINE 38 UNITS: 100 INJECTION, SOLUTION SUBCUTANEOUS at 22:56

## 2017-12-23 RX ADMIN — METOPROLOL TARTRATE 1 MG: 5 INJECTION INTRAVENOUS at 13:38

## 2017-12-23 RX ADMIN — Medication 1 CAPSULE: at 16:49

## 2017-12-23 RX ADMIN — METOPROLOL TARTRATE 1 MG: 5 INJECTION INTRAVENOUS at 12:45

## 2017-12-23 RX ADMIN — Medication 2 G: at 21:27

## 2017-12-23 RX ADMIN — Medication 2 G: at 12:50

## 2017-12-23 RX ADMIN — SUCCINYLCHOLINE CHLORIDE 120 MG: 20 INJECTION INTRAMUSCULAR; INTRAVENOUS at 12:38

## 2017-12-23 RX ADMIN — FENTANYL CITRATE 25 MCG: 50 INJECTION, SOLUTION INTRAMUSCULAR; INTRAVENOUS at 12:53

## 2017-12-23 RX ADMIN — FENTANYL CITRATE 25 MCG: 50 INJECTION, SOLUTION INTRAMUSCULAR; INTRAVENOUS at 13:02

## 2017-12-23 RX ADMIN — CLINDAMYCIN PHOSPHATE 600 MG: 12 INJECTION, SOLUTION INTRAMUSCULAR; INTRAVENOUS at 11:53

## 2017-12-23 RX ADMIN — HYDROCODONE BITARTRATE AND ACETAMINOPHEN 1 TABLET: 5; 325 TABLET ORAL at 18:29

## 2017-12-23 RX ADMIN — ENOXAPARIN SODIUM 40 MG: 40 INJECTION SUBCUTANEOUS at 18:29

## 2017-12-23 RX ADMIN — PROPOFOL 100 MG: 10 INJECTION, EMULSION INTRAVENOUS at 12:38

## 2017-12-23 RX ADMIN — Medication 10 ML: at 06:24

## 2017-12-23 RX ADMIN — SIMVASTATIN 40 MG: 40 TABLET, FILM COATED ORAL at 21:27

## 2017-12-23 RX ADMIN — AMLODIPINE BESYLATE 5 MG: 5 TABLET ORAL at 16:50

## 2017-12-23 RX ADMIN — ONDANSETRON 4 MG: 2 INJECTION INTRAMUSCULAR; INTRAVENOUS at 13:01

## 2017-12-23 RX ADMIN — Medication 10 ML: at 21:27

## 2017-12-23 RX ADMIN — MIDAZOLAM HYDROCHLORIDE 1 MG: 1 INJECTION, SOLUTION INTRAMUSCULAR; INTRAVENOUS at 12:34

## 2017-12-23 RX ADMIN — FUROSEMIDE 40 MG: 40 TABLET ORAL at 16:49

## 2017-12-23 RX ADMIN — LISINOPRIL 40 MG: 20 TABLET ORAL at 16:49

## 2017-12-23 RX ADMIN — SODIUM CHLORIDE, SODIUM LACTATE, POTASSIUM CHLORIDE, CALCIUM CHLORIDE: 600; 310; 30; 20 INJECTION, SOLUTION INTRAVENOUS at 12:29

## 2017-12-23 RX ADMIN — ALBUTEROL SULFATE 2.5 MG: 2.5 SOLUTION RESPIRATORY (INHALATION) at 13:43

## 2017-12-23 NOTE — OP NOTES
Preop Dx Abscess lower abdomen   Postop Dx same   Surgeon [facility]. Romina Pedroza MD   Assistant SA   Procedure performed: I&D infected abscess   EBL minimal   Indications for procedure large abscess believed secondary to insulin injection site   Significant findings 8 cm deep large cavity   Detailed description of procedure:   After time out, 6 cm transverse incision was made into the abdominal abscess and the cavity was explored. Purulent material was obtained and suctioned. Aerobic and anaerobic cultures were obtained. There was a large amount of pus present in the collection. There was no active bleeding. Cavity was 8cm deep and probably 10 cm transverse diameter. Wound was packed open with betadyne damp Shoshana wrap. Patient tolerated the procedure well and was transferred to PACU in good condition.

## 2017-12-23 NOTE — ANESTHESIA PREPROCEDURE EVALUATION
Anesthetic History   No history of anesthetic complications            Review of Systems / Medical History  Patient summary reviewed, nursing notes reviewed and pertinent labs reviewed    Pulmonary  Within defined limits                 Neuro/Psych   Within defined limits           Cardiovascular    Hypertension                   GI/Hepatic/Renal  Within defined limits              Endo/Other    Diabetes: poorly controlled, using insulin    Morbid obesity and anemia     Other Findings   Comments: K+  3.2         Physical Exam    Airway  Mallampati: II  TM Distance: > 6 cm  Neck ROM: normal range of motion   Mouth opening: Normal     Cardiovascular  Regular rate and rhythm,  S1 and S2 normal,  no murmur, click, rub, or gallop             Dental  No notable dental hx       Pulmonary  Breath sounds clear to auscultation               Abdominal  GI exam deferred       Other Findings            Anesthetic Plan    ASA: 3  Anesthesia type: general          Induction: Intravenous  Anesthetic plan and risks discussed with: Patient

## 2017-12-23 NOTE — PROGRESS NOTES
Hospital Progress Note    NAME:  Edgar Westbrook   :   1950   MRN:  [id number]     Date/Time:  2017 6:53 AM    Plan:   1. Iv ab abscess will be drained today by GS. 2. Control bs   3. Titrate bp meds  4. Continue Ancef and Clindamycin . ID on case. Risk of Deterioration: Low  []           Moderate  [x]           High  []                 Assessment:   Principal Problem:    Cellulitis of right abdominal wall ultrasound done yesterday showed  4.1 x 2.0 x 2.5 cm fluid collection in the right lower quadrant subcutaneous fat suggestive of abscess. Active Problems:    Diabetes (HCC) () titrate insulin      Hypertension () titrate meds      Low back pain ()      Analgesics        Hypokalemia POA As evidenced by Low K 3.3 treated with IV KCL . Repeat labs today. Sepsis POA secondary to abdominal wall cellulitis as evidenced by fever, tachycardia, leucocytosis, hyperglycemia  and elevated neutrophils treated with IV antibiotics.        Subjective:     Continues with rlq pain   11 Point Review of Systems:   Negative except no cp this am noted cough    []            Unable to obtain ROS due to:       []            mental status change []            sedated []            intubated     Social History   Substance Use Topics    Smoking status: Never Smoker    Smokeless tobacco: Never Used    Alcohol use No     Medications reviewed:  Current Facility-Administered Medications   Medication Dose Route Frequency    insulin glargine (LANTUS) injection 38 Units  38 Units SubCUTAneous QHS    guaiFENesin (ROBITUSSIN) 100 mg/5 mL oral liquid 100 mg  100 mg Oral Q4H PRN    guaiFENesin ER (MUCINEX) tablet 600 mg  600 mg Oral Q12H    clindamycin (CLEOCIN) 600mg D5W 50mL IVPB (premix)  600 mg IntraVENous Q8H    ceFAZolin (ANCEF) 2 g/20 mL in sterile water IV syringe  2 g IntraVENous Q8H    metoprolol succinate (TOPROL-XL) XL tablet 50 mg  50 mg Oral DAILY    furosemide (LASIX) tablet 40 mg  40 mg Oral DAILY    pantoprazole (PROTONIX) tablet 40 mg  40 mg Oral ACB    lactobac ac& pc-s.therm-b.anim (JERRICA Q/RISAQUAD)  1 Cap Oral DAILY    lisinopril (PRINIVIL, ZESTRIL) tablet 40 mg  40 mg Oral DAILY    amLODIPine (NORVASC) tablet 5 mg  5 mg Oral DAILY    simvastatin (ZOCOR) tablet 40 mg  40 mg Oral QHS    insulin lispro (HUMALOG) injection   SubCUTAneous TIDAC    glucose chewable tablet 16 g  4 Tab Oral PRN    dextrose (D50W) injection syrg 12.5-25 g  12.5-25 g IntraVENous PRN    glucagon (GLUCAGEN) injection 1 mg  1 mg IntraMUSCular PRN    sodium chloride (NS) flush 5-10 mL  5-10 mL IntraVENous Q8H    sodium chloride (NS) flush 5-10 mL  5-10 mL IntraVENous PRN    acetaminophen (TYLENOL) tablet 650 mg  650 mg Oral Q4H PRN    HYDROcodone-acetaminophen (NORCO) 5-325 mg per tablet 1 Tab  1 Tab Oral Q4H PRN    morphine injection 2 mg  2 mg IntraVENous Q3H PRN    enoxaparin (LOVENOX) injection 40 mg  40 mg SubCUTAneous Q24H        Objective:   Vitals:  Visit Vitals    /79    Pulse 94    Temp 99.5 °F (37.5 °C)    Resp 18    Wt 97.5 kg (214 lb 15.2 oz)  Comment: Historic 2017    LMP  (LMP Unknown)    SpO2 96%    BMI 38.08 kg/m2     Temp (24hrs), Av.4 °F (37.4 °C), Min:99 °F (37.2 °C), Max:100.2 °F (37.9 °C)    O2 Flow Rate (L/min): 2 l/min O2 Device: Room air    Last 24hr Input/Output:    Intake/Output Summary (Last 24 hours) at 17 0913  Last data filed at 17 2135   Gross per 24 hour   Intake              480 ml   Output                0 ml   Net              480 ml        PHYSICAL EXAM:  General:    Alert, cooperative, no distress, appears stated age. Head:   Normocephalic, without obvious abnormality, atraumatic.s. Lungs:   Clear to auscultation bilaterally. No Wheezing or Rhonchi. No rales. Heart:   Regular rate and rhythm,  no murmur, rub or gallop. Abdomen:   Soft, non-tender. Not distended.   Bowel sounds normal. Large area of erythema,increased warmth, and less induration rlq,tender, some purulent seepage   .        Lab Data Reviewed:    Recent Labs      12/21/17   0020   WBC  12.7*   HGB  10.6*   HCT  32.0*   PLT  282     Recent Labs      12/22/17   0343  12/21/17   0020   NA  143  141   K  3.2*  3.4*   CL  107  109*   CO2  27  23   GLU  88  202*   BUN  4*  6   CREA  0.83  0.90   CA  8.6  8.3*     Lab Results   Component Value Date/Time    Glucose (POC) 102 12/23/2017 06:53 AM    Glucose (POC) 74 12/23/2017 06:31 AM    Glucose (POC) 156 12/22/2017 09:36 PM    Glucose (POC) 164 12/22/2017 04:43 PM    Glucose (POC) 139 12/22/2017 11:36 AM       ___________________________________________________  ___________________________________________________    Attending Physician: Arron Ayala MD

## 2017-12-23 NOTE — PROGRESS NOTES
HYPOGLYCEMIC EPISODE DOCUMENTATION    Patient with hypoglycemic episode(s) at 0630(time) on 12/23/2017(date). BG value(s) pre-treatment 76  Was patient symptomatic?  [] yes, [x] no  Patient was treated with the following rescue medications/treatments: [] D50                [] Glucose tablets                [] Glucagon                [x] 4oz juice                [] 6oz reg soda                [] 8oz low fat milk  BG value post-treatment: 102

## 2017-12-23 NOTE — PERIOP NOTES
TRANSFER - OUT REPORT:    Verbal report given to Vijaya(name) on Gabino Levi  being transferred to  505(unit) for routine post - op       Report consisted of patients Situation, Background, Assessment and   Recommendations(SBAR). Time Pre op antibiotic given:2 G Ancef  f [id number]  Anesthesia Stop time: 2097  Nicholas Present on Transfer to floor:n/a  Order for Nicholas on Chart:n/a    Information from the following report(s) OR Summary, Intake/Output and MAR was reviewed with the receiving nurse. Opportunity for questions and clarification was provided. Is the patient on 02? YES       L/Min 3       Other n/a    Is the patient on a monitor? YES    Is the nurse transporting with the patient? YES    Surgical Waiting Area notified of patient's transfer from PACU? NO no family here      The following personal items collected during your admission accompanied patient upon transfer:   Dental Appliance: Dental Appliances: None  Vision: Visual Aid: Glasses, With patient  Hearing Aid:    Jewelry: Jewelry: None  Clothing: Clothing: At bedside  Other Valuables:  Other Valuables: Cell Phone  Valuables sent to safe:

## 2017-12-23 NOTE — PROGRESS NOTES
Bedside shift change report given to [facility] (oncoming nurse) by Phyllis Mathur (offgoing nurse). Report included the following information SBAR, Kardex, Intake/Output and MAR.

## 2017-12-23 NOTE — CONSULTS
Diabetic admitted with cellulitis of R lower abdominal wall  CT initially without fluid collection however US confirmed collection present  Patient then developed fluid drainage from site    Patient Vitals for the past 12 hrs:   Temp Pulse Resp BP SpO2   12/23/17 0342 99.5 °F (37.5 °C) 94 18 152/79 96 %   12/22/17 2300 99 °F (37.2 °C) 89 18 158/79 98 %     Mass RLQ area of pannus- SQ and mobile consistent with SQ abscess  Central site of necrosis where there had been spont drainage but sealed over again    Plan  Needs I&D- have called OR to post case  Patient understands situation and is agreeable to proceed  Understands risks of worsening infection, bleeding, medical complications  She will have an open wound requiring wound care  Plan to take to OR later this am

## 2017-12-23 NOTE — PROGRESS NOTES
Bedside shift change report given to Corinne (oncoming nurse) by Ambika Nixon (offgoing nurse). Report included the following information SBAR.

## 2017-12-23 NOTE — PROGRESS NOTES
Called Dr. Meade Raw answering service. Dr Pieter Heard called back and stated that hospitalists were covering for Dr Crys Chaudhari since he is on vacation. Dr Rogerio York called due to no orders written. Dr Rogerio York clarified that hospitalists will be covering for Dr Crys Chaudhari.

## 2017-12-24 LAB
ANION GAP SERPL CALC-SCNC: 9 MMOL/L (ref 5–15)
BACTERIA SPEC CULT: NORMAL
BACTERIA SPEC CULT: NORMAL
BUN SERPL-MCNC: 3 MG/DL (ref 6–20)
BUN/CREAT SERPL: 3 (ref 12–20)
CALCIUM SERPL-MCNC: 8.4 MG/DL (ref 8.5–10.1)
CHLORIDE SERPL-SCNC: 106 MMOL/L (ref 97–108)
CO2 SERPL-SCNC: 29 MMOL/L (ref 21–32)
CREAT SERPL-MCNC: 0.9 MG/DL (ref 0.55–1.02)
ERYTHROCYTE [DISTWIDTH] IN BLOOD BY AUTOMATED COUNT: 14.3 % (ref 11.5–14.5)
GLUCOSE BLD STRIP.AUTO-MCNC: 104 MG/DL (ref 65–100)
GLUCOSE BLD STRIP.AUTO-MCNC: 112 MG/DL (ref 65–100)
GLUCOSE BLD STRIP.AUTO-MCNC: 138 MG/DL (ref 65–100)
GLUCOSE BLD STRIP.AUTO-MCNC: 174 MG/DL (ref 65–100)
GLUCOSE BLD STRIP.AUTO-MCNC: 215 MG/DL (ref 65–100)
GLUCOSE SERPL-MCNC: 98 MG/DL (ref 65–100)
HCT VFR BLD AUTO: 33.5 % (ref 35–47)
HGB BLD-MCNC: 11.2 G/DL (ref 11.5–16)
MAGNESIUM SERPL-MCNC: 2 MG/DL (ref 1.6–2.4)
MCH RBC QN AUTO: 28.4 PG (ref 26–34)
MCHC RBC AUTO-ENTMCNC: 33.4 G/DL (ref 30–36.5)
MCV RBC AUTO: 84.8 FL (ref 80–99)
PLATELET # BLD AUTO: 401 K/UL (ref 150–400)
POTASSIUM SERPL-SCNC: 2.9 MMOL/L (ref 3.5–5.1)
RBC # BLD AUTO: 3.95 M/UL (ref 3.8–5.2)
SERVICE CMNT-IMP: ABNORMAL
SERVICE CMNT-IMP: NORMAL
SERVICE CMNT-IMP: NORMAL
SODIUM SERPL-SCNC: 144 MMOL/L (ref 136–145)
WBC # BLD AUTO: 14.6 K/UL (ref 3.6–11)

## 2017-12-24 PROCEDURE — 80048 BASIC METABOLIC PNL TOTAL CA: CPT | Performed by: SURGERY

## 2017-12-24 PROCEDURE — 74011250637 HC RX REV CODE- 250/637: Performed by: INTERNAL MEDICINE

## 2017-12-24 PROCEDURE — 74011636637 HC RX REV CODE- 636/637: Performed by: INTERNAL MEDICINE

## 2017-12-24 PROCEDURE — 74011250636 HC RX REV CODE- 250/636: Performed by: INTERNAL MEDICINE

## 2017-12-24 PROCEDURE — 65660000000 HC RM CCU STEPDOWN

## 2017-12-24 PROCEDURE — 36415 COLL VENOUS BLD VENIPUNCTURE: CPT | Performed by: SURGERY

## 2017-12-24 PROCEDURE — 83735 ASSAY OF MAGNESIUM: CPT | Performed by: INTERNAL MEDICINE

## 2017-12-24 PROCEDURE — 85027 COMPLETE CBC AUTOMATED: CPT | Performed by: SURGERY

## 2017-12-24 PROCEDURE — 77030018836 HC SOL IRR NACL ICUM -A

## 2017-12-24 PROCEDURE — 82962 GLUCOSE BLOOD TEST: CPT

## 2017-12-24 RX ORDER — POTASSIUM CHLORIDE 750 MG/1
40 TABLET, FILM COATED, EXTENDED RELEASE ORAL ONCE
Status: COMPLETED | OUTPATIENT
Start: 2017-12-24 | End: 2017-12-24

## 2017-12-24 RX ORDER — POTASSIUM CHLORIDE 750 MG/1
40 TABLET, FILM COATED, EXTENDED RELEASE ORAL
Status: DISCONTINUED | OUTPATIENT
Start: 2017-12-24 | End: 2017-12-24

## 2017-12-24 RX ADMIN — Medication 2 G: at 20:54

## 2017-12-24 RX ADMIN — AMLODIPINE BESYLATE 5 MG: 5 TABLET ORAL at 09:46

## 2017-12-24 RX ADMIN — PANTOPRAZOLE SODIUM 40 MG: 40 TABLET, DELAYED RELEASE ORAL at 06:50

## 2017-12-24 RX ADMIN — CLINDAMYCIN PHOSPHATE 600 MG: 12 INJECTION, SOLUTION INTRAMUSCULAR; INTRAVENOUS at 03:41

## 2017-12-24 RX ADMIN — Medication 10 ML: at 21:33

## 2017-12-24 RX ADMIN — HYDROCODONE BITARTRATE AND ACETAMINOPHEN 1 TABLET: 5; 325 TABLET ORAL at 20:54

## 2017-12-24 RX ADMIN — Medication 10 ML: at 18:45

## 2017-12-24 RX ADMIN — SIMVASTATIN 40 MG: 40 TABLET, FILM COATED ORAL at 21:33

## 2017-12-24 RX ADMIN — Medication 2 G: at 12:53

## 2017-12-24 RX ADMIN — GUAIFENESIN 100 MG: 200 SOLUTION ORAL at 16:32

## 2017-12-24 RX ADMIN — Medication 1 CAPSULE: at 09:46

## 2017-12-24 RX ADMIN — Medication 2 G: at 04:41

## 2017-12-24 RX ADMIN — CLINDAMYCIN PHOSPHATE 600 MG: 12 INJECTION, SOLUTION INTRAMUSCULAR; INTRAVENOUS at 18:44

## 2017-12-24 RX ADMIN — METOPROLOL SUCCINATE 50 MG: 50 TABLET, EXTENDED RELEASE ORAL at 09:46

## 2017-12-24 RX ADMIN — ENOXAPARIN SODIUM 40 MG: 40 INJECTION SUBCUTANEOUS at 18:45

## 2017-12-24 RX ADMIN — POTASSIUM CHLORIDE 40 MEQ: 750 TABLET, FILM COATED, EXTENDED RELEASE ORAL at 06:50

## 2017-12-24 RX ADMIN — LISINOPRIL 40 MG: 20 TABLET ORAL at 09:46

## 2017-12-24 RX ADMIN — FUROSEMIDE 40 MG: 40 TABLET ORAL at 09:46

## 2017-12-24 RX ADMIN — INSULIN GLARGINE 38 UNITS: 100 INJECTION, SOLUTION SUBCUTANEOUS at 21:33

## 2017-12-24 RX ADMIN — GUAIFENESIN 600 MG: 600 TABLET, EXTENDED RELEASE ORAL at 20:55

## 2017-12-24 RX ADMIN — GUAIFENESIN 600 MG: 600 TABLET, EXTENDED RELEASE ORAL at 09:50

## 2017-12-24 RX ADMIN — Medication 10 ML: at 04:41

## 2017-12-24 RX ADMIN — CLINDAMYCIN PHOSPHATE 600 MG: 12 INJECTION, SOLUTION INTRAMUSCULAR; INTRAVENOUS at 11:26

## 2017-12-24 RX ADMIN — INSULIN LISPRO 2 UNITS: 100 INJECTION, SOLUTION INTRAVENOUS; SUBCUTANEOUS at 16:31

## 2017-12-24 RX ADMIN — Medication 10 ML: at 11:26

## 2017-12-24 NOTE — PROGRESS NOTES
ID Progress Note  2017    Subjective:     She is feeling better after surgery--appreciate surgical care    Objective:     Antibiotics:  1. Clindamycin   2. Cefazolin      Vitals:   Visit Vitals    /72    Pulse 97    Temp 98.9 °F (37.2 °C)    Resp 16    Wt 97.5 kg (214 lb 15.2 oz)  Comment: Historic 2017    LMP  (LMP Unknown)    SpO2 98%    BMI 38.08 kg/m2        Tmax:  Temp (24hrs), Av.7 °F (37.1 °C), Min:98.1 °F (36.7 °C), Max:99.5 °F (37.5 °C)      Exam:  Area of induration about the same; still very tender to palpation and red/warm    Labs:      Recent Labs      17   0341  17   0343   WBC  14.6*   --    HGB  11.2*   --    PLT  401*   --    BUN  3*  4*   CREA  0.90  0.83       Cultures:     No results found for: [facility]  Lab Results   Component Value Date/Time    Culture result: PENDING 2017 12:56 PM    Culture result: LIGHT MICROAEROPHILIC STREPTOCOCCUS  10:13 PM    Culture result:  2017 10:13 PM     RARE STAPHYLOCOCCUS EPIDERMIDIS (OXACILLIN RESISTANT)       Radiology: fluid collection on ultrasound concerning for abscess    Line/Insert Date:           Assessment:     1. Soft tissue infection of the abdominal wall with associated fluid collection--status post OR  2. DM    Objective:     1. Continue current therapy  2. Follow up cultures and adjust meds as needed  3.  Group will see intermittently over the holiday weekend, call if she specifically needs to be seen    Erna King MD

## 2017-12-24 NOTE — PROGRESS NOTES
Hospital Progress Note    NAME:  Ailyn Mayers   :   1950   MRN:  [id number]     Date/Time:  2017 6:53 AM         Assessment:     Cellulitis of right abdominal wall ultrasound done yesterday showed  4.1 x 2.0 x 2.5 cm fluid collection in the right lower quadrant subcutaneous fat suggestive of abscess. S/p I & D on  continue Clinda and Ancef, ID on board. Diabetes ([address] titrate insulin      Hypertension, moniotr and adjust meds as needed. Low back pain      Analgesics        Hypokalemia POA As evidenced by Low K 2.9 treated with KCL Po, check mag. Repeat labs today. Sepsis POA, resolved, secondary to abdominal wall cellulitis as evidenced by fever, tachycardia, leucocytosis, hyperglycemia  and elevated neutrophils treated with IV antibiotics. Subjective:   No acute events overnight. ROS: Mild abdomina lpain, no nausea, vomiting, constipation.     Social History   Substance Use Topics    Smoking status: Never Smoker    Smokeless tobacco: Never Used    Alcohol use No     Medications reviewed:  Current Facility-Administered Medications   Medication Dose Route Frequency    insulin glargine (LANTUS) injection 38 Units  38 Units SubCUTAneous QHS    guaiFENesin (ROBITUSSIN) 100 mg/5 mL oral liquid 100 mg  100 mg Oral Q4H PRN    guaiFENesin ER (MUCINEX) tablet 600 mg  600 mg Oral Q12H    clindamycin (CLEOCIN) 600mg D5W 50mL IVPB (premix)  600 mg IntraVENous Q8H    ceFAZolin (ANCEF) 2 g/20 mL in sterile water IV syringe  2 g IntraVENous Q8H    metoprolol succinate (TOPROL-XL) XL tablet 50 mg  50 mg Oral DAILY    furosemide (LASIX) tablet 40 mg  40 mg Oral DAILY    pantoprazole (PROTONIX) tablet 40 mg  40 mg Oral ACB    lactobac ac& pc-s.therm-b.anim (JERRICA Q/RISAQUAD)  1 Cap Oral DAILY    lisinopril (PRINIVIL, ZESTRIL) tablet 40 mg  40 mg Oral DAILY    amLODIPine (NORVASC) tablet 5 mg  5 mg Oral DAILY    simvastatin (ZOCOR) tablet 40 mg  40 mg Oral QHS    insulin lispro (HUMALOG) injection   SubCUTAneous TIDAC    glucose chewable tablet 16 g  4 Tab Oral PRN    dextrose (D50W) injection syrg 12.5-25 g  12.5-25 g IntraVENous PRN    glucagon (GLUCAGEN) injection 1 mg  1 mg IntraMUSCular PRN    sodium chloride (NS) flush 5-10 mL  5-10 mL IntraVENous Q8H    sodium chloride (NS) flush 5-10 mL  5-10 mL IntraVENous PRN    acetaminophen (TYLENOL) tablet 650 mg  650 mg Oral Q4H PRN    HYDROcodone-acetaminophen (NORCO) 5-325 mg per tablet 1 Tab  1 Tab Oral Q4H PRN    morphine injection 2 mg  2 mg IntraVENous Q3H PRN    enoxaparin (LOVENOX) injection 40 mg  40 mg SubCUTAneous Q24H        Objective:   Vitals:  Visit Vitals    /72    Pulse 97    Temp 98.9 °F (37.2 °C)    Resp 16    Wt 97.5 kg (214 lb 15.2 oz)  Comment: Historic 2017    LMP  (LMP Unknown)    SpO2 98%    BMI 38.08 kg/m2     Temp (24hrs), Av.9 °F (37.2 °C), Min:98.6 °F (37 °C), Max:99.5 °F (37.5 °C)    O2 Flow Rate (L/min): 2 l/min O2 Device: Room air    Last 24hr Input/Output:    Intake/Output Summary (Last 24 hours) at 17 1352  Last data filed at 17 1921   Gross per 24 hour   Intake              240 ml   Output              200 ml   Net               40 ml        PHYSICAL EXAM:  General:    Alert, cooperative, no distress, appears stated age. Head:   Normocephalic, without obvious abnormality, atraumatic.s. Lungs:   Clear to auscultation bilaterally. No Wheezing or Rhonchi. No rales. Heart:   Regular rate and rhythm,  no murmur, rub or gallop. Abdomen:   Soft, non-tender. Not distended. Bowel sounds normal. Large area of erythema,increased warmth, and less induration rlq,tender, dressing change and packing by NS.   .       Lab Data Reviewed:    Recent Labs      17   0341   WBC  14.6*   HGB  11.2*   HCT  33.5*   PLT  401*     Recent Labs      17   0341  17   0343   NA  144  143   K  2.9*  3.2*   CL  106  107   CO2  29  27   GLU  98  88   BUN  3*  4* CREA  0.90  0.83   CA  8.4*  8.6   MG  2.0   --      Lab Results   Component Value Date/Time    Glucose (POC) 138 12/24/2017 11:15 AM    Glucose (POC) 104 12/24/2017 06:49 AM    Glucose (POC) 112 12/24/2017 03:56 AM    Glucose (POC) 100 12/23/2017 09:18 PM    Glucose (POC) 110 12/23/2017 05:12 PM       ___________________________________________________  ___________________________________________________    Attending Physician: Antonina Fuller MD

## 2017-12-24 NOTE — PROGRESS NOTES
Pts blood sugar 100 tonight and has 38 units of Lantus ordered at bedtime. Pt had a hypoglycemia episode of 74 this morning. RN paged Dr. Daphne Rankin and he informed RN that a blood sugar of 74 is not hypoglycemia and that 38 units of lantus would not bottom the patients blood sugar out tomorrow morning. RN explained that blood sugars below 80 we have to treat per protocol. Dr. Daphne Rankin informed RN to give pt the 38 units of Lantus. RN spoke with pt on doctors orders and pt explained she usually takes 30 units of Lantus at home every night but was concerned since her blood sugar was low this morning at 74. Pt informed RN that she will take the 38 units of Lantus tonight.

## 2017-12-25 LAB
ANION GAP SERPL CALC-SCNC: 7 MMOL/L (ref 5–15)
BUN SERPL-MCNC: 5 MG/DL (ref 6–20)
BUN/CREAT SERPL: 6 (ref 12–20)
CALCIUM SERPL-MCNC: 8.2 MG/DL (ref 8.5–10.1)
CHLORIDE SERPL-SCNC: 107 MMOL/L (ref 97–108)
CO2 SERPL-SCNC: 29 MMOL/L (ref 21–32)
CREAT SERPL-MCNC: 0.84 MG/DL (ref 0.55–1.02)
ERYTHROCYTE [DISTWIDTH] IN BLOOD BY AUTOMATED COUNT: 14.2 % (ref 11.5–14.5)
GLUCOSE BLD STRIP.AUTO-MCNC: 137 MG/DL (ref 65–100)
GLUCOSE BLD STRIP.AUTO-MCNC: 149 MG/DL (ref 65–100)
GLUCOSE BLD STRIP.AUTO-MCNC: 165 MG/DL (ref 65–100)
GLUCOSE BLD STRIP.AUTO-MCNC: 91 MG/DL (ref 65–100)
GLUCOSE SERPL-MCNC: 105 MG/DL (ref 65–100)
HCT VFR BLD AUTO: 30.6 % (ref 35–47)
HGB BLD-MCNC: 10.2 G/DL (ref 11.5–16)
MAGNESIUM SERPL-MCNC: 2 MG/DL (ref 1.6–2.4)
MCH RBC QN AUTO: 28.2 PG (ref 26–34)
MCHC RBC AUTO-ENTMCNC: 33.3 G/DL (ref 30–36.5)
MCV RBC AUTO: 84.5 FL (ref 80–99)
PLATELET # BLD AUTO: 384 K/UL (ref 150–400)
POTASSIUM SERPL-SCNC: 3.1 MMOL/L (ref 3.5–5.1)
RBC # BLD AUTO: 3.62 M/UL (ref 3.8–5.2)
SERVICE CMNT-IMP: ABNORMAL
SERVICE CMNT-IMP: NORMAL
SODIUM SERPL-SCNC: 143 MMOL/L (ref 136–145)
WBC # BLD AUTO: 11.3 K/UL (ref 3.6–11)

## 2017-12-25 PROCEDURE — 36415 COLL VENOUS BLD VENIPUNCTURE: CPT | Performed by: HOSPITALIST

## 2017-12-25 PROCEDURE — 80048 BASIC METABOLIC PNL TOTAL CA: CPT | Performed by: HOSPITALIST

## 2017-12-25 PROCEDURE — 85027 COMPLETE CBC AUTOMATED: CPT | Performed by: HOSPITALIST

## 2017-12-25 PROCEDURE — 77030018836 HC SOL IRR NACL ICUM -A

## 2017-12-25 PROCEDURE — 74011636637 HC RX REV CODE- 636/637: Performed by: INTERNAL MEDICINE

## 2017-12-25 PROCEDURE — 74011250636 HC RX REV CODE- 250/636: Performed by: INTERNAL MEDICINE

## 2017-12-25 PROCEDURE — 74011250637 HC RX REV CODE- 250/637: Performed by: HOSPITALIST

## 2017-12-25 PROCEDURE — 65660000000 HC RM CCU STEPDOWN

## 2017-12-25 PROCEDURE — 74011250637 HC RX REV CODE- 250/637: Performed by: INTERNAL MEDICINE

## 2017-12-25 PROCEDURE — 82962 GLUCOSE BLOOD TEST: CPT

## 2017-12-25 PROCEDURE — 83735 ASSAY OF MAGNESIUM: CPT | Performed by: HOSPITALIST

## 2017-12-25 RX ORDER — POTASSIUM CHLORIDE 750 MG/1
40 TABLET, FILM COATED, EXTENDED RELEASE ORAL
Status: COMPLETED | OUTPATIENT
Start: 2017-12-25 | End: 2017-12-25

## 2017-12-25 RX ADMIN — Medication 10 ML: at 20:59

## 2017-12-25 RX ADMIN — INSULIN LISPRO 2 UNITS: 100 INJECTION, SOLUTION INTRAVENOUS; SUBCUTANEOUS at 16:30

## 2017-12-25 RX ADMIN — HYDROCODONE BITARTRATE AND ACETAMINOPHEN 1 TABLET: 5; 325 TABLET ORAL at 09:42

## 2017-12-25 RX ADMIN — CLINDAMYCIN PHOSPHATE 600 MG: 12 INJECTION, SOLUTION INTRAMUSCULAR; INTRAVENOUS at 19:02

## 2017-12-25 RX ADMIN — AMLODIPINE BESYLATE 5 MG: 5 TABLET ORAL at 08:39

## 2017-12-25 RX ADMIN — GUAIFENESIN 600 MG: 600 TABLET, EXTENDED RELEASE ORAL at 08:38

## 2017-12-25 RX ADMIN — CLINDAMYCIN PHOSPHATE 600 MG: 12 INJECTION, SOLUTION INTRAMUSCULAR; INTRAVENOUS at 04:24

## 2017-12-25 RX ADMIN — FUROSEMIDE 40 MG: 40 TABLET ORAL at 08:39

## 2017-12-25 RX ADMIN — METOPROLOL SUCCINATE 50 MG: 50 TABLET, EXTENDED RELEASE ORAL at 08:38

## 2017-12-25 RX ADMIN — SIMVASTATIN 40 MG: 40 TABLET, FILM COATED ORAL at 23:01

## 2017-12-25 RX ADMIN — Medication 10 ML: at 04:25

## 2017-12-25 RX ADMIN — Medication 10 ML: at 19:03

## 2017-12-25 RX ADMIN — ENOXAPARIN SODIUM 40 MG: 40 INJECTION SUBCUTANEOUS at 19:02

## 2017-12-25 RX ADMIN — Medication 10 ML: at 06:17

## 2017-12-25 RX ADMIN — Medication 1 CAPSULE: at 08:38

## 2017-12-25 RX ADMIN — Medication 2 G: at 21:00

## 2017-12-25 RX ADMIN — GUAIFENESIN 600 MG: 600 TABLET, EXTENDED RELEASE ORAL at 20:59

## 2017-12-25 RX ADMIN — PANTOPRAZOLE SODIUM 40 MG: 40 TABLET, DELAYED RELEASE ORAL at 07:11

## 2017-12-25 RX ADMIN — Medication 2 G: at 12:41

## 2017-12-25 RX ADMIN — POTASSIUM CHLORIDE 40 MEQ: 750 TABLET, FILM COATED, EXTENDED RELEASE ORAL at 08:38

## 2017-12-25 RX ADMIN — Medication 10 ML: at 12:41

## 2017-12-25 RX ADMIN — CLINDAMYCIN PHOSPHATE 600 MG: 12 INJECTION, SOLUTION INTRAMUSCULAR; INTRAVENOUS at 11:12

## 2017-12-25 RX ADMIN — Medication 10 ML: at 11:16

## 2017-12-25 RX ADMIN — INSULIN GLARGINE 38 UNITS: 100 INJECTION, SOLUTION SUBCUTANEOUS at 23:01

## 2017-12-25 RX ADMIN — HYDROCODONE BITARTRATE AND ACETAMINOPHEN 1 TABLET: 5; 325 TABLET ORAL at 20:59

## 2017-12-25 RX ADMIN — LISINOPRIL 40 MG: 20 TABLET ORAL at 08:39

## 2017-12-25 RX ADMIN — Medication 2 G: at 06:16

## 2017-12-25 NOTE — PROGRESS NOTES
Hospital Progress Note    NAME:  Katherin Suresh   :   1950   MRN:  [id number]     Date/Time:  2017 6:53 AM         Assessment:     Cellulitis of right abdominal wall ultrasound done yesterday showed  4.1 x 2.0 x 2.5 cm fluid collection in the right lower quadrant subcutaneous fat suggestive of abscess. S/p I & D on  continue Clinda and Ancef, ID on board. Cultures from  done in OR possible streptococcus. Follow up with cultures. Diabetes ([address] titrate insulin      Hypertension, moniotr and adjust meds as needed. Low back pain      Analgesics    Hypokalemia, improved, mag with in normal limits. Sepsis POA, resolved, secondary to abdominal wall cellulitis as evidenced by fever, tachycardia, leucocytosis, hyperglycemia  and elevated neutrophils treated with IV antibiotics. Subjective:   No acute events overnight. ROS: Mild abdominal pain, no nausea, vomiting, constipation.     Social History   Substance Use Topics    Smoking status: Never Smoker    Smokeless tobacco: Never Used    Alcohol use No     Medications reviewed:  Current Facility-Administered Medications   Medication Dose Route Frequency    benzocaine-menthol (CEPACOL) lozenge 1 Lozenge  1 Lozenge Mucous Membrane PRN    insulin glargine (LANTUS) injection 38 Units  38 Units SubCUTAneous QHS    guaiFENesin (ROBITUSSIN) 100 mg/5 mL oral liquid 100 mg  100 mg Oral Q4H PRN    guaiFENesin ER (MUCINEX) tablet 600 mg  600 mg Oral Q12H    clindamycin (CLEOCIN) 600mg D5W 50mL IVPB (premix)  600 mg IntraVENous Q8H    ceFAZolin (ANCEF) 2 g/20 mL in sterile water IV syringe  2 g IntraVENous Q8H    metoprolol succinate (TOPROL-XL) XL tablet 50 mg  50 mg Oral DAILY    furosemide (LASIX) tablet 40 mg  40 mg Oral DAILY    pantoprazole (PROTONIX) tablet 40 mg  40 mg Oral ACB    lactobac ac& pc-s.therm-b.anim (JERRICA Q/RISAQUAD)  1 Cap Oral DAILY    lisinopril (PRINIVIL, ZESTRIL) tablet 40 mg  40 mg Oral DAILY    amLODIPine (NORVASC) tablet 5 mg  5 mg Oral DAILY    simvastatin (ZOCOR) tablet 40 mg  40 mg Oral QHS    insulin lispro (HUMALOG) injection   SubCUTAneous TIDAC    glucose chewable tablet 16 g  4 Tab Oral PRN    dextrose (D50W) injection syrg 12.5-25 g  12.5-25 g IntraVENous PRN    glucagon (GLUCAGEN) injection 1 mg  1 mg IntraMUSCular PRN    sodium chloride (NS) flush 5-10 mL  5-10 mL IntraVENous Q8H    sodium chloride (NS) flush 5-10 mL  5-10 mL IntraVENous PRN    acetaminophen (TYLENOL) tablet 650 mg  650 mg Oral Q4H PRN    HYDROcodone-acetaminophen (NORCO) 5-325 mg per tablet 1 Tab  1 Tab Oral Q4H PRN    morphine injection 2 mg  2 mg IntraVENous Q3H PRN    enoxaparin (LOVENOX) injection 40 mg  40 mg SubCUTAneous Q24H        Objective:   Vitals:  Visit Vitals    /76    Pulse 91    Temp 98.8 °F (37.1 °C)    Resp 18    Wt 97.5 kg (214 lb 15.2 oz)  Comment: Historic 2017    LMP  (LMP Unknown)    SpO2 95%    BMI 38.08 kg/m2     Temp (24hrs), Av.6 °F (37 °C), Min:98.4 °F (36.9 °C), Max:98.8 °F (37.1 °C)    O2 Flow Rate (L/min): 2 l/min O2 Device: Room air    Last 24hr Input/Output:    Intake/Output Summary (Last 24 hours) at 17 1554  Last data filed at 17 1830   Gross per 24 hour   Intake              320 ml   Output                0 ml   Net              320 ml        PHYSICAL EXAM:  General:    Alert, cooperative, no distress, appears stated age. Head:   Normocephalic, without obvious abnormality, atraumatic.s. Lungs:   Clear to auscultation bilaterally. No Wheezing or Rhonchi. No rales. Heart:   Regular rate and rhythm,  no murmur, rub or gallop. Abdomen:   Soft, non-tender. Not distended.   Bowel sounds normal.  Wound with dressing,   Neuro:              AOx3      Lab Data Reviewed:    Recent Labs      17   0428  17   0341   WBC  11.3*  14.6*   HGB  10.2*  11.2*   HCT  30.6*  33.5*   PLT  384  401*     Recent Labs      17   0428  17 0341   NA  143  144   K  3.1*  2.9*   CL  107  106   CO2  29  29   GLU  105*  98   BUN  5*  3*   CREA  0.84  0.90   CA  8.2*  8.4*   MG  2.0  2.0     Lab Results   Component Value Date/Time    Glucose (POC) 137 12/25/2017 11:42 AM    Glucose (POC) 91 12/25/2017 07:03 AM    Glucose (POC) 215 12/24/2017 09:28 PM    Glucose (POC) 174 12/24/2017 04:13 PM    Glucose (POC) 138 12/24/2017 11:15 AM       ___________________________________________________  ___________________________________________________    Attending Physician: Prasad Quintanilla MD

## 2017-12-25 NOTE — PROGRESS NOTES
Bedside shift change report given to Nae Watson (oncoming nurse) by Andi Santillan (offgoing nurse). Report included the following information SBAR, Kardex, Intake/Output and MAR.

## 2017-12-25 NOTE — PROGRESS NOTES
Bedside and Verbal shift change report given to Amber NEle Kingsley (oncoming nurse) by Jyoti Batista (offgoing nurse). Report included the following information Kardex.

## 2017-12-26 ENCOUNTER — HOME HEALTH ADMISSION (OUTPATIENT)
Dept: HOME HEALTH SERVICES | Facility: HOME HEALTH | Age: 67
End: 2017-12-26
Payer: MEDICARE

## 2017-12-26 LAB
ANION GAP SERPL CALC-SCNC: 7 MMOL/L (ref 5–15)
BACTERIA SPEC CULT: NORMAL
BUN SERPL-MCNC: 5 MG/DL (ref 6–20)
BUN/CREAT SERPL: 6 (ref 12–20)
CALCIUM SERPL-MCNC: 8.1 MG/DL (ref 8.5–10.1)
CHLORIDE SERPL-SCNC: 104 MMOL/L (ref 97–108)
CO2 SERPL-SCNC: 28 MMOL/L (ref 21–32)
CREAT SERPL-MCNC: 0.87 MG/DL (ref 0.55–1.02)
ERYTHROCYTE [DISTWIDTH] IN BLOOD BY AUTOMATED COUNT: 14.5 % (ref 11.5–14.5)
GLUCOSE BLD STRIP.AUTO-MCNC: 128 MG/DL (ref 65–100)
GLUCOSE BLD STRIP.AUTO-MCNC: 205 MG/DL (ref 65–100)
GLUCOSE BLD STRIP.AUTO-MCNC: 225 MG/DL (ref 65–100)
GLUCOSE BLD STRIP.AUTO-MCNC: 85 MG/DL (ref 65–100)
GLUCOSE SERPL-MCNC: 154 MG/DL (ref 65–100)
HCT VFR BLD AUTO: 33 % (ref 35–47)
HGB BLD-MCNC: 10.8 G/DL (ref 11.5–16)
MCH RBC QN AUTO: 28.1 PG (ref 26–34)
MCHC RBC AUTO-ENTMCNC: 32.7 G/DL (ref 30–36.5)
MCV RBC AUTO: 85.7 FL (ref 80–99)
PLATELET # BLD AUTO: 432 K/UL (ref 150–400)
POTASSIUM SERPL-SCNC: 3.9 MMOL/L (ref 3.5–5.1)
RBC # BLD AUTO: 3.85 M/UL (ref 3.8–5.2)
SERVICE CMNT-IMP: ABNORMAL
SERVICE CMNT-IMP: NORMAL
SERVICE CMNT-IMP: NORMAL
SODIUM SERPL-SCNC: 139 MMOL/L (ref 136–145)
WBC # BLD AUTO: 9.2 K/UL (ref 3.6–11)

## 2017-12-26 PROCEDURE — 82962 GLUCOSE BLOOD TEST: CPT

## 2017-12-26 PROCEDURE — 65270000029 HC RM PRIVATE

## 2017-12-26 PROCEDURE — 36415 COLL VENOUS BLD VENIPUNCTURE: CPT | Performed by: HOSPITALIST

## 2017-12-26 PROCEDURE — 74011250636 HC RX REV CODE- 250/636: Performed by: INTERNAL MEDICINE

## 2017-12-26 PROCEDURE — 80048 BASIC METABOLIC PNL TOTAL CA: CPT | Performed by: HOSPITALIST

## 2017-12-26 PROCEDURE — 85027 COMPLETE CBC AUTOMATED: CPT | Performed by: HOSPITALIST

## 2017-12-26 PROCEDURE — 74011636637 HC RX REV CODE- 636/637: Performed by: INTERNAL MEDICINE

## 2017-12-26 PROCEDURE — 74011250637 HC RX REV CODE- 250/637: Performed by: INTERNAL MEDICINE

## 2017-12-26 PROCEDURE — 74011250637 HC RX REV CODE- 250/637: Performed by: HOSPITALIST

## 2017-12-26 PROCEDURE — 97116 GAIT TRAINING THERAPY: CPT

## 2017-12-26 PROCEDURE — 74011250637 HC RX REV CODE- 250/637: Performed by: SURGERY

## 2017-12-26 RX ORDER — POTASSIUM CHLORIDE 750 MG/1
40 TABLET, FILM COATED, EXTENDED RELEASE ORAL
Status: COMPLETED | OUTPATIENT
Start: 2017-12-26 | End: 2017-12-26

## 2017-12-26 RX ADMIN — Medication 10 ML: at 22:16

## 2017-12-26 RX ADMIN — Medication 10 ML: at 07:32

## 2017-12-26 RX ADMIN — GUAIFENESIN 600 MG: 600 TABLET, EXTENDED RELEASE ORAL at 08:41

## 2017-12-26 RX ADMIN — INSULIN GLARGINE 38 UNITS: 100 INJECTION, SOLUTION SUBCUTANEOUS at 22:18

## 2017-12-26 RX ADMIN — INSULIN LISPRO 3 UNITS: 100 INJECTION, SOLUTION INTRAVENOUS; SUBCUTANEOUS at 18:10

## 2017-12-26 RX ADMIN — Medication 2 G: at 23:28

## 2017-12-26 RX ADMIN — HYDROCODONE BITARTRATE AND ACETAMINOPHEN 1 TABLET: 5; 325 TABLET ORAL at 08:40

## 2017-12-26 RX ADMIN — CLINDAMYCIN PHOSPHATE 600 MG: 12 INJECTION, SOLUTION INTRAMUSCULAR; INTRAVENOUS at 06:03

## 2017-12-26 RX ADMIN — GUAIFENESIN 600 MG: 600 TABLET, EXTENDED RELEASE ORAL at 20:25

## 2017-12-26 RX ADMIN — Medication 2 G: at 15:09

## 2017-12-26 RX ADMIN — BENZOCAINE AND MENTHOL 1 LOZENGE: 15; 3.6 LOZENGE ORAL at 00:00

## 2017-12-26 RX ADMIN — AMLODIPINE BESYLATE 5 MG: 5 TABLET ORAL at 08:40

## 2017-12-26 RX ADMIN — CLINDAMYCIN PHOSPHATE 600 MG: 12 INJECTION, SOLUTION INTRAMUSCULAR; INTRAVENOUS at 22:21

## 2017-12-26 RX ADMIN — POTASSIUM CHLORIDE 40 MEQ: 750 TABLET, FILM COATED, EXTENDED RELEASE ORAL at 09:55

## 2017-12-26 RX ADMIN — ENOXAPARIN SODIUM 40 MG: 40 INJECTION SUBCUTANEOUS at 18:11

## 2017-12-26 RX ADMIN — Medication 1 CAPSULE: at 08:40

## 2017-12-26 RX ADMIN — PANTOPRAZOLE SODIUM 40 MG: 40 TABLET, DELAYED RELEASE ORAL at 07:35

## 2017-12-26 RX ADMIN — Medication 10 ML: at 14:00

## 2017-12-26 RX ADMIN — CLINDAMYCIN PHOSPHATE 600 MG: 12 INJECTION, SOLUTION INTRAMUSCULAR; INTRAVENOUS at 13:49

## 2017-12-26 RX ADMIN — FUROSEMIDE 40 MG: 40 TABLET ORAL at 08:40

## 2017-12-26 RX ADMIN — METOPROLOL SUCCINATE 50 MG: 50 TABLET, EXTENDED RELEASE ORAL at 08:41

## 2017-12-26 RX ADMIN — Medication 2 G: at 07:25

## 2017-12-26 RX ADMIN — SIMVASTATIN 40 MG: 40 TABLET, FILM COATED ORAL at 22:15

## 2017-12-26 RX ADMIN — LISINOPRIL 40 MG: 20 TABLET ORAL at 08:40

## 2017-12-26 NOTE — PROGRESS NOTES
ID Progress Note  2017    Subjective:     She is doing better each day    Objective:     Antibiotics:  1. Clindamycin   2. Cefazolin      Vitals:   Visit Vitals    /72    Pulse 85    Temp 98.9 °F (37.2 °C)    Resp 18    Wt 97.5 kg (214 lb 15.2 oz)  Comment: Historic 2017    LMP  (LMP Unknown)    SpO2 97%    BMI 38.08 kg/m2        Tmax:  Temp (24hrs), Av.4 °F (36.9 °C), Min:97.3 °F (36.3 °C), Max:98.9 °F (37.2 °C)      Exam:  Area of induration about the same; still very tender to palpation and red/warm    Labs:      Recent Labs      17   1016  17   0428  17   0341   WBC  9.2  11.3*  14.6*   HGB  10.8*  10.2*  11.2*   PLT  432*  384  401*   BUN  5*  5*  3*   CREA  0.87  0.84  0.90       Cultures:     No results found for: SDES  Lab Results   Component Value Date/Time    Culture result: POSSIBLE STREPTOCOCCUS SPECIES 2017 12:56 PM    Culture result: NO ANAEROBES ISOLATED 2017 12:56 PM    Culture result: LIGHT MICROAEROPHILIC STREPTOCOCCUS  10:13 PM    Culture result:  2017 10:13 PM     RARE STAPHYLOCOCCUS EPIDERMIDIS (OXACILLIN RESISTANT)       Radiology: fluid collection on ultrasound concerning for abscess    Line/Insert Date:           Assessment:     1. Soft tissue infection of the abdominal wall with associated fluid collection--status post OR  2. DM    Objective:     1. Continue current therapy  2. Follow up cultures and adjust meds as needed  3.  She will be able to be managed with oral cephalexin 500 mg QID for another week or so upon discharge    Tee Pelaez MD

## 2017-12-26 NOTE — WOUND CARE
Wound Consult:  New Patient Visit. Chart reviewed. Consulted for right lower abdominal wall wound - abscess S/P I&D on 12/23. Spoke with patients nurse,  Lauren Santana and she pre-medicated patient with po pain medication prior to wound care per discussion with patient; Pascual Lr PA with surgery is in to assess with me. Patient is resting on a [facility] bed. Patient tolerated wound care fair to well; tenderness with packing depth of wound noted but up to chair after care completed with comfort. Assessment:  Right lower abdominal wall - 2.5 x 5 x 3.2 cm, with sinus tract at 9 o'clock edge straight down to 7.5 cm. Jessica-wound has some induration remaining mostly above wound; skin superficially peeling as it resolves from inflammation. No odor or purulence to drainage which is serosanguinous in moderate amount. Treatment:  Irrigated with saline, wicked back out with dry gauze; packed with MeSalt packing strip -one continuous packing strip used; topped with dry gauze/tape. Protected surrounding skin with skin prep. Wound Recommendations:  Wound Care: Cleanse RLQ abdominal wound daily with saline,  and with continuous piece of MeSalt packing (MeSalt goes in dry); then apply Santyl to areas at top of wound with yellow slough and cover with 4x4s, secure with tape daily. Skin Care Recommendations:  Continue to monitor nutrition, pain, and skin risk scale, and skin assessment. Needs pain medication prior to wound care. Plan:  Discussed with Delta Steward; opted for packing as sinus tract is fairly deep and narrow with over all narrow wound as well for wound VAC sponge. MeSalt should help enable good packing into depth of wound and clean wound bed well. Delta Steward educated patient on wound healing from base up and patient in agreement with plan. We will continue to reassess routinely and as needed.    Notice, [address] Office [phone]  Pager [phone]

## 2017-12-26 NOTE — PROGRESS NOTES
Reviewed medical chart; note plan for possible wound vac. Patient states that she lives alone, but her aunt will stay with her at discharge. She is aware that she may need a wound vac at home and subsequently home health skilled nursing for wound care. Patient is agreeable to sending the home health referral to [address] [place] [address]. Referral sent via Blane Haley to [address] [place] [address] and they can accept her into care. Confirmed the patient's address ([address] [place] [address], [address]) and phone number (F#[phone]). Care Management will continue to follow her disposition.    LASHA Bonilla

## 2017-12-26 NOTE — PROGRESS NOTES
General Surgery Daily Progress Note    Admit Date: 2017  Post-Operative Day: 3 Days Post-Op from Procedure(s):  Incision and drainage of abdominal wall abscess     Subjective:     Last 24 hrs: Currently receiving wound care and evalutation for wound vac placement  Wound is too narrow & deep on right side for wound vac placement at this time. Reports pain in area of wound with  and wound care/dressing changes for which she is premedicated. Denies fevers or chills. Tolerating diet without NV. Inquiring about discharge. AM labs pending. Objective:     Blood pressure 145/78, pulse 85, temperature 98.5 °F (36.9 °C), resp. rate 18, weight 214 lb 15.2 oz (97.5 kg), SpO2 98 %. Temp (24hrs), Av.6 °F (37 °C), Min:98.5 °F (36.9 °C), Max:98.8 °F (37.1 °C)      _____________________  Physical Exam:     Alert and Oriented, cooperative, in no acute distress. Cardiovascular: RRR, trace peripheral edema  Lungs:CTAB   Abdominal wound: beefy red wound bases without discharge. Approx 5cm across, 3cm wide. Right side measures 7.5cm deep. Appro TTP, cheko with dressing changes and packing with Nusalt. +Induration surrounding wound. Abdomen: soft, round +BS    Assessment:   Principal Problem:    Cellulitis of right abdominal wall (2017)    Active Problems:    Diabetes (HCC) ()      Hypertension ()      Low back pain ()      Overview: chiorpactor            Plan:     Continue antibiotics. Cultures pending. AM labs. Medical management per Medicine Team.      Data Review:    Recent Labs      178  17   WBC  11.3*  14.6*   HGB  10.2*  11.2*   HCT  30.6*  33.5*   PLT  384  401*     Recent Labs      178  17   NA  143  144   K  3.1*  2.9*   CL  107  106   CO2  29  29   GLU  105*  98   BUN  5*  3*   CREA  0.84  0.90   CA  8.2*  8.4*   MG  2.0  2.0     No results for input(s): AML, LPSE in the last 72 hours.         ______________________  Medications:    Current Facility-Administered Medications   Medication Dose Route Frequency    benzocaine-menthol (CEPACOL) lozenge 1 Lozenge  1 Lozenge Mucous Membrane PRN    insulin glargine (LANTUS) injection 38 Units  38 Units SubCUTAneous QHS    guaiFENesin (ROBITUSSIN) 100 mg/5 mL oral liquid 100 mg  100 mg Oral Q4H PRN    guaiFENesin ER (MUCINEX) tablet 600 mg  600 mg Oral Q12H    clindamycin (CLEOCIN) 600mg D5W 50mL IVPB (premix)  600 mg IntraVENous Q8H    ceFAZolin (ANCEF) 2 g/20 mL in sterile water IV syringe  2 g IntraVENous Q8H    metoprolol succinate (TOPROL-XL) XL tablet 50 mg  50 mg Oral DAILY    furosemide (LASIX) tablet 40 mg  40 mg Oral DAILY    pantoprazole (PROTONIX) tablet 40 mg  40 mg Oral ACB    lactobac ac& pc-s.therm-b.anim (JERRICA Q/RISAQUAD)  1 Cap Oral DAILY    lisinopril (PRINIVIL, ZESTRIL) tablet 40 mg  40 mg Oral DAILY    amLODIPine (NORVASC) tablet 5 mg  5 mg Oral DAILY    simvastatin (ZOCOR) tablet 40 mg  40 mg Oral QHS    insulin lispro (HUMALOG) injection   SubCUTAneous TIDAC    glucose chewable tablet 16 g  4 Tab Oral PRN    dextrose (D50W) injection syrg 12.5-25 g  12.5-25 g IntraVENous PRN    glucagon (GLUCAGEN) injection 1 mg  1 mg IntraMUSCular PRN    sodium chloride (NS) flush 5-10 mL  5-10 mL IntraVENous Q8H    sodium chloride (NS) flush 5-10 mL  5-10 mL IntraVENous PRN    acetaminophen (TYLENOL) tablet 650 mg  650 mg Oral Q4H PRN    HYDROcodone-acetaminophen (NORCO) 5-325 mg per tablet 1 Tab  1 Tab Oral Q4H PRN    morphine injection 2 mg  2 mg IntraVENous Q3H PRN    enoxaparin (LOVENOX) injection 40 mg  40 mg SubCUTAneous Q24H       Daphne Freitas PA-C  12/26/2017

## 2017-12-26 NOTE — PROGRESS NOTES
Hospital Progress Note    NAME:  Chelly Oakes   :   1950   MRN:  [id number]     Date/Time:  2017 6:53 AM         Assessment:     Cellulitis of right abdominal wall and abscess, ultrasound done yesterday showed  4.1 x 2.0 x 2.5 cm fluid collection in the right lower quadrant subcutaneous fat suggestive of abscess. S/p I & D on  continue Clinda and Ancef, ID on board. Cultures from  done in OR possible streptococcus, not yet finalized, no anaerobes so far. Follow up with cultures. Diabetes ([address] titrate insulin    Hypertension, moniotr and adjust meds as needed. Low back pain     Analgesics    Hypokalemia, improved, mag with in normal limits. Sepsis POA, resolved, secondary to abdominal wall cellulitis as evidenced by fever, tachycardia, leucocytosis, hyperglycemia  and elevated neutrophils treated with IV antibiotics. Subjective:   No acute events overnight. ROS: Mild abdominal pain, no nausea, vomiting, constipation.     Social History   Substance Use Topics    Smoking status: Never Smoker    Smokeless tobacco: Never Used    Alcohol use No     Medications reviewed:  Current Facility-Administered Medications   Medication Dose Route Frequency    collagenase (SANTYL) 250 unit/gram ointment   Topical DAILY    benzocaine-menthol (CEPACOL) lozenge 1 Lozenge  1 Lozenge Mucous Membrane PRN    insulin glargine (LANTUS) injection 38 Units  38 Units SubCUTAneous QHS    guaiFENesin (ROBITUSSIN) 100 mg/5 mL oral liquid 100 mg  100 mg Oral Q4H PRN    guaiFENesin ER (MUCINEX) tablet 600 mg  600 mg Oral Q12H    clindamycin (CLEOCIN) 600mg D5W 50mL IVPB (premix)  600 mg IntraVENous Q8H    ceFAZolin (ANCEF) 2 g/20 mL in sterile water IV syringe  2 g IntraVENous Q8H    metoprolol succinate (TOPROL-XL) XL tablet 50 mg  50 mg Oral DAILY    furosemide (LASIX) tablet 40 mg  40 mg Oral DAILY    pantoprazole (PROTONIX) tablet 40 mg  40 mg Oral ACB    lactobac ac& pc-s.therm-b.anim (JERRICA Q/RISAQUAD)  1 Cap Oral DAILY    lisinopril (PRINIVIL, ZESTRIL) tablet 40 mg  40 mg Oral DAILY    amLODIPine (NORVASC) tablet 5 mg  5 mg Oral DAILY    simvastatin (ZOCOR) tablet 40 mg  40 mg Oral QHS    insulin lispro (HUMALOG) injection   SubCUTAneous TIDAC    glucose chewable tablet 16 g  4 Tab Oral PRN    dextrose (D50W) injection syrg 12.5-25 g  12.5-25 g IntraVENous PRN    glucagon (GLUCAGEN) injection 1 mg  1 mg IntraMUSCular PRN    sodium chloride (NS) flush 5-10 mL  5-10 mL IntraVENous Q8H    sodium chloride (NS) flush 5-10 mL  5-10 mL IntraVENous PRN    acetaminophen (TYLENOL) tablet 650 mg  650 mg Oral Q4H PRN    HYDROcodone-acetaminophen (NORCO) 5-325 mg per tablet 1 Tab  1 Tab Oral Q4H PRN    morphine injection 2 mg  2 mg IntraVENous Q3H PRN    enoxaparin (LOVENOX) injection 40 mg  40 mg SubCUTAneous Q24H        Objective:   Vitals:  Visit Vitals    /72    Pulse 85    Temp 98.9 °F (37.2 °C)    Resp 18    Wt 97.5 kg (214 lb 15.2 oz)  Comment: Historic 2017    LMP  (LMP Unknown)    SpO2 97%    BMI 38.08 kg/m2     Temp (24hrs), Av.4 °F (36.9 °C), Min:97.3 °F (36.3 °C), Max:98.9 °F (37.2 °C)    O2 Flow Rate (L/min): 2 l/min O2 Device: Room air    Last 24hr Input/Output:    Intake/Output Summary (Last 24 hours) at 17 1455  Last data filed at 17 2159   Gross per 24 hour   Intake              730 ml   Output                0 ml   Net              730 ml        PHYSICAL EXAM:  General:    Alert, cooperative, no distress, appears stated age. Head:   Normocephalic, without obvious abnormality, atraumatic.s. Lungs:   Clear to auscultation bilaterally. No Wheezing or Rhonchi. No rales. Heart:   Regular rate and rhythm,  no murmur, rub or gallop. Abdomen:   Soft, non-tender. Not distended.   Bowel sounds normal.  Wound with dressing,   Neuro:              AOx3      Lab Data Reviewed:    Recent Labs      17   1016  17   0420 12/24/17   0341   WBC  9.2  11.3*  14.6*   HGB  10.8*  10.2*  11.2*   HCT  33.0*  30.6*  33.5*   PLT  432*  384  401*     Recent Labs      12/26/17   1016  12/25/17   0428  12/24/17 0341   NA  139  143  144   K  3.9  3.1*  2.9*   CL  104  107  106   CO2  28  29  29   GLU  154*  105*  98   BUN  5*  5*  3*   CREA  0.87  0.84  0.90   CA  8.1*  8.2*  8.4*   MG   --   2.0  2.0     Lab Results   Component Value Date/Time    Glucose (POC) 128 12/26/2017 11:55 AM    Glucose (POC) 85 12/26/2017 06:20 AM    Glucose (POC) 165 12/25/2017 09:23 PM    Glucose (POC) 149 12/25/2017 04:32 PM    Glucose (POC) 137 12/25/2017 11:42 AM       ___________________________________________________  ___________________________________________________    Attending Physician: Willow Dubose MD

## 2017-12-26 NOTE — PROGRESS NOTES
Problem: Mobility Impaired (Adult and Pediatric)  Goal: *Acute Goals and Plan of Care (Insert Text)  Physical Therapy Goals  Initiated 12/19/2017  1. Patient will move from supine to sit and sit to supine , scoot up and down and roll side to side in bed with independence within 7 day(s). 2.  Patient will transfer from bed to chair and chair to bed with independence using the least restrictive device within 7 day(s). 3.  Patient will perform sit to stand with independence on first attempt 100% within 7 day(s). 4.  Patient will ambulate with independence for 300 feet with no path deviations within 7 day(s). 5.  Patient will ascend/descend 12 stairs with rightg handrail(s) with modified independence within 7 day(s). physical Therapy TREATMENT/DISCHARGE  Patient: Betty Moss (34 y.o. female)  Date: 12/26/2017  Diagnosis: abdominal wall cellulitis  Cellulitis of right abdominal wall  abdominal wall abscess,  minor lap Cellulitis of right abdominal wall  Procedure(s) (LRB):  Incision and drainage of abdominal wall abscess (N/A) 3 Days Post-Op        ASSESSMENT:  Pt has made good progress, nurse reports pt has been ambulating independently in hallway, pt demonstrated independent transfers, ambulation x 350 feet with steady gait, normal pace, returned to bedside chair, pt is at her baseline, no further services indicated for home at this time, will discontinue services   Progression toward goals:  [x]      Improving appropriately and progressing toward goals, goals met  []      Improving slowly and progressing toward goals  []      Not making progress toward goals and plan of care will be adjusted     PLAN:  Patient will be discharged from physical therapy at this time.   Rationale for discharge:  [x] Goals Achieved  [] [address]  [] Patient not participating in therapy  [] Other:  Discharge Recommendations:  None  Further Equipment Recommendations for Discharge:  none     SUBJECTIVE:   Patient stated I have been walking in the halls. I am going home tomorrow.     OBJECTIVE DATA SUMMARY:   Critical Behavior:  Neurologic State: Alert, Appropriate for age  Orientation Level: Oriented X4        Functional Mobility Training:  Bed Mobility:   not assessed, nursing reports pt is independent                  Transfers:  Sit to Stand: Independent  Stand to Sit: Independent                             Balance:  Sitting: Intact  Standing: Intact  Ambulation/Gait Training:  Distance (ft): 350 Feet (ft)  Assistive Device: Gait belt  Ambulation - Level of Assistance: Independent                                     Pain:  Pain Scale 1: Numeric (0 - 10)  Pain Intensity 1: 0          Activity Tolerance:   good    After treatment:   [x] Patient left in no apparent distress sitting up in chair  [] Patient left in no apparent distress in bed  [x] Call bell left within reach  [x] Nursing notified  [] Caregiver present  [] Bed alarm activated    COMMUNICATION/COLLABORATION:   The patients plan of care was discussed with: Registered Nurse    Dana Gomes   Time Calculation: 12 mins

## 2017-12-27 VITALS
OXYGEN SATURATION: 98 % | SYSTOLIC BLOOD PRESSURE: 130 MMHG | RESPIRATION RATE: 20 BRPM | BODY MASS INDEX: 38.08 KG/M2 | DIASTOLIC BLOOD PRESSURE: 78 MMHG | TEMPERATURE: 98.5 F | WEIGHT: 214.95 LBS | HEART RATE: 80 BPM

## 2017-12-27 LAB
BACTERIA SPEC CULT: ABNORMAL
GLUCOSE BLD STRIP.AUTO-MCNC: 148 MG/DL (ref 65–100)
GLUCOSE BLD STRIP.AUTO-MCNC: 82 MG/DL (ref 65–100)
GRAM STN SPEC: ABNORMAL
GRAM STN SPEC: ABNORMAL
SERVICE CMNT-IMP: ABNORMAL
SERVICE CMNT-IMP: ABNORMAL
SERVICE CMNT-IMP: NORMAL

## 2017-12-27 PROCEDURE — 74011250636 HC RX REV CODE- 250/636: Performed by: INTERNAL MEDICINE

## 2017-12-27 PROCEDURE — 74011250637 HC RX REV CODE- 250/637: Performed by: INTERNAL MEDICINE

## 2017-12-27 PROCEDURE — 77030009526 HC GEL CARSYN MDII -A

## 2017-12-27 PROCEDURE — 82962 GLUCOSE BLOOD TEST: CPT

## 2017-12-27 PROCEDURE — 77030018836 HC SOL IRR NACL ICUM -A

## 2017-12-27 RX ORDER — CEPHALEXIN 500 MG/1
500 CAPSULE ORAL 4 TIMES DAILY
Qty: 20 CAP | Refills: 0 | Status: SHIPPED | OUTPATIENT
Start: 2017-12-27 | End: 2018-01-02 | Stop reason: SDUPTHER

## 2017-12-27 RX ORDER — HYDROCODONE BITARTRATE AND ACETAMINOPHEN 5; 325 MG/1; MG/1
1 TABLET ORAL
Qty: 12 TAB | Refills: 0 | Status: SHIPPED | OUTPATIENT
Start: 2017-12-27 | End: 2018-01-02 | Stop reason: SDUPTHER

## 2017-12-27 RX ADMIN — CLINDAMYCIN PHOSPHATE 600 MG: 12 INJECTION, SOLUTION INTRAMUSCULAR; INTRAVENOUS at 05:52

## 2017-12-27 RX ADMIN — Medication 10 ML: at 05:52

## 2017-12-27 RX ADMIN — METOPROLOL SUCCINATE 50 MG: 50 TABLET, EXTENDED RELEASE ORAL at 08:13

## 2017-12-27 RX ADMIN — LISINOPRIL 40 MG: 20 TABLET ORAL at 08:12

## 2017-12-27 RX ADMIN — HYDROCODONE BITARTRATE AND ACETAMINOPHEN 1 TABLET: 5; 325 TABLET ORAL at 08:13

## 2017-12-27 RX ADMIN — AMLODIPINE BESYLATE 5 MG: 5 TABLET ORAL at 08:13

## 2017-12-27 RX ADMIN — Medication 1 CAPSULE: at 08:12

## 2017-12-27 RX ADMIN — PANTOPRAZOLE SODIUM 40 MG: 40 TABLET, DELAYED RELEASE ORAL at 06:32

## 2017-12-27 RX ADMIN — FUROSEMIDE 40 MG: 40 TABLET ORAL at 08:13

## 2017-12-27 RX ADMIN — GUAIFENESIN 600 MG: 600 TABLET, EXTENDED RELEASE ORAL at 08:12

## 2017-12-27 RX ADMIN — Medication 2 G: at 06:57

## 2017-12-27 NOTE — PROGRESS NOTES
Reviewed medical chart; patient will discharge home today. Cherrie Monzon from [facility] confirmed that they can accept the patient into care tomorrow and requested that 3 days worth of wound care supplies be sent home with the patient. Bedside nurse notified and she plans to provide these supplies. Follow-up With  Details  Why  Contact Kelsey   Eliseo Ku  On 1/2/2018  For discharge follow up 3:00PM   [place] [address] [place] [address] 227  In 1 day  [address] [facility]. Please call P#[phone] if you do not hear from [facility] by 11AM the day after discharge. [address]  [facility] [id number] [phone]     Care Management will continue to follow her disposition.    LASHA Norwood

## 2017-12-27 NOTE — PROGRESS NOTES
Daily Progress Note  [facility] General Surgery at [address] Date: 2017  Post-Operative Day: 4 from Procedure(s):  Incision and drainage of abdominal wall abscess     Subjective:     Last 24 hrs: Pain well controlled. Just had wound care done. Hoping for discharge today, she notes plan for assistance from [place] and a \"nursing friend. \"        Objective:     Blood pressure 130/78, pulse 80, temperature 98.5 °F (36.9 °C), resp. rate 20, weight 97.5 kg (214 lb 15.2 oz), SpO2 98 %. Temp (24hrs), Av.5 °F (36.9 °C), Min:98.3 °F (36.8 °C), Max:98.9 °F (37.2 °C)      _____________________  Physical Exam:     Alert and Oriented, sitting up in chair, pleasant and cooperative. Cardiovascular: RRR  Abdomen: wound with packing, small amount of necrotic slough at top edge of wound. Assessment:   Principal Problem:    Cellulitis of right abdominal wall (2017)    Active Problems:    Diabetes (HCC) ()      Hypertension ()      Low back pain ()      Overview: chiorpactor            Plan:     Stable for dc from surgical perspective  Continue local wound care  F/U in office        Beltran Villalta, [address] [facility] Surgery at [address], [address] [place], [place]  [phone]    Data Review:    Recent Labs      17   1016  17   0428   WBC  9.2  11.3*   HGB  10.8*  10.2*   HCT  33.0*  30.6*   PLT  432*  384     Recent Labs      17   1016  17   0428   NA  139  143   K  3.9  3.1*   CL  104  107   CO2  28  29   GLU  154*  105*   BUN  5*  5*   CREA  0.87  0.84   CA  8.1*  8.2*   MG   --   2.0     No results for input(s): AML, LPSE in the last 72 hours.         ______________________  Medications:    Current Facility-Administered Medications   Medication Dose Route Frequency    collagenase (SANTYL) 250 unit/gram ointment   Topical DAILY    benzocaine-menthol (CEPACOL) lozenge 1 Lozenge  1 Lozenge Mucous Membrane PRN    insulin glargine (LANTUS) injection 38 Units  38 Units SubCUTAneous QHS    guaiFENesin (ROBITUSSIN) 100 mg/5 mL oral liquid 100 mg  100 mg Oral Q4H PRN    guaiFENesin ER (MUCINEX) tablet 600 mg  600 mg Oral Q12H    clindamycin (CLEOCIN) 600mg D5W 50mL IVPB (premix)  600 mg IntraVENous Q8H    ceFAZolin (ANCEF) 2 g/20 mL in sterile water IV syringe  2 g IntraVENous Q8H    metoprolol succinate (TOPROL-XL) XL tablet 50 mg  50 mg Oral DAILY    furosemide (LASIX) tablet 40 mg  40 mg Oral DAILY    pantoprazole (PROTONIX) tablet 40 mg  40 mg Oral ACB    lactobac ac& pc-s.therm-b.anim (JERRICA Q/RISAQUAD)  1 Cap Oral DAILY    lisinopril (PRINIVIL, ZESTRIL) tablet 40 mg  40 mg Oral DAILY    amLODIPine (NORVASC) tablet 5 mg  5 mg Oral DAILY    simvastatin (ZOCOR) tablet 40 mg  40 mg Oral QHS    insulin lispro (HUMALOG) injection   SubCUTAneous TIDAC    glucose chewable tablet 16 g  4 Tab Oral PRN    dextrose (D50W) injection syrg 12.5-25 g  12.5-25 g IntraVENous PRN    glucagon (GLUCAGEN) injection 1 mg  1 mg IntraMUSCular PRN    sodium chloride (NS) flush 5-10 mL  5-10 mL IntraVENous Q8H    sodium chloride (NS) flush 5-10 mL  5-10 mL IntraVENous PRN    acetaminophen (TYLENOL) tablet 650 mg  650 mg Oral Q4H PRN    HYDROcodone-acetaminophen (NORCO) 5-325 mg per tablet 1 Tab  1 Tab Oral Q4H PRN    morphine injection 2 mg  2 mg IntraVENous Q3H PRN    enoxaparin (LOVENOX) injection 40 mg  40 mg SubCUTAneous Q24H

## 2017-12-27 NOTE — PROGRESS NOTES
Discharge instructions reviewed with patient. Information on prescriptions given. Wound care done prior to discharge around 0930. Old dressing with gross amount of drainage beige/pink color. Wound requiring packing. Tissue pink inside wound. Yellow sloughing tissue on top of wound.

## 2017-12-27 NOTE — DISCHARGE INSTRUCTIONS
DISCHARGE SUMMARY from Nurse    PATIENT INSTRUCTIONS:    After general anesthesia or intravenous sedation, for 24 hours or while taking prescription Narcotics:  · Limit your activities  · Do not drive and operate hazardous machinery  · Do not make important personal or business decisions  · Do  not drink alcoholic beverages  · If you have not urinated within 8 hours after discharge, please contact your surgeon on call. Report the following to your surgeon:  · Excessive pain, swelling, redness or odor of or around the surgical area  · Temperature over 100.5  · Nausea and vomiting lasting longer than 4 hours or if unable to take medications  · Any signs of decreased circulation or nerve impairment to extremity: change in color, persistent  numbness, tingling, coldness or increase pain  · Any questions    What to do at Home:  Recommended activity:per instructions    If you experience any of the following symptoms per instructions, please follow up with per instructions. *  Please give a list of your current medications to your Primary Care Provider. *  Please update this list whenever your medications are discontinued, doses are      changed, or new medications (including over-the-counter products) are added. *  Please carry medication information at all times in case of emergency situations. These are general instructions for a healthy lifestyle:    No smoking/ No tobacco products/ Avoid exposure to second hand smoke  Surgeon General's Warning:  Quitting smoking now greatly reduces serious risk to your health.     Obesity, smoking, and sedentary lifestyle greatly increases your risk for illness    A healthy diet, regular physical exercise & weight monitoring are important for maintaining a healthy lifestyle    You may be retaining fluid if you have a history of heart failure or if you experience any of the following symptoms:  Weight gain of 3 pounds or more overnight or 5 pounds in a week, increased swelling in our hands or feet or shortness of breath while lying flat in bed. Please call your doctor as soon as you notice any of these symptoms; do not wait until your next office visit. Recognize signs and symptoms of STROKE:    F-face looks uneven    A-arms unable to move or move unevenly    S-speech slurred or non-existent    T-time-call 911 as soon as signs and symptoms begin-DO NOT go       Back to bed or wait to see if you get better-TIME IS BRAIN. Warning Signs of HEART ATTACK     Call 911 if you have these symptoms:   Chest discomfort. Most heart attacks involve discomfort in the center of the chest that lasts more than a few minutes, or that goes away and comes back. It can feel like uncomfortable pressure, squeezing, fullness, or pain.  Discomfort in other areas of the upper body. Symptoms can include pain or discomfort in one or both arms, the back, neck, jaw, or stomach.  Shortness of breath with or without chest discomfort.  Other signs may include breaking out in a cold sweat, nausea, or lightheadedness. Don't wait more than five minutes to call 911 - MINUTES MATTER! Fast action can save your life. Calling 911 is almost always the fastest way to get lifesaving treatment. Emergency Medical Services staff can begin treatment when they arrive -- up to an hour sooner than if someone gets to the hospital by car. The discharge information has been reviewed with the patient. The patient verbalized understanding. Discharge medications reviewed with the patient and appropriate educational materials and side effects teaching were provided.   ___________________________________________________________________________________________________________________________________      Discharge Instructions       PATIENT ID: Ken Hughes  MRN: [id number]   YOB: 1950    DATE OF ADMISSION: 12/18/2017  4:07 PM    DATE OF DISCHARGE: 12/27/2017    PRIMARY CARE PROVIDER: America Childs Dave Rodriguez MD     ATTENDING PHYSICIAN: Warden Joe MD  DISCHARGING PROVIDER: Warden Joe MD    To contact this individual call [phone] and ask the  to page. If unavailable ask to be transferred the Adult Hospitalist Department. DISCHARGE DIAGNOSES Abdominal wall abscess and cellulitis    CONSULTATIONS: IP CONSULT TO INFECTIOUS DISEASES  IP CONSULT TO GENERAL SURGERY    PROCEDURES/SURGERIES:   Incision and drainage of abdominal wall abscess    PENDING TEST RESULTS:   At the time of discharge the following test results are still pending: Cultures from abscess    FOLLOW UP APPOINTMENTS:   Follow-up Information     Follow up With Details Comments Contact Info    Wai Burk MD On 1/2/2018 For discharge follow up 3:00PM  Kris Tsang [address] In 1 day [address] [place] [address] for Wound Care. Please call P#[phone] if you do not hear from [facility] by 11AM the day after discharge. [facility] [address]  [phone]        Follow-up in 10-14 days with Dr. Selam Johnson, General Surgery. Our office is located at [facility] in [facility]. Call office at [phone] to schedule appointment. ADDITIONAL CARE RECOMMENDATIONS: As per surgery and wound care instructions per wound care. Check blood sugars before meals and at bed time if blood sugars>250 or <80 on 2 consecutive readings call doctor. DIET: Diabetic, check your blood sugars and   Oral Nutritional Supplements:       ACTIVITY: As tolerated    WOUND CARE: Per wound care nurse    EQUIPMENT needed:       DISCHARGE MEDICATIONS:   See Medication Reconciliation Form    · It is important that you take the medication exactly as they are prescribed. · Keep your medication in the bottles provided by the pharmacist and keep a list of the medication names, dosages, and times to be taken in your wallet.    · Do not take other medications without consulting your doctor. NOTIFY YOUR PHYSICIAN FOR ANY OF THE FOLLOWING:   Fever over 101 degrees for 24 hours. Chest pain, shortness of breath, fever, chills, nausea, vomiting, diarrhea, change in mentation, falling, weakness, bleeding. Severe pain or pain not relieved by medications. Or, any other signs or symptoms that you may have questions about.       DISPOSITION:   x Home With:   OT  PT x HH  RN       SNF/Inpatient Rehab/LTAC    Independent/assisted living    Hospice    Other:     CDMP Checked:   Yes      PROBLEM LIST Updated:  Yes        Signed:   Tk Block MD  12/27/2017  11:36 AM

## 2017-12-27 NOTE — PROGRESS NOTES
Bedside and Verbal shift change report given to Ruthie RN (oncoming nurse) by Georgi Castillo RN (offgoing nurse). Report included the following information SBAR, Kardex, Intake/Output, MAR, Accordion, Recent Results and Med Rec Status.

## 2017-12-28 ENCOUNTER — PATIENT OUTREACH (OUTPATIENT)
Dept: INTERNAL MEDICINE CLINIC | Age: 67
End: 2017-12-28

## 2017-12-28 ENCOUNTER — HOME CARE VISIT (OUTPATIENT)
Dept: SCHEDULING | Facility: HOME HEALTH | Age: 67
End: 2017-12-28
Payer: MEDICARE

## 2017-12-28 PROCEDURE — G0299 HHS/HOSPICE OF RN EA 15 MIN: HCPCS

## 2017-12-28 PROCEDURE — 3331090002 HH PPS REVENUE DEBIT

## 2017-12-28 PROCEDURE — 3331090001 HH PPS REVENUE CREDIT

## 2017-12-28 PROCEDURE — 400013 HH SOC

## 2017-12-29 ENCOUNTER — PATIENT OUTREACH (OUTPATIENT)
Dept: INTERNAL MEDICINE CLINIC | Age: 67
End: 2017-12-29

## 2017-12-29 ENCOUNTER — HOME CARE VISIT (OUTPATIENT)
Dept: SCHEDULING | Facility: HOME HEALTH | Age: 67
End: 2017-12-29
Payer: MEDICARE

## 2017-12-29 VITALS
TEMPERATURE: 98.7 F | OXYGEN SATURATION: 98 % | SYSTOLIC BLOOD PRESSURE: 142 MMHG | DIASTOLIC BLOOD PRESSURE: 64 MMHG | HEART RATE: 84 BPM | RESPIRATION RATE: 12 BRPM

## 2017-12-29 PROCEDURE — A6266 IMPREG GAUZE NO H20/SAL/YARD: HCPCS

## 2017-12-29 PROCEDURE — 3331090002 HH PPS REVENUE DEBIT

## 2017-12-29 PROCEDURE — 3331090001 HH PPS REVENUE CREDIT

## 2017-12-29 PROCEDURE — A4452 WATERPROOF TAPE: HCPCS

## 2017-12-29 PROCEDURE — A6252 ABSORPT DRG >16 <=48 W/O BDR: HCPCS

## 2017-12-29 PROCEDURE — G0299 HHS/HOSPICE OF RN EA 15 MIN: HCPCS

## 2017-12-29 PROCEDURE — A9270 NON-COVERED ITEM OR SERVICE: HCPCS

## 2017-12-29 PROCEDURE — A4216 STERILE WATER/SALINE, 10 ML: HCPCS

## 2017-12-29 PROCEDURE — A6216 NON-STERILE GAUZE<=16 SQ IN: HCPCS

## 2017-12-29 NOTE — PROGRESS NOTES
[address]LY[address] Anaya IP [facility] -2017:  Cellulitis of right abdominal wall- f/u    NN spoke with [place] [address] [address] [facility]; advised of wound care dressing change order to be changed from 2 x per week to dressing change daily per PCP order. Nurse voiced repeat order feed-back of change made until 1/3/2017; agreed to contact NN or PCP before changing back to 2x/week. NN LV on  to inform patient and to perform post d/c assessment. NN w/ continue to follow-up. NN received incoming call from patient:    [facility] Discharge Follow-Up    Date/Time:  2017 10:00 AM  Patient listed on discharge [facility]) report on . Patient discharged from [facility] for Cellulitis of Rt Abdom Wall. Mal Samantha RRAT score: 11  Low    Medical History:     Past Medical History:   Diagnosis Date    Cellulitis of right abdominal wall 2017    Diabetes ([address]     DJD (degenerative joint disease) of knee     Hypercholesterolemia     Hypertension     LBP (low back pain)     chiorpactor    Lumbar stenosis     Obesity     S/P colonoscopy 12       Nurse Navigator(NN) contacted the patient by telephone to perform post [facility] IP  discharge assessment. Verified  and address with patient as identifiers. Provided introduction to self, and explanation of the Nurses Navigator role. Diet:   Patient reports: Diabetic Diet    Activity:    Patient reports: mostly moving around the house    Medication:   Performed medication reconciliation with patient, and patient verbalizes understanding of administration of home medications. There were no barriers to obtaining medications identified at this time. Support system:  patient and friend. Friend called patient that she is unable to come today due to sickness; thus unable to change her dressings. Discharge Instructions : PCP consulted w/ NN that dressing change to be done by [facility] Nurse daily; changed from 2x/week.   NN spoke with Veronica at [facility]; agreed to order change. Reviewed discharge instructions with patient. Patient emotional stated she was so worried about her wound care dressing. Verbalized relief. Patient verbalizes understanding of discharge instructions and follow-up care. Red Flags:  Elev temp. Increased drainage from incision. Increased redness and swelling of surgical site. Labs Reviewed:  Admit WBC 14.4; Glucose 181.     (teachings done on infection and the increase of BS. Advance Care Planning:   Patient was offered the opportunity to discuss advance care planning:  no     Does patient have an Advance Directive:  yes   If no, did you provide information on C[facility] Connections?  no     PCP/Specialist follow up: Patient scheduled to follow up with Ara Spencer MD on 1/2/2017. Case management Impression/ plan:    Goals Addressed             Most Recent     Patient/Family verbalizes understanding of self-management of chronic disease. On track (12/29/2017)             Diabetes medications and diabetic diet teachings done. Comparison of BS on Admit 181;  BS today 117.  Supportive resources in place to maintain patient in the community (ie. Home Health, DME equipment, refer to, medication assistant plan, etc.)   On track (12/29/2017)             Patient opened for [facility] visits for dressing changes 2x/week. PCP now requesting wound change daily. NN advised and confirmed order changed to daily. Goals completion:  1/2/2017.

## 2017-12-29 NOTE — DISCHARGE SUMMARY
[address]    DISCHARGE SUMMARY    Luciano Cleveland  MR#: [id number]  : 1950  ACCOUNT #: [de-identified]   ADMIT DATE: 2017  DISCHARGE DATE: 2017    HISTORY:  A 71-year-old black female in her usual state of health until 2 weeks prior to admission. At that time, she showed the onset of anorexia, loss of control of her sugar which previous had been consistently less than 140 with now having blood sugars in the 200s. She also noted swelling of the right side of her abdomen. She denies chills or fever. She was seen in the office, found to have abdominal wall cellulitis and subsequently agreed to further evaluation and care in the hospital.    PAST MEDICAL HISTORY, REVIEW OF SYSTEMS AND PHYSICAL EXAMINATION:  Noted on the HPI. CONSULTATIONS:  Infectious disease, Miah Catherine MD, 2017. IMPRESSION:  1. Severe cellulitis abdominal wall insulin injection site. 2.  Query small abscess. 3.  Leukocytosis, some improvement. PLAN:  IV antibiotics, topical heat. Consider ultrasound abdominal wall to be sure no residual fluid is present. SURGICAL CONSULTATION:  Dominick Cassidy MD, 2017. Diabetic admitted with cellulitis, right abdominal wall, right lower abdominal wall. CT initially without fluid collection; however, ultrasound confirmed collection present. She then developed drainage from the site. PLAN:  I and D. Patient understands the situation and is agreeable to proceed. She understands the risk of worsening infection, bleeding, medical complications. She had an open wound requiring wound care, plan to take to OR later this afternoon. OPERATION AND PROCEDURES:  Dominick Cassidy MD.      POSTOPERATIVE DIAGNOSIS:  Abdominal abscess lower abdomen. PROCEDURE PERFORMED:  I and D infected abscess. The cavity was 8 cm deep and probably 10 cm in transverse diameter. The wound was packed with Betadine, damp Shoshana wrap.   The patient tolerated the procedure well. RESULTS REVIEWED:  Hemoglobin 10.8, hematocrit 33.0, WBC 9.2. The white count was high as 14.6. Urinalysis 1+ leukocyte esterase, 11-30 WBCs per high power field. Sodium 139, potassium 3.9, chloride 104, CO2 of 28, glucose 154, BUN 5, creatinine 0.87. Wound field Microaerophilic streptococcus, no anaerobes were isolated, rare Staphylococcus epidermidis, staph oxacillin resistant. IMAGING STUDIES ON ADMISSION:  CT abdomen and pelvis:    1. Right lower quadrant thickening and edema in the fat and enlarged right inguinal nodes without definite drainable abscess or fluid collection. The appearance is consistent with cellulitis. 2.  Obesity with hepatic steatosis. 3.  Atherosclerotic abdominal aorta without aneurysm. 4.  Status post hysterectomy. 5.  Status post appendectomy. 6.  Diverticulosis. 7.  Lumbar spondylosis. CTA chest:  No evidence of pulmonary embolism. Abdominal ultrasound:  4.1 x 2.2 x 2.5 fluid collection in the right lower quadrant and subcutaneous fat on 12/22/2017. CARDIAC STUDIES:  Echocardiogram:  Left ventricular systolic function was normal, ejection fraction in the range of 55-60%. There were no regional wall motion abnormalities. The left atrium was moderately dilated. There was a mild to moderate annular calcification. There was mild mitral regurgitation. EKG:  Unusual P axis, possible ectopic atrial rhythm. Compared to previous EKG, ectopic atrial rhythm has replaced sinus rhythm. HOSPITAL COURSE:  The patient was admitted for reasons stated above. She was placed on broad spectrum antibiotics and warm compresses to the lower abdominal wall. The area of induration related to the cellulitis was rather large and she continued to have low grade fever spikes.   For that reason, we asked infectious disease to see her in consultation and she was seen by Dr. Venkat Lezama with appropriate adjustments in her medications and her IV antibiotics and he suggested an ultrasound of the abdomen. That was accomplished with the finding of a large fluid collection in the lower abdominal wall. She was seen in consultation by surgery and taken to the surgical suite where the I and D of the infected abscess was performed with minimal blood loss. The large abscess was believed secondary to insulin injection site. Significant findings was that the abscess cavity with 8 cm deep. Appropriate surgical procedure was completed and the patient returned to her room in satisfactory condition. Wound management was initiated. She was subsequently seen in consultation by care management to assist and will arrange for home health to care for the wound. During the first part of her hospital stay, she had episodes of shortness of breath. For that reason, ordered a CTA of the chest which fortunately was negative. Also, ordered an echocardiogram which was subsequently negative. The shortness of breath was resolved. Her blood sugar was controlled with sliding scale insulin, as well as a basal insulin of Lantus. Blood pressure was monitored throughout and the medications were adjusted as noted. Her low back pain was treated with appropriate analgesics. Hyperkalemia noted was resolved. She is noted to have sepsis present on admission, which resolved, was secondary to the abdominal abscess and cellulitis and was manifested by fever, tachycardia, leukocytosis, hyperglycemia, as well as elevated neutrophils and treated with antibiotics. The signs and symptoms of sepsis resolved. Patient's medical status is now stable. She is being discharged to home, ambulatory, in satisfactory condition with a plan for home health nurse to change the wound daily. FINAL DIAGNOSES:  1. Sepsis secondary to #2.  2.  Abdominal wall cellulitis/abscess. 3.  Diabetes mellitus type 2.  4.  Primary hypertension. 5.  Chronic low back pain.     6.  Hepatic steatosis. DISPOSITION:  Discharged to home. DISCHARGE MEDICATIONS:  Keflex 500 q.i.d. for 7 days, Lantus 38 units subQ at bedtime, Toprol-XL 50 mg daily, Lasix 40 mg daily, Protonix 40 mg daily, Suzanne-Q daily while on antibiotics, lisinopril 40 mg daily, amlodipine 5 mg daily, simvastatin 40 mg daily, Humalog t.i.d. a.c. as directed, hydrocodone 5 q.4 hours p.r.n. FOLLOWUP:  She will return to our office in 3-5 days for followup care. DIET:  American Heart Association low concentrated sweet diet. ACTIVITY:  Up as tolerated. Wound care and wound dressing changes daily by home health nursing.       Anitha Young MD       REX/SN  D: 12/28/2017 20:20     T: 12/29/2017 13:39  JOB #: [id number]

## 2017-12-29 NOTE — PROGRESS NOTES
[facility] 12/18-12/27/2017:  Abdom Wall Abscess    Patient listed on [facility] Discharge Report dated 12/28/2017. Patient seen in office on 12/18/2017: date of admission. Pulse 110  Temp 99.2 Pulse ox 96%     PCP Note:  Patient has an abdominal wall cellulitis. She'll be admitted to [facility].  We placed her on broad spectrum antibiotics. Will ask for a CT scan of the abdomen and pelvis of the abdominal wall, looking for an area for drainage. Pending that result, surgical consult may be required. [facility] Chart Review: ADMITTING DIAGNOSES & HOSPITAL COURSE:   Abdominal cellulitis/ abdominal wall abscess  A 70-year-old black female in usual state of health until 2 weeks prior to admission.  At that time, she noted the onset of anorexia, loss of control of her blood sugar, which previously had been consistently less than 140, now had blood sugars in the 200s.  She also noted welling of the right side of her abdomen.  She denied chills or fever.  She was seen in the office, found to have abdominal wall cellulitis, and subsequently agreed to further evaluation and care.     Cellulitis of right abdominal wall and abscess, ultrasound showed  4.1 x 2.0 x 2.5 cm fluid collection in the right lower quadrant subcutaneous fat suggestive of abscess. S/p I & D on 12/23, ID consulted, in hospital received Clinda and Ancef. Cultures from 12/23 done in OR showed streptococcus, no anaerobes so far. As per ID recs discharged on Keflex for 1 more week.   Diabetes: titrate insulin      Discharge:  Next office visit:  1/2/2018  3PM  [facility] --dressing change;     Consult with PCP. PCP requests wound care to be done daily.   NN to continue to f/u.

## 2017-12-29 NOTE — DISCHARGE SUMMARY
Discharge Summary       PATIENT ID: Jimena Chris  MRN: [id number]   YOB: 1950    DATE OF ADMISSION: 12/18/2017  4:07 PM    DATE OF DISCHARGE: 12/27/18  PRIMARY CARE PROVIDER: Darrell Meehan MD     ATTENDING PHYSICIAN: Nakul Lowe MD    DISCHARGING PROVIDER: Nakul Lowe MD    To contact this individual call [phone] and ask the  to page. If unavailable ask to be transferred the Adult Hospitalist Department. CONSULTATIONS: IP CONSULT TO INFECTIOUS DISEASES  IP CONSULT TO GENERAL SURGERY    PROCEDURES/SURGERIES: Procedure(s):  Incision and drainage of abdominal wall abscess    ADMITTING DIAGNOSES & HOSPITAL COURSE:   Abdominal cellulitis/ abdominal wall abscess  A 41-year-old black female in usual state of health until 2 weeks prior to admission. At that time, she noted the onset of anorexia, loss of control of her blood sugar, which previously had been consistently less than 140, now had blood sugars in the 200s. She also noted welling of the right side of her abdomen. She denied chills or fever. She was seen in the office, found to have abdominal wall cellulitis, and subsequently agreed to further evaluation and care. Cellulitis of right abdominal wall and abscess, ultrasound showed  4.1 x 2.0 x 2.5 cm fluid collection in the right lower quadrant subcutaneous fat suggestive of abscess. S/p I & D on 12/23, ID consulted, in hospital received Clinda and Ancef. Cultures from 12/23 done in OR showed streptococcus, no anaerobes so far.   As per ID recs discharged on Keflex for 1 more week.     Diabetes: titrate insulin     Hypertension: continued home meds     Hypokalemia, improved, mag with in normal limits.     Sepsis POA, resolved, secondary to abdominal wall cellulitis as evidenced by fever, tachycardia, leucocytosis,       DISCHARGE DIAGNOSES / PLAN:      Sepsis POA, resolved, secondary to abdominal wall cellulitis as evidenced by fever, tachycardia, leucocytosis,     Cellulitis of right abdominal wall and abscess, ultrasound showed  4.1 x 2.0 x 2.5 cm fluid collection in the right lower quadrant subcutaneous fat suggestive of abscess. S/p I & D on 12/23, ID consulted, in hospital received Clinda and Ancef. Cultures from 12/23 done in OR showed streptococcus, no anaerobes so far. As per ID recs discharged on Keflex for 1 more week.     Diabetes: titrate insulin     Hypertension: continued home meds     Hypokalemia, improved, mag with in normal limits. PENDING TEST RESULTS:   At the time of discharge the following test results are still pending:     FOLLOW UP APPOINTMENTS:    Follow-up Information     Follow up With Details Comments Contact Info    Wai Burk MD On 1/2/2018 For discharge follow up 3:00PM  Saundrasanjuana DOAN Sharan [address] [place] [address] 227 In 1 day [address] [facility] for [address] [facility]. Please call P#[phone] if you do not hear from [facility] by 11AM the day after discharge. [address] [place] [address]  [phone]           ADDITIONAL CARE RECOMMENDATIONS: Wound care instructions as per wound care nurse  No bath only shower for few weeks. DIET: Diabetic diet, cardiac diet  Oral Nutritional Supplements:     ACTIVITY: Activity as tolerated    WOUND CARE: As per wound care nurse, D/C'd on [facility] for wound care. EQUIPMENT needed:       DISCHARGE MEDICATIONS:  Discharge Medication List as of 12/27/2017 11:59 AM      START taking these medications    Details   HYDROcodone-acetaminophen (NORCO) 5-325 mg per tablet Take 1 Tab by mouth every six (6) hours as needed. Max Daily Amount: 4 Tabs. Indications: Pain, Print, Disp-12 Tab, R-0      collagenase (SANTYL) 250 unit/gram ointment Apply  to affected area daily. , Print, Disp-15 g, R-0      cephALEXin (KEFLEX) 500 mg capsule Take 1 Cap by mouth four (4) times daily for 6 days. , Print, Disp-20 Cap, R-0         CONTINUE these medications which have NOT CHANGED    Details   insulin glargine (BASAGLAR KWIKPEN) 100 unit/mL (3 mL) inpn 30 Units by SubCUTAneous route nightly., Normal, Disp-15 Pen, R-3      FLUZONE HIGH-DOSE 2017-18, PF, syrg injection ADMINISTERED BY PHARMACIST, Historical Med, R-0, JEANNINE      !! Lancets (ONETOUCH ULTRASOFT LANCETS) [facility] USE TO TEST THREE TIMES A DAY. Dx.e11.9, Normal, Disp-300 Each, R-11      !! ONETOUCH DELICA LANCETS 30 gauge misc USE TO TEST BLOOD SUGAR 3 TIMES A DAY, Historical Med, R-3, JEANNINE      glucose blood VI test strips (ONETOUCH ULTRA TEST) strip tiuse to test blood sugar three times a day. dx.e11.9, Normal, Disp-300 Strip, R-3      metFORMIN ER (GLUCOPHAGE XR) 500 mg tablet Take 2 Tabs by mouth two (2) times daily (with meals). , Normal, Disp-360 Tab, R-95      furosemide (LASIX) 40 mg tablet Take 1 Tab by mouth daily. , Normal, Disp-90 Tab, R-10      metoprolol succinate (TOPROL-XL) 25 mg XL tablet Take 1 Tab by mouth daily. , Normal, Disp-90 Tab, R-11      simvastatin (ZOCOR) 40 mg tablet Take 1 Tab by mouth nightly., Normal, Disp-90 Tab, R-10      amLODIPine (NORVASC) 5 mg tablet Take 1 Tab by mouth daily. , Normal, Disp-90 Tab, R-3      quinapril (ACCUPRIL) 40 mg tablet Take 1 Tab by mouth daily. , Normal, Disp-90 Tab, R-11      !! Lancets misc delica one touch, Normal, Disp-1 Each, R-11      Insulin Needles, Disposable, (BD INSULIN PEN NEEDLE UF ORIG) 29 gauge x 1/2\" ndle tid, Normal, Disp-100 Pen Needle, R-11      albuterol (PROVENTIL HFA, VENTOLIN HFA, PROAIR HFA) 90 mcg/actuation inhaler Take 2 Puffs by inhalation every four (4) hours as needed for Wheezing., Normal, Disp-1 Inhaler, R-0      !! ONE TOUCH DELICA 33 gauge misc Historical Med, JEANNINE      !! Blood-Glucose Meter (ONETOUCH ULTRA SYSTEM KIT) monitoring kit Use daily, Normal, Disp-1 Kit, R-0      !!  Blood-Glucose Meter (ONETOUCH ULTRA SYSTEM KIT) monitoring kit Test 3 times a day, Normal, Disp-1 Kit, R-0      omeprazole (PRILOSEC) 40 mg capsule Take 40 mg by mouth daily. , Historical Med      AFLURIA [id number] 45 mcg (15 mcg x 3)/0.5 mL susp Historical Med, R-0       !! - Potential duplicate medications found. Please discuss with provider. STOP taking these medications       celecoxib (CELEBREX) 200 mg capsule Comments:   Reason for Stopping:         predniSONE (STERAPRED DS) 10 mg dose pack Comments:   Reason for Stopping:         hydrALAZINE (APRESOLINE) 25 mg tablet Comments:   Reason for Stopping:         clobetasol (TEMOVATE) 0.05 % ointment Comments:   Reason for Stopping:                 NOTIFY YOUR PHYSICIAN FOR ANY OF THE FOLLOWING:   Fever over 101 degrees for 24 hours. Chest pain, shortness of breath, fever, chills, nausea, vomiting, diarrhea, change in mentation, falling, weakness, bleeding. Severe pain or pain not relieved by medications. Or, any other signs or symptoms that you may have questions about.     DISPOSITION:  x  Home With:   OT  PT x HH  RN       Long term SNF/Inpatient Rehab    Independent/assisted living    Hospice    Other:       PATIENT CONDITION AT DISCHARGE:     Functional status    Poor     Deconditioned    x Independent      Cognition    x Lucid     Forgetful     Dementia      Catheters/lines (plus indication)    Nicholas     PICC     PEG    x None      Code status   x  Full code     DNR      PHYSICAL EXAMINATION AT DISCHARGE:   Gen: not in acute distress  HEENT: normocephalic, atraumatic  CVS: S1, S2 with in normal limits  RS: clear to auscultate, no wheezing  GI: bowel sounds present in all quadrants, soft, non tender, non distended, wound in dressing, no erythema  Neuro : alert and oriented x 3      CHRONIC MEDICAL DIAGNOSES:  Problem List as of 12/27/2017  Date Reviewed: 12/23/2017          Codes Class Noted - Resolved    * (Principal)Cellulitis of right abdominal wall ICD-10-CM: L03.311  ICD-9-CM: 682.2  12/18/2017 - Present        DJD (degenerative joint disease) of knee ICD-10-CM: M17.10  ICD-9-CM: 715.36  Unknown - Present        Low back pain ICD-10-CM: M54.5  ICD-9-CM: 724.2  Unknown - Present    Overview Signed 10/29/2015  5:21 PM by Aric Albrecht MD     chiorpactor             Lumbar stenosis ICD-10-CM: M48.061  ICD-9-CM: 724.02  Unknown - Present        Diabetes ([address] ICD-10-CM: E11.9  ICD-9-CM: 250.00  Unknown - Present        Hypercholesterolemia ICD-10-CM: E78.00  ICD-9-CM: 272.0  Unknown - Present        Hypertension ICD-10-CM: I10  ICD-9-CM: 401.9  Unknown - Present        Obesity ICD-10-CM: E66.9  ICD-9-CM: 278.00  Unknown - Present              Greater than 40 minutes were spent with the patient on counseling and coordination of care    Signed:   Evette Stubbs MD  12/28/2017  8:28 PM

## 2017-12-30 ENCOUNTER — HOME CARE VISIT (OUTPATIENT)
Dept: SCHEDULING | Facility: HOME HEALTH | Age: 67
End: 2017-12-30
Payer: MEDICARE

## 2017-12-30 PROCEDURE — 3331090002 HH PPS REVENUE DEBIT

## 2017-12-30 PROCEDURE — G0299 HHS/HOSPICE OF RN EA 15 MIN: HCPCS

## 2017-12-30 PROCEDURE — 3331090001 HH PPS REVENUE CREDIT

## 2017-12-31 ENCOUNTER — HOME CARE VISIT (OUTPATIENT)
Dept: SCHEDULING | Facility: HOME HEALTH | Age: 67
End: 2017-12-31
Payer: MEDICARE

## 2017-12-31 PROCEDURE — 3331090001 HH PPS REVENUE CREDIT

## 2017-12-31 PROCEDURE — G0299 HHS/HOSPICE OF RN EA 15 MIN: HCPCS

## 2017-12-31 PROCEDURE — 3331090002 HH PPS REVENUE DEBIT

## 2018-01-01 ENCOUNTER — HOME CARE VISIT (OUTPATIENT)
Dept: SCHEDULING | Facility: HOME HEALTH | Age: 68
End: 2018-01-01
Payer: MEDICARE

## 2018-01-01 VITALS
OXYGEN SATURATION: 98 % | HEART RATE: 84 BPM | HEART RATE: 101 BPM | RESPIRATION RATE: 14 BRPM | RESPIRATION RATE: 14 BRPM | TEMPERATURE: 97.8 F | DIASTOLIC BLOOD PRESSURE: 84 MMHG | SYSTOLIC BLOOD PRESSURE: 140 MMHG | TEMPERATURE: 97 F | OXYGEN SATURATION: 99 % | DIASTOLIC BLOOD PRESSURE: 80 MMHG | SYSTOLIC BLOOD PRESSURE: 140 MMHG

## 2018-01-01 VITALS
RESPIRATION RATE: 20 BRPM | SYSTOLIC BLOOD PRESSURE: 126 MMHG | DIASTOLIC BLOOD PRESSURE: 70 MMHG | HEART RATE: 89 BPM | TEMPERATURE: 97.6 F | OXYGEN SATURATION: 97 %

## 2018-01-01 DIAGNOSIS — L03.311 CELLULITIS OF RIGHT ABDOMINAL WALL: Primary | ICD-10-CM

## 2018-01-01 PROCEDURE — G0299 HHS/HOSPICE OF RN EA 15 MIN: HCPCS

## 2018-01-01 PROCEDURE — 3331090001 HH PPS REVENUE CREDIT

## 2018-01-01 PROCEDURE — 3331090002 HH PPS REVENUE DEBIT

## 2018-01-02 ENCOUNTER — HOME CARE VISIT (OUTPATIENT)
Dept: SCHEDULING | Facility: HOME HEALTH | Age: 68
End: 2018-01-02
Payer: MEDICARE

## 2018-01-02 ENCOUNTER — OFFICE VISIT (OUTPATIENT)
Dept: INTERNAL MEDICINE CLINIC | Age: 68
End: 2018-01-02

## 2018-01-02 VITALS
RESPIRATION RATE: 15 BRPM | TEMPERATURE: 99.3 F | WEIGHT: 210 LBS | SYSTOLIC BLOOD PRESSURE: 141 MMHG | BODY MASS INDEX: 37.21 KG/M2 | HEART RATE: 92 BPM | DIASTOLIC BLOOD PRESSURE: 63 MMHG | OXYGEN SATURATION: 97 % | HEIGHT: 63 IN

## 2018-01-02 VITALS
TEMPERATURE: 97.3 F | HEART RATE: 105 BPM | RESPIRATION RATE: 20 BRPM | OXYGEN SATURATION: 99 % | DIASTOLIC BLOOD PRESSURE: 68 MMHG | SYSTOLIC BLOOD PRESSURE: 136 MMHG

## 2018-01-02 DIAGNOSIS — Z79.4 TYPE 2 DIABETES MELLITUS WITHOUT COMPLICATION, WITH LONG-TERM CURRENT USE OF INSULIN (HCC): ICD-10-CM

## 2018-01-02 DIAGNOSIS — E11.9 TYPE 2 DIABETES MELLITUS WITHOUT COMPLICATION, WITH LONG-TERM CURRENT USE OF INSULIN (HCC): ICD-10-CM

## 2018-01-02 DIAGNOSIS — L03.311 CELLULITIS OF RIGHT ABDOMINAL WALL: ICD-10-CM

## 2018-01-02 DIAGNOSIS — I10 ESSENTIAL HYPERTENSION: Primary | ICD-10-CM

## 2018-01-02 PROCEDURE — 3331090002 HH PPS REVENUE DEBIT

## 2018-01-02 PROCEDURE — G0299 HHS/HOSPICE OF RN EA 15 MIN: HCPCS

## 2018-01-02 PROCEDURE — 3331090001 HH PPS REVENUE CREDIT

## 2018-01-02 RX ORDER — CEPHALEXIN 500 MG/1
500 CAPSULE ORAL 4 TIMES DAILY
Qty: 40 CAP | Refills: 0 | Status: SHIPPED | OUTPATIENT
Start: 2018-01-02 | End: 2018-03-06 | Stop reason: ALTCHOICE

## 2018-01-02 RX ORDER — HYDROCODONE BITARTRATE AND ACETAMINOPHEN 5; 325 MG/1; MG/1
1 TABLET ORAL
Qty: 42 TAB | Refills: 0 | Status: SHIPPED | OUTPATIENT
Start: 2018-01-02 | End: 2018-03-06 | Stop reason: ALTCHOICE

## 2018-01-02 RX ORDER — FLUCONAZOLE 150 MG/1
150 TABLET ORAL DAILY
Qty: 2 TAB | Refills: 1 | Status: SHIPPED | OUTPATIENT
Start: 2018-01-02 | End: 2018-01-03

## 2018-01-02 RX ORDER — CELECOXIB 200 MG/1
CAPSULE ORAL
Refills: 2 | COMMUNITY
Start: 2017-12-16 | End: 2018-01-25

## 2018-01-02 NOTE — MR AVS SNAPSHOT
Visit Information Date & Time Provider Department Dept. Phone Encounter #  
 1/2/2018  3:00 PM Jesse Billings MD SPORTS MED AND PRIMARY CARE  Reji Ruvalcaba [phone] [id number] Follow-up Instructions Return in about 4 weeks (around 1/30/2018). Follow-up and Disposition History Your Appointments 1/12/2018  9:15 AM  
POST OP 10 MIN with Katina Infante MD  
[address] ([facility]) Appt Note: PO follow up 12/23/2017 [address] [facility] [address]  
[address] [id number]  
  
    
 1/25/2018  2:30 PM  
Any with Jesse Billings MD  
[address] [facility] [place] ([facility]) Appt Note: 3 month f/u DM  
 [address], [place] [address] [facility] [place] [address]  
  
   
 [address], [place] [address] [address] [place] [address] Upcoming Health Maintenance Date Due HEMOGLOBIN A1C Q6M 6/19/2018 FOOT EXAM Q1 7/20/2018 MEDICARE YEARLY EXAM 7/21/2018 MICROALBUMIN Q1 10/26/2018 EYE EXAM RETINAL OR DILATED Q1 11/22/2018 LIPID PANEL Q1 12/18/2018 BREAST CANCER SCRN MAMMOGRAM 7/21/2019 Pneumococcal 65+ Low/Medium Risk (2 of 2 - PPSV23) 9/18/2019 GLAUCOMA SCREENING Q2Y 11/22/2019 COLONOSCOPY 12/18/2022 DTaP/Tdap/Td series (2 - Td) 12/20/2026 Allergies as of 1/2/2018  Review Complete On: 1/2/2018 By: Jesse Billings MD  
 No Known Allergies Current Immunizations  Never Reviewed Name Date Influenza Vaccine 10/10/2016, 9/18/2014 Pneumococcal Vaccine (Unspecified Type) 9/18/2014 Not reviewed this visit You Were Diagnosed With   
  
 Codes Comments Essential hypertension    -  Primary ICD-10-CM: I10 
ICD-9-CM: 401.9 Cellulitis of right abdominal wall     ICD-10-CM: L03.311 ICD-9-CM: 259. 2  Type 2 diabetes mellitus without complication, with long-term current use of insulin ([facility]     ICD-10-CM: E11.9, Z79.4 ICD-9-CM: 250.00, V58.67 Vitals BP Pulse Temp Resp Height(growth percentile) Weight(growth percentile) 141/63 (BP 1 Location: Left arm, BP Patient Position: Sitting) 92 99.3 °F (37.4 °C) (Oral) 15 5' 3\" (1.6 m) 210 lb (95.3 kg) LMP SpO2 BMI OB Status Smoking Status (LMP Unknown) 97% 37.2 kg/m2 Hysterectomy Never Smoker BMI and BSA Data Body Mass Index Body Surface Area  
 37.2 kg/m 2 2.06 m 2 Preferred Pharmacy Pharmacy Name Phone [id number] Tamara Witt, [address] [place] [address] [phone] Your Updated Medication List  
  
   
This list is accurate as of: 1/2/18  4:03 PM.  Always use your most recent med list.  
  
  
  
  
 AFLURIA 5007-6116 45 mcg (15 mcg x 3)/0.5 mL Susp Generic drug:  flu vaccine ts2014-15(5 yr,up)  
  
 albuterol 90 mcg/actuation inhaler Commonly known as:  PROVENTIL HFA, VENTOLIN HFA, PROAIR HFA Take 2 Puffs by inhalation every four (4) hours as needed for Wheezing. amLODIPine 5 mg tablet Commonly known as:  [place] Hair Take 1 Tab by mouth daily. * Blood-Glucose Meter monitoring kit Commonly known as:  [address]   [address] KIT Test 3 times a day * Blood-Glucose Meter monitoring kit Commonly known as:  [address]   [address] KIT Use daily  
  
 celecoxib 200 mg capsule Commonly known as:  CELEBREX  
TAKE 1 CAP BY MOUTH TWO (2) TIMES A DAY FOR 90 DAYS. cephALEXin 500 mg capsule Commonly known as:  [place] Penny Take 1 Cap by mouth four (4) times daily. collagenase 250 unit/gram ointment Commonly known as:  SANTYL Apply  to affected area daily. fluconazole 150 mg tablet Commonly known as:  DIFLUCAN Take 1 Tab by mouth daily for 1 day. FLUZONE HIGH-DOSE 2017-18 (PF) Syrg injection Generic drug:  influenza vaccine 2017-18 (65 yrs+)(PF)  
ADMINISTERED BY PHARMACIST  
  
 furosemide 40 mg tablet Commonly known as:  LASIX Take 1 Tab by mouth daily. glucose blood VI test strips strip Commonly known as:  ONETOUCH ULTRA TEST  
tiuse to test blood sugar three times a day. dx.e11.9 HYDROcodone-acetaminophen 5-325 mg per tablet Commonly known as:  Julienne Rummage Take 1 Tab by mouth every six (6) hours as needed. Max Daily Amount: 4 Tabs. Indications: Pain * LANTUS 100 unit/mL injection Generic drug:  insulin glargine 30 Units by SubCUTAneous route nightly. * insulin glargine 100 unit/mL (3 mL) Inpn Commonly known as:  BASAGLAR KWIKPEN  
30 Units by SubCUTAneous route nightly. Insulin Needles (Disposable) 29 gauge x 1/2\" Ndle Commonly known as:  BD INSULIN PEN NEEDLE UF ORIG  
tid  
  
 metFORMIN  mg tablet Commonly known as:  GLUCOPHAGE XR Take 2 Tabs by mouth two (2) times daily (with meals). metoprolol succinate 25 mg XL tablet Commonly known as:  TOPROL-XL Take 1 Tab by mouth daily. omeprazole 40 mg capsule Commonly known as:  PRILOSEC Take 40 mg by mouth daily. * One Touch Delica 33 gauge Misc Generic drug:  lancets * Lancets Misc  
delica one touch  
  
 * ONETOUCH DELICA LANCETS 30 gauge Misc Generic drug:  lancets USE TO TEST BLOOD SUGAR 3 TIMES A DAY * Lancets Misc Commonly known as:  ONETOUCH ULTRASOFT LANCETS  
USE TO TEST THREE TIMES A DAY. Dx.e11.9  
  
 quinapril 40 mg tablet Commonly known as:  ACCUPRIL Take 1 Tab by mouth daily. simvastatin 40 mg tablet Commonly known as:  ZOCOR Take 1 Tab by mouth nightly. * Notice: This list has 8 medication(s) that are the same as other medications prescribed for you. Read the directions carefully, and ask your doctor or other care provider to review them with you. Prescriptions Printed Refills HYDROcodone-acetaminophen (NORCO) 5-325 mg per tablet 0 Sig: Take 1 Tab by mouth every six (6) hours as needed.  Max Daily Amount: 4 Tabs. Indications: Pain Class: Print Route: Oral  
  
Prescriptions Sent to Pharmacy Refills  
 cephALEXin (KEFLEX) 500 mg capsule 0 Sig: Take 1 Cap by mouth four (4) times daily. Class: Normal  
 Pharmacy: [address] [place] [address] Ph #: [phone] Route: Oral  
 fluconazole (DIFLUCAN) 150 mg tablet 1 Sig: Take 1 Tab by mouth daily for 1 day. Class: Normal  
 Pharmacy: [address] [place] [address] Ph #: [phone] Route: Oral  
  
We Performed the Following CBC WITH AUTOMATED DIFF [06929 CPT(R)] METABOLIC PANEL, COMPREHENSIVE [77374 CPT(R)] PAIN MGMT PANEL W/REFL, UR [[id number] Custom] Follow-up Instructions Return in about 4 weeks (around 1/30/2018). To-Do List   
 01/03/2018 To Be Determined Appointment with César Carrizales RN at [address] & [facility] SERVICES! Dear Lonnie Akins: Thank you for requesting a [employer] account. Our records indicate that you already have an active [employer] account. You can access your account anytime at [email or web address] Did you know that you can access your hospital and ER discharge instructions at any time in [employer]? You can also review all of your test results from your hospital stay or ER visit. Additional Information If you have questions, please visit the Frequently Asked Questions section of the Geek[employer] website at [email or web address] Remember, GeekStatus is NOT to be used for urgent needs. For medical emergencies, dial 911. Now available from your iPhone and Android! Please provide this summary of care documentation to your next provider. Your primary care clinician is listed as Tushar Nagy. If you have any questions after today's visit, please call [phone].

## 2018-01-02 NOTE — ASSESSMENT & PLAN NOTE
Stable, based on history, physical exam and review of pertinent labs, studies and medications; meds reconciled; continue current treatment plan. Key Antihyperglycemic Medications             insulin glargine (LANTUS) 100 unit/mL injection  (Taking) 30 Units by SubCUTAneous route nightly. insulin glargine (BASAGLAR KWIKPEN) 100 unit/mL (3 mL) inpn  (Taking) 30 Units by SubCUTAneous route nightly. metFORMIN ER (GLUCOPHAGE XR) 500 mg tablet  (Taking) Take 2 Tabs by mouth two (2) times daily (with meals). Other Key Diabetic Medications             simvastatin (ZOCOR) 40 mg tablet  (Taking) Take 1 Tab by mouth nightly. quinapril (ACCUPRIL) 40 mg tablet  (Taking) Take 1 Tab by mouth daily.         Lab Results   Component Value Date/Time    Hemoglobin A1c 6.8 12/19/2017 02:57 AM    Glucose 154 12/26/2017 10:16 AM    Creatinine 0.87 12/26/2017 10:16 AM    Microalb/Creat ratio (ug/mg creat.) 12.5 08/23/2016 04:26 PM    Cholesterol, total 95 12/18/2017 06:01 PM    HDL Cholesterol 37 12/18/2017 06:01 PM    LDL, calculated 43.4 12/18/2017 06:01 PM    Triglyceride 73 12/18/2017 06:01 PM     Diabetic Foot and Eye Exam HM Status   Topic Date Due    Diabetic Foot Care  07/20/2018    Eye Exam  11/22/2018

## 2018-01-02 NOTE — PROGRESS NOTES
.1. Have you been to the ER, urgent care clinic since your last visit? Hospitalized since your last visit? Yes Where: [facility]    2. Have you seen or consulted any other health care providers outside of the [address] [place] [address] since your last visit? Include any pap smears or colon screening.  Yes Reason for visit: Cellulitis

## 2018-01-02 NOTE — PROGRESS NOTES
Diagnoses and all orders for this visit:    1. Essential hypertension    2. Cellulitis of right abdominal wall  -     HYDROcodone-acetaminophen (NORCO) 5-325 mg per tablet; Take 1 Tab by mouth every six (6) hours as needed. Max Daily Amount: 4 Tabs. Indications: Pain  -     PAIN MGMT PANEL W/REFL, UR  -     CBC WITH AUTOMATED DIFF  -     METABOLIC PANEL, COMPREHENSIVE    3. Type 2 diabetes mellitus without complication, with long-term current use of insulin (HCC)  Assessment & Plan:  Stable, based on history, physical exam and review of pertinent labs, studies and medications; meds reconciled; continue current treatment plan. Key Antihyperglycemic Medications             insulin glargine (LANTUS) 100 unit/mL injection  (Taking) 30 Units by SubCUTAneous route nightly. insulin glargine (BASAGLAR KWIKPEN) 100 unit/mL (3 mL) inpn  (Taking) 30 Units by SubCUTAneous route nightly. metFORMIN ER (GLUCOPHAGE XR) 500 mg tablet  (Taking) Take 2 Tabs by mouth two (2) times daily (with meals). Other Key Diabetic Medications             simvastatin (ZOCOR) 40 mg tablet  (Taking) Take 1 Tab by mouth nightly. quinapril (ACCUPRIL) 40 mg tablet  (Taking) Take 1 Tab by mouth daily. Lab Results   Component Value Date/Time    Hemoglobin A1c 6.8 12/19/2017 02:57 AM    Glucose 154 12/26/2017 10:16 AM    Creatinine 0.87 12/26/2017 10:16 AM    Microalb/Creat ratio (ug/mg creat.) 12.5 08/23/2016 04:26 PM    Cholesterol, total 95 12/18/2017 06:01 PM    HDL Cholesterol 37 12/18/2017 06:01 PM    LDL, calculated 43.4 12/18/2017 06:01 PM    Triglyceride 73 12/18/2017 06:01 PM     Diabetic Foot and Eye Exam HM Status   Topic Date Due    Diabetic Foot Care  07/20/2018    Eye Exam  11/22/2018         Other orders  -     cephALEXin (KEFLEX) 500 mg capsule; Take 1 Cap by mouth four (4) times daily. -     fluconazole (DIFLUCAN) 150 mg tablet; Take 1 Tab by mouth daily for 1 day.     SPORTS MEDICINE AND PRIMARY CARE  [facility] Claudia Hilario MD, [address] [address]  Phone:  [phone]  Fax: [phone]       Chief Complaint   Patient presents with   [facility] Follow Up   . SUBJECTIVE:    Angelica Cao is a 78 y.o. female    Patient is seen today for transition of care services following admission to [address] [place] [address] on 12/18 and discharged on 12/27. During that admission she was found to have sepsis related to abdominal wall cellulitis, which  area compatible with the cellulitis and subsequently  showed a large abcess. .. She was seen in consultation Gilles Loja MD   , who performed the I&D of the large abcess. NN performed outreach to patient on 12/28/17 to complete medication reconciliation, telephonic assessment of condition . .. Patient comes in to the office for ongoing care. Patient is concerned about the swelling at the top of the wound. She notes a hardness at the top of the area. She wonders about pain medication, medication for  and whether she needs a further antibiotic. Patient is seen for evaluation. Current Outpatient Prescriptions   Medication Sig Dispense Refill    celecoxib (CELEBREX) 200 mg capsule TAKE 1 CAP BY MOUTH TWO (2) TIMES A DAY FOR 90 DAYS. 2    HYDROcodone-acetaminophen (NORCO) 5-325 mg per tablet Take 1 Tab by mouth every six (6) hours as needed. Max Daily Amount: 4 Tabs. Indications: Pain 42 Tab 0    cephALEXin (KEFLEX) 500 mg capsule Take 1 Cap by mouth four (4) times daily. 40 Cap 0    fluconazole (DIFLUCAN) 150 mg tablet Take 1 Tab by mouth daily for 1 day. 2 Tab 1    insulin glargine (LANTUS) 100 unit/mL injection 30 Units by SubCUTAneous route nightly.  collagenase (SANTYL) 250 unit/gram ointment Apply  to affected area daily. 15 g 0    insulin glargine (BASAGLAR KWIKPEN) 100 unit/mL (3 mL) inpn 30 Units by SubCUTAneous route nightly. 15 Pen 3    Lancets (ONETOUCH ULTRASOFT LANCETS) [facility] USE TO TEST THREE TIMES A DAY.  Dx.e11.9 300 Each 11    ONETOUCH DELICA LANCETS 30 gauge misc USE TO TEST BLOOD SUGAR 3 TIMES A DAY  3    glucose blood VI test strips (ONETOUCH ULTRA TEST) strip tiuse to test blood sugar three times a day. dx.e11.9 300 Strip 3    metFORMIN ER (GLUCOPHAGE XR) 500 mg tablet Take 2 Tabs by mouth two (2) times daily (with meals). 360 Tab 95    furosemide (LASIX) 40 mg tablet Take 1 Tab by mouth daily. 90 Tab 10    metoprolol succinate (TOPROL-XL) 25 mg XL tablet Take 1 Tab by mouth daily. 90 Tab 11    simvastatin (ZOCOR) 40 mg tablet Take 1 Tab by mouth nightly. 90 Tab 10    amLODIPine (NORVASC) 5 mg tablet Take 1 Tab by mouth daily. 90 Tab 3    quinapril (ACCUPRIL) 40 mg tablet Take 1 Tab by mouth daily. 90 Tab 11    Lancets misc delica one touch 1 Each 11    Insulin Needles, Disposable, (BD INSULIN PEN NEEDLE UF ORIG) 29 gauge x 1/2\" ndle tid 100 Pen Needle 11    albuterol (PROVENTIL HFA, VENTOLIN HFA, PROAIR HFA) 90 mcg/actuation inhaler Take 2 Puffs by inhalation every four (4) hours as needed for Wheezing. 1 Inhaler 0    ONE TOUCH DELICA 33 gauge misc       Blood-Glucose Meter (ONETOUCH ULTRA SYSTEM KIT) monitoring kit Use daily 1 Kit 0    Blood-Glucose Meter (ONETOUCH ULTRA SYSTEM KIT) monitoring kit Test 3 times a day 1 Kit 0    omeprazole (PRILOSEC) 40 mg capsule Take 40 mg by mouth daily.       AFLURIA 4059-9126 45 mcg (15 mcg x 3)/0.5 mL susp   0    FLUZONE HIGH-DOSE 2017-18, PF, syrg injection ADMINISTERED BY PHARMACIST  0     Past Medical History:   Diagnosis Date    Cellulitis of right abdominal wall 12/18/2017    Diabetes ([address] 1995    DJD (degenerative joint disease) of knee     Hypercholesterolemia     Hypertension     LBP (low back pain)     chiorpactor    Lumbar stenosis     Obesity     S/P colonoscopy 12-18-12     Past Surgical History:   Procedure Laterality Date    HX BREAST BIOPSY Right 1990    negative    HX COLONOSCOPY       No Known Allergies      REVIEW OF SYSTEMS:  General: negative for - chills or fever  ENT: negative for - headaches, nasal congestion or tinnitus  Respiratory: negative for - cough, hemoptysis, shortness of breath or wheezing  Cardiovascular : negative for - chest pain, edema, palpitations or shortness of breath  Gastrointestinal: negative for - abdominal pain, blood in stools, heartburn or nausea/vomiting  Genito-Urinary: no dysuria, trouble voiding, or hematuria  Musculoskeletal: negative for - gait disturbance, joint pain, joint stiffness or joint swelling  Neurological: no TIA or stroke symptoms  Hematologic: no bruises, no bleeding, no swollen glands  Integument: no lumps, mole changes, nail changes or rash  Endocrine: no malaise/lethargy or unexpected weight changes      Social History     Social History    Marital status: SINGLE     Spouse name: N/A    Number of children: N/A    Years of education: N/A     Social History Main Topics    Smoking status: Never Smoker    Smokeless tobacco: Never Used    Alcohol use No    Drug use: No    Sexual activity: Not Asked     Other Topics Concern    None     Social History Narrative    Medical History: Diverticulosis 2002DM 1995primary HTN 1995Obesity 1995Dyslipidemia 1995January 2012 moderate central stenosis L3-L4,    mild to moderate central stenosis at L4-L5 MRI    Gyn History: Last mammogram date 2013. Last menstrual perioddate hysterectomy  FOR FIBROIDS, PT W ITH ONE OVARY. Last    papdate . Menopausal hot flashes. Sexually active not currently sexually active. History of abnormal pap smears none. History of STDs    none. Vaginal discharge or odor none. HysterectomyDate NONE.    OB History: Total pregnancies 1. Pre term deliveries (>20-36.6 w eeks) 1. Surgical History: [facility] right breast bx , kidney stones colonoscopy     Hospitalization/Major Diagnostic Procedure: [facility] right breast bx , nicole paulino     Family History:  Mother:  52 yrs, skin d/oFather: deceasedAunt: alive, lung cancerAdopted: alive    Social History: Alcohol Use Patient does not use alcohol. Smoking Status Patient is a never smoker. Marital Status: Single. Lives w ith:    alone. Education/School: has masters degree-[employer]. Family History   Problem Relation Age of Onset    Cancer Mother     No Known Problems Father        OBJECTIVE:    Visit Vitals    /63 (BP 1 Location: Left arm, BP Patient Position: Sitting)    Pulse 92    Temp 99.3 °F (37.4 °C) (Oral)    Resp 15    Ht 5' 3\" (1.6 m)    Wt 210 lb (95.3 kg)    LMP  (LMP Unknown)    SpO2 97%    BMI 37.2 kg/m2     CONSTITUTIONAL: well , well nourished, appears age appropriate  EYES: perrla, eom intact  ENMT:moist mucous membranes, pharynx clear  NECK: supple. Thyroid normal  RESPIRATORY: Chest: clear bilaterally   CARDIOVASCULAR: Heart: regular rate and rhythm  GASTROINTESTINAL: Abdomen: soft, bowel sounds active  HEMATOLOGIC: no pathological lymph nodes palpated  MUSCULOSKELETAL: Extremities: no edema, pulse 1+   INTEGUMENT: No unusual rashes or suspicious skin lesions noted. Nails appear normal.  NEUROLOGIC: non-focal exam   MENTAL STATUS: alert and oriented, appropriate affect           ASSESSMENT:  1. Essential hypertension    2. Cellulitis of right abdominal wall    3. Type 2 diabetes mellitus without complication, with long-term current use of insulin ([address]      The nurse is coming out daily for dressing changes and wound packing. We completely agree. She still has induration at the superior aspect of the wound and it is a significant amount. Will renew the   microaerophilic streptococcus and rare , oxacillin resistant. She's only taking narcotic analgesics before dressing changes and not throughout the whole day and we concur with that approach.     Blood sugar is a little less than ideal.  We stressed to her the importance of having a blood sugar that is close to normal as possible for good wound healing to occur.    Encouraged her to keep the appointment with the surgeon. She'll return to see us in about three to four weeks, sooner if she needs to. PLAN:  .  Orders Placed This Encounter    PAIN MGMT PANEL W/REFL, UR    CBC WITH AUTOMATED DIFF    METABOLIC PANEL, COMPREHENSIVE    celecoxib (CELEBREX) 200 mg capsule    HYDROcodone-acetaminophen (NORCO) 5-325 mg per tablet    cephALEXin (KEFLEX) 500 mg capsule    fluconazole (DIFLUCAN) 150 mg tablet       Follow-up Disposition:  Return in about 4 weeks (around 1/30/2018). ATTENTION:   This medical record was transcribed using an electronic medical records system. Although proofread, it may and can contain electronic and spelling errors. Other human spelling and other errors may be present. Corrections may be executed at a later time. Please feel free to contact us for any clarifications as needed.

## 2018-01-03 ENCOUNTER — TELEPHONE (OUTPATIENT)
Dept: SURGERY | Age: 68
End: 2018-01-03

## 2018-01-03 ENCOUNTER — HOME CARE VISIT (OUTPATIENT)
Dept: SCHEDULING | Facility: HOME HEALTH | Age: 68
End: 2018-01-03
Payer: MEDICARE

## 2018-01-03 ENCOUNTER — PATIENT OUTREACH (OUTPATIENT)
Dept: INTERNAL MEDICINE CLINIC | Age: 68
End: 2018-01-03

## 2018-01-03 PROCEDURE — 3331090001 HH PPS REVENUE CREDIT

## 2018-01-03 PROCEDURE — G0300 HHS/HOSPICE OF LPN EA 15 MIN: HCPCS

## 2018-01-03 PROCEDURE — 3331090002 HH PPS REVENUE DEBIT

## 2018-01-03 NOTE — PROGRESS NOTES
[facility] Cellulitis of right abdominal wall f/u     NN received call from patient c/o having received call from [place] that they wanted to know if she still needed dressing changes done by [place] daily, and if a person was available to assist with changes. Patient states she was informed she was not on the schedule to be seen today. 6:00PM:  NN received called from patient informing that Armond Gutierrez did come and complete dressing changes. Patient stated she was so worried that he dressing would not be changed today. Patient states she has no one else due to friend is sick that she thought could have helped her. Patient states wound is closing slowly; that the healing process is from the inside out. Patient seen by PCP office visit on 1/2/2018; no change ordered for dressing changes. Goals Addressed             Most Recent     Supportive resources in place to maintain patient in the community (ie. Home Health, DME equipment, refer to, medication assistant plan, etc.)   On track (1/3/2018)             Patient opened for [facility] visits for dressing changes 2x/week. PCP now requesting wound change daily. NN advised and confirmed order changed to daily. 1/3/2017:  MARELY PENA remains in progress for wound dressing change. NN to consult w/ PCP regarding dressing changes needed going forward. NN w/ continue to f/u.

## 2018-01-03 NOTE — TELEPHONE ENCOUNTER
Valerie Purvis (Nurse) from [facility] home care wants to decrease the frequency of patient's wound care/dressing change.

## 2018-01-04 ENCOUNTER — PATIENT OUTREACH (OUTPATIENT)
Dept: INTERNAL MEDICINE CLINIC | Age: 68
End: 2018-01-04

## 2018-01-04 ENCOUNTER — OFFICE VISIT (OUTPATIENT)
Dept: SURGERY | Age: 68
End: 2018-01-04

## 2018-01-04 VITALS
OXYGEN SATURATION: 98 % | WEIGHT: 209 LBS | DIASTOLIC BLOOD PRESSURE: 66 MMHG | TEMPERATURE: 98.5 F | HEART RATE: 94 BPM | SYSTOLIC BLOOD PRESSURE: 124 MMHG | HEIGHT: 63 IN | RESPIRATION RATE: 18 BRPM | BODY MASS INDEX: 37.03 KG/M2

## 2018-01-04 DIAGNOSIS — Z09 POSTOPERATIVE EXAMINATION: Primary | ICD-10-CM

## 2018-01-04 DIAGNOSIS — L02.211 ABDOMINAL WALL ABSCESS: ICD-10-CM

## 2018-01-04 PROCEDURE — 3331090001 HH PPS REVENUE CREDIT

## 2018-01-04 PROCEDURE — 3331090002 HH PPS REVENUE DEBIT

## 2018-01-04 NOTE — PROGRESS NOTES
Subjective:      Ansley Jenkins is a 78 y.o. female who presents today for wound check. She is status post I&D of abdominal wall abscess. She is getting wound care done daily by [facility]. She was been seen today to see if her wound care orders need to be changed. The last wound care was done with [facility]. Objective:     Visit Vitals    /66 (BP 1 Location: Right arm, BP Patient Position: Sitting)    Pulse 94    Temp 98.5 °F (36.9 °C) (Oral)    Resp 18    Ht 5' 3\" (1.6 m)    Wt 209 lb (94.8 kg)    LMP  (LMP Unknown)    SpO2 98%    BMI 37.02 kg/m2       Wound:   Wound beefy red, healing well, Approx 5 cm by 3 cm. Wound repacking with 1 in guaze and covered. Discussed wound care with Dr. Phyllis Ambrosio. He would like once a day wet to dry packing with guaze and cover. Assessment:     Wound check. Plan:     1. Home health orders changed. [facility] notified. 2. Dr. Phyllis Ambrosio will change orders once her reevaluates the wound on 1/12/2017. Pt verbalized understanding and questions were answered to the best of my knowledge and ability.

## 2018-01-04 NOTE — PATIENT INSTRUCTIONS
Wound Care: After Your Visit  Your Care Instructions  Taking good care of your wound at home will help it heal quickly and reduce your chance of infection. The doctor has checked you carefully, but problems can develop later. If you notice any problems or new symptoms, get medical treatment right away. Follow-up care is a key part of your treatment and safety. Be sure to make and go to all appointments, and call your doctor if you are having problems. It's also a good idea to know your test results and keep a list of the medicines you take. How can you care for yourself at home? · Clean the area with soap and water 2 times a day unless your doctor gives you different instructions. Don't use hydrogen peroxide or alcohol, which can slow healing. ¨ You may cover the wound with a thin layer of antibiotic ointment, such as bacitracin, and a nonstick bandage. ¨ Apply more ointment and replace the bandage as needed. · Take pain medicines exactly as directed. Some pain is normal with a wound, but do not ignore pain that is getting worse instead of better. You could have an infection. ¨ If the doctor gave you a prescription medicine for pain, take it as prescribed. ¨ If you are not taking a prescription pain medicine, ask your doctor if you can take an over-the-counter medicine. · Your doctor may have closed your wound with stitches (sutures), staples, or skin glue. ¨ If you have stitches, your doctor may remove them after several days to 2 weeks. Or you may have stitches that dissolve on their own. ¨ If you have staples, your doctor may remove them after 7 to 10 days. ¨ If your wound was closed with skin glue, the glue will wear off in a few days to 2 weeks. When should you call for help? Call your doctor now or seek immediate medical care if:  · You have signs of infection, such as:  ¨ Increased pain, swelling, warmth, or redness near the wound. ¨ Red streaks leading from the wound.   ¨ Pus draining from the wound. ¨ A fever. · You bleed so much from your incision that you soak one or more bandages over 2 to 4 hours. Watch closely for changes in your health, and be sure to contact your doctor if:  · The wound is not getting better each day. Where can you learn more? Go to [employer]  Enter M973 in the search box to learn more about \"Wound Care: After Your Visit. \"   © 3320-4955 [employer], [employer]. Care instructions adapted under license by Joe Le (which disclaims liability or warranty for this information). This care instruction is for use with your licensed healthcare professional. If you have questions about a medical condition or this instruction, always ask your healthcare professional. [address] any warranty or liability for your use of this information. Content Version: 89.8.863650;  Last Revised: April 23, 2012

## 2018-01-04 NOTE — PROGRESS NOTES
[facility] Cellulitis of right abdominal walll/wound care  f/u     Call received from patient x2 with message on VM stating she had not heard from the [facility] agency; that blood has come through the dressings and she was now in pain. Stated she was worried the dressing would fall off. NN consulted w/ PCP. Advised to contact [facility]; Advised to contact surgeon for immediate office appointment for patient. NN spoke with Jovanny at [facility] requesting patient wound care to be done today and daily due to increased bleeding & increased pain. NN was informed that a shortage was experienced due to the snow; that if patient needs dressing changes daily, we may need to send patient somewhere else that could handle her case better. NN contacted surgeon's office: Dr. Endy Pendleton @ [phone]. Spoke with Arben Vaca; stated Dr. Ale Resendez is out of office until 1/12/2018 (date of patient's scheduled appt); stated NP Talisha Moreno is available today for 2PM appt. Consult done w/ PCP. Agreed and advised to contact patient to see if appointment transportation is available for 2PM appointment. NN spoke with patient who agrees to make the appointment. States she has no extra wound dressing at home and will have to figure out what to do regarding excess bleeding. Patient informed NN she was so glad to get the appointment in that the [facility] Nurse called to say she was stuck in her yard due to snow and would not be alble to see her today. Patient states finding transportation will not be a problem. NN spoke with Elvis Patel at Dr. Fransico Izquierdo office; appt confirmed for 2PM with MARSHA Moreno. Goals Addressed             Most Recent     Patient/Family verbalizes understanding of self-management of chronic disease. On track (1/4/2018)             Diabetes medications and diabetic diet teachings done. Comparison of BS on Admit 181;  BS today 117.          Self-schedules and keeps appointments                 Patient's appointment rescheduled from 1/12/2018 to today as advised by PCP due to increased bleeding/drainage and dressing nearly falling off.  Supportive resources in place to maintain patient in the community (ie. Home Health, DME equipment, refer to, medication assistant plan, etc.)   On track (1/4/2018)             Patient opened for [address] visits for dressing changes 2x/week. PCP now requesting wound change daily. NN advised and confirmed order changed to daily. 1/3/2017:  MARELY PENA remains in progress for wound dressing change.            Goal completion:  1/5/2018

## 2018-01-04 NOTE — TELEPHONE ENCOUNTER
I called Chapin Sullivan back and she said she saw the patient last night and there was some drainage but not a lot and they want to do the [place] dressing 3 times a week. I spoke to Dr Kevin Watkins and he said that was okay and she has an appointment to see him next week. Chapin Sullivan said the wound was healing.

## 2018-01-04 NOTE — PROGRESS NOTES
Wound check. Bleeding. I&D of abdominal wall obscess on 12/23/17. 1. Have you been to the ER, urgent care clinic since your last visit? Hospitalized since your last visit? No    2. Have you seen or consulted any other health care providers outside of the [address] [place] [address] since your last visit? Include any pap smears or colon screening. No      Spoke with Yogesh Saldivar LPN Intake @ [address] [place] [address] to advise her of new wound care orders in chart as per Charles Okeefe NP. Ronna Torres verbalized understanding of all. Spoke with Avis Liao, Dr. Candance Odor nurse, Avis Liao, to advise her that we saw the patient here today and that we also advised [facility] of the new wound care orders. I also advised Avis Liao of the new orders. Avis Liao verbalized understanding of all.

## 2018-01-05 ENCOUNTER — HOME CARE VISIT (OUTPATIENT)
Dept: SCHEDULING | Facility: HOME HEALTH | Age: 68
End: 2018-01-05
Payer: MEDICARE

## 2018-01-05 PROCEDURE — G0299 HHS/HOSPICE OF RN EA 15 MIN: HCPCS

## 2018-01-05 PROCEDURE — 3331090001 HH PPS REVENUE CREDIT

## 2018-01-05 PROCEDURE — 3331090002 HH PPS REVENUE DEBIT

## 2018-01-06 PROCEDURE — 3331090001 HH PPS REVENUE CREDIT

## 2018-01-06 PROCEDURE — 3331090002 HH PPS REVENUE DEBIT

## 2018-01-07 ENCOUNTER — HOME CARE VISIT (OUTPATIENT)
Dept: SCHEDULING | Facility: HOME HEALTH | Age: 68
End: 2018-01-07
Payer: MEDICARE

## 2018-01-07 VITALS
OXYGEN SATURATION: 98 % | TEMPERATURE: 98.3 F | SYSTOLIC BLOOD PRESSURE: 158 MMHG | DIASTOLIC BLOOD PRESSURE: 70 MMHG | HEART RATE: 90 BPM | RESPIRATION RATE: 20 BRPM

## 2018-01-07 PROCEDURE — 3331090001 HH PPS REVENUE CREDIT

## 2018-01-07 PROCEDURE — G0299 HHS/HOSPICE OF RN EA 15 MIN: HCPCS

## 2018-01-07 PROCEDURE — 3331090002 HH PPS REVENUE DEBIT

## 2018-01-08 VITALS
SYSTOLIC BLOOD PRESSURE: 152 MMHG | HEART RATE: 70 BPM | RESPIRATION RATE: 20 BRPM | TEMPERATURE: 98.2 F | DIASTOLIC BLOOD PRESSURE: 66 MMHG | OXYGEN SATURATION: 98 %

## 2018-01-08 PROCEDURE — 3331090001 HH PPS REVENUE CREDIT

## 2018-01-08 PROCEDURE — 3331090002 HH PPS REVENUE DEBIT

## 2018-01-09 ENCOUNTER — HOME CARE VISIT (OUTPATIENT)
Dept: SCHEDULING | Facility: HOME HEALTH | Age: 68
End: 2018-01-09
Payer: MEDICARE

## 2018-01-09 VITALS — TEMPERATURE: 98 F

## 2018-01-09 PROCEDURE — 3331090002 HH PPS REVENUE DEBIT

## 2018-01-09 PROCEDURE — G0299 HHS/HOSPICE OF RN EA 15 MIN: HCPCS

## 2018-01-09 PROCEDURE — 3331090001 HH PPS REVENUE CREDIT

## 2018-01-10 VITALS
SYSTOLIC BLOOD PRESSURE: 146 MMHG | OXYGEN SATURATION: 98 % | DIASTOLIC BLOOD PRESSURE: 70 MMHG | TEMPERATURE: 98.2 F | RESPIRATION RATE: 20 BRPM

## 2018-01-10 LAB
ALBUMIN SERPL-MCNC: 3.8 G/DL (ref 3.6–4.8)
ALBUMIN/GLOB SERPL: 1.2 {RATIO} (ref 1.2–2.2)
ALP SERPL-CCNC: 50 IU/L (ref 39–117)
ALT SERPL-CCNC: 10 IU/L (ref 0–32)
AMPHETAMINES UR QL SCN: NEGATIVE NG/ML
AST SERPL-CCNC: 14 IU/L (ref 0–40)
BARBITURATES UR QL SCN: NEGATIVE NG/ML
BASOPHILS # BLD AUTO: 0 X10E3/UL (ref 0–0.2)
BASOPHILS NFR BLD AUTO: 0 %
BENZODIAZ UR QL SCN: NEGATIVE NG/ML
BILIRUB SERPL-MCNC: <0.2 MG/DL (ref 0–1.2)
BUN SERPL-MCNC: 21 MG/DL (ref 8–27)
BUN/CREAT SERPL: 29 (ref 12–28)
BZE UR QL SCN: NEGATIVE NG/ML
CALCIUM SERPL-MCNC: 9.5 MG/DL (ref 8.7–10.3)
CANNABINOIDS UR QL SCN: NEGATIVE NG/ML
CHLORIDE SERPL-SCNC: 99 MMOL/L (ref 96–106)
CO2 SERPL-SCNC: 24 MMOL/L (ref 18–29)
CREAT SERPL-MCNC: 0.72 MG/DL (ref 0.57–1)
CREAT UR-MCNC: 90 MG/DL (ref 20–300)
EOSINOPHIL # BLD AUTO: 0.3 X10E3/UL (ref 0–0.4)
EOSINOPHIL NFR BLD AUTO: 2 %
ERYTHROCYTE [DISTWIDTH] IN BLOOD BY AUTOMATED COUNT: 15.5 % (ref 12.3–15.4)
FENTANYL+NORFENTANYL UR QL SCN: NEGATIVE PG/ML
GLOBULIN SER CALC-MCNC: 3.2 G/DL (ref 1.5–4.5)
GLUCOSE SERPL-MCNC: 116 MG/DL (ref 65–99)
HCT VFR BLD AUTO: 35 % (ref 34–46.6)
HGB BLD-MCNC: 11.1 G/DL (ref 11.1–15.9)
IMM GRANULOCYTES # BLD: 0 X10E3/UL (ref 0–0.1)
IMM GRANULOCYTES NFR BLD: 0 %
LYMPHOCYTES # BLD AUTO: 1.9 X10E3/UL (ref 0.7–3.1)
LYMPHOCYTES NFR BLD AUTO: 18 %
MCH RBC QN AUTO: 27.3 PG (ref 26.6–33)
MCHC RBC AUTO-ENTMCNC: 31.7 G/DL (ref 31.5–35.7)
MCV RBC AUTO: 86 FL (ref 79–97)
MEPERIDINE UR QL: NEGATIVE NG/ML
METHADONE UR QL SCN: NEGATIVE NG/ML
MONOCYTES # BLD AUTO: 0.8 X10E3/UL (ref 0.1–0.9)
MONOCYTES NFR BLD AUTO: 8 %
NEUTROPHILS # BLD AUTO: 7.6 X10E3/UL (ref 1.4–7)
NEUTROPHILS NFR BLD AUTO: 72 %
OPIATES UR QL SCN: POSITIVE NG/ML
OXYCODONE+OXYMORPHONE UR QL SCN: NEGATIVE NG/ML
PCP UR QL: NEGATIVE NG/ML
PH UR: 4.8 [PH] (ref 4.5–8.9)
PLATELET # BLD AUTO: 481 X10E3/UL (ref 150–379)
PLEASE NOTE:, 733157: ABNORMAL
POTASSIUM SERPL-SCNC: 4.1 MMOL/L (ref 3.5–5.2)
PROPOXYPH UR QL SCN: NEGATIVE NG/ML
PROT SERPL-MCNC: 7 G/DL (ref 6–8.5)
RBC # BLD AUTO: 4.07 X10E6/UL (ref 3.77–5.28)
SODIUM SERPL-SCNC: 141 MMOL/L (ref 134–144)
SP GR UR: 1.01
TRAMADOL UR QL SCN: NEGATIVE NG/ML
WBC # BLD AUTO: 10.6 X10E3/UL (ref 3.4–10.8)

## 2018-01-10 PROCEDURE — 3331090001 HH PPS REVENUE CREDIT

## 2018-01-10 PROCEDURE — 3331090002 HH PPS REVENUE DEBIT

## 2018-01-11 ENCOUNTER — HOME CARE VISIT (OUTPATIENT)
Dept: SCHEDULING | Facility: HOME HEALTH | Age: 68
End: 2018-01-11
Payer: MEDICARE

## 2018-01-11 PROCEDURE — G0299 HHS/HOSPICE OF RN EA 15 MIN: HCPCS

## 2018-01-11 PROCEDURE — 3331090002 HH PPS REVENUE DEBIT

## 2018-01-11 PROCEDURE — 3331090001 HH PPS REVENUE CREDIT

## 2018-01-12 ENCOUNTER — OFFICE VISIT (OUTPATIENT)
Dept: SURGERY | Age: 68
End: 2018-01-12

## 2018-01-12 ENCOUNTER — TELEPHONE (OUTPATIENT)
Dept: SURGERY | Age: 68
End: 2018-01-12

## 2018-01-12 VITALS
RESPIRATION RATE: 20 BRPM | TEMPERATURE: 98.2 F | DIASTOLIC BLOOD PRESSURE: 70 MMHG | SYSTOLIC BLOOD PRESSURE: 138 MMHG | OXYGEN SATURATION: 98 %

## 2018-01-12 VITALS
HEIGHT: 63 IN | SYSTOLIC BLOOD PRESSURE: 140 MMHG | RESPIRATION RATE: 20 BRPM | DIASTOLIC BLOOD PRESSURE: 64 MMHG | OXYGEN SATURATION: 98 % | HEART RATE: 87 BPM | BODY MASS INDEX: 36.86 KG/M2 | WEIGHT: 208 LBS | TEMPERATURE: 98.8 F

## 2018-01-12 DIAGNOSIS — L02.211 ABDOMINAL WALL ABSCESS: Primary | ICD-10-CM

## 2018-01-12 PROCEDURE — 3331090002 HH PPS REVENUE DEBIT

## 2018-01-12 PROCEDURE — 3331090001 HH PPS REVENUE CREDIT

## 2018-01-12 NOTE — TELEPHONE ENCOUNTER
Patient identified with two patient identifiers. Patient informed per Dr. Sid Yeh she can shower. Patient instructed to remove dressing and packing then shower prior to the [facility] nurse arriving then she can repack wound. Patient expressed understanding she will call if any other questions or concerns.

## 2018-01-12 NOTE — PROGRESS NOTES
Doing well  No fever/chills  Glucose under good control per patient who checks it daily    /64  Pulse 87  Temp 98.8 °F (37.1 °C)  Resp 20  Ht 5' 3\" (1.6 m)  Wt 208 lb (94.3 kg)  LMP  (LMP Unknown)  SpO2 98%  BMI 36.85 kg/m2  Wound has contracted- open with clean granulation  4 cm depth but does not track now  Probed- no pus    Doing well  Continue  wound care per current plan- 3x per week  Re-packed wound with 1/2 inch gauze strip  RTC in 3-4 weeks

## 2018-01-12 NOTE — PROGRESS NOTES
1. Have you been to the ER, urgent care clinic since your last visit? Hospitalized since your last visit? No    2. Have you seen or consulted any other health care providers outside of the [address] [place] [address] since your last visit? Include any pap smears or colon screening.  No

## 2018-01-13 PROCEDURE — 3331090001 HH PPS REVENUE CREDIT

## 2018-01-13 PROCEDURE — 3331090002 HH PPS REVENUE DEBIT

## 2018-01-14 ENCOUNTER — HOME CARE VISIT (OUTPATIENT)
Dept: SCHEDULING | Facility: HOME HEALTH | Age: 68
End: 2018-01-14
Payer: MEDICARE

## 2018-01-14 VITALS
DIASTOLIC BLOOD PRESSURE: 82 MMHG | OXYGEN SATURATION: 98 % | RESPIRATION RATE: 20 BRPM | HEART RATE: 92 BPM | TEMPERATURE: 98.2 F | SYSTOLIC BLOOD PRESSURE: 158 MMHG

## 2018-01-14 PROCEDURE — 3331090002 HH PPS REVENUE DEBIT

## 2018-01-14 PROCEDURE — G0299 HHS/HOSPICE OF RN EA 15 MIN: HCPCS

## 2018-01-14 PROCEDURE — 3331090001 HH PPS REVENUE CREDIT

## 2018-01-15 ENCOUNTER — PATIENT OUTREACH (OUTPATIENT)
Dept: INTERNAL MEDICINE CLINIC | Age: 68
End: 2018-01-15

## 2018-01-15 PROCEDURE — 3331090002 HH PPS REVENUE DEBIT

## 2018-01-15 PROCEDURE — 3331090001 HH PPS REVENUE CREDIT

## 2018-01-16 ENCOUNTER — HOME CARE VISIT (OUTPATIENT)
Dept: SCHEDULING | Facility: HOME HEALTH | Age: 68
End: 2018-01-16
Payer: MEDICARE

## 2018-01-16 PROCEDURE — 3331090001 HH PPS REVENUE CREDIT

## 2018-01-16 PROCEDURE — 3331090002 HH PPS REVENUE DEBIT

## 2018-01-16 PROCEDURE — G0299 HHS/HOSPICE OF RN EA 15 MIN: HCPCS

## 2018-01-16 NOTE — PROGRESS NOTES
[facility]IP [facility] Cellulitis of right abdominal walll/wound care  f/u    1/15/2018 :  NN received message left on VM. Patient stated surgeon was seen:   Requested call-back. 1/16/2018:  Patient spoke highly of Nurse Surya Burciaga with [facility]. Goals Addressed             Most Recent     Patient/Family verbalizes understanding of self-management of chronic disease. On track (1/15/2018)             Diabetes medications and diabetic diet teachings done. Comparison of BS on Admit 181;  BS today 117.  Self-schedules and keeps appointments    On track (1/15/2018)             Patient's appointment rescheduled from 1/12/2018 to today as advised by PCP due to increased bleeding/drainage and dressing nearly falling off.      1/16/2018:  Office Visit attended with Surgeon. Stated open wound if healing well. [facility] visits decreased from daily to 3x/week. Patient teachings done on proper protein in diet and drinking plenty of fluids to promote proper healing and eliminating dehydration which  Could cause infection.  Supportive resources in place to maintain patient in the community (ie. Home Health, DME equipment, refer to, medication assistant plan, etc.)   On track (1/15/2018)             Patient opened for Debora Lute visits for dressing changes 2x/week. PCP now requesting wound change daily. NN advised and confirmed order changed to daily. 1/3/2017:  [facility] remains in progress for wound dressing change.  Takes Medications as prescribed   On track (1/15/2018)             Patient stated surgeon advised that she could stop taking the Keflex as of yesterday when seen in office. Next office visit scheduled Jan 25, 2018.     Goal date completion:  1/25/2018

## 2018-01-17 VITALS
WEIGHT: 215 LBS | OXYGEN SATURATION: 98 % | TEMPERATURE: 98.2 F | RESPIRATION RATE: 18 BRPM | HEART RATE: 86 BPM | HEIGHT: 63 IN | DIASTOLIC BLOOD PRESSURE: 64 MMHG | BODY MASS INDEX: 38.09 KG/M2 | SYSTOLIC BLOOD PRESSURE: 140 MMHG

## 2018-01-17 PROCEDURE — 3331090002 HH PPS REVENUE DEBIT

## 2018-01-17 PROCEDURE — 3331090001 HH PPS REVENUE CREDIT

## 2018-01-18 ENCOUNTER — HOME CARE VISIT (OUTPATIENT)
Dept: SCHEDULING | Facility: HOME HEALTH | Age: 68
End: 2018-01-18
Payer: MEDICARE

## 2018-01-18 PROCEDURE — G0299 HHS/HOSPICE OF RN EA 15 MIN: HCPCS

## 2018-01-18 PROCEDURE — 3331090002 HH PPS REVENUE DEBIT

## 2018-01-18 PROCEDURE — 3331090001 HH PPS REVENUE CREDIT

## 2018-01-19 PROCEDURE — 3331090002 HH PPS REVENUE DEBIT

## 2018-01-19 PROCEDURE — 3331090001 HH PPS REVENUE CREDIT

## 2018-01-20 PROCEDURE — 3331090001 HH PPS REVENUE CREDIT

## 2018-01-20 PROCEDURE — 3331090002 HH PPS REVENUE DEBIT

## 2018-01-21 ENCOUNTER — HOME CARE VISIT (OUTPATIENT)
Dept: SCHEDULING | Facility: HOME HEALTH | Age: 68
End: 2018-01-21
Payer: MEDICARE

## 2018-01-21 VITALS
TEMPERATURE: 98.2 F | DIASTOLIC BLOOD PRESSURE: 80 MMHG | OXYGEN SATURATION: 96 % | OXYGEN SATURATION: 98 % | DIASTOLIC BLOOD PRESSURE: 80 MMHG | HEART RATE: 78 BPM | SYSTOLIC BLOOD PRESSURE: 136 MMHG | RESPIRATION RATE: 20 BRPM | HEART RATE: 78 BPM | SYSTOLIC BLOOD PRESSURE: 138 MMHG | RESPIRATION RATE: 20 BRPM | TEMPERATURE: 98.2 F

## 2018-01-21 VITALS
HEART RATE: 80 BPM | TEMPERATURE: 98.2 F | DIASTOLIC BLOOD PRESSURE: 86 MMHG | SYSTOLIC BLOOD PRESSURE: 136 MMHG | OXYGEN SATURATION: 98 % | RESPIRATION RATE: 20 BRPM

## 2018-01-21 PROCEDURE — G0299 HHS/HOSPICE OF RN EA 15 MIN: HCPCS

## 2018-01-21 PROCEDURE — 3331090001 HH PPS REVENUE CREDIT

## 2018-01-21 PROCEDURE — 3331090002 HH PPS REVENUE DEBIT

## 2018-01-22 PROCEDURE — 3331090001 HH PPS REVENUE CREDIT

## 2018-01-22 PROCEDURE — 3331090002 HH PPS REVENUE DEBIT

## 2018-01-23 ENCOUNTER — HOME CARE VISIT (OUTPATIENT)
Dept: SCHEDULING | Facility: HOME HEALTH | Age: 68
End: 2018-01-23
Payer: MEDICARE

## 2018-01-23 PROCEDURE — 3331090002 HH PPS REVENUE DEBIT

## 2018-01-23 PROCEDURE — 3331090001 HH PPS REVENUE CREDIT

## 2018-01-23 PROCEDURE — G0299 HHS/HOSPICE OF RN EA 15 MIN: HCPCS

## 2018-01-24 VITALS
TEMPERATURE: 98.6 F | OXYGEN SATURATION: 98 % | RESPIRATION RATE: 20 BRPM | DIASTOLIC BLOOD PRESSURE: 86 MMHG | SYSTOLIC BLOOD PRESSURE: 152 MMHG | HEART RATE: 86 BPM

## 2018-01-24 PROCEDURE — 3331090002 HH PPS REVENUE DEBIT

## 2018-01-24 PROCEDURE — 3331090001 HH PPS REVENUE CREDIT

## 2018-01-25 ENCOUNTER — HOME CARE VISIT (OUTPATIENT)
Dept: SCHEDULING | Facility: HOME HEALTH | Age: 68
End: 2018-01-25
Payer: MEDICARE

## 2018-01-25 ENCOUNTER — OFFICE VISIT (OUTPATIENT)
Dept: INTERNAL MEDICINE CLINIC | Age: 68
End: 2018-01-25

## 2018-01-25 DIAGNOSIS — E11.9 TYPE 2 DIABETES MELLITUS WITHOUT COMPLICATION, WITH LONG-TERM CURRENT USE OF INSULIN (HCC): Primary | ICD-10-CM

## 2018-01-25 DIAGNOSIS — Z71.89 ACP (ADVANCE CARE PLANNING): ICD-10-CM

## 2018-01-25 DIAGNOSIS — E78.00 HYPERCHOLESTEROLEMIA: ICD-10-CM

## 2018-01-25 DIAGNOSIS — I10 ESSENTIAL HYPERTENSION: ICD-10-CM

## 2018-01-25 DIAGNOSIS — Z79.4 TYPE 2 DIABETES MELLITUS WITHOUT COMPLICATION, WITH LONG-TERM CURRENT USE OF INSULIN (HCC): Primary | ICD-10-CM

## 2018-01-25 PROCEDURE — G0299 HHS/HOSPICE OF RN EA 15 MIN: HCPCS

## 2018-01-25 PROCEDURE — 3331090002 HH PPS REVENUE DEBIT

## 2018-01-25 PROCEDURE — 3331090001 HH PPS REVENUE CREDIT

## 2018-01-25 RX ORDER — INSULIN GLARGINE 100 [IU]/ML
30 INJECTION, SOLUTION SUBCUTANEOUS
Qty: 15 PEN | Refills: 11 | Status: SHIPPED | OUTPATIENT
Start: 2018-01-25 | End: 2018-03-10

## 2018-01-25 RX ORDER — NAPROXEN 500 MG/1
500 TABLET ORAL 2 TIMES DAILY WITH MEALS
Qty: 180 TAB | Refills: 3 | Status: SHIPPED | OUTPATIENT
Start: 2018-01-25 | End: 2018-03-07

## 2018-01-25 NOTE — MR AVS SNAPSHOT
[address] 
[phone] Patient: Luis Holden MRN: [id number] Visit Information Date & Time Provider Department Dept. Phone Encounter #  
 1/25/2018  2:30 PM Yudelka Oakley MD [facility] - [facility] [phone] [id number] Follow-up Instructions Return in about 2 months (around 3/25/2018). Follow-up and Disposition History Your Appointments 2/9/2018  9:00 AM  
ESTABLISHED PATIENT with MD Shakir Ruiz [phone] ([address]) Appt Note: 4 weeks follow up [facility] [address] [facility] [id number]  
[address] [facility] Upcoming Health Maintenance Date Due HEMOGLOBIN A1C Q6M 6/19/2018 FOOT EXAM Q1 7/20/2018 MEDICARE YEARLY EXAM 7/21/2018 MICROALBUMIN Q1 10/26/2018 EYE EXAM RETINAL OR DILATED Q1 11/22/2018 LIPID PANEL Q1 12/18/2018 BREAST CANCER SCRN MAMMOGRAM 7/21/2019 Pneumococcal 65+ Low/Medium Risk (2 of 2 - PPSV23) 9/18/2019 GLAUCOMA SCREENING Q2Y 11/22/2019 COLONOSCOPY 12/18/2022 DTaP/Tdap/Td series (2 - Td) 12/20/2026 Allergies as of 1/25/2018  Review Complete On: 1/25/2018 By: Yudelka Oakley MD  
 No Known Allergies Current Immunizations  Never Reviewed Name Date Influenza Vaccine 10/10/2016, 9/18/2014 Pneumococcal Vaccine (Unspecified Type) 9/18/2014 Not reviewed this visit You Were Diagnosed With   
  
 Codes Comments Type 2 diabetes mellitus without complication, with long-term current use of insulin (HCC)    -  Primary ICD-10-CM: E11.9, Z79.4 ICD-9-CM: 250.00, V58.67 Hypercholesterolemia     ICD-10-CM: E78.00 ICD-9-CM: 272.0 Essential hypertension     ICD-10-CM: I10 
ICD-9-CM: 401.9  ACP (advance care planning)     ICD-10-CM: Z71.89 
 ICD-9-CM: V65.49 Vitals LMP OB Status Smoking Status (LMP Unknown) Hysterectomy Never Smoker Preferred Pharmacy Pharmacy Name Phone  [address] [phone] Your Updated Medication List  
  
   
This list is accurate as of: 1/25/18  3:20 PM.  Always use your most recent med list.  
  
  
  
  
 AFLURIA [id number] 45 mcg (15 mcg x 3)/0.5 mL Susp Generic drug:  flu vaccine [id number](5 yr,up)  
  
 albuterol 90 mcg/actuation inhaler Commonly known as:  PROVENTIL HFA, VENTOLIN HFA, PROAIR HFA Take 2 Puffs by inhalation every four (4) hours as needed for Wheezing. amLODIPine 5 mg tablet Commonly known as:  Tiffanie Au Take 1 Tab by mouth daily. * Blood-Glucose Meter monitoring kit Commonly known as:  [address]   [address] KIT Test 3 times a day * Blood-Glucose Meter monitoring kit Commonly known as:  [address]   [address] KIT Use daily  
  
 cephALEXin 500 mg capsule Commonly known as:  Chapis Garter Take 1 Cap by mouth four (4) times daily. collagenase 250 unit/gram ointment Commonly known as:  SANTYL Apply  to affected area daily. furosemide 40 mg tablet Commonly known as:  LASIX Take 1 Tab by mouth daily. glucose blood VI test strips strip Commonly known as:  ONETOUCH ULTRA TEST  
tiuse to test blood sugar three times a day. dx.e11.9 HYDROcodone-acetaminophen 5-325 mg per tablet Commonly known as:  Farnsworth Orange Take 1 Tab by mouth every six (6) hours as needed. Max Daily Amount: 4 Tabs. Indications: Pain  
  
 insulin glargine 100 unit/mL (3 mL) Inpn Commonly known as:  BASAGLAR KWIKPEN  
30 Units by SubCUTAneous route nightly. Insulin Needles (Disposable) 29 gauge x 1/2\" Ndle Commonly known as:  BD INSULIN PEN NEEDLE UF ORIG  
tid  
  
 metFORMIN  mg tablet Commonly known as:  GLUCOPHAGE XR Take 2 Tabs by mouth two (2) times daily (with meals). metoprolol succinate 25 mg XL tablet Commonly known as:  TOPROL-XL Take 1 Tab by mouth daily. naproxen 500 mg tablet Commonly known as:  NAPROSYN Take 1 Tab by mouth two (2) times daily (with meals). omeprazole 40 mg capsule Commonly known as:  PRILOSEC Take 40 mg by mouth daily. * One Touch Delica 33 gauge Misc Generic drug:  lancets * Lancets Misc  
delica one touch  
  
 * ONETOUCH DELICA LANCETS 30 gauge Misc Generic drug:  lancets USE TO TEST BLOOD SUGAR 3 TIMES A DAY * Lancets Misc Commonly known as:  ONETOUCH ULTRASOFT LANCETS  
USE TO TEST THREE TIMES A DAY. Dx.e11.9  
  
 quinapril 40 mg tablet Commonly known as:  ACCUPRIL Take 1 Tab by mouth daily. simvastatin 40 mg tablet Commonly known as:  ZOCOR Take 1 Tab by mouth nightly. * Notice: This list has 6 medication(s) that are the same as other medications prescribed for you. Read the directions carefully, and ask your doctor or other care provider to review them with you. Prescriptions Sent to Pharmacy Refills  
 insulin glargine (BASAGLAR KWIKPEN) 100 unit/mL (3 mL) inpn 11 Si Units by SubCUTAneous route nightly. Class: Normal  
 Pharmacy: [facility] [address] Ph #: [phone] Route: SubCUTAneous  
 naproxen (NAPROSYN) 500 mg tablet 3 Sig: Take 1 Tab by mouth two (2) times daily (with meals). Class: Normal  
 Pharmacy: [facility] [address] Ph #: [phone] Route: Oral  
  
Follow-up Instructions Return in about 2 months (around 3/25/2018). To-Do List   
 2018 To Be Determined Appointment with Jaden Lynn RN at [facility] 39  
  
 2018 To Be Determined Appointment with Jaden Lynn RN at [place] [address]  
  
 2018 To Be Determined Appointment with Sharan Lawler RN at [address] [facility]! Dear Jesus Espinoza: Thank you for requesting a [employer] account. Our records indicate that you already have an active D[employer]OM account. You can access your account anytime at [email or web address] Did you know that you can access your hospital and ER discharge instructions at any time in [employer]OM? You can also review all of your test results from your hospital stay or ER visit. Additional Information If you have questions, please visit the Frequently Asked Questions section of the D[employer]OM website at [email or web address] Remember, D[employer]OM is NOT to be used for urgent needs. For medical emergencies, dial 911. Now available from your iPhone and Android! Please provide this summary of care documentation to your next provider. Your primary care clinician is listed as Lord Hamilton. If you have any questions after today's visit, please call [phone].

## 2018-01-25 NOTE — PROGRESS NOTES
SPORTS MEDICINE AND PRIMARY CARE  Angelica Ramirez MD, [address] [address]  Phone:  [phone]  Fax: [phone]      Chief Complaint   Patient presents with    Diabetes     3 month f/u         SUBECTIVE:    Lisa Garber is a 78 y.o. female Patient returns today with known history of diabetes, primary hypertension, dyslipidemia and recent cellulitis of abdominal wall, resulting in abscess formation. Since we last saw her she has been followed by wound care and Calin Enriquez MD.    Patient states she's doing much better. By the way, she is rotating her sites with the insulin needles, which she was doing before, and she is cleaning well with alcohol swabs before she gives the insulin. She notes that the abscess cavity is decreasing in size and all concerned are pleased with her progress. She voices no new complaints today. Highest blood sugar reading around 140, lowest blood sugar was today at 76. Current Outpatient Prescriptions   Medication Sig Dispense Refill    celecoxib (CELEBREX) 200 mg capsule TAKE 1 CAP BY MOUTH TWO (2) TIMES A DAY FOR 90 DAYS.  2    cephALEXin (KEFLEX) 500 mg capsule Take 1 Cap by mouth four (4) times daily. 40 Cap 0    insulin glargine (LANTUS) 100 unit/mL injection 30 Units by SubCUTAneous route nightly.  collagenase (SANTYL) 250 unit/gram ointment Apply  to affected area daily. 15 g 0    insulin glargine (BASAGLAR KWIKPEN) 100 unit/mL (3 mL) inpn 30 Units by SubCUTAneous route nightly. 15 Pen 3    Lancets (ONETOUCH ULTRASOFT LANCETS) misc USE TO TEST THREE TIMES A DAY. Dx.e11.9 300 Each 11    ONETOUCH DELICA LANCETS 30 gauge misc USE TO TEST BLOOD SUGAR 3 TIMES A DAY  3    glucose blood VI test strips (ONETOUCH ULTRA TEST) strip tiuse to test blood sugar three times a day. dx.e11.9 300 Strip 3    metFORMIN ER (GLUCOPHAGE XR) 500 mg tablet Take 2 Tabs by mouth two (2) times daily (with meals).  360 Tab 95    furosemide (LASIX) 40 mg tablet Take 1 Tab by mouth daily. 90 Tab 10    metoprolol succinate (TOPROL-XL) 25 mg XL tablet Take 1 Tab by mouth daily. 90 Tab 11    simvastatin (ZOCOR) 40 mg tablet Take 1 Tab by mouth nightly. 90 Tab 10    amLODIPine (NORVASC) 5 mg tablet Take 1 Tab by mouth daily. 90 Tab 3    Lancets misc delica one touch 1 Each 11    Insulin Needles, Disposable, (BD INSULIN PEN NEEDLE UF ORIG) 29 gauge x 1/2\" ndle tid 100 Pen Needle 11    albuterol (PROVENTIL HFA, VENTOLIN HFA, PROAIR HFA) 90 mcg/actuation inhaler Take 2 Puffs by inhalation every four (4) hours as needed for Wheezing. 1 Inhaler 0    ONE TOUCH DELICA 33 gauge misc       Blood-Glucose Meter (ONETOUCH ULTRA SYSTEM KIT) monitoring kit Use daily 1 Kit 0    Blood-Glucose Meter (ONETOUCH ULTRA SYSTEM KIT) monitoring kit Test 3 times a day 1 Kit 0    omeprazole (PRILOSEC) 40 mg capsule Take 40 mg by mouth daily.  AFLURIA 1951-0283 45 mcg (15 mcg x 3)/0.5 mL susp   0    HYDROcodone-acetaminophen (NORCO) 5-325 mg per tablet Take 1 Tab by mouth every six (6) hours as needed. Max Daily Amount: 4 Tabs. Indications: Pain 42 Tab 0    quinapril (ACCUPRIL) 40 mg tablet Take 1 Tab by mouth daily.  80 Tab 11     Past Medical History:   Diagnosis Date    Cellulitis of right abdominal wall 12/18/2017    letty whatley md    Diabetes ([facility] [address] 1995    DJD (degenerative joint disease) of knee     Hypercholesterolemia     Hypertension     LBP (low back pain)     chiorpactor    Lumbar stenosis     Obesity     S/P colonoscopy 12-18-12     Past Surgical History:   Procedure Laterality Date    HX BREAST BIOPSY Right 1990    negative    HX COLONOSCOPY      HX OTHER SURGICAL  12/23/2017    I&D infected abscess      No Known Allergies    REVIEW OF SYSTEMS:   Advance Care Planning (ACP) Provider Conversation Snapshot    Date of ACP Conversation: 01/25/18  Persons included in Conversation:  patient  Length of ACP Conversation in minutes:  <16 minutes (Non-Billable)    Authorized Decision Maker (if patient is incapable of making informed decisions): This person is:   Healthcare Agent/Medical Power of  under Advance Directive  Cezar Adams        For Patients with Decision Making Capacity:   Values/Goals: Exploration of values, goals, and preferences if recovery is not expected, even with continued medical treatment in the event of:  Imminent death    Conversation Outcomes / Follow-Up Plan:   Recommended completion of Advance Directive form after review of ACP materials and conversation with prospective healthcare agent         Social History     Social History    Marital status: SINGLE     Spouse name: N/A    Number of children: N/A    Years of education: N/A     Social History Main Topics    Smoking status: Never Smoker    Smokeless tobacco: Never Used    Alcohol use No    Drug use: No    Sexual activity: Not Asked     Other Topics Concern    None     Social History Narrative    Medical History: Diverticulosis 2002DM 1995primary HTN 1995Obesity 1995Dyslipidemia 1995January 2012 moderate central stenosis L3-L4,    mild to moderate central stenosis at L4-L5 MRI    Gyn History: Last mammogram date 2013. Last menstrual perioddate hysterectomy  FOR FIBROIDS, PT W ITH ONE OVARY. Last    papdate . Menopausal hot flashes. Sexually active not currently sexually active. History of abnormal pap smears none. History of STDs    none. Vaginal discharge or odor none. HysterectomyDate NONE.    OB History: Total pregnancies 1. Pre term deliveries (>20-36.6 w eeks) 1. Surgical History: [facility] rig breast bx , kidney stones colonoscopy     Hospitalization/Major Diagnostic Procedure: [facility] right breast bx , nicole paulino     Family History: Mother:  52 yrs, skin d/oFather: deceasedAunt: alive, lung cancerAdopted: alive    Social History: Alcohol Use Patient does not use alcohol.  Smoking Status Patient is a never smoker. Marital Status: Single. Lives w ith:    alone. Education/School: has masters degree-[employer].   r  Family History   Problem Relation Age of Onset    Cancer Mother     No Known Problems Father        OBJECTIVE:  Visit Vitals    LMP  (LMP Unknown)     ENT: perrla,  eom intact  NECK: supple. Thyroid normal  CHEST: clear to ascultation and percussion   HEART: regular rate and rhythm  ABD: soft, bowel sounds active  EXTREMITIES: no edema, pulse 1+     Office Visit on 01/02/2018   Component Date Value Ref Range Status    Amphetamine Screen, urine 01/02/2018 Negative  Drjugk=4832 ng/mL Final    Barbiturates Screen, urine 01/02/2018 Negative  Pqugfr=642 ng/mL Final    Benzodiazepines Screen, urine 01/02/2018 Negative  Ahzabs=156 ng/mL Final    Cannabinoid Screen, urine 01/02/2018 Negative  Cutoff=20 ng/mL Final    Cocaine Metab. Screen, urine 01/02/2018 Negative  Dqfgwm=900 ng/mL Final    Opiate Screen, urine 01/02/2018 Positive* Jycydh=550 ng/mL Final    Comment: Opiate test includes Codeine, Morphine, Hydromorphone, Hydrocodone. Codeine                     Negative            Culbnz=296            01  Morphine                    Negative            Owpsea=235            01  Hydromorphone               Positive        A                         01     Hydromorphone Confirm    106                ng/mL Mbxdts=095       01  Hydromorphone detected; this finding is consistent with use of  medications that include Dilaudid, or drugs containing Hydrocodone, or  generic formulations. This drug may also be detected as a minor  metabolite associated with high doses of morphine. Drugs listed are  representative of common sources of the compound detected and are not  intended to include all possible sources.   Hydrocodone                 Positive        A                         01     Hydrocodone Confirm      >100               ng/mL Tlrjzp=194       01  Hydrocodone detected; this finding is consistent with use of  medications that include Lortab, Lorcet,                            Vicodin, Vicoprofen,  Tussionex, Norco, or generic formulations. Drugs listed are  representative of common sources of the compound detected and are  not intended to include all possible sources.  Oxycodone/Oxymorphone, urine 01/02/2018 Negative  Xovcjw=399 ng/mL Final    Test includes Oxycodone and Oxymorphone    Phencyclidine Screen, urine 01/02/2018 Negative  Cutoff=25 ng/mL Final    Methadone Screen, urine 01/02/2018 Negative  Yosxuc=770 ng/mL Final    Propoxyphene Screen, urine 01/02/2018 Negative  Qwhgax=853 ng/mL Final    Meperidine screen 01/02/2018 Negative  Zbskru=739 ng/mL Final    Comment: This test was developed and its performance characteristics  determined by [facility]. It has not been cleared or approved  by the Food and Drug Administration.  FENTANYL, URINE 01/02/2018 Negative  Smyvun=7138 pg/mL Final    Comment: Test includes Fentanyl and Norfentanyl  This test was developed and its performance characteristics  determined by [facility]. It has not been cleared or approved  by the Food and Drug Administration.  Tramadol Screen, urine 01/02/2018 Negative  Wqchcp=658 ng/mL Final    Creatinine, urine 01/02/2018 90.0  20.0 - 300.0 mg/dL Final    Specific Gravity 01/02/2018 1.014   Final    pH, urine 01/02/2018 4.8  4.5 - 8.9 Final    Please Note 01/02/2018 Comment   Final    Comment: Drug-test results should be interpreted in the context of clinical  information. Patient metabolic variables, specific drug chemistry, and  specimen characteristics can affect test outcome. Technical  consultation is available if a test result is inconsistent with an  expected outcome. ([email or web address] or call toll-free  [phone])  Drug brands, if listed herein, are trademarks of their respective  owners.       WBC 01/02/2018 10.6  3.4 - 10.8 x10E3/uL Final    RBC 01/02/2018 4.07  3.77 - 5.28 x10E6/uL Final    HGB 01/02/2018 11.1  11.1 - 15.9 g/dL Final    HCT 01/02/2018 35.0  34.0 - 46.6 % Final    MCV 01/02/2018 86  79 - 97 fL Final    MCH 01/02/2018 27.3  26.6 - 33.0 pg Final    MCHC 01/02/2018 31.7  31.5 - 35.7 g/dL Final    RDW 01/02/2018 15.5* 12.3 - 15.4 % Final    PLATELET 05/05/1078 967* 150 - 379 x10E3/uL Final    NEUTROPHILS 01/02/2018 72  Not Estab. % Final    Lymphocytes 01/02/2018 18  Not Estab. % Final    MONOCYTES 01/02/2018 8  Not Estab. % Final    EOSINOPHILS 01/02/2018 2  Not Estab. % Final    BASOPHILS 01/02/2018 0  Not Estab. % Final    ABS. NEUTROPHILS 01/02/2018 7.6* 1.4 - 7.0 x10E3/uL Final    Abs Lymphocytes 01/02/2018 1.9  0.7 - 3.1 x10E3/uL Final    ABS. MONOCYTES 01/02/2018 0.8  0.1 - 0.9 x10E3/uL Final    ABS. EOSINOPHILS 01/02/2018 0.3  0.0 - 0.4 x10E3/uL Final    ABS. BASOPHILS 01/02/2018 0.0  0.0 - 0.2 x10E3/uL Final    IMMATURE GRANULOCYTES 01/02/2018 0  Not Estab. % Final    ABS. IMM. GRANS. 01/02/2018 0.0  0.0 - 0.1 x10E3/uL Final    Glucose 01/02/2018 116* 65 - 99 mg/dL Final    BUN 01/02/2018 21  8 - 27 mg/dL Final    Creatinine 01/02/2018 0.72  0.57 - 1.00 mg/dL Final    GFR est non-AA 01/02/2018 88  >59 mL/min/1.73 Final    GFR est AA 01/02/2018 101  >59 mL/min/1.73 Final    BUN/Creatinine ratio 01/02/2018 29* 12 - 28 Final    Sodium 01/02/2018 141  134 - 144 mmol/L Final    Potassium 01/02/2018 4.1  3.5 - 5.2 mmol/L Final    Chloride 01/02/2018 99  96 - 106 mmol/L Final    CO2 01/02/2018 24  18 - 29 mmol/L Final    Calcium 01/02/2018 9.5  8.7 - 10.3 mg/dL Final    Protein, total 01/02/2018 7.0  6.0 - 8.5 g/dL Final    Albumin 01/02/2018 3.8  3.6 - 4.8 g/dL Final    GLOBULIN, TOTAL 01/02/2018 3.2  1.5 - 4.5 g/dL Final    A-G Ratio 01/02/2018 1.2  1.2 - 2.2 Final    Bilirubin, total 01/02/2018 <0.2  0.0 - 1.2 mg/dL Final    Alk.  phosphatase 01/02/2018 50  39 - 117 IU/L Final    AST (SGOT) 01/02/2018 14  0 - 40 IU/L Final    ALT (SGPT) 01/02/2018 10 0 - 32 IU/L Final   Admission on 12/18/2017, Discharged on 12/27/2017   No results displayed because visit has over 200 results. ASSESSMENT:  No diagnosis found. Patient's medical status is satisfactory, blood pressure control is excellent. BMI reflects obesity and I suspect weight loss. We encouraged physical activity 30 minutes five days a week. She's going to start walking again, which we completely agree. Blood pressure control is at goal.  Blood sugar control is excellent. Patient's medical progress is satisfactory. She will see us in two months, sooner if she has any problems. She's reminded she can walk in to see us at any time should she have any urgency and cannot get an appointment. PLAN:  . No orders of the defined types were placed in this encounter. Follow-up Disposition: Not on File      ATTENTION:   This medical record was transcribed using an electronic medical records system. Although proofread, it may and can contain electronic and spelling errors. Other human spelling and other errors may be present. Corrections may be executed at a later time. Please feel free to contact us for any clarifications as needed.

## 2018-01-26 VITALS
RESPIRATION RATE: 20 BRPM | HEART RATE: 78 BPM | SYSTOLIC BLOOD PRESSURE: 158 MMHG | TEMPERATURE: 98.2 F | DIASTOLIC BLOOD PRESSURE: 88 MMHG | OXYGEN SATURATION: 98 %

## 2018-01-26 PROCEDURE — 3331090002 HH PPS REVENUE DEBIT

## 2018-01-26 PROCEDURE — 3331090001 HH PPS REVENUE CREDIT

## 2018-01-27 PROCEDURE — 3331090001 HH PPS REVENUE CREDIT

## 2018-01-27 PROCEDURE — 3331090002 HH PPS REVENUE DEBIT

## 2018-01-28 ENCOUNTER — HOME CARE VISIT (OUTPATIENT)
Dept: SCHEDULING | Facility: HOME HEALTH | Age: 68
End: 2018-01-28
Payer: MEDICARE

## 2018-01-28 VITALS
OXYGEN SATURATION: 98 % | SYSTOLIC BLOOD PRESSURE: 128 MMHG | HEART RATE: 80 BPM | DIASTOLIC BLOOD PRESSURE: 76 MMHG | TEMPERATURE: 98.2 F | RESPIRATION RATE: 20 BRPM

## 2018-01-28 PROCEDURE — 3331090002 HH PPS REVENUE DEBIT

## 2018-01-28 PROCEDURE — 3331090001 HH PPS REVENUE CREDIT

## 2018-01-28 PROCEDURE — G0299 HHS/HOSPICE OF RN EA 15 MIN: HCPCS

## 2018-01-29 PROCEDURE — 3331090001 HH PPS REVENUE CREDIT

## 2018-01-29 PROCEDURE — 3331090002 HH PPS REVENUE DEBIT

## 2018-01-30 ENCOUNTER — HOME CARE VISIT (OUTPATIENT)
Dept: SCHEDULING | Facility: HOME HEALTH | Age: 68
End: 2018-01-30
Payer: MEDICARE

## 2018-01-30 VITALS
RESPIRATION RATE: 20 BRPM | OXYGEN SATURATION: 98 % | DIASTOLIC BLOOD PRESSURE: 96 MMHG | SYSTOLIC BLOOD PRESSURE: 148 MMHG | TEMPERATURE: 98.6 F

## 2018-01-30 PROCEDURE — 3331090002 HH PPS REVENUE DEBIT

## 2018-01-30 PROCEDURE — G0299 HHS/HOSPICE OF RN EA 15 MIN: HCPCS

## 2018-01-30 PROCEDURE — 3331090001 HH PPS REVENUE CREDIT

## 2018-01-31 PROCEDURE — 3331090001 HH PPS REVENUE CREDIT

## 2018-01-31 PROCEDURE — 3331090002 HH PPS REVENUE DEBIT

## 2018-02-01 PROCEDURE — 3331090002 HH PPS REVENUE DEBIT

## 2018-02-01 PROCEDURE — 3331090001 HH PPS REVENUE CREDIT

## 2018-02-02 ENCOUNTER — HOME CARE VISIT (OUTPATIENT)
Dept: SCHEDULING | Facility: HOME HEALTH | Age: 68
End: 2018-02-02
Payer: MEDICARE

## 2018-02-02 PROCEDURE — 3331090002 HH PPS REVENUE DEBIT

## 2018-02-02 PROCEDURE — 3331090001 HH PPS REVENUE CREDIT

## 2018-02-02 PROCEDURE — G0299 HHS/HOSPICE OF RN EA 15 MIN: HCPCS

## 2018-02-03 PROCEDURE — 3331090001 HH PPS REVENUE CREDIT

## 2018-02-03 PROCEDURE — 3331090002 HH PPS REVENUE DEBIT

## 2018-02-04 PROCEDURE — 3331090001 HH PPS REVENUE CREDIT

## 2018-02-04 PROCEDURE — 3331090002 HH PPS REVENUE DEBIT

## 2018-02-05 ENCOUNTER — HOME CARE VISIT (OUTPATIENT)
Dept: SCHEDULING | Facility: HOME HEALTH | Age: 68
End: 2018-02-05
Payer: MEDICARE

## 2018-02-05 VITALS
RESPIRATION RATE: 20 BRPM | DIASTOLIC BLOOD PRESSURE: 88 MMHG | HEART RATE: 78 BPM | TEMPERATURE: 98.2 F | OXYGEN SATURATION: 98 % | SYSTOLIC BLOOD PRESSURE: 148 MMHG

## 2018-02-05 PROCEDURE — 3331090001 HH PPS REVENUE CREDIT

## 2018-02-05 PROCEDURE — 3331090002 HH PPS REVENUE DEBIT

## 2018-02-05 PROCEDURE — G0299 HHS/HOSPICE OF RN EA 15 MIN: HCPCS

## 2018-02-06 VITALS
TEMPERATURE: 98.4 F | OXYGEN SATURATION: 98 % | RESPIRATION RATE: 20 BRPM | SYSTOLIC BLOOD PRESSURE: 136 MMHG | DIASTOLIC BLOOD PRESSURE: 80 MMHG

## 2018-02-06 PROCEDURE — 3331090002 HH PPS REVENUE DEBIT

## 2018-02-06 PROCEDURE — 3331090001 HH PPS REVENUE CREDIT

## 2018-02-07 ENCOUNTER — TELEPHONE (OUTPATIENT)
Dept: SURGERY | Age: 68
End: 2018-02-07

## 2018-02-07 PROCEDURE — 3331090002 HH PPS REVENUE DEBIT

## 2018-02-07 PROCEDURE — 3331090001 HH PPS REVENUE CREDIT

## 2018-02-08 ENCOUNTER — HOME CARE VISIT (OUTPATIENT)
Dept: SCHEDULING | Facility: HOME HEALTH | Age: 68
End: 2018-02-08
Payer: MEDICARE

## 2018-02-08 ENCOUNTER — TELEPHONE (OUTPATIENT)
Dept: SURGERY | Age: 68
End: 2018-02-08

## 2018-02-08 PROCEDURE — G0299 HHS/HOSPICE OF RN EA 15 MIN: HCPCS

## 2018-02-08 PROCEDURE — 3331090002 HH PPS REVENUE DEBIT

## 2018-02-08 PROCEDURE — A9270 NON-COVERED ITEM OR SERVICE: HCPCS

## 2018-02-08 PROCEDURE — A6216 NON-STERILE GAUZE<=16 SQ IN: HCPCS

## 2018-02-08 PROCEDURE — A5120 SKIN BARRIER, WIPE OR SWAB: HCPCS

## 2018-02-08 PROCEDURE — A6199 ALGINATE DRSG WOUND FILLER: HCPCS

## 2018-02-08 PROCEDURE — 3331090001 HH PPS REVENUE CREDIT

## 2018-02-08 NOTE — TELEPHONE ENCOUNTER
Message received to call home health nurse, call placed with no answer. unable to leave voicemail box is full.

## 2018-02-08 NOTE — TELEPHONE ENCOUNTER
Spoke with [facility] nurse advised per MARSHA otero its fine to use aqua-gel

## 2018-02-09 ENCOUNTER — OFFICE VISIT (OUTPATIENT)
Dept: SURGERY | Age: 68
End: 2018-02-09

## 2018-02-09 VITALS
DIASTOLIC BLOOD PRESSURE: 80 MMHG | OXYGEN SATURATION: 96 % | RESPIRATION RATE: 20 BRPM | HEART RATE: 80 BPM | TEMPERATURE: 98.4 F | SYSTOLIC BLOOD PRESSURE: 136 MMHG

## 2018-02-09 VITALS
RESPIRATION RATE: 18 BRPM | BODY MASS INDEX: 36.86 KG/M2 | HEART RATE: 84 BPM | DIASTOLIC BLOOD PRESSURE: 76 MMHG | OXYGEN SATURATION: 96 % | TEMPERATURE: 97.9 F | HEIGHT: 63 IN | WEIGHT: 208 LBS | SYSTOLIC BLOOD PRESSURE: 137 MMHG

## 2018-02-09 DIAGNOSIS — L02.211 ABDOMINAL WALL ABSCESS: Primary | ICD-10-CM

## 2018-02-09 PROCEDURE — 3331090002 HH PPS REVENUE DEBIT

## 2018-02-09 PROCEDURE — 3331090001 HH PPS REVENUE CREDIT

## 2018-02-09 NOTE — PROGRESS NOTES
1. Have you been to the ER, urgent care clinic since your last visit? Hospitalized since your last visit? No    2. Have you seen or consulted any other health care providers outside of the [address] [place] [address] since your last visit? Include any pap smears or colon screening.  No

## 2018-02-09 NOTE — PROGRESS NOTES
Doing quite well  Wound almost closed  Small amount of packing- replaced with new DSD on top  Clean and granulating    RTC in one month if not healed otherwise prn

## 2018-02-09 NOTE — MR AVS SNAPSHOT
[address] [place] [address] [phone] Patient: July Kim MRN: [id number] Visit Information Date & Time Provider Department Dept. Phone Encounter #  
 2/9/2018  9:00 AM Demarcus Barone, [address] [facility] [address] [phone] [id number] Your Appointments 3/29/2018  2:45 PM  
Any with Atiya Paiz MD  
SPORTS MED AND PRIMARY CARE - [facility] Appt Note: 2 month f/u htn [address], [place] [address] [address] [facility]  
  
   
 [address], [place] [address] [place] [address] Upcoming Health Maintenance Date Due HEMOGLOBIN A1C Q6M 6/19/2018 FOOT EXAM Q1 7/20/2018 MEDICARE YEARLY EXAM 7/21/2018 MICROALBUMIN Q1 10/26/2018 EYE EXAM RETINAL OR DILATED Q1 11/22/2018 LIPID PANEL Q1 12/18/2018 BREAST CANCER SCRN MAMMOGRAM 7/21/2019 Pneumococcal 65+ Low/Medium Risk (2 of 2 - PPSV23) 9/18/2019 GLAUCOMA SCREENING Q2Y 11/22/2019 COLONOSCOPY 12/18/2022 DTaP/Tdap/Td series (2 - Td) 12/20/2026 Allergies as of 2/9/2018  Review Complete On: 2/9/2018 By: Will Stinson No Known Allergies Current Immunizations  Never Reviewed Name Date Influenza Vaccine 10/10/2016, 9/18/2014 Pneumococcal Vaccine (Unspecified Type) 9/18/2014 Not reviewed this visit You Were Diagnosed With   
  
 Codes Comments Abdominal wall abscess    -  Primary ICD-10-CM: L02.211 ICD-9-CM: 064. 2 Vitals BP Pulse Temp Resp Height(growth percentile) Weight(growth percentile)  
 137/76 (BP 1 Location: Left arm, BP Patient Position: Sitting) 84 97.9 °F (36.6 °C) (Oral) 18 5' 3\" (1.6 m) 208 lb (94.3 kg) LMP SpO2 BMI OB Status Smoking Status (LMP Unknown) 96% 36.85 kg/m2 Hysterectomy Never Smoker BMI and BSA Data  Body Mass Index Body Surface Area  
 36.85 kg/m 2 2.05 m 2  
  
  
 Preferred Pharmacy Pharmacy Name Phone  [facility] [address] [phone] Your Updated Medication List  
  
   
This list is accurate as of: 2/9/18  9:17 AM.  Always use your most recent med list.  
  
  
  
  
 AFLURIA [id number] 45 mcg (15 mcg x 3)/0.5 mL Susp Generic drug:  flu vaccine [id number](5 yr,up)  
  
 albuterol 90 mcg/actuation inhaler Commonly known as:  PROVENTIL HFA, VENTOLIN HFA, PROAIR HFA Take 2 Puffs by inhalation every four (4) hours as needed for Wheezing. amLODIPine 5 mg tablet Commonly known as:  Ardeen Squire Take 1 Tab by mouth daily. * Blood-Glucose Meter monitoring kit Commonly known as:  [address]   [address] KIT Test 3 times a day * Blood-Glucose Meter monitoring kit Commonly known as:  [address]   [address] KIT Use daily  
  
 cephALEXin 500 mg capsule Commonly known as:  Sharrell Allegra Take 1 Cap by mouth four (4) times daily. collagenase 250 unit/gram ointment Commonly known as:  SANTYL Apply  to affected area daily. furosemide 40 mg tablet Commonly known as:  LASIX Take 1 Tab by mouth daily. glucose blood VI test strips strip Commonly known as:  ONETOUCH ULTRA TEST  
tiuse to test blood sugar three times a day. dx.e11.9 HYDROcodone-acetaminophen 5-325 mg per tablet Commonly known as:  Luster Payor Take 1 Tab by mouth every six (6) hours as needed. Max Daily Amount: 4 Tabs. Indications: Pain  
  
 insulin glargine 100 unit/mL (3 mL) Inpn Commonly known as:  BASAGLAR KWIKPEN  
30 Units by SubCUTAneous route nightly. Insulin Needles (Disposable) 29 gauge x 1/2\" Ndle Commonly known as:  BD INSULIN PEN NEEDLE UF ORIG  
tid  
  
 metFORMIN  mg tablet Commonly known as:  GLUCOPHAGE XR Take 2 Tabs by mouth two (2) times daily (with meals). metoprolol succinate 25 mg XL tablet Commonly known as:  TOPROL-XL Take 1 Tab by mouth daily. naproxen 500 mg tablet Commonly known as:  NAPROSYN Take 1 Tab by mouth two (2) times daily (with meals). omeprazole 40 mg capsule Commonly known as:  PRILOSEC Take 40 mg by mouth daily. * One Touch Delica 33 gauge Misc Generic drug:  lancets * Lancets Misc  
delica one touch  
  
 * ONETOUCH DELICA LANCETS 30 gauge Misc Generic drug:  lancets USE TO TEST BLOOD SUGAR 3 TIMES A DAY * Lancets Misc Commonly known as:  ONETOUCH ULTRASOFT LANCETS  
USE TO TEST THREE TIMES A DAY. Dx.e11.9  
  
 quinapril 40 mg tablet Commonly known as:  ACCUPRIL Take 1 Tab by mouth daily. simvastatin 40 mg tablet Commonly known as:  ZOCOR Take 1 Tab by mouth nightly. * Notice: This list has 6 medication(s) that are the same as other medications prescribed for you. Read the directions carefully, and ask your doctor or other care provider to review them with you. Introducing \[facility]\"" & HEALTH SERVICES! Dear Anita Yanes: Thank you for requesting a [employer] account. Our records indicate that you already have an active [employer] account. You can access your account anytime at [email or web address] Did you know that you can access your hospital and ER discharge instructions at any time in [employer]? You can also review all of your test results from your hospital stay or ER visit. Additional Information If you have questions, please visit the Frequently Asked Questions section of the [employer] website at [email or web address] Remember, [employer] is NOT to be used for urgent needs. For medical emergencies, dial 911. Now available from your iPhone and Android! Please provide this summary of care documentation to your next provider. Your primary care clinician is listed as Keren Zelaya. If you have any questions after today's visit, please call [phone].

## 2018-02-10 PROCEDURE — 3331090001 HH PPS REVENUE CREDIT

## 2018-02-10 PROCEDURE — 3331090002 HH PPS REVENUE DEBIT

## 2018-02-11 ENCOUNTER — HOME CARE VISIT (OUTPATIENT)
Dept: HOME HEALTH SERVICES | Facility: HOME HEALTH | Age: 68
End: 2018-02-11
Payer: MEDICARE

## 2018-02-11 PROCEDURE — 3331090002 HH PPS REVENUE DEBIT

## 2018-02-11 PROCEDURE — 3331090001 HH PPS REVENUE CREDIT

## 2018-02-11 PROCEDURE — G0299 HHS/HOSPICE OF RN EA 15 MIN: HCPCS

## 2018-02-12 PROCEDURE — 3331090002 HH PPS REVENUE DEBIT

## 2018-02-12 PROCEDURE — 3331090001 HH PPS REVENUE CREDIT

## 2018-02-13 ENCOUNTER — HOME CARE VISIT (OUTPATIENT)
Dept: SCHEDULING | Facility: HOME HEALTH | Age: 68
End: 2018-02-13
Payer: MEDICARE

## 2018-02-13 PROCEDURE — 3331090002 HH PPS REVENUE DEBIT

## 2018-02-13 PROCEDURE — 3331090001 HH PPS REVENUE CREDIT

## 2018-02-13 PROCEDURE — G0299 HHS/HOSPICE OF RN EA 15 MIN: HCPCS

## 2018-02-14 VITALS
RESPIRATION RATE: 20 BRPM | HEART RATE: 86 BPM | SYSTOLIC BLOOD PRESSURE: 128 MMHG | DIASTOLIC BLOOD PRESSURE: 80 MMHG | TEMPERATURE: 98.2 F | DIASTOLIC BLOOD PRESSURE: 80 MMHG | OXYGEN SATURATION: 98 % | RESPIRATION RATE: 20 BRPM | OXYGEN SATURATION: 98 % | TEMPERATURE: 98.2 F | HEART RATE: 78 BPM | SYSTOLIC BLOOD PRESSURE: 148 MMHG

## 2018-02-14 PROCEDURE — 3331090001 HH PPS REVENUE CREDIT

## 2018-02-14 PROCEDURE — 3331090002 HH PPS REVENUE DEBIT

## 2018-02-15 ENCOUNTER — HOME CARE VISIT (OUTPATIENT)
Dept: SCHEDULING | Facility: HOME HEALTH | Age: 68
End: 2018-02-15
Payer: MEDICARE

## 2018-02-15 VITALS
DIASTOLIC BLOOD PRESSURE: 70 MMHG | RESPIRATION RATE: 20 BRPM | TEMPERATURE: 98.2 F | HEART RATE: 78 BPM | OXYGEN SATURATION: 98 % | SYSTOLIC BLOOD PRESSURE: 142 MMHG

## 2018-02-15 PROCEDURE — 3331090001 HH PPS REVENUE CREDIT

## 2018-02-15 PROCEDURE — G0299 HHS/HOSPICE OF RN EA 15 MIN: HCPCS

## 2018-02-15 PROCEDURE — 3331090002 HH PPS REVENUE DEBIT

## 2018-02-16 PROCEDURE — 3331090002 HH PPS REVENUE DEBIT

## 2018-02-16 PROCEDURE — 3331090001 HH PPS REVENUE CREDIT

## 2018-02-17 PROCEDURE — 3331090001 HH PPS REVENUE CREDIT

## 2018-02-17 PROCEDURE — 3331090002 HH PPS REVENUE DEBIT

## 2018-02-18 ENCOUNTER — HOME CARE VISIT (OUTPATIENT)
Dept: SCHEDULING | Facility: HOME HEALTH | Age: 68
End: 2018-02-18
Payer: MEDICARE

## 2018-02-18 PROCEDURE — 3331090002 HH PPS REVENUE DEBIT

## 2018-02-18 PROCEDURE — 3331090001 HH PPS REVENUE CREDIT

## 2018-02-18 PROCEDURE — G0299 HHS/HOSPICE OF RN EA 15 MIN: HCPCS

## 2018-02-19 VITALS
SYSTOLIC BLOOD PRESSURE: 142 MMHG | HEART RATE: 78 BPM | RESPIRATION RATE: 18 BRPM | TEMPERATURE: 98.1 F | OXYGEN SATURATION: 98 % | DIASTOLIC BLOOD PRESSURE: 80 MMHG

## 2018-02-19 PROCEDURE — 3331090002 HH PPS REVENUE DEBIT

## 2018-02-19 PROCEDURE — 3331090001 HH PPS REVENUE CREDIT

## 2018-02-20 ENCOUNTER — HOME CARE VISIT (OUTPATIENT)
Dept: SCHEDULING | Facility: HOME HEALTH | Age: 68
End: 2018-02-20
Payer: MEDICARE

## 2018-02-20 PROCEDURE — 3331090002 HH PPS REVENUE DEBIT

## 2018-02-20 PROCEDURE — G0299 HHS/HOSPICE OF RN EA 15 MIN: HCPCS

## 2018-02-20 PROCEDURE — 3331090001 HH PPS REVENUE CREDIT

## 2018-02-21 VITALS
HEART RATE: 78 BPM | DIASTOLIC BLOOD PRESSURE: 78 MMHG | SYSTOLIC BLOOD PRESSURE: 126 MMHG | TEMPERATURE: 98.2 F | OXYGEN SATURATION: 98 % | RESPIRATION RATE: 20 BRPM

## 2018-02-21 PROCEDURE — 3331090002 HH PPS REVENUE DEBIT

## 2018-02-21 PROCEDURE — 3331090001 HH PPS REVENUE CREDIT

## 2018-02-22 ENCOUNTER — HOME CARE VISIT (OUTPATIENT)
Dept: SCHEDULING | Facility: HOME HEALTH | Age: 68
End: 2018-02-22
Payer: MEDICARE

## 2018-02-22 PROCEDURE — 3331090001 HH PPS REVENUE CREDIT

## 2018-02-22 PROCEDURE — G0299 HHS/HOSPICE OF RN EA 15 MIN: HCPCS

## 2018-02-22 PROCEDURE — 3331090002 HH PPS REVENUE DEBIT

## 2018-02-23 VITALS
TEMPERATURE: 98.2 F | OXYGEN SATURATION: 98 % | HEART RATE: 78 BPM | SYSTOLIC BLOOD PRESSURE: 152 MMHG | RESPIRATION RATE: 20 BRPM | DIASTOLIC BLOOD PRESSURE: 86 MMHG

## 2018-02-23 PROCEDURE — 3331090001 HH PPS REVENUE CREDIT

## 2018-02-23 PROCEDURE — 3331090002 HH PPS REVENUE DEBIT

## 2018-02-24 PROCEDURE — 3331090002 HH PPS REVENUE DEBIT

## 2018-02-24 PROCEDURE — 3331090001 HH PPS REVENUE CREDIT

## 2018-02-25 ENCOUNTER — HOME CARE VISIT (OUTPATIENT)
Dept: SCHEDULING | Facility: HOME HEALTH | Age: 68
End: 2018-02-25
Payer: MEDICARE

## 2018-02-25 PROCEDURE — G0299 HHS/HOSPICE OF RN EA 15 MIN: HCPCS

## 2018-02-25 PROCEDURE — 3331090002 HH PPS REVENUE DEBIT

## 2018-02-25 PROCEDURE — 3331090001 HH PPS REVENUE CREDIT

## 2018-02-25 PROCEDURE — 3331090003 HH PPS REVENUE ADJ

## 2018-02-26 VITALS
SYSTOLIC BLOOD PRESSURE: 132 MMHG | HEART RATE: 78 BPM | OXYGEN SATURATION: 98 % | DIASTOLIC BLOOD PRESSURE: 80 MMHG | RESPIRATION RATE: 20 BRPM | TEMPERATURE: 98.2 F

## 2018-02-26 PROCEDURE — 3331090002 HH PPS REVENUE DEBIT

## 2018-02-26 PROCEDURE — 3331090001 HH PPS REVENUE CREDIT

## 2018-02-27 ENCOUNTER — HOME CARE VISIT (OUTPATIENT)
Dept: SCHEDULING | Facility: HOME HEALTH | Age: 68
End: 2018-02-27
Payer: MEDICARE

## 2018-02-27 PROCEDURE — G0299 HHS/HOSPICE OF RN EA 15 MIN: HCPCS

## 2018-02-27 PROCEDURE — 400014 HH F/U

## 2018-02-27 PROCEDURE — 3331090002 HH PPS REVENUE DEBIT

## 2018-02-27 PROCEDURE — 3331090001 HH PPS REVENUE CREDIT

## 2018-02-28 VITALS
HEART RATE: 78 BPM | DIASTOLIC BLOOD PRESSURE: 80 MMHG | SYSTOLIC BLOOD PRESSURE: 132 MMHG | OXYGEN SATURATION: 98 % | TEMPERATURE: 98.4 F | RESPIRATION RATE: 20 BRPM

## 2018-02-28 PROCEDURE — 3331090002 HH PPS REVENUE DEBIT

## 2018-02-28 PROCEDURE — 3331090001 HH PPS REVENUE CREDIT

## 2018-03-01 ENCOUNTER — HOME CARE VISIT (OUTPATIENT)
Dept: SCHEDULING | Facility: HOME HEALTH | Age: 68
End: 2018-03-01
Payer: MEDICARE

## 2018-03-01 PROCEDURE — G0299 HHS/HOSPICE OF RN EA 15 MIN: HCPCS

## 2018-03-01 PROCEDURE — 3331090001 HH PPS REVENUE CREDIT

## 2018-03-01 PROCEDURE — 3331090002 HH PPS REVENUE DEBIT

## 2018-03-02 ENCOUNTER — HOSPITAL ENCOUNTER (OUTPATIENT)
Dept: MAMMOGRAPHY | Age: 68
Discharge: HOME | End: 2018-03-02
Attending: INTERNAL MEDICINE
Payer: MEDICARE

## 2018-03-02 VITALS
SYSTOLIC BLOOD PRESSURE: 134 MMHG | OXYGEN SATURATION: 98 % | HEART RATE: 76 BPM | DIASTOLIC BLOOD PRESSURE: 80 MMHG | TEMPERATURE: 98.1 F | RESPIRATION RATE: 20 BRPM

## 2018-03-02 DIAGNOSIS — Z12.39 SCREENING BREAST EXAMINATION: ICD-10-CM

## 2018-03-02 PROCEDURE — 77067 SCR MAMMO BI INCL CAD: CPT

## 2018-03-02 PROCEDURE — 3331090002 HH PPS REVENUE DEBIT

## 2018-03-02 PROCEDURE — 3331090001 HH PPS REVENUE CREDIT

## 2018-03-02 RX ORDER — QUINAPRIL 40 MG/1
40 TABLET ORAL DAILY
Qty: 90 TAB | Refills: 3 | Status: SHIPPED | OUTPATIENT
Start: 2018-03-02 | End: 2018-03-10

## 2018-03-02 RX ORDER — METOPROLOL SUCCINATE 25 MG/1
25 TABLET, EXTENDED RELEASE ORAL DAILY
Qty: 90 TAB | Refills: 3 | Status: ON HOLD | OUTPATIENT
Start: 2018-03-02 | End: 2018-03-10

## 2018-03-02 RX ORDER — PEN NEEDLE, DIABETIC 29 G X1/2"
NEEDLE, DISPOSABLE MISCELLANEOUS
Qty: 100 PEN NEEDLE | Refills: 3 | Status: SHIPPED | OUTPATIENT
Start: 2018-03-02 | End: 2018-03-07

## 2018-03-02 RX ORDER — SIMVASTATIN 40 MG/1
40 TABLET, FILM COATED ORAL
Qty: 90 TAB | Refills: 3 | Status: SHIPPED | OUTPATIENT
Start: 2018-03-02 | End: 2019-04-08 | Stop reason: SDUPTHER

## 2018-03-02 RX ORDER — METFORMIN HYDROCHLORIDE 500 MG/1
1000 TABLET, EXTENDED RELEASE ORAL 2 TIMES DAILY WITH MEALS
Qty: 360 TAB | Refills: 3 | Status: SHIPPED | OUTPATIENT
Start: 2018-03-02 | End: 2018-03-10

## 2018-03-02 RX ORDER — FUROSEMIDE 40 MG/1
40 TABLET ORAL DAILY
Qty: 90 TAB | Refills: 3 | Status: SHIPPED | OUTPATIENT
Start: 2018-03-02 | End: 2018-03-10

## 2018-03-03 PROCEDURE — 3331090001 HH PPS REVENUE CREDIT

## 2018-03-03 PROCEDURE — 3331090002 HH PPS REVENUE DEBIT

## 2018-03-04 ENCOUNTER — HOME CARE VISIT (OUTPATIENT)
Dept: SCHEDULING | Facility: HOME HEALTH | Age: 68
End: 2018-03-04
Payer: MEDICARE

## 2018-03-04 PROCEDURE — G0299 HHS/HOSPICE OF RN EA 15 MIN: HCPCS

## 2018-03-04 PROCEDURE — 3331090001 HH PPS REVENUE CREDIT

## 2018-03-04 PROCEDURE — 3331090002 HH PPS REVENUE DEBIT

## 2018-03-05 VITALS
TEMPERATURE: 98.2 F | OXYGEN SATURATION: 98 % | RESPIRATION RATE: 20 BRPM | DIASTOLIC BLOOD PRESSURE: 88 MMHG | SYSTOLIC BLOOD PRESSURE: 142 MMHG | HEART RATE: 78 BPM

## 2018-03-05 PROCEDURE — 3331090001 HH PPS REVENUE CREDIT

## 2018-03-05 PROCEDURE — 3331090002 HH PPS REVENUE DEBIT

## 2018-03-06 ENCOUNTER — HOME CARE VISIT (OUTPATIENT)
Dept: SCHEDULING | Facility: HOME HEALTH | Age: 68
End: 2018-03-06
Payer: MEDICARE

## 2018-03-06 ENCOUNTER — OFFICE VISIT (OUTPATIENT)
Dept: INTERNAL MEDICINE CLINIC | Age: 68
End: 2018-03-06

## 2018-03-06 VITALS
BODY MASS INDEX: 36.71 KG/M2 | OXYGEN SATURATION: 98 % | TEMPERATURE: 98.3 F | RESPIRATION RATE: 18 BRPM | DIASTOLIC BLOOD PRESSURE: 84 MMHG | WEIGHT: 207.2 LBS | HEIGHT: 63 IN | SYSTOLIC BLOOD PRESSURE: 155 MMHG | HEART RATE: 77 BPM

## 2018-03-06 DIAGNOSIS — K52.9 GASTROENTERITIS: Primary | ICD-10-CM

## 2018-03-06 DIAGNOSIS — I10 ESSENTIAL HYPERTENSION: ICD-10-CM

## 2018-03-06 DIAGNOSIS — Z79.4 TYPE 2 DIABETES MELLITUS WITHOUT COMPLICATION, WITH LONG-TERM CURRENT USE OF INSULIN (HCC): ICD-10-CM

## 2018-03-06 DIAGNOSIS — E78.00 HYPERCHOLESTEROLEMIA: ICD-10-CM

## 2018-03-06 DIAGNOSIS — E11.9 TYPE 2 DIABETES MELLITUS WITHOUT COMPLICATION, WITH LONG-TERM CURRENT USE OF INSULIN (HCC): ICD-10-CM

## 2018-03-06 PROCEDURE — G0299 HHS/HOSPICE OF RN EA 15 MIN: HCPCS

## 2018-03-06 PROCEDURE — 3331090001 HH PPS REVENUE CREDIT

## 2018-03-06 PROCEDURE — 3331090002 HH PPS REVENUE DEBIT

## 2018-03-06 RX ORDER — PROMETHAZINE HYDROCHLORIDE 25 MG/1
25 TABLET ORAL
Qty: 30 TAB | Refills: 2 | Status: SHIPPED | OUTPATIENT
Start: 2018-03-06 | End: 2018-03-10

## 2018-03-06 NOTE — ACP (ADVANCE CARE PLANNING)

## 2018-03-06 NOTE — MR AVS SNAPSHOT
[facility]. [place] [address] [address] 
[phone] Patient: Candida Calixto MRN: [id number] MICHELLE:[birth year] Visit Information Date & Time Provider Department Dept. Phone Encounter #  
 3/6/2018 12:15 PM Coty Munoz 80 [facility] [phone] [id number] Follow-up Instructions Return in about 1 week (around 3/13/2018). Follow-up and Disposition History Your Appointments 3/29/2018  2:45 PM  
Any with Nafisa Sullivan MD  
[facility] - [address] ([address]) Appt Note: 2 month f/u htn [address], [place] [address] [facility] 98  
  
   
 [address], [place] [address] [address] [place] Upcoming Health Maintenance Date Due HEMOGLOBIN A1C Q6M 6/19/2018 FOOT EXAM Q1 7/20/2018 MEDICARE YEARLY EXAM 7/21/2018 MICROALBUMIN Q1 10/26/2018 EYE EXAM RETINAL OR DILATED Q1 11/22/2018 LIPID PANEL Q1 12/18/2018 Pneumococcal 65+ Low/Medium Risk (2 of 2 - PPSV23) 9/18/2019 GLAUCOMA SCREENING Q2Y 11/22/2019 BREAST CANCER SCRN MAMMOGRAM 3/2/2020 COLONOSCOPY 12/18/2022 DTaP/Tdap/Td series (2 - Td) 12/20/2026 Allergies as of 3/6/2018  Review Complete On: 3/6/2018 By: Nafisa Sullivan MD  
 No Known Allergies Current Immunizations  Never Reviewed Name Date Influenza Vaccine 10/10/2016, 9/18/2014 Pneumococcal Vaccine (Unspecified Type) 9/18/2014 Not reviewed this visit You Were Diagnosed With   
  
 Codes Comments Gastroenteritis    -  Primary ICD-10-CM: K52.9 ICD-9-CM: 558. 9 Type 2 diabetes mellitus without complication, with long-term current use of insulin (HCC)     ICD-10-CM: E11.9, Z79.4 ICD-9-CM: 250.00, V58.67 Hypercholesterolemia     ICD-10-CM: E78.00 ICD-9-CM: 272.0 Essential hypertension     ICD-10-CM: I10 
ICD-9-CM: 401.9 Vitals BP Pulse Temp Resp Height(growth percentile) Weight(growth percentile) 155/84 77 98.3 °F (36.8 °C) (Oral) 18 5' 3\" (1.6 m) 207 lb 3.2 oz (94 kg) LMP SpO2 BMI OB Status Smoking Status (LMP Unknown) 98% 36.7 kg/m2 Hysterectomy Never Smoker Vitals History BMI and BSA Data Body Mass Index Body Surface Area 36.7 kg/m 2 2.04 m 2 Preferred Pharmacy Pharmacy Name Phone [id number] [address] [phone] Your Updated Medication List  
  
   
This list is accurate as of 3/6/18  2:38 PM.  Always use your most recent med list.  
  
  
  
  
 AFLURIA [id number] 45 mcg (15 mcg x 3)/0.5 mL Susp Generic drug:  flu vaccine [id number](5 yr,up)  
  
 albuterol 90 mcg/actuation inhaler Commonly known as:  PROVENTIL HFA, VENTOLIN HFA, PROAIR HFA Take 2 Puffs by inhalation every four (4) hours as needed for Wheezing. amLODIPine 5 mg tablet Commonly known as:  Beth Torres Take 1 Tab by mouth daily. * Blood-Glucose Meter monitoring kit Commonly known as:  [address]   [address] KIT Test 3 times a day * Blood-Glucose Meter monitoring kit Commonly known as:  [address]   [address] KIT Use daily  
  
 collagenase 250 unit/gram ointment Commonly known as:  SANTYL Apply  to affected area daily. furosemide 40 mg tablet Commonly known as:  LASIX Take 1 Tab by mouth daily. glucose blood VI test strips strip Commonly known as:  ONETOUCH ULTRA TEST  
tiuse to test blood sugar three times a day. dx.e11.9  
  
 insulin glargine 100 unit/mL (3 mL) Inpn Commonly known as:  BASAGLAR KWIKPEN U-100 INSULIN  
30 Units by SubCUTAneous route nightly. Insulin Needles (Disposable) 29 gauge x 1/2\" Ndle Commonly known as:  BD INSULIN PEN NEEDLE UF ORIG  
tid  
  
 metFORMIN  mg tablet Commonly known as:  GLUCOPHAGE XR Take 2 Tabs by mouth two (2) times daily (with meals). metoprolol succinate 25 mg XL tablet Commonly known as:  TOPROL-XL Take 1 Tab by mouth daily. naproxen 500 mg tablet Commonly known as:  NAPROSYN Take 1 Tab by mouth two (2) times daily (with meals). omeprazole 40 mg capsule Commonly known as:  PRILOSEC Take 40 mg by mouth daily. * One Touch Delica 33 gauge Misc Generic drug:  lancets * Lancets Misc  
delica one touch  
  
 * ONETOUCH DELICA LANCETS 30 gauge Misc Generic drug:  lancets USE TO TEST BLOOD SUGAR 3 TIMES A DAY * Lancets Misc Commonly known as:  ONETOUCH ULTRASOFT LANCETS  
USE TO TEST THREE TIMES A DAY. Dx.e11.9 promethazine 25 mg tablet Commonly known as:  PHENERGAN Take 1 Tab by mouth every six (6) hours as needed for Nausea. quinapril 40 mg tablet Commonly known as:  ACCUPRIL Take 1 Tab by mouth daily. simvastatin 40 mg tablet Commonly known as:  ZOCOR Take 1 Tab by mouth nightly. * Notice: This list has 6 medication(s) that are the same as other medications prescribed for you. Read the directions carefully, and ask your doctor or other care provider to review them with you. Prescriptions Sent to Pharmacy Refills  
 promethazine (PHENERGAN) 25 mg tablet 2 Sig: Take 1 Tab by mouth every six (6) hours as needed for Nausea. Class: Normal  
 Pharmacy: [address] [place] [address] #: [phone] Route: Oral  
  
We Performed the Following CBC WITH AUTOMATED DIFF [06065 CPT(R)] COLLECTION VENOUS BLOOD,VENIPUNCTURE [id number] CPT(R)] HEMOGLOBIN A1C WITH EAG [38629 CPT(R)] METABOLIC PANEL, BASIC [43488 CPT(R)] Follow-up Instructions Return in about 1 week (around 3/13/2018). To-Do List   
 03/09/2018 To Be Determined Appointment with Chester Peterson RN at [place] [address] Patient Instructions Body Mass Index: Care Instructions Your Care Instructions Body mass index (BMI) can help you see if your weight is raising your risk for health problems. It uses a formula to compare how much you weigh with how tall you are. · A BMI lower than 18.5 is considered underweight. · A BMI between 18.5 and 24.9 is considered healthy. · A BMI between 25 and 29.9 is considered overweight. A BMI of 30 or higher is considered obese. If your BMI is in the normal range, it means that you have a lower risk for weight-related health problems. If your BMI is in the overweight or obese range, you may be at increased risk for weight-related health problems, such as high blood pressure, heart disease, stroke, arthritis or joint pain, and diabetes. If your BMI is in the underweight range, you may be at increased risk for health problems such as fatigue, lower protection (immunity) against illness, muscle loss, bone loss, hair loss, and hormone problems. BMI is just one measure of your risk for weight-related health problems. You may be at higher risk for health problems if you are not active, you eat an unhealthy diet, or you drink too much alcohol or use tobacco products. Follow-up care is a key part of your treatment and safety. Be sure to make and go to all appointments, and call your doctor if you are having problems. It's also a good idea to know your test results and keep a list of the medicines you take. How can you care for yourself at home? · Practice healthy eating habits. This includes eating plenty of fruits, vegetables, whole grains, lean protein, and low-fat dairy. · If your doctor recommends it, get more exercise. Walking is a good choice. Bit by bit, increase the amount you walk every day. Try for at least 30 minutes on most days of the week. · Do not smoke. Smoking can increase your risk for health problems.  If you need help quitting, talk to your doctor about stop-smoking programs and medicines. These can increase your chances of quitting for good. · Limit alcohol to 2 drinks a day for men and 1 drink a day for women. Too much alcohol can cause health problems. If you have a BMI higher than 25 · Your doctor may do other tests to check your risk for weight-related health problems. This may include measuring the distance around your waist. A waist measurement of more than 40 inches in men or 35 inches in women can increase the risk of weight-related health problems. · Talk with your doctor about steps you can take to stay healthy or improve your health. You may need to make lifestyle changes to lose weight and stay healthy, such as changing your diet and getting regular exercise. If you have a BMI lower than 18.5 · Your doctor may do other tests to check your risk for health problems. · Talk with your doctor about steps you can take to stay healthy or improve your health. You may need to make lifestyle changes to gain or maintain weight and stay healthy, such as getting more healthy foods in your diet and doing exercises to build muscle. Where can you learn more? Go to http://king-charles.info/. Enter S176 in the search box to learn more about \"Body Mass Index: Care Instructions. \" Current as of: October 13, 2016 Content Version: 11.4 © 6208-8396 Healthwise, Incorporated. Care instructions adapted under license by [facility] (which disclaims liability or warranty for this information). If you have questions about a medical condition or this instruction, always ask your healthcare professional. [address] any warranty or liability for your use of this information. Introducing [facility]! Dear Tg Doss: Thank you for requesting a [employer] account. Our records indicate that you already have an active [employer] account. You can access your account anytime at [email or web address]. [email or web address]/[employer] Did you know that you can access your hospital and ER discharge instructions at any time in [employer]? You can also review all of your test results from your hospital stay or ER visit. Additional Information If you have questions, please visit the Frequently Asked Questions section of the [employer] website at [email or web address] Remember, [employer] is NOT to be used for urgent needs. For medical emergencies, dial 911. Now available from your iPhone and Android! Please provide this summary of care documentation to your next provider. Your primary care clinician is listed as Audrey Cox. If you have any questions after today's visit, please call [phone].

## 2018-03-06 NOTE — PATIENT INSTRUCTIONS
Body Mass Index: Care Instructions  Your Care Instructions    Body mass index (BMI) can help you see if your weight is raising your risk for health problems. It uses a formula to compare how much you weigh with how tall you are. · A BMI lower than 18.5 is considered underweight. · A BMI between 18.5 and 24.9 is considered healthy. · A BMI between 25 and 29.9 is considered overweight. A BMI of 30 or higher is considered obese. If your BMI is in the normal range, it means that you have a lower risk for weight-related health problems. If your BMI is in the overweight or obese range, you may be at increased risk for weight-related health problems, such as high blood pressure, heart disease, stroke, arthritis or joint pain, and diabetes. If your BMI is in the underweight range, you may be at increased risk for health problems such as fatigue, lower protection (immunity) against illness, muscle loss, bone loss, hair loss, and hormone problems. BMI is just one measure of your risk for weight-related health problems. You may be at higher risk for health problems if you are not active, you eat an unhealthy diet, or you drink too much alcohol or use tobacco products. Follow-up care is a key part of your treatment and safety. Be sure to make and go to all appointments, and call your doctor if you are having problems. It's also a good idea to know your test results and keep a list of the medicines you take. How can you care for yourself at home? · Practice healthy eating habits. This includes eating plenty of fruits, vegetables, whole grains, lean protein, and low-fat dairy. · If your doctor recommends it, get more exercise. Walking is a good choice. Bit by bit, increase the amount you walk every day. Try for at least 30 minutes on most days of the week. · Do not smoke. Smoking can increase your risk for health problems. If you need help quitting, talk to your doctor about stop-smoking programs and medicines. These can increase your chances of quitting for good. · Limit alcohol to 2 drinks a day for men and 1 drink a day for women. Too much alcohol can cause health problems. If you have a BMI higher than 25  · Your doctor may do other tests to check your risk for weight-related health problems. This may include measuring the distance around your waist. A waist measurement of more than 40 inches in men or 35 inches in women can increase the risk of weight-related health problems. · Talk with your doctor about steps you can take to stay healthy or improve your health. You may need to make lifestyle changes to lose weight and stay healthy, such as changing your diet and getting regular exercise. If you have a BMI lower than 18.5  · Your doctor may do other tests to check your risk for health problems. · Talk with your doctor about steps you can take to stay healthy or improve your health. You may need to make lifestyle changes to gain or maintain weight and stay healthy, such as getting more healthy foods in your diet and doing exercises to build muscle. Where can you learn more? Go to http://king-charles.info/. Enter S176 in the search box to learn more about \"Body Mass Index: Care Instructions. \"  Current as of: October 13, 2016  Content Version: 11.4  © 1870-7183 Healthwise, Incorporated. Care instructions adapted under license by [employer] (which disclaims liability or warranty for this information). If you have questions about a medical condition or this instruction, always ask your healthcare professional. [address] any warranty or liability for your use of this information.

## 2018-03-06 NOTE — PROGRESS NOTES
1. Have you been to the ER, urgent care clinic since your last visit? Hospitalized since your last visit? No    2. Have you seen or consulted any other health care providers outside of the [address] [place] [address] since your last visit? Include any pap smears or colon screening.  No    f  Complaints of low blood sugar,nausea,vomiting, weak and no appetite

## 2018-03-06 NOTE — PROGRESS NOTES
[facility]  Anita Bryant MD, [address] [place] [address],[address] [address]  Phone:  [phone]  Fax: [phone]       Chief Complaint   Patient presents with    Diabetes   . SUBJECTIVE:    Aaron Smith is a 78 y.o. female The patient returns today with known history of diabetes mellitus type 2, abdominal wall abscess, degenerative joint disease of the knee, dyslipidemia, primary hypertension and lumbar stenosis. Since we last saw her, she was seen by Sarah Beatty M.D. who noted the wound was almost closed. A small amount of packing was replaced with new DSD on top. It was cleaned and granulating in. He would see her on a p.r.n. basis, but if not healed in one month, see her in one month. The patient returns today stating she is not feeling well. She was doing okay until Sunday when she noted the onset of upset stomach, anorexia, some loose stools, but no actual diarrhea. This morning was the first time she actually had an episode of nausea and vomiting. She just is not up to par. She does not recall anything she could have consumed that was inappropriate or not good. She is seen for evaluation. In spite of the nausea and vomiting, the patient took her medications this morning for her diabetes and is seen for evaluation. Current Outpatient Prescriptions   Medication Sig Dispense Refill    promethazine (PHENERGAN) 25 mg tablet Take 1 Tab by mouth every six (6) hours as needed for Nausea. 30 Tab 2    furosemide (LASIX) 40 mg tablet Take 1 Tab by mouth daily. 90 Tab 3    metoprolol succinate (TOPROL-XL) 25 mg XL tablet Take 1 Tab by mouth daily. 90 Tab 3    metFORMIN ER (GLUCOPHAGE XR) 500 mg tablet Take 2 Tabs by mouth two (2) times daily (with meals). 360 Tab 3    Insulin Needles, Disposable, (BD INSULIN PEN NEEDLE UF ORIG) 29 gauge x 1/2\" ndle tid 100 Pen Needle 3    simvastatin (ZOCOR) 40 mg tablet Take 1 Tab by mouth nightly.  90 Tab 3  quinapril (ACCUPRIL) 40 mg tablet Take 1 Tab by mouth daily. 90 Tab 3    insulin glargine (BASAGLAR KWIKPEN) 100 unit/mL (3 mL) inpn 30 Units by SubCUTAneous route nightly. 15 Pen 11    naproxen (NAPROSYN) 500 mg tablet Take 1 Tab by mouth two (2) times daily (with meals). 180 Tab 3    collagenase (SANTYL) 250 unit/gram ointment Apply  to affected area daily. 15 g 0    Lancets (ONETOUCH ULTRASOFT LANCETS) misc USE TO TEST THREE TIMES A DAY. Dx.e11.9 300 Each 11    ONETOUCH DELICA LANCETS 30 gauge misc USE TO TEST BLOOD SUGAR 3 TIMES A DAY  3    glucose blood VI test strips (ONETOUCH ULTRA TEST) strip tiuse to test blood sugar three times a day. dx.e11.9 300 Strip 3    amLODIPine (NORVASC) 5 mg tablet Take 1 Tab by mouth daily. 90 Tab 3    Lancets misc delica one touch 1 Each 11    albuterol (PROVENTIL HFA, VENTOLIN HFA, PROAIR HFA) 90 mcg/actuation inhaler Take 2 Puffs by inhalation every four (4) hours as needed for Wheezing. 1 Inhaler 0    ONE TOUCH DELICA 33 gauge misc       Blood-Glucose Meter (ONETOUCH ULTRA SYSTEM KIT) monitoring kit Use daily 1 Kit 0    Blood-Glucose Meter (ONETOUCH ULTRA SYSTEM KIT) monitoring kit Test 3 times a day 1 Kit 0    omeprazole (PRILOSEC) 40 mg capsule Take 40 mg by mouth daily.       AFLURIA [id number] 45 mcg (15 mcg x 3)/0.5 mL susp   0     Past Medical History:   Diagnosis Date    Cellulitis of right abdominal wall 12/18/2017    letty whatley md    Diabetes ([facility] [address] 1995    DJD (degenerative joint disease) of knee     Gastroenteritis     Hypercholesterolemia     Hypertension     LBP (low back pain)     chiorpactor    Lumbar stenosis     Obesity     S/P colonoscopy 12-18-12     Past Surgical History:   Procedure Laterality Date    HX BREAST BIOPSY Right 1990    negative    HX COLONOSCOPY      HX HYSTERECTOMY      HX OOPHORECTOMY N/A     HX OTHER SURGICAL  12/23/2017    I&D infected abscess      No Known Allergies      REVIEW OF SYSTEMS:  General: negative for - chills or fever  ENT: negative for - headaches, nasal congestion or tinnitus  Respiratory: negative for - cough, hemoptysis, shortness of breath or wheezing  Cardiovascular : negative for - chest pain, edema, palpitations or shortness of breath  Gastrointestinal: negative for - abdominal pain, blood in stools, heartburn or nausea/vomiting  Genito-Urinary: no dysuria, trouble voiding, or hematuria  Musculoskeletal: negative for - gait disturbance, joint pain, joint stiffness or joint swelling  Neurological: no TIA or stroke symptoms  Hematologic: no bruises, no bleeding, no swollen glands  Integument: no lumps, mole changes, nail changes or rash  Endocrine: no malaise/lethargy or unexpected weight changes      Social History     Social History    Marital status: SINGLE     Spouse name: N/A    Number of children: N/A    Years of education: N/A     Social History Main Topics    Smoking status: Never Smoker    Smokeless tobacco: Never Used    Alcohol use No    Drug use: No    Sexual activity: Not Currently     Other Topics Concern    None     Social History Narrative    Medical History: Diverticulosis 2002DM 1995primary HTN 1995Obesity 1995Dyslipidemia 1995January 2012 moderate central stenosis L3-L4,    mild to moderate central stenosis at L4-L5 MRI    Gyn History: Last mammogram date 2013. Last menstrual perioddate hysterectomy  FOR FIBROIDS, PT W ITH ONE OVARY. Last    papdate . Menopausal hot flashes. Sexually active not currently sexually active. History of abnormal pap smears none. History of STDs    none. Vaginal discharge or odor none. HysterectomyDate NONE.    OB History: Total pregnancies 1. Pre term deliveries (>20-36.6 w eeks) 1. Surgical History: [facility] right breast bx , kidney stones colonoscopy     Hospitalization/Major Diagnostic Procedure: [facility] right breast bx , nicole paulino     Family History:  Mother:  52 yrs, skin d/oFather: deceasedAunt: alive, lung cancerAdopted: alive    Social History: Alcohol Use Patient does not use alcohol. Smoking Status Patient is a never smoker. Marital Status: Single. Lives w ith:    alone. Education/School: has masters degree-[employer]. Family History   Problem Relation Age of Onset    Cancer Mother     No Known Problems Father        OBJECTIVE:    Visit Vitals    /84    Pulse 77    Temp 98.3 °F (36.8 °C) (Oral)    Resp 18    Ht 5' 3\" (1.6 m)    Wt 207 lb 3.2 oz (94 kg)    LMP  (LMP Unknown)    SpO2 98%    BMI 36.7 kg/m2     CONSTITUTIONAL: well , well nourished, appears age appropriate  EYES: perrla, eom intact  ENMT:moist mucous membranes, pharynx clear  NECK: supple. Thyroid normal  RESPIRATORY: Chest: clear bilaterally   CARDIOVASCULAR: Heart: regular rate and rhythm  GASTROINTESTINAL: Abdomen: soft, bowel sounds active  HEMATOLOGIC: no pathological lymph nodes palpated  MUSCULOSKELETAL: Extremities: no edema, pulse 1+   INTEGUMENT: No unusual rashes or suspicious skin lesions noted. Nails appear normal.  NEUROLOGIC: non-focal exam   MENTAL STATUS: alert and oriented, appropriate affect           ASSESSMENT:  1. Gastroenteritis    2. Type 2 diabetes mellitus without complication, with long-term current use of insulin ([address]    3. Hypercholesterolemia    4. Essential hypertension      The patient is having a hypoglycemic blood sugar currently at 52. We will give her Glucola and she will have Glucola until her blood sugar is over 100. For the symptoms she has currently, I think this is a viral gastroenteritis and we will treat her accordingly. We suggested that if she develops diarrhea, she use Pepto-Bismol. For the nausea and vomiting, we will give her some Promethazine and if she develops other symptoms, she will call us and we will address them accordingly. Blood pressure elevation as noted, but when the nurse took it this morning it was 478 systolic.       BMI is discussed below. We will ask her to come back in a week. If she is feeling okay, she can come back in three months. She will continue to drink her Glucola until the blood sugar is over 100, at which point we can let her go home. Appropriate laboratory studies will be requested. She will be back to see us in a week. PLAN:  .  Orders Placed This Encounter    CBC WITH AUTOMATED DIFF    METABOLIC PANEL, BASIC    HEMOGLOBIN A1C WITH EAG    promethazine (PHENERGAN) 25 mg tablet       Follow-up Disposition:  Return in about 1 week (around 3/13/2018). ATTENTION:   This medical record was transcribed using an electronic medical records system. Although proofread, it may and can contain electronic and spelling errors. Other human spelling and other errors may be present. Corrections may be executed at a later time. Please feel free to contact us for any clarifications as needed.

## 2018-03-07 ENCOUNTER — APPOINTMENT (OUTPATIENT)
Dept: GENERAL RADIOLOGY | Age: 68
DRG: 683 | End: 2018-03-07
Attending: PHYSICIAN ASSISTANT
Payer: MEDICARE

## 2018-03-07 ENCOUNTER — HOSPITAL ENCOUNTER (INPATIENT)
Age: 68
LOS: 3 days | Discharge: HOME HEALTH | DRG: 683 | End: 2018-03-10
Attending: EMERGENCY MEDICINE | Admitting: INTERNAL MEDICINE
Payer: MEDICARE

## 2018-03-07 ENCOUNTER — HOME CARE VISIT (OUTPATIENT)
Dept: HOME HEALTH SERVICES | Facility: HOME HEALTH | Age: 68
End: 2018-03-07
Payer: MEDICARE

## 2018-03-07 ENCOUNTER — APPOINTMENT (OUTPATIENT)
Dept: ULTRASOUND IMAGING | Age: 68
DRG: 683 | End: 2018-03-07
Attending: PHYSICIAN ASSISTANT
Payer: MEDICARE

## 2018-03-07 VITALS
RESPIRATION RATE: 20 BRPM | OXYGEN SATURATION: 98 % | SYSTOLIC BLOOD PRESSURE: 142 MMHG | TEMPERATURE: 98.2 F | DIASTOLIC BLOOD PRESSURE: 80 MMHG | HEART RATE: 86 BPM

## 2018-03-07 DIAGNOSIS — N17.9 ACUTE RENAL FAILURE, UNSPECIFIED ACUTE RENAL FAILURE TYPE (HCC): Primary | ICD-10-CM

## 2018-03-07 LAB
ALBUMIN SERPL-MCNC: 3.7 G/DL (ref 3.5–5)
ALBUMIN/GLOB SERPL: 0.8 {RATIO} (ref 1.1–2.2)
ALP SERPL-CCNC: 54 U/L (ref 45–117)
ALT SERPL-CCNC: 20 U/L (ref 12–78)
ANION GAP SERPL CALC-SCNC: 10 MMOL/L (ref 5–15)
APPEARANCE UR: CLEAR
AST SERPL-CCNC: 18 U/L (ref 15–37)
ATRIAL RATE: 81 BPM
BACTERIA URNS QL MICRO: NEGATIVE /HPF
BASOPHILS # BLD AUTO: 0 X10E3/UL (ref 0–0.2)
BASOPHILS # BLD: 0 K/UL (ref 0–0.1)
BASOPHILS NFR BLD AUTO: 0 %
BASOPHILS NFR BLD: 0 % (ref 0–1)
BILIRUB SERPL-MCNC: 0.3 MG/DL (ref 0.2–1)
BILIRUB UR QL: NEGATIVE
BUN SERPL-MCNC: 101 MG/DL (ref 8–27)
BUN SERPL-MCNC: 109 MG/DL (ref 6–20)
BUN/CREAT SERPL: 13 (ref 12–20)
BUN/CREAT SERPL: 13 (ref 12–28)
CALCIUM SERPL-MCNC: 10.3 MG/DL (ref 8.7–10.3)
CALCIUM SERPL-MCNC: 9.3 MG/DL (ref 8.5–10.1)
CALCULATED P AXIS, ECG09: 72 DEGREES
CALCULATED R AXIS, ECG10: 12 DEGREES
CALCULATED T AXIS, ECG11: 46 DEGREES
CHLORIDE SERPL-SCNC: 105 MMOL/L (ref 96–106)
CHLORIDE SERPL-SCNC: 110 MMOL/L (ref 97–108)
CO2 SERPL-SCNC: 17 MMOL/L (ref 18–29)
CO2 SERPL-SCNC: 21 MMOL/L (ref 21–32)
COLOR UR: ABNORMAL
CREAT SERPL-MCNC: 7.62 MG/DL (ref 0.57–1)
CREAT SERPL-MCNC: 8.45 MG/DL (ref 0.55–1.02)
CREAT UR-MCNC: 49.5 MG/DL
DIAGNOSIS, 93000: NORMAL
DIFFERENTIAL METHOD BLD: ABNORMAL
EOSINOPHIL # BLD AUTO: 0.1 X10E3/UL (ref 0–0.4)
EOSINOPHIL # BLD: 0.3 K/UL (ref 0–0.4)
EOSINOPHIL #/AREA URNS HPF: NEGATIVE /[HPF]
EOSINOPHIL NFR BLD AUTO: 1 %
EOSINOPHIL NFR BLD: 3 % (ref 0–7)
EPITH CASTS URNS QL MICRO: ABNORMAL /LPF
ERYTHROCYTE [DISTWIDTH] IN BLOOD BY AUTOMATED COUNT: 15 % (ref 11.5–14.5)
ERYTHROCYTE [DISTWIDTH] IN BLOOD BY AUTOMATED COUNT: 15.6 % (ref 12.3–15.4)
EST. AVERAGE GLUCOSE BLD GHB EST-MCNC: 146 MG/DL
GFR SERPLBLD CREATININE-BSD FMLA CKD-EPI: 5 ML/MIN/1.73
GFR SERPLBLD CREATININE-BSD FMLA CKD-EPI: 6 ML/MIN/1.73
GLOBULIN SER CALC-MCNC: 4.4 G/DL (ref 2–4)
GLUCOSE BLD STRIP.AUTO-MCNC: 236 MG/DL (ref 65–100)
GLUCOSE BLD STRIP.AUTO-MCNC: 282 MG/DL (ref 65–100)
GLUCOSE SERPL-MCNC: 127 MG/DL (ref 65–99)
GLUCOSE SERPL-MCNC: 181 MG/DL (ref 65–100)
GLUCOSE UR STRIP.AUTO-MCNC: 100 MG/DL
HBA1C MFR BLD: 6.7 % (ref 4.8–5.6)
HCT VFR BLD AUTO: 39.8 % (ref 35–47)
HCT VFR BLD AUTO: 39.9 % (ref 34–46.6)
HGB BLD-MCNC: 13 G/DL (ref 11.1–15.9)
HGB BLD-MCNC: 13.4 G/DL (ref 11.5–16)
HGB UR QL STRIP: ABNORMAL
HYALINE CASTS URNS QL MICRO: ABNORMAL /LPF (ref 0–5)
IMM GRANULOCYTES # BLD: 0 K/UL (ref 0–0.04)
IMM GRANULOCYTES # BLD: 0 X10E3/UL (ref 0–0.1)
IMM GRANULOCYTES NFR BLD AUTO: 0 % (ref 0–0.5)
IMM GRANULOCYTES NFR BLD: 0 %
KETONES UR QL STRIP.AUTO: NEGATIVE MG/DL
LEUKOCYTE ESTERASE UR QL STRIP.AUTO: ABNORMAL
LIPASE SERPL-CCNC: 398 U/L (ref 73–393)
LYMPHOCYTES # BLD AUTO: 1.1 X10E3/UL (ref 0.7–3.1)
LYMPHOCYTES # BLD: 1.2 K/UL (ref 0.8–3.5)
LYMPHOCYTES NFR BLD AUTO: 11 %
LYMPHOCYTES NFR BLD: 15 % (ref 12–49)
MAGNESIUM SERPL-MCNC: 2.6 MG/DL (ref 1.6–2.4)
MCH RBC QN AUTO: 28.5 PG (ref 26.6–33)
MCH RBC QN AUTO: 29.1 PG (ref 26–34)
MCHC RBC AUTO-ENTMCNC: 32.6 G/DL (ref 31.5–35.7)
MCHC RBC AUTO-ENTMCNC: 33.7 G/DL (ref 30–36.5)
MCV RBC AUTO: 86.3 FL (ref 80–99)
MCV RBC AUTO: 88 FL (ref 79–97)
MONOCYTES # BLD AUTO: 0.5 X10E3/UL (ref 0.1–0.9)
MONOCYTES # BLD: 0.7 K/UL (ref 0–1)
MONOCYTES NFR BLD AUTO: 5 %
MONOCYTES NFR BLD: 8 % (ref 5–13)
NEUTROPHILS # BLD AUTO: 8 X10E3/UL (ref 1.4–7)
NEUTROPHILS NFR BLD AUTO: 83 %
NEUTS SEG # BLD: 6.3 K/UL (ref 1.8–8)
NEUTS SEG NFR BLD: 74 % (ref 32–75)
NITRITE UR QL STRIP.AUTO: NEGATIVE
NRBC # BLD: 0 K/UL (ref 0–0.01)
NRBC BLD-RTO: 0 PER 100 WBC
P-R INTERVAL, ECG05: 126 MS
PH UR STRIP: 5.5 [PH] (ref 5–8)
PHOSPHATE SERPL-MCNC: 5 MG/DL (ref 2.6–4.7)
PLATELET # BLD AUTO: 280 K/UL (ref 150–400)
PLATELET # BLD AUTO: 302 X10E3/UL (ref 150–379)
PMV BLD AUTO: 11.1 FL (ref 8.9–12.9)
POTASSIUM SERPL-SCNC: 4.7 MMOL/L (ref 3.5–5.1)
POTASSIUM SERPL-SCNC: 5.8 MMOL/L (ref 3.5–5.2)
PROT SERPL-MCNC: 8.1 G/DL (ref 6.4–8.2)
PROT UR STRIP-MCNC: ABNORMAL MG/DL
PROT UR-MCNC: 22 MG/DL (ref 0–11.9)
PROT/CREAT UR-RTO: 0.4
Q-T INTERVAL, ECG07: 368 MS
QRS DURATION, ECG06: 66 MS
QTC CALCULATION (BEZET), ECG08: 427 MS
RBC # BLD AUTO: 4.56 X10E6/UL (ref 3.77–5.28)
RBC # BLD AUTO: 4.61 M/UL (ref 3.8–5.2)
RBC #/AREA URNS HPF: ABNORMAL /HPF (ref 0–5)
SERVICE CMNT-IMP: ABNORMAL
SERVICE CMNT-IMP: ABNORMAL
SODIUM SERPL-SCNC: 141 MMOL/L (ref 136–145)
SODIUM SERPL-SCNC: 146 MMOL/L (ref 134–144)
SP GR UR REFRACTOMETRY: 1.01 (ref 1–1.03)
TROPONIN I SERPL-MCNC: <0.04 NG/ML
UR CULT HOLD, URHOLD: NORMAL
UROBILINOGEN UR QL STRIP.AUTO: 0.2 EU/DL (ref 0.2–1)
VENTRICULAR RATE, ECG03: 81 BPM
WBC # BLD AUTO: 8.5 K/UL (ref 3.6–11)
WBC # BLD AUTO: 9.6 X10E3/UL (ref 3.4–10.8)
WBC URNS QL MICRO: ABNORMAL /HPF (ref 0–4)

## 2018-03-07 PROCEDURE — 74011250636 HC RX REV CODE- 250/636: Performed by: INTERNAL MEDICINE

## 2018-03-07 PROCEDURE — 99285 EMERGENCY DEPT VISIT HI MDM: CPT

## 2018-03-07 PROCEDURE — 80053 COMPREHEN METABOLIC PANEL: CPT | Performed by: PHYSICIAN ASSISTANT

## 2018-03-07 PROCEDURE — 74011636637 HC RX REV CODE- 636/637: Performed by: INTERNAL MEDICINE

## 2018-03-07 PROCEDURE — 84100 ASSAY OF PHOSPHORUS: CPT | Performed by: PHYSICIAN ASSISTANT

## 2018-03-07 PROCEDURE — 83690 ASSAY OF LIPASE: CPT | Performed by: PHYSICIAN ASSISTANT

## 2018-03-07 PROCEDURE — 3331090002 HH PPS REVENUE DEBIT

## 2018-03-07 PROCEDURE — 83735 ASSAY OF MAGNESIUM: CPT | Performed by: PHYSICIAN ASSISTANT

## 2018-03-07 PROCEDURE — 81001 URINALYSIS AUTO W/SCOPE: CPT | Performed by: PHYSICIAN ASSISTANT

## 2018-03-07 PROCEDURE — 71046 X-RAY EXAM CHEST 2 VIEWS: CPT

## 2018-03-07 PROCEDURE — 85025 COMPLETE CBC W/AUTO DIFF WBC: CPT | Performed by: PHYSICIAN ASSISTANT

## 2018-03-07 PROCEDURE — 74011250637 HC RX REV CODE- 250/637: Performed by: INTERNAL MEDICINE

## 2018-03-07 PROCEDURE — 93005 ELECTROCARDIOGRAM TRACING: CPT

## 2018-03-07 PROCEDURE — 74011250636 HC RX REV CODE- 250/636: Performed by: PHYSICIAN ASSISTANT

## 2018-03-07 PROCEDURE — 84484 ASSAY OF TROPONIN QUANT: CPT | Performed by: PHYSICIAN ASSISTANT

## 2018-03-07 PROCEDURE — 76770 US EXAM ABDO BACK WALL COMP: CPT

## 2018-03-07 PROCEDURE — 65270000032 HC RM SEMIPRIVATE

## 2018-03-07 PROCEDURE — 3331090001 HH PPS REVENUE CREDIT

## 2018-03-07 PROCEDURE — 84156 ASSAY OF PROTEIN URINE: CPT | Performed by: INTERNAL MEDICINE

## 2018-03-07 PROCEDURE — 96360 HYDRATION IV INFUSION INIT: CPT

## 2018-03-07 PROCEDURE — 74011000258 HC RX REV CODE- 258: Performed by: INTERNAL MEDICINE

## 2018-03-07 PROCEDURE — 87205 SMEAR GRAM STAIN: CPT | Performed by: INTERNAL MEDICINE

## 2018-03-07 PROCEDURE — 82962 GLUCOSE BLOOD TEST: CPT

## 2018-03-07 PROCEDURE — 36415 COLL VENOUS BLD VENIPUNCTURE: CPT | Performed by: PHYSICIAN ASSISTANT

## 2018-03-07 RX ORDER — SODIUM CHLORIDE 0.9 % (FLUSH) 0.9 %
5-10 SYRINGE (ML) INJECTION EVERY 8 HOURS
Status: DISCONTINUED | OUTPATIENT
Start: 2018-03-07 | End: 2018-03-10 | Stop reason: HOSPADM

## 2018-03-07 RX ORDER — CELECOXIB 200 MG/1
200 CAPSULE ORAL 2 TIMES DAILY
COMMUNITY
End: 2018-03-10

## 2018-03-07 RX ORDER — BISACODYL 5 MG
5 TABLET, DELAYED RELEASE (ENTERIC COATED) ORAL DAILY PRN
Status: DISCONTINUED | OUTPATIENT
Start: 2018-03-07 | End: 2018-03-10 | Stop reason: HOSPADM

## 2018-03-07 RX ORDER — AMLODIPINE BESYLATE 5 MG/1
5 TABLET ORAL DAILY
Status: DISCONTINUED | OUTPATIENT
Start: 2018-03-08 | End: 2018-03-07

## 2018-03-07 RX ORDER — PANTOPRAZOLE SODIUM 40 MG/1
40 TABLET, DELAYED RELEASE ORAL
Status: DISCONTINUED | OUTPATIENT
Start: 2018-03-08 | End: 2018-03-10 | Stop reason: HOSPADM

## 2018-03-07 RX ORDER — HEPARIN SODIUM 5000 [USP'U]/ML
5000 INJECTION, SOLUTION INTRAVENOUS; SUBCUTANEOUS EVERY 12 HOURS
Status: DISCONTINUED | OUTPATIENT
Start: 2018-03-07 | End: 2018-03-10 | Stop reason: HOSPADM

## 2018-03-07 RX ORDER — SODIUM CHLORIDE 450 MG/100ML
100 INJECTION, SOLUTION INTRAVENOUS CONTINUOUS
Status: DISCONTINUED | OUTPATIENT
Start: 2018-03-07 | End: 2018-03-08

## 2018-03-07 RX ORDER — ACETAMINOPHEN 325 MG/1
650 TABLET ORAL
Status: DISCONTINUED | OUTPATIENT
Start: 2018-03-07 | End: 2018-03-10 | Stop reason: HOSPADM

## 2018-03-07 RX ORDER — SIMVASTATIN 40 MG/1
40 TABLET, FILM COATED ORAL
Status: DISCONTINUED | OUTPATIENT
Start: 2018-03-07 | End: 2018-03-10 | Stop reason: HOSPADM

## 2018-03-07 RX ORDER — DEXTROSE 50 % IN WATER (D50W) INTRAVENOUS SYRINGE
12.5-25 AS NEEDED
Status: DISCONTINUED | OUTPATIENT
Start: 2018-03-07 | End: 2018-03-10 | Stop reason: HOSPADM

## 2018-03-07 RX ORDER — METOPROLOL SUCCINATE 25 MG/1
25 TABLET, EXTENDED RELEASE ORAL DAILY
Status: DISCONTINUED | OUTPATIENT
Start: 2018-03-08 | End: 2018-03-08

## 2018-03-07 RX ORDER — ENOXAPARIN SODIUM 100 MG/ML
30 INJECTION SUBCUTANEOUS EVERY 24 HOURS
Status: DISCONTINUED | OUTPATIENT
Start: 2018-03-07 | End: 2018-03-07 | Stop reason: ALTCHOICE

## 2018-03-07 RX ORDER — SODIUM CHLORIDE 0.9 % (FLUSH) 0.9 %
5-10 SYRINGE (ML) INJECTION AS NEEDED
Status: DISCONTINUED | OUTPATIENT
Start: 2018-03-07 | End: 2018-03-10 | Stop reason: HOSPADM

## 2018-03-07 RX ORDER — ONDANSETRON 2 MG/ML
4 INJECTION INTRAMUSCULAR; INTRAVENOUS
Status: DISCONTINUED | OUTPATIENT
Start: 2018-03-07 | End: 2018-03-08

## 2018-03-07 RX ORDER — OMEPRAZOLE 40 MG/1
40 CAPSULE, DELAYED RELEASE ORAL DAILY
Status: DISCONTINUED | OUTPATIENT
Start: 2018-03-08 | End: 2018-03-07 | Stop reason: CLARIF

## 2018-03-07 RX ORDER — MAGNESIUM SULFATE 100 %
4 CRYSTALS MISCELLANEOUS AS NEEDED
Status: DISCONTINUED | OUTPATIENT
Start: 2018-03-07 | End: 2018-03-10 | Stop reason: HOSPADM

## 2018-03-07 RX ORDER — AMLODIPINE BESYLATE 5 MG/1
10 TABLET ORAL DAILY
Status: DISCONTINUED | OUTPATIENT
Start: 2018-03-08 | End: 2018-03-10 | Stop reason: HOSPADM

## 2018-03-07 RX ORDER — INSULIN LISPRO 100 [IU]/ML
INJECTION, SOLUTION INTRAVENOUS; SUBCUTANEOUS
Status: DISCONTINUED | OUTPATIENT
Start: 2018-03-07 | End: 2018-03-10 | Stop reason: HOSPADM

## 2018-03-07 RX ADMIN — INSULIN LISPRO 3 UNITS: 100 INJECTION, SOLUTION INTRAVENOUS; SUBCUTANEOUS at 17:36

## 2018-03-07 RX ADMIN — SODIUM CHLORIDE 1000 ML: 900 INJECTION, SOLUTION INTRAVENOUS at 12:12

## 2018-03-07 RX ADMIN — SIMVASTATIN 40 MG: 40 TABLET, FILM COATED ORAL at 21:32

## 2018-03-07 RX ADMIN — INSULIN LISPRO 3 UNITS: 100 INJECTION, SOLUTION INTRAVENOUS; SUBCUTANEOUS at 22:12

## 2018-03-07 RX ADMIN — SODIUM CHLORIDE 100 ML/HR: 450 INJECTION, SOLUTION INTRAVENOUS at 16:00

## 2018-03-07 RX ADMIN — HEPARIN SODIUM 5000 UNITS: 5000 INJECTION, SOLUTION INTRAVENOUS; SUBCUTANEOUS at 17:31

## 2018-03-07 NOTE — CONSULTS
[address]    Winnie Mosley  MR#: [id number]  : 1950  ACCOUNT #: [de-identified]   DATE OF SERVICE: 2018    REFERRING PHYSICIAN:  Dr. Holmes Canavan from the Emergency Department. REASON FOR CONSULTATION:  Acute kidney injury, for evaluation and management. HISTORY OF PRESENT ILLNESS:  The patient is a very pleasant 79-year-old  woman who is an office patient of Dr. Raúl Cardona. Patient visited Dr. Tate Pierce in his office yesterday for her routine blood work and followup visit. She was told that her kidney function is abnormal and she needs to come to the Emergency Room for further management. The patient presented to the Emergency Room where she had another set of blood work. At this time, her creatinine went up from 7 to 8.4. The patient is not aware of having any prior renal related issues. She does have chronic nocturia x2, but she denies urgency, frequency, foamy urine, dysuria, blood, reji hematuria, urinary incontinence. Patient had a kidney stone many years ago which she passed spontaneously. Of importance is the fact that in December, the patient was diagnosed with a large area of cellulitis of her right abdominal wall. She was treated for a prolonged period of time with antibiotics. One of them is vancomycin. The patient has been taking Celebrex twice a day for at least 6 months. The last dose she took just recently, yesterday. PAST MEDICAL HISTORY:  Diabetes mellitus since , degenerative joint disease with pain syndrome, dyslipidemia, hypertension, low back pain, obesity, central stenosis of L3-L4 and L4-L5, diverticulosis and cellulitis of abdominal wall. PAST SURGICAL HISTORY:  Total abdominal hysterectomy in , right breast biopsy in , nephrolithiasis in . ALLERGIES:  NO KNOWN MEDICAL ALLERGIES. SOCIAL HISTORY:  The patient is retired from the  department.   She denies alcohol, tobacco or illicit drug abuse. She lives alone. She is single and childless. FAMILY HISTORY:  Mother and father are . Patient apparently was adopted. REVIEW OF SYSTEMS:    CONSTITUTIONAL:  Patient feels somewhat fatigued. INTEGUMENT:  No pruritus or rashes. HEENT:  No visual or auditory disturbances. NECK:  No stiffness or odynophagia. RESPIRATORY:  No shortness of breath, cough, sputum production, hemoptysis. CARDIOVASCULAR:  No chest pain, palpitation, no dyspnea on exertion. GASTROINTESTINAL:  No diarrhea or vomiting. The patient feels nauseated mainly in the morning. No melena, hematemesis or abdominal pain. GENITOURINARY:  As outlined in history of present illness. MUSCULOSKELETAL:  The patient has chronic pain syndrome from degenerative joint disease and lumbar spinal stenosis. NEUROPSYCHIATRIC:  Denies depression, delusions, seizures, localized weakness. ENDOCRINOLOGY:  Denies heat or cold intolerance. HEMATOLOGY/ONCOLOGY:  The patient was not diagnosed with cancer. She denies bleeding tendencies. PHYSICAL EXAMINATION:  GENERAL:  A well-appearing middle-aged black woman who is not in any acute distress. She does not have any tremor and she is talking in full sentences. She is well built, well nourished. VITAL SIGNS:  Blood pressure 144/75, heart rate is 81, temperature is 98. 6. HEAD:  Normocephalic with atraumatic scalp. EYES:  With anicteric sclerae. EARS, NOSE AND THROAT:  With normal discharge. MOUTH:  With moist oral mucosa. No mouth ulcerations. NECK:  Supple with no increased JVD, carotid bruit, thyromegaly, no lymphadenopathy. CHEST:  Symmetrical.  LUNGS:  Clear to auscultation with good entry bibasilarly. HEART:  With S1 and S2 with regular rate and rhythm with no friction rub or gallop. ABDOMEN:  Soft, nontender, nondistended. Bowel sounds are present.   There is a small area in the right lower quadrant of unhealed cellulitis wound which is covered with a gauze dressing. EXTREMITIES:  With no edema, cyanosis or clubbing. Pulses are present. No joint effusions. NEUROLOGICAL:  Nonfocal.  Patient is awake, alert, oriented to time, self and place. LABORATORY DATA:  On 03/06, BUN and creatinine were 101 and 7.6. Today, BUN and creatinine are respectively 109 and 8.45. For comparison in 01/02/2018, creatinine was 0.7. Sodium is 151, potassium is 4.7. Hemoglobin A1c is 6.7, hemoglobin is 13.4. Urinalysis shows specific gravity of 1.011, protein trace, glucose 100, blood trace, leukocyte esterase small. Chest x-ray shows no acute process or change. Ultrasound of the kidneys show echogenic kidneys compatible with medical renal disease, focal increased echogenicity noted mid portion of the left kidney which may represent intramedullary fat. IMPRESSION:  Acute kidney injury with a significant drop in GFR in a patient who had been on long-term antibiotic therapy including vancomycin and on BATRES inhibitors. The patient may have vancomycin toxicity or analgesic nephropathy or combination of both. Her GFR is less than 10 mL per minute. The patient has minimal uremic symptoms--nausea and weakness. No electrolyte abnormalities or acid base disorder is observed. The patient is not in fluid overload. She is not even anemic. Hypertension with suboptimal control which is typical for advanced kidney disease. RECOMMENDATIONS:  No immediate need for renal replacement therapy. Patient is asymptomatic, she is making urine, she looks well and she has no electrolyte or acid base disorder. Initial renal evaluation will be initiated to complete the etiology workup. Patient will have eosinophils in the urine checked, her renal function will be monitored very closely with intake and output of fluids and with daily electrolytes. If patient's condition worsened and she becomes overly uremic or develops complications of acute kidney injury. She may require at least a temporary renal replacement therapy. Discontinue any possible nephrotoxic agents as diuretics, ACE inhibitor, angiotensin receptor blocker, BATRES inhibitors or other nonsteroidal anti-inflammatory medications. Start IV fluids. Patient has no fluid overload. Her lungs are clear. She can tolerate a saline infusion at 75 to 100 mL per hour. Screening for secondary hyperparathyroidism with PTH. Check vitamin D level. Quantitation of proteinuria will be requested. The patient had a few questions about her kidney function and prognosis which were answered to her satisfaction. Thank you very much for the opportunity to be part of this patient's care.       Marcin Lerma MD       LTD / SN  D: 03/07/2018 14:56     T: 03/07/2018 15:42  JOB #: [id number]  CC: Jacquelyn Mason MD

## 2018-03-07 NOTE — ED TRIAGE NOTES
Seen at PCP yesterday for generalized fatigue & sluggishness. Lab work yesterday showed Cr 7.62. Called today by Storm Roberts to come in for redraw, further testing.  +abdominal pain last night. Vomited once yesterday morning. Denies SOB or CP. Denies urinary symptoms.

## 2018-03-07 NOTE — ED NOTES
10:54 AM  I have evaluated the patient as the Provider in Triage. I have reviewed Her vital signs and the triage nurse assessment. I have talked with the patient and any available family and advised that I am the provider in triage and have ordered the appropriate study to initiate their work up based on the clinical presentation during my assessment. I have advised that the patient will be accommodated in the Main ED as soon as possible. I have also requested to contact the triage nurse or myself immediately if the patient experiences any changes in their condition during this brief waiting period. Pt went to PCP yesterday - thought pt maybe had virus - fatigue, anorexia, \"sluggish. \"  Pt was called today and was told that her kidney function \"didn't look good\" - creatine 7 yesterday.   Vomited x 1 yesterday LAUREN Richardson

## 2018-03-07 NOTE — CONSULTS
Patient was examined, chart reviewed. Full consult dictated. Impression:    1. DANICA- multiple possible etiologies. Work up was initiated. Will follow closely. No need of immediate RRT.

## 2018-03-07 NOTE — PROGRESS NOTES
Physical Therapy  Order received, chart reviewed, evaluation held. PT spoke with RN/pt regarding any therapy needs. None identified by either party at this time. Pt reports she has been up ad nhung and walking without balance or gait disturbance. Pt further states she has stairs at home and has been \"going up and down, no problem. Aaron Puls Aaron Puls \"  Will D/C PT at this time and please contact with any arising questions/concerns.   Thank you,  Dottie Patel, PT, DPT

## 2018-03-07 NOTE — PROGRESS NOTES
Admission Medication Reconciliation:    Information obtained from: patient, rx query    Significant PMH/Disease States:   Past Medical History:   Diagnosis Date    Cellulitis of right abdominal wall 2017    letty whatley md    Diabetes ([facility] [address]     DJD (degenerative joint disease) of knee     Gastroenteritis     Hypercholesterolemia     Hypertension     LBP (low back pain)     chiorpactor    Lumbar stenosis     Obesity     S/P colonoscopy 12       Chief Complaint for this Admission:  elevated SCr, fatigue    Allergies:  Review of patient's allergies indicates no known allergies. Prior to Admission Medications:   Prior to Admission Medications   Prescriptions Last Dose Informant Patient Reported? Taking? albuterol (PROVENTIL HFA, VENTOLIN HFA, PROAIR HFA) 90 mcg/actuation inhaler   No Yes   Sig: Take 2 Puffs by inhalation every four (4) hours as needed for Wheezing. amLODIPine (NORVASC) 5 mg tablet 3/6/2018  No Yes   Sig: Take 1 Tab by mouth daily. celecoxib (CELEBREX) 200 mg capsule 3/6/2018  Yes Yes   Sig: Take 200 mg by mouth two (2) times a day. furosemide (LASIX) 40 mg tablet 3/6/2018  No Yes   Sig: Take 1 Tab by mouth daily. insulin glargine (BASAGLAR KWIKPEN) 100 unit/mL (3 mL) inpn 3/6/2018  No Yes   Si Units by SubCUTAneous route nightly. metFORMIN ER (GLUCOPHAGE XR) 500 mg tablet 3/5/2018  No Yes   Sig: Take 2 Tabs by mouth two (2) times daily (with meals). metoprolol succinate (TOPROL-XL) 25 mg XL tablet 3/6/2018  No Yes   Sig: Take 1 Tab by mouth daily. promethazine (PHENERGAN) 25 mg tablet   No Yes   Sig: Take 1 Tab by mouth every six (6) hours as needed for Nausea. quinapril (ACCUPRIL) 40 mg tablet 3/6/2018  No Yes   Sig: Take 1 Tab by mouth daily. simvastatin (ZOCOR) 40 mg tablet 3/6/2018  No Yes   Sig: Take 1 Tab by mouth nightly. Facility-Administered Medications: None         Comments/Recommendations: Removed naproxen and omeprazole.  Added celebrex. No other changes made.

## 2018-03-07 NOTE — ROUTINE PROCESS
TRANSFER - OUT REPORT:    Verbal report given to IBAN Kyle(name) on Aaron Smith  being transferred to [facility](unit) for routine progression of care       Report consisted of patients Situation, Background, Assessment and   Recommendations(SBAR). Information from the following report(s) SBAR was reviewed with the receiving nurse. Lines:   Peripheral IV 03/07/18 Right Antecubital (Active)   Site Assessment Clean, dry, & intact 3/7/2018 11:59 AM   Phlebitis Assessment 0 3/7/2018 11:59 AM   Infiltration Assessment 0 3/7/2018 11:59 AM   Dressing Status Clean, dry, & intact 3/7/2018 11:59 AM   Dressing Type Topical skin adhesive;Transparent 3/7/2018 11:59 AM   Hub Color/Line Status Pink;Capped;Flushed;Patent 3/7/2018 11:59 AM   Action Taken Blood drawn 3/7/2018 11:59 AM        Opportunity for questions and clarification was provided.       Patient transported with:   transport

## 2018-03-07 NOTE — ED PROVIDER NOTES
HPI Comments: 78 y.o. female with past medical history significant for DM, hypercholesterolemia, HTN, obesity, lumbar stenosis, DJD, cellulitis of right abdominal wall, and gastroenteritis who presents from home via private vehicle with chief complaint of fatigue. Pt reports that she has not been feeling well for the last several days, citing fatigue, generalized weakness, nausea, decreased appetite, and one episode of vomiting yesterday morning. Pt notes that she was seen by her PCP yesterday for these complaints. Pt states that she had blood work drawn and that her PCP suspected that she has a viral infection. However, result of the labs were returned today and pt was told to come to the ED for evaluation as her BUN and creatinine were elevated. Pt additionally notes that her blood glucose was 80 yesterday morning but 52 when she got to the PCP. This was increased to 104 before the pt was sent home yesterday. Pt notes that she takes 30 units of Lantus every night and is also on metformin. Pt denies a h/o HTN. She lastly reports having been admitted to the hospital for cellulitis of abdomen in December, ~ 3 months ago. Pt has had home health nurse for wound care and states that the wound is healing well. Pt denies the following other complaints: cough, fever, diarrhea, HA, and any complaints with urination or bowel movements. There are no other acute medical concerns at this time. PCP: [place], MD      Note written by Mercy Alcala, as dictated by Barrera Almaguer MD 12:30 PM     The history is provided by the patient. No  was used.         Past Medical History:   Diagnosis Date    Cellulitis of right abdominal wall 12/18/2017    letty whatley md    Diabetes ([facility] [address] 1995    DJD (degenerative joint disease) of knee     Gastroenteritis     Hypercholesterolemia     Hypertension     LBP (low back pain)     chiorpactor    Lumbar stenosis     Obesity     S/P colonoscopy 12       Past Surgical History:   Procedure Laterality Date    HX BREAST BIOPSY Right     negative    HX COLONOSCOPY      HX HYSTERECTOMY      HX OOPHORECTOMY N/A     HX OTHER SURGICAL  2017    I&D infected abscess          Family History:   Problem Relation Age of Onset    Cancer Mother     No Known Problems Father        Social History     Social History    Marital status: SINGLE     Spouse name: N/A    Number of children: N/A    Years of education: N/A     Occupational History    Not on file. Social History Main Topics    Smoking status: Never Smoker    Smokeless tobacco: Never Used    Alcohol use No    Drug use: No    Sexual activity: Not Currently     Other Topics Concern    Not on file     Social History Narrative    Medical History: Diverticulosis 2002DM 1995primary HTN 1995Obesity 1995Dyslipidemia 1995January 2012 moderate central stenosis L3-L4,    mild to moderate central stenosis at L4-L5 MRI    Gyn History: Last mammogram date 2013. Last menstrual perioddate hysterectomy  FOR FIBROIDS, PT W ITH ONE OVARY. Last    papdate . Menopausal hot flashes. Sexually active not currently sexually active. History of abnormal pap smears none. History of STDs    none. Vaginal discharge or odor none. HysterectomyDate NONE.    OB History: Total pregnancies 1. Pre term deliveries (>20-36.6 w eeks) 1. Surgical History: [facility] right breast bx , kidney stones colonoscopy     Hospitalization/Major Diagnostic Procedure: [facility] rig breast bx , kidney stones     Family History: Mother:  52 yrs, skin d/oFather: deceasedAunt: alive, lung cancerAdopted: alive    Social History: Alcohol Use Patient does not use alcohol. Smoking Status Patient is a never smoker. Marital Status: Single. Lives w ith:    alone. Education/School: has masters degree-[employer].          ALLERGIES: Review of patient's allergies indicates no known allergies. Review of Systems   Constitutional: Positive for appetite change (decrease) and fatigue. Negative for chills and fever. HENT: Negative for rhinorrhea, sore throat and trouble swallowing. Eyes: Negative for photophobia. Respiratory: Negative for cough and shortness of breath. Cardiovascular: Negative for chest pain and palpitations. Gastrointestinal: Positive for nausea and vomiting. Negative for abdominal pain. Endocrine: Negative for polyuria. Genitourinary: Negative for decreased urine volume, dysuria, frequency and hematuria. Musculoskeletal: Negative for arthralgias. Neurological: Positive for weakness (generalized). Negative for dizziness and syncope. Psychiatric/Behavioral: Negative for behavioral problems. The patient is not nervous/anxious. All other systems reviewed and are negative. Vitals:    03/07/18 1059 03/07/18 1215 03/07/18 1230   BP: (!) 177/91 159/66 144/75   Pulse: 81  81   Resp: 16 14 15   Temp: 98.6 °F (37 °C)     SpO2: 99%  99%   Weight: 93.4 kg (206 lb)     Height: 5' 3\" (1.6 m)              Physical Exam   Constitutional: She appears well-developed and well-nourished. HENT:   Head: Normocephalic and atraumatic. Mouth/Throat: Oropharynx is clear and moist.   Eyes: EOM are normal. Pupils are equal, round, and reactive to light. Neck: Normal range of motion. Neck supple. Cardiovascular: Normal rate, regular rhythm, normal heart sounds and intact distal pulses. Exam reveals no gallop and no friction rub. No murmur heard. Pulmonary/Chest: Effort normal. No respiratory distress. She has no wheezes. She has no rales. Abdominal: Soft. There is no tenderness. There is no rebound. Musculoskeletal: Normal range of motion. She exhibits no tenderness. Neurological: She is alert. No cranial nerve deficit. Motor; symmetric   Skin: No erythema. Psychiatric: She has a normal mood and affect.  Her behavior is normal.   Nursing note and vitals reviewed. Note written by Mercy Bobby, as dictated by Paul Claudio MD 12:31 PM      [facility]      ED Course       Procedures      CONSULT NOTE:  1:22 PM Paul Claudio MD spoke with Dr. Gene Jean, Consult for PCP. Discussed available diagnostic tests and clinical findings. Dr. Gene Jean will be admitting the pt. He will arrange for Dr. Alpesh Deng group (nephrology) to see the pt.

## 2018-03-07 NOTE — PROGRESS NOTES
Primary Nurse Michelle Jo RN and Haley Noble RN performed a dual skin assessment on this patient Impairment noted- see wound doc flow sheet (small abdominal wound)  Edgard score is 22

## 2018-03-07 NOTE — IP AVS SNAPSHOT
[address] 
[phone] Patient: Pio Lopez MRN: [id number] TMN:[id number] About your hospitalization You were admitted on:  March 7, 2018 [facility]  [facility] You were discharged on:  March 10, 2018 Why you were hospitalized Your primary diagnosis was:  Acute Renal Failure (Arf) (Hcc) Your diagnoses also included:  Diabetes (Hcc), Hypertension, Cellulitis Of Right Abdominal Wall Follow-up Information Follow up With Details Comments Contact Info [address] [place] [address] home health wound care. Call agency if you haven't been contacted by a liaison within 24 hours of hospital discharge. [address] 
1st Floor [facility] [address] 
[phone] Katy Petty MD In 1 week  [facility] [address] [facility] 
[phone] James Tirado MD In 3 days  [address] [facility] [address] [facility] 
[phone] Your Scheduled Appointments Tuesday March 13, 2018 12:45 PM EDT Any with Katy Petty MD  
[address] [place] [address] and Primary Care [facility]) [address] [address]  
[phone] Thursday March 29, 2018  2:45 PM EDT Any with Katy Petty MD  
SPORTS MED AND PRIMARY CARE - [address] [facility] [address] ([facility]) [facility] 8057 [facility] 57  
[phone] Discharge Orders None A check tierney indicates which time of day the medication should be taken. My Medications START taking these medications Instructions Each Dose to Equal  
 Morning Noon Evening Bedtime  
 calcitRIOL 0.25 mcg capsule Commonly known as:  ROCALTROL Start taking on:  3/11/2018 Your last dose was: Your next dose is: Take 1 Cap by mouth daily. 0.25 mcg cloNIDine HCl 0.2 mg tablet Commonly known as:  CATAPRES Your last dose was: Your next dose is: Take 1 Tab by mouth two (2) times a day. 0.2 mg  
    
   
   
   
  
 insulin lispro 100 unit/mL injection Commonly known as:  HUMALOG Your last dose was: Your next dose is:    
   
   
 Injection subcutaneously three times daily before meals as needed as above  
     
   
   
   
  
 lancets 28 gauge Misc Your last dose was: Your next dose is: Three times daily as needed  
     
   
   
   
  
 pantoprazole 40 mg tablet Commonly known as:  PROTONIX Start taking on:  3/11/2018 Your last dose was: Your next dose is: Take 1 Tab by mouth Daily (before breakfast). 40 mg  
    
   
   
   
  
 sodium bicarbonate 325 mg tablet Your last dose was: Your next dose is: Take 1 Tab by mouth two (2) times a day for 10 days. 325 mg CHANGE how you take these medications Instructions Each Dose to Equal  
 Morning Noon Evening Bedtime  
 amLODIPine 5 mg tablet Commonly known as:  Brittani Marin What changed:  how much to take Your last dose was: Your next dose is: Take 2 Tabs by mouth daily. 10 mg  
    
   
   
   
  
 metoprolol succinate 25 mg XL tablet Commonly known as:  TOPROL-XL What changed:  how much to take Your last dose was: Your next dose is: Take 2 Tabs by mouth daily. 50 mg CONTINUE taking these medications Instructions Each Dose to Equal  
 Morning Noon Evening Bedtime  
 albuterol 90 mcg/actuation inhaler Commonly known as:  PROVENTIL HFA, VENTOLIN HFA, PROAIR HFA Your last dose was: Your next dose is: Take 2 Puffs by inhalation every four (4) hours as needed for Wheezing. 2 Puff  
    
   
   
   
  
 simvastatin 40 mg tablet Commonly known as:  ZOCOR Your last dose was: Your next dose is: Take 1 Tab by mouth nightly. 40 mg  
    
   
   
   
  
  
STOP taking these medications CeleBREX 200 mg capsule Generic drug:  celecoxib  
   
  
 furosemide 40 mg tablet Commonly known as:  LASIX  
   
  
 insulin glargine 100 unit/mL (3 mL) Inpn Commonly known as:  BASAGLAR KWIKPEN U-100 INSULIN  
   
  
 metFORMIN  mg tablet Commonly known as:  GLUCOPHAGE XR  
   
  
 promethazine 25 mg tablet Commonly known as:  PHENERGAN  
   
  
 quinapril 40 mg tablet Commonly known as:  ACCUPRIL Where to Get Your Medications Information on where to get these meds will be given to you by the nurse or doctor. ! Ask your nurse or doctor about these medications  
  amLODIPine 5 mg tablet  
 calcitRIOL 0.25 mcg capsule  
 cloNIDine HCl 0.2 mg tablet  
 insulin lispro 100 unit/mL injection  
 lancets 28 gauge Misc  
 metoprolol succinate 25 mg XL tablet  
 pantoprazole 40 mg tablet  
 sodium bicarbonate 325 mg tablet Discharge Instructions Discharge Instructions PATIENT ID: July Kim MRN: [id number] YOB: 1950 DATE OF ADMISSION: 3/7/2018 11:54 AM   
DATE OF DISCHARGE: 3/10/2018 PRIMARY CARE PROVIDER: Atiya Paiz MD  
 
ATTENDING PHYSICIAN: Dwight Oviedo MD 
DISCHARGING PROVIDER: Dwight Oviedo MD   
To contact this individual call [phone] and ask the  to page. If unavailable ask to be transferred the Adult Hospitalist Department. DISCHARGE DIAGNOSES  
ARF Cellulitis abd CONSULTATIONS: IP CONSULT TO NEPHROLOGY 
IP CONSULT TO NEPHROLOGY PROCEDURES/SURGERIES: * No surgery found * PENDING TEST RESULTS:  
At the time of discharge the following test results are still pending: none FOLLOW UP APPOINTMENTS:  
Follow-up Information Follow up With Details Comments Contact Info [address] [facility]  Continued home health wound care. Call agency if you haven't been contacted by a liaison within 24 hours of hospital discharge. [address] 
1st Floor [place] [address] 
[phone] Jesse Billings MD In 1 week  [address] [address] [place] 
[phone] Erin Ridley MD In 3 days  [address] [facility] [address] [place] [address] 
[phone] ADDITIONAL CARE RECOMMENDATIONS:  
Follow up with Dr Jay Marques in 3-4 days Follow up with Dr Storm Roberts in 3-4 days Please expect a blood work when you seen your physicians Your kidney function is not back to normal. It is very important that you follow up with your physicians Blood sugar checks four times daily (before meals as well as before going off to bed) 
 
----------------------- Sliding Scale Insulin, 3 times daily before meals: 
 
140-199=2 units           
200-249=3 units 250-299=5 units 300-349=7 units 350 or greater = Call MD 
Give in addition to basal medications. Do Not Hold for NPO 
------------------- DIET: Renal Diet ACTIVITY: Activity as tolerated WOUND CARE: per wound care nurse DISCHARGE MEDICATIONS: 
 See Medication Reconciliation Form · It is important that you take the medication exactly as they are prescribed. · Keep your medication in the bottles provided by the pharmacist and keep a list of the medication names, dosages, and times to be taken in your wallet. · Do not take other medications without consulting your doctor. NOTIFY YOUR PHYSICIAN FOR ANY OF THE FOLLOWING:  
Fever over 101 degrees for 24 hours. Chest pain, shortness of breath, fever, chills, nausea, vomiting, diarrhea, change in mentation, falling, weakness, bleeding. Severe pain or pain not relieved by medications. Or, any other signs or symptoms that you may have questions about.  
 
 
DISPOSITION: 
 x Home With: 
 OT  PT  Armond Gutierrez RN  
  
 SNF/Inpatient Rehab/LTAC Independent/assisted living Hospice Other: CDMP Checked:  
Yes x PROBLEM LIST Updated: 
Yes x Signed:  
Reese Medina MD 
3/10/2018 
10:02 AM 
 
ACO Transitions of Care Introducing [employer] [address] offers a voluntary care coordination program to provide high quality service and care to Cesilia Archibald fee-for-service beneficiaries. Leonel Mcduffie was designed to help you enhance your health and well-being through the following services: ? Transitions of Care  support for individuals who are transitioning from one care setting to another (example: Hospital to home). ? Chronic and Complex Care Coordination  support for individuals and caregivers of those with serious or chronic illnesses or with more than one chronic (ongoing) condition and those who take a number of different medications. If you meet specific medical criteria, a [facility] [address] may call you directly to coordinate your care with your primary care physician and your other care providers. For questions about the [facility] programs, please, contact your physicians office. For general questions or additional information about Accountable Care Organizations: 
Please visit www.medicare.gov/acos. html or call 1-800-MEDICARE (6-648.872.3116) TTY users should call 6-960.888.5958. [employer] Announcement We are excited to announce that we are making your provider's discharge notes available to you in [employer]. You will see these notes when they are completed and signed by the physician that discharged you from your recent hospital stay. If you have any questions or concerns about any information you see in [employer], please call the Health Information Department where you were seen or reach out to your Primary Care Provider for more information about your plan of care. Introducing [facility]! Dear Rian Mayer: Thank you for requesting a [employer] account. Our records indicate that you already have an active icanbuy account. You can access your account anytime at [email or web address] Did you know that you can access your hospital and ER discharge instructions at any time in [employer]buy? You can also review all of your test results from your hospital stay or ER visit. Additional Information If you have questions, please visit the Frequently Asked Questions section of the [employer] website at [email or web address] Remember, [employer] is NOT to be used for urgent needs. For medical emergencies, dial 911. Now available from your iPhone and Android! Providers Seen During Your Hospitalization Provider Specialty Primary office phone Dre Garner MD Emergency Medicine [phone] Jessi Samuel MD Internal Medicine [phone] Stephen Hansen MD Internal Medicine [phone] Your Primary Care Physician (PCP) Primary Care Physician Office Phone Office Fax [facility] [phone] You are allergic to the following No active allergies Recent Documentation Height Weight Breastfeeding? BMI OB Status Smoking Status 1.6 m 93.4 kg No 36.49 kg/m2 Hysterectomy Never Smoker Emergency Contacts Name Discharge Info Relation Home Work Mobile [address] CAREGIVER [3] Friend [5] [phone] Mary Ackerman  Other Relative [6] [phone] Patient Belongings The following personal items are in your possession at time of discharge: 
  Dental Appliances: None  Visual Aid: Glasses, With patient      Home Medications: None   Jewelry: With patient  Clothing: At bedside    Other Valuables: Cell Phone, Eyeglasses (wallet) Please provide this summary of care documentation to your next provider. Signatures-by signing, you are acknowledging that this After Visit Summary has been reviewed with you and you have received a copy. Patient Signature:  ____________________________________________________________ Date:  ____________________________________________________________  
  
Christina Saver Provider Signature:  ____________________________________________________________ Date:  ____________________________________________________________

## 2018-03-08 LAB
25(OH)D3 SERPL-MCNC: 30.1 NG/ML (ref 30–100)
ALBUMIN SERPL-MCNC: 3.2 G/DL (ref 3.5–5)
ALBUMIN/GLOB SERPL: 0.9 {RATIO} (ref 1.1–2.2)
ALP SERPL-CCNC: 47 U/L (ref 45–117)
ALT SERPL-CCNC: 16 U/L (ref 12–78)
ANION GAP SERPL CALC-SCNC: 9 MMOL/L (ref 5–15)
AST SERPL-CCNC: 13 U/L (ref 15–37)
BASOPHILS # BLD: 0 K/UL (ref 0–0.1)
BASOPHILS NFR BLD: 1 % (ref 0–1)
BILIRUB SERPL-MCNC: 0.4 MG/DL (ref 0.2–1)
BUN SERPL-MCNC: 98 MG/DL (ref 6–20)
BUN/CREAT SERPL: 12 (ref 12–20)
CALCIUM SERPL-MCNC: 8.8 MG/DL (ref 8.5–10.1)
CALCIUM SERPL-MCNC: 8.8 MG/DL (ref 8.5–10.1)
CHLORIDE SERPL-SCNC: 111 MMOL/L (ref 97–108)
CHOLEST SERPL-MCNC: 90 MG/DL
CO2 SERPL-SCNC: 20 MMOL/L (ref 21–32)
CREAT SERPL-MCNC: 8.45 MG/DL (ref 0.55–1.02)
DIFFERENTIAL METHOD BLD: ABNORMAL
EOSINOPHIL # BLD: 0.4 K/UL (ref 0–0.4)
EOSINOPHIL NFR BLD: 5 % (ref 0–7)
ERYTHROCYTE [DISTWIDTH] IN BLOOD BY AUTOMATED COUNT: 14.7 % (ref 11.5–14.5)
GLOBULIN SER CALC-MCNC: 3.5 G/DL (ref 2–4)
GLUCOSE BLD STRIP.AUTO-MCNC: 135 MG/DL (ref 65–100)
GLUCOSE BLD STRIP.AUTO-MCNC: 141 MG/DL (ref 65–100)
GLUCOSE BLD STRIP.AUTO-MCNC: 177 MG/DL (ref 65–100)
GLUCOSE BLD STRIP.AUTO-MCNC: 194 MG/DL (ref 65–100)
GLUCOSE SERPL-MCNC: 141 MG/DL (ref 65–100)
HCT VFR BLD AUTO: 34.4 % (ref 35–47)
HDLC SERPL-MCNC: 35 MG/DL
HDLC SERPL: 2.6 {RATIO} (ref 0–5)
HGB BLD-MCNC: 11.5 G/DL (ref 11.5–16)
IMM GRANULOCYTES # BLD: 0 K/UL (ref 0–0.04)
IMM GRANULOCYTES NFR BLD AUTO: 0 % (ref 0–0.5)
LDLC SERPL CALC-MCNC: 33 MG/DL (ref 0–100)
LIPID PROFILE,FLP: NORMAL
LYMPHOCYTES # BLD: 1.5 K/UL (ref 0.8–3.5)
LYMPHOCYTES NFR BLD: 17 % (ref 12–49)
MCH RBC QN AUTO: 29 PG (ref 26–34)
MCHC RBC AUTO-ENTMCNC: 33.4 G/DL (ref 30–36.5)
MCV RBC AUTO: 86.6 FL (ref 80–99)
MONOCYTES # BLD: 0.9 K/UL (ref 0–1)
MONOCYTES NFR BLD: 10 % (ref 5–13)
NEUTS SEG # BLD: 6 K/UL (ref 1.8–8)
NEUTS SEG NFR BLD: 68 % (ref 32–75)
NRBC # BLD: 0 K/UL (ref 0–0.01)
NRBC BLD-RTO: 0 PER 100 WBC
PHOSPHATE SERPL-MCNC: 4.9 MG/DL (ref 2.6–4.7)
PLATELET # BLD AUTO: 233 K/UL (ref 150–400)
PMV BLD AUTO: 10.9 FL (ref 8.9–12.9)
POTASSIUM SERPL-SCNC: 4.6 MMOL/L (ref 3.5–5.1)
PROT SERPL-MCNC: 6.7 G/DL (ref 6.4–8.2)
PTH-INTACT SERPL-MCNC: 168.2 PG/ML (ref 18.4–88)
RBC # BLD AUTO: 3.97 M/UL (ref 3.8–5.2)
SERVICE CMNT-IMP: ABNORMAL
SODIUM SERPL-SCNC: 140 MMOL/L (ref 136–145)
TRIGL SERPL-MCNC: 110 MG/DL (ref ?–150)
VLDLC SERPL CALC-MCNC: 22 MG/DL
WBC # BLD AUTO: 8.8 K/UL (ref 3.6–11)

## 2018-03-08 PROCEDURE — 83970 ASSAY OF PARATHORMONE: CPT | Performed by: INTERNAL MEDICINE

## 2018-03-08 PROCEDURE — 74011250636 HC RX REV CODE- 250/636: Performed by: INTERNAL MEDICINE

## 2018-03-08 PROCEDURE — 82306 VITAMIN D 25 HYDROXY: CPT | Performed by: INTERNAL MEDICINE

## 2018-03-08 PROCEDURE — 80061 LIPID PANEL: CPT | Performed by: INTERNAL MEDICINE

## 2018-03-08 PROCEDURE — 74011250637 HC RX REV CODE- 250/637: Performed by: INTERNAL MEDICINE

## 2018-03-08 PROCEDURE — 84100 ASSAY OF PHOSPHORUS: CPT | Performed by: INTERNAL MEDICINE

## 2018-03-08 PROCEDURE — 77030020057 HC DRSG MTRX CLLGN STGN -B

## 2018-03-08 PROCEDURE — 82962 GLUCOSE BLOOD TEST: CPT

## 2018-03-08 PROCEDURE — 3331090002 HH PPS REVENUE DEBIT

## 2018-03-08 PROCEDURE — 85025 COMPLETE CBC W/AUTO DIFF WBC: CPT | Performed by: INTERNAL MEDICINE

## 2018-03-08 PROCEDURE — 74011000258 HC RX REV CODE- 258: Performed by: INTERNAL MEDICINE

## 2018-03-08 PROCEDURE — 3331090001 HH PPS REVENUE CREDIT

## 2018-03-08 PROCEDURE — 36415 COLL VENOUS BLD VENIPUNCTURE: CPT | Performed by: INTERNAL MEDICINE

## 2018-03-08 PROCEDURE — 80053 COMPREHEN METABOLIC PANEL: CPT | Performed by: INTERNAL MEDICINE

## 2018-03-08 PROCEDURE — 65270000032 HC RM SEMIPRIVATE

## 2018-03-08 PROCEDURE — 74011636637 HC RX REV CODE- 636/637: Performed by: INTERNAL MEDICINE

## 2018-03-08 RX ORDER — SODIUM BICARBONATE 650 MG/1
325 TABLET ORAL 2 TIMES DAILY
Status: DISCONTINUED | OUTPATIENT
Start: 2018-03-08 | End: 2018-03-10 | Stop reason: HOSPADM

## 2018-03-08 RX ORDER — METOPROLOL SUCCINATE 50 MG/1
50 TABLET, EXTENDED RELEASE ORAL DAILY
Status: DISCONTINUED | OUTPATIENT
Start: 2018-03-09 | End: 2018-03-10 | Stop reason: HOSPADM

## 2018-03-08 RX ORDER — ONDANSETRON 4 MG/1
4 TABLET, ORALLY DISINTEGRATING ORAL
Status: DISCONTINUED | OUTPATIENT
Start: 2018-03-08 | End: 2018-03-10 | Stop reason: HOSPADM

## 2018-03-08 RX ADMIN — SODIUM BICARBONATE 325 MG: 650 TABLET, ORALLY DISINTEGRATING ORAL at 12:16

## 2018-03-08 RX ADMIN — INSULIN LISPRO 2 UNITS: 100 INJECTION, SOLUTION INTRAVENOUS; SUBCUTANEOUS at 12:09

## 2018-03-08 RX ADMIN — HEPARIN SODIUM 5000 UNITS: 5000 INJECTION, SOLUTION INTRAVENOUS; SUBCUTANEOUS at 17:08

## 2018-03-08 RX ADMIN — Medication 10 ML: at 06:54

## 2018-03-08 RX ADMIN — PANTOPRAZOLE SODIUM 40 MG: 40 TABLET, DELAYED RELEASE ORAL at 07:12

## 2018-03-08 RX ADMIN — ONDANSETRON 4 MG: 4 TABLET, ORALLY DISINTEGRATING ORAL at 17:07

## 2018-03-08 RX ADMIN — SIMVASTATIN 40 MG: 40 TABLET, FILM COATED ORAL at 21:03

## 2018-03-08 RX ADMIN — HEPARIN SODIUM 5000 UNITS: 5000 INJECTION, SOLUTION INTRAVENOUS; SUBCUTANEOUS at 06:51

## 2018-03-08 RX ADMIN — SODIUM BICARBONATE 325 MG: 650 TABLET, ORALLY DISINTEGRATING ORAL at 17:08

## 2018-03-08 RX ADMIN — Medication 10 ML: at 16:38

## 2018-03-08 RX ADMIN — METOPROLOL SUCCINATE 25 MG: 25 TABLET, EXTENDED RELEASE ORAL at 10:28

## 2018-03-08 RX ADMIN — INSULIN LISPRO 2 UNITS: 100 INJECTION, SOLUTION INTRAVENOUS; SUBCUTANEOUS at 17:07

## 2018-03-08 RX ADMIN — AMLODIPINE BESYLATE 10 MG: 5 TABLET ORAL at 10:28

## 2018-03-08 RX ADMIN — SODIUM CHLORIDE 100 ML/HR: 450 INJECTION, SOLUTION INTRAVENOUS at 02:13

## 2018-03-08 NOTE — PROGRESS NOTES
Name: Jerrell Phillips MRN: [id number]   : 1950 Hospital: . [address]   Date: 3/8/2018        IMPRESSION:   · DANICA vs CKD. Patient's kidneys are echogenic on US, although her kidney function was normal few months back. Obstruction and AIN are ruled out. Patient most likely has toxicity to one or more of the AB's used to treat her abdominal wall celulitis. It is still early to know if the injury is permanent. Patient is not uremic clinically. Her volume status is euvolemic. · Mild metabolic acidosis related to reduced GFR.  · HTN still poorly controlled. PLAN:   · D/C IVF's. Patient is not volume depleted. Her GFR did not improve after hydration. · Start sodium bicarbonate. · Adjust BP medications. · Waiting for PTH. · Will monitor GFR for 24-48 hours. Patient does not need immediate RRT. If she remains clinically stable, even if her GFR does not improve she will be stable to be discharged, but will need a very close office monitoring. Subjective/Interval History:   I have reviewed the flowsheet and previous days notes. ROS:A comprehensive review of systems was negative.     Objective:   Vital Signs:    Visit Vitals    /88 (BP 1 Location: Right arm, BP Patient Position: At rest;Sitting)    Pulse 92    Temp 96.9 °F (36.1 °C)    Resp 20    Ht 5' 3\" (1.6 m)    Wt 93.4 kg (206 lb)    LMP  (LMP Unknown)    SpO2 92%    Breastfeeding No    BMI 36.49 kg/m2       O2 Device: Room air       Temp (24hrs), Av.3 °F (36.8 °C), Min:96.9 °F (36.1 °C), Max:98.6 °F (37 °C)       Intake/Output:   Last shift:      701 - 1900  In: -   Out: 800 [Urine:800]  Last 3 shifts: 1901 -  07  In: 720 [P.O.:720]  Out: 850 [Urine:850]    Intake/Output Summary (Last 24 hours) at 18 1039  Last data filed at 18 1038   Gross per 24 hour   Intake              720 ml   Output             1650 ml   Net             -930 ml        Physical Exam:  General:    Alert, cooperative, no distress, appears stated age. Head:   Normocephalic, without obvious abnormality, atraumatic. Eyes:   Conjunctivae/corneas clear. Nose:  Nares normal. No drainage or sinus tenderness. Throat:    Lips, mucosa, and tongue normal.  No Thrush  Neck:  Supple, symmetrical,  no adenopathy, thyroid: non tender    no carotid bruit and no JVD. Back:    Symmetric,  No CVA tenderness. Lungs:   Clear to auscultation bilaterally. No Wheezing or Rhonchi. No rales. Chest wall:  No tenderness or deformity. No Accessory muscle use. Heart:   Regular rate and rhythm,  no murmur, rub or gallop. Abdomen:   Soft, non-tender. Not distended. Bowel sounds normal. No masses  Extremities: Extremities normal, atraumatic, No cyanosis. No edema. No clubbing  Skin:     Texture, turgor normal. No rashes or lesions. Not Jaundiced  Psych:  Good insight. Not depressed. Not anxious or agitated. Neurologic: Normal strength, Alert and oriented X 3.        DATA:  Labs:  Recent Labs      03/08/18   0707  03/07/18   1156  03/06/18   1417   NA  140  141  146*   K  4.6  4.7  5.8*   CL  111*  110*  105   CO2  20*  21  17*   BUN  98*  109*  101*   CREA  8.45*  8.45*  7.62*   CA  PENDING  8.8  9.3  10.3   ALB  3.2*  3.7   --    PHOS  4.9*  5.0*   --    MG   --   2.6*   --      Recent Labs      03/08/18   0707  03/07/18   1156  03/06/18   1417   WBC  8.8  8.5  9.6   HGB  11.5  13.4  13.0   HCT  34.4*  39.8  39.9   PLT  233  280  302     Recent Labs      03/07/18   1552   CREAU  49.50       Total time spent with patient:  35 minutes    [] Critical Care Provided    Care Plan discussed with:   Staff, Rayray Mccabe MD

## 2018-03-08 NOTE — H&P
[address] [facility]  ACUTE CARE HISTORY AND PHYSICAL    Gena Mcburney.  MR#: [id number]  : 1950  ACCOUNT #: [de-identified]   DATE OF SERVICE: 2018    CHIEF COMPLAINT:  Anorexia. HISTORY OF PRESENT ILLNESS:  This is a 67-year-old black female with known history of type 2 diabetes mellitus, abdominal wall abscess, degenerative joint disease of the knee, dyslipidemia, primary hypertension, lumbar stenosis. She has been followed by Dr. Lavell Bella, who noted almost complete wound closure. On the day prior to admission, she came to the office not feeling well. She was doing well until , 3 days prior to admission when she noted the onset of upset stomach, anorexia, loose stools, but no actual diarrhea. On the morning prior to admission, she noted that she had episodes of nausea and vomiting. She just was not up to par she states. Patient was seen in the office. Appropriate laboratory studies were taken. She was noted to have a marked change in her creatinine. She was referred to emergency room today to confirm the abnormal labs, which unfortunately was the case and she was admitted for evaluation and care. MEDICATIONS:  On the day prior to admission, medications include Phenergan 25 mg q.4 hours p.r.n., Lasix 40 mg daily, Toprol-XL 25 mg daily, metformin XR 1,000 mg b.i.d., NovoLog a.c. meals, simvastatin 40 mg daily, Accupril 40 mg daily, Basaglar 30 units  at bedtime, Celebrex 200 mg b.i.d., Santyl ointment, and omeprazole 20 mg daily. PAST MEDICAL HISTORY:  Remarkable for cellulitis of the right abdominal wall 2017 requiring surgical debridement and a prolonged course of antibiotics including vancomycin. She was also on Zosyn and was seen in consultation by infectious disease, who also titrated her with antibiotics. She was subsequently discharged on 2017 with home care for the abdominal wall cellulitis/abscess.   Other medical problems include diabetes mellitus since , now insulin requiring, degenerative joint disease of the knee, dyslipidemia, primary hypertension, chronic low back pain, lumbar stenosis, obesity. PAST SURGICAL HISTORY:  Includes right breast biopsy, colonoscopy, hysterectomy, oophorectomy, and I and D of the infected abscess. ALLERGIES:  THERE ARE NO KNOWN ALLERGIES. HABITS:  There is no history of smoking or ETOH abuse. REVIEW OF SYSTEMS:  A 13-point review of systems unremarkable, no chills or fever. No chest pain or shortness of breath and otherwise unremarkable. SOCIAL HISTORY:  The patient is single. She lives alone. She has a master's degree in education from [address] [place] [address]. FAMILY HISTORY:  Mother  52 years of skin disorder. Father  of unknown cause. One aunt with lung cancer. The patient is adopted. PHYSICAL EXAMINATION:  GENERAL:  Well-developed, well-nourished, black female, resting quietly on the bed. VITAL SIGNS:  98.4, 89, 16, 146/80, O2 sat 99%, height 5 feet 3 inches, weight 206 pounds, BSA 2.04. EYES:  PERRLA, EOM intact. ENT:  Moist mucous membrane. Throat is clear. NECK:  Supple. Thyroid is palpated within the limits. RESPIRATORY:  Chest diminished breath sounds bilaterally. No adventitious sounds. HEART:  Regular rate and rhythm. GASTROINTESTINAL:  Abdomen is soft. Bowel sounds are active. There is a right lower quadrant dressing intact. EXTREMITIES:  Pulses are 1+. No edema. NEUROLOGIC:  Nonfocal exam.  HEMATOLOGIC:  No pathologic lymph nodes are palpated. IMPRESSION:    1. Acute kidney injury, etiology to be determined. 2.  Primary hypertension. 3.  Diabetes mellitus type 2.  4,  Resolving abdominal wall abscess. 5.  Degenerative joint disease, knee. 6.  Dyslipidemia. 7.  Lumbar spinal stenosis. 8.  Obesity. DISCUSSION:  For acute kidney injury, she will be placed on IV fluids and seen in consultation by nephrology.   We will discontinue Lasix, metformin, quinapril, and the NSAID. Blood pressure will be controlled with amlodipine with the addition of clonidine or hydralazine to induce a near normotensive response. We will continue the metoprolol. Blood sugar will be controlled with initially sliding scale insulin with addition of basal insulin as the blood sugars dictate. We will ask wound care to become involved in the abdominal wall abscess, which is resolving. We will encourage activity while she is in the hospital and p.o. intake.       Oma Cárdenas MD       [facility] / Merly Interiano  D: 03/07/2018 20:03     T: 03/07/2018 20:51  JOB #: [id number]

## 2018-03-08 NOTE — DIABETES MGMT
DTC Progress Note    Recommendations/ Comments: If appropriate, please consider changing correctional insulin scale to high sensitivity given pt current renal status. Current hospital DM medication:   -Humalog normal sensitivity correction    Chart reviewed on Jeovany Santos for variable BG. Patient is a 78 y.o. female with known history of Type 2 Diabetes on Metformin ER 500mg - 2 tabs bid and Basaglar 30 units nightly at home. A1c:   Lab Results   Component Value Date/Time    Hemoglobin A1c 6.7 (H) 03/06/2018 02:17 PM    Hemoglobin A1c 6.8 (H) 12/19/2017 02:57 AM       Recent Glucose Results: Lab Results   Component Value Date/Time     (H) 03/08/2018 07:07 AM    GLUCPOC 177 (H) 03/08/2018 11:30 AM    GLUCPOC 135 (H) 03/08/2018 07:07 AM    GLUCPOC 282 (H) 03/07/2018 09:46 PM        Lab Results   Component Value Date/Time    Creatinine 8.45 (H) 03/08/2018 07:07 AM     Estimated Creatinine Clearance: 7.1 mL/min (based on Cr of 8.45). Active Orders   Diet    DIET CARDIAC Regular        PO intake: Patient Vitals for the past 72 hrs:   % Diet Eaten   03/07/18 1800 75 %       Will continue to follow as needed.     Thank you    Faraz Steen RD, [address]

## 2018-03-08 NOTE — PROGRESS NOTES
Note CM consult \"dispo. \"    Patient is a 79 y/o single -American female insured by [facility] A/B, admitted to [facility] 3/7/18 for c/o fatigue. PMH HTN, DMII, DJD, spinal stenosis, obesity. Recently admitted to [facility] 12/18-12/27/17 for abdominal wall cellulitis, underwent I&D and discharged home with [facility] wound care. Per surgery notes from 3/6/18 office visit, wound doing well and is almost closed. For current hospitalization, patient being followed by nephrology for DANICA. PT met with patient, noted patient up ad nhung and ambulatory, pt reports to be at baseline and no further rehab needs identified. Patient continues to receive [facility] for wound care 3x/week ([facility]). CM sent updated referral to [facility] and note on chart requesting that Dr. Dan Medal order \"Resume HH\" at discharge. Expect discharge home when medically clear.  [facility] info added to [address] [facility], [place]

## 2018-03-08 NOTE — PROGRESS NOTES
NUTRITION  Pt seen for:     [x]     Supplements   [x]     MST   [x]      Education         RECOMMENDATIONS:   1. Weekly weights  2. Daily MVI for wound healing    Interventions:  - Glucerna 1x/day   - consistent CHO  SUBJECTIVE/OBJECTIVE:   Information obtained from:   patient and friend        Pt admitted for DANICA. PMHx: DM, abdominal wall abscess, dyslipidemia, HTN, lumbar stenosis. Recent admit for abd cellulitis with debridement in December noted. DANICA 2/2 abx noted with renal following. IVF discontinued with plans to monitor renal fx for 1-2days. Wt loss of about 10# since 12/2017 noted but wt more stable recently with improving appetite. Pt reports drinking 1-2 Premier Protein shakes for high protein diet to aid in wound healing (daily protein goal 90g = 1g/kg which is appropriate for wound healing). She reports good healing of abd wound. Encouraged continuation of premier protein as needed based on PO intake that day, if eating well may not be needed. Reviewed low sodium diet recommendations for renal fx - pt already with good exisiting knowledge. No questions or concerns at this time. Will add Glucerna 1x/day while here for 220kcal, 10g protein. Will also add consistent CHO to diet order with hx of DM. Plan to rescreen per protocol.      Diet:  cardiac  Intake: [x]           Good/Fair    Patient Vitals for the past 100 hrs:   % Diet Eaten   03/07/18 1800 75 %     Weight Changes:   [x]            Loss  Wt Readings from Last 7 Encounters:   03/07/18 93.4 kg (206 lb)   03/06/18 94 kg (207 lb 3.2 oz)   02/09/18 94.3 kg (208 lb)   01/12/18 94.3 kg (208 lb)   01/04/18 94.8 kg (209 lb)   01/02/18 95.3 kg (210 lb)   12/28/17 97.5 kg (215 lb)     Nutrition Problems Identified:  [x]      None    PLAN:   [x]           Educated patient  [x]           Rescreen per screening protocol  [x]           Add Supplements    Rescreen:  []            At Nutrition Risk           [x]            Not at Nutrition Risk, rescreen per screening protocol    Berna Ramirez, RD

## 2018-03-08 NOTE — PROGRESS NOTES
Spiritual Care Assessment/Progress Note  [facility]      NAME: Saira Barr      MRN: [id number]  AGE: 78 y.o. SEX: female  Congregational Affiliation: Jewish   Language: English     3/8/2018     Total Time (in minutes): 8     Spiritual Assessment begun in [facility] [phone] through conversation with:         [x]Patient        [] Family    [] Friend(s)        Reason for Consult: Request by staff     Spiritual beliefs: (Please include comment if needed)     [] Involved in a roddy tradition/spiritual practice:     [] Supported by a roddy community:      [] Claims no spiritual orientation:      [] Seeking spiritual identity:           [] Adheres to an individual form of spirituality:      [x] Not able to assess:  Patient busy                 Identified resources for coping:      [] Prayer                  [] Devotional reading               [] Music                  [] Guided Imagery     [] Family/friends                 [] Pet visits     [x] Other: none yet identified       Interventions offered during this visit: (See comments for more details)                Plan of Care:     [x] Discuss Spiritual/Cultural needs    [] Support AMD and/or advance care planning process      [] Support grieving process   [] Coordinate Rites/Rituals    [] Coordination with community clergy   [] No spiritual needs identified at this time   [] Detailed Plan of Care below (See Comments)  [] Make referral to Music Therapy  [] Make referral to Pet Therapy     [] Make referral to Addiction services  [] Make referral to [facility]  [] Make referral to Spiritual Care Partner  [] No future visits requested             Comments: I was referred to Ms. Fernanda Muller by her nurse, Halley Brian. I attempted to visit with her but she was in conversation with a visitor who was at the bedside. She asked that I return at a later time. Spiritual Care will attempt again at a more appropriate time. Rev. Yuliana Hernandez M.Div, [facility]

## 2018-03-08 NOTE — PROGRESS NOTES
1655: Pt's IV infiltrated. Per Dr. Sandi Bang, ok for pt to go without IV access. Ok to switch 4 mg IV Zofran Q6H PRN to PO order using the same dose and frequency. Bedside shift change report given to Corinne (oncoming nurse) by Halley Brian (offgoing nurse). Report included the following information SBAR, Kardex, Intake/Output, MAR and Recent Results.

## 2018-03-08 NOTE — PROGRESS NOTES
Hospital Progress Note    NAME:  Marcela Bermudez   :   1950   MRN:  [id number]     Date/Time:  3/8/2018 8:05 AM    Plan:   1. Am lab na - will ck later  2.  Encourage activity  Risk of Deterioration: Low  []           Moderate  [x]           High  []                 Assessment:   Principal Problem:    Acute renal failure (ARF) (HCC) (3/7/2018) - iv hydration - renal following    Active Problems:    Diabetes (HCC) () ssi      Hypertension () titrate meds      Cellulitis of right abdominal wall (2017) wound care to see       Subjective:     Feeling better  11 Point Review of Systems:   Negative except urinary frequency    []            Unable to obtain ROS due to:       []            mental status change []            sedated []            intubated     Social History   Substance Use Topics    Smoking status: Never Smoker    Smokeless tobacco: Never Used    Alcohol use No     Medications reviewed:  Current Facility-Administered Medications   Medication Dose Route Frequency    metoprolol succinate (TOPROL-XL) XL tablet 25 mg  25 mg Oral DAILY    simvastatin (ZOCOR) tablet 40 mg  40 mg Oral QHS    insulin lispro (HUMALOG) injection   SubCUTAneous AC&HS    glucose chewable tablet 16 g  4 Tab Oral PRN    dextrose (D50W) injection syrg 12.5-25 g  12.5-25 g IntraVENous PRN    glucagon (GLUCAGEN) injection 1 mg  1 mg IntraMUSCular PRN    sodium chloride (NS) flush 5-10 mL  5-10 mL IntraVENous Q8H    sodium chloride (NS) flush 5-10 mL  5-10 mL IntraVENous PRN    acetaminophen (TYLENOL) tablet 650 mg  650 mg Oral Q4H PRN    ondansetron (ZOFRAN) injection 4 mg  4 mg IntraVENous Q4H PRN    bisacodyl (DULCOLAX) tablet 5 mg  5 mg Oral DAILY PRN    0.45% sodium chloride infusion  100 mL/hr IntraVENous CONTINUOUS    pantoprazole (PROTONIX) tablet 40 mg  40 mg Oral ACB    heparin (porcine) injection 5,000 Units  5,000 Units SubCUTAneous Q12H    amLODIPine (NORVASC) tablet 10 mg  10 mg Oral DAILY Objective:   Vitals:  Visit Vitals    /79    Pulse 75    Temp 98.6 °F (37 °C)    Resp 18    Ht 5' 3\" (1.6 m)    Wt 206 lb (93.4 kg)    LMP  (LMP Unknown)    SpO2 98%    Breastfeeding No    BMI 36.49 kg/m2     Temp (24hrs), Av.5 °F (36.9 °C), Min:98.4 °F (36.9 °C), Max:98.6 °F (37 °C)      O2 Device: Room air    Last 24hr Input/Output:    Intake/Output Summary (Last 24 hours) at 18 0805  Last data filed at 18 7360   Gross per 24 hour   Intake              720 ml   Output             1350 ml   Net             -630 ml        PHYSICAL EXAM:  General:    Alert, cooperative, no distress, appears stated age. Head:   Normocephalic, without obvious abnormality, atraumatic. Eyes:   Conjunctivae/corneas clear. PERRLA  Lungs:   Clear to auscultation bilaterally. No Wheezing or Rhonchi. No rales. .  Heart:   Regular rate and rhythm,  no murmur, rub or gallop. Abdomen:   Soft, non-tender. Not distended.   Bowel sounds normal. No masses                          Dressing intact        Lab Data Reviewed:    Recent Labs      18   0707  18   1156  18   1417   WBC  8.8  8.5  9.6   HGB  11.5  13.4  13.0   HCT  34.4*  39.8  39.9   PLT  233  280  302     Recent Labs      18   0707  18   1156  18   1417   NA  140  141  146*   K  4.6  4.7  5.8*   CL  111*  110*  105   CO2  20*  21  17*   GLU  141*  181*  127*   BUN  98*  109*  101*   CREA  8.45*  8.45*  7.62*   CA  8.8  9.3  10.3   MG   --   2.6*   --    PHOS  4.9*  5.0*   --    ALB  3.2*  3.7   --    TBILI  0.4  0.3   --    SGOT  13*  18   --    ALT  16  20   --      Lab Results   Component Value Date/Time    Glucose (POC) 135 (H) 2018 07:07 AM    Glucose (POC) 282 (H) 2018 09:46 PM    Glucose (POC) 236 (H) 2018 04:39 PM    Glucose (POC) 148 (H) 2017 10:46 AM    Glucose (POC) 82 2017 06:42 AM ___________________________________________________  ___________________________________________________    Attending Physician: Brandon Darting, MD

## 2018-03-08 NOTE — CDMP QUERY
Dear Dr. Rae Barnes,    Please clarify if this patient is (was) being treated/managed for:     => Acute kidney failure with tubular necrosis in the setting of DANICA with recent vanc requiring IVF and lab monitoring  => Other explanation of clinical findings  => Unable to determine (no explanation for clinical findings)    The medical record reflects the following clinical findings, treatment, and risk factors. Risk Factors:  adm with fatigue  Clinical Indicators:  per PN\"Acute kidney injury with a significant drop in GFR in a patient who had been on long-term antibiotic therapy including vancomycin and on BATRES inhibitors\", , creat 8.45, GFR 6  Treatment: lab monitoring and IVF    Please clarify and document your clinical opinion in the progress notes and discharge summary including the definitive and/or presumptive diagnosis, (suspected or probable), related to the above clinical findings. Please include clinical findings supporting your diagnosis.     Thank You  Madelyn Santos,MSN,BSN,RN,[facility]  [phone]

## 2018-03-08 NOTE — PROGRESS NOTES
Spiritual Care Assessment/Progress Note  [facility]      NAME: Vickey Bearden      MRN: [id number]  AGE: 78 y.o. SEX: female  Mandaen Affiliation: Christian   Language: English     3/8/2018     Total Time (in minutes): 10     Spiritual Assessment begun in [facility] [phone] through conversation with:         [x]Patient        [] Family    [] Friend(s)        Reason for Consult: Request by staff     Spiritual beliefs: (Please include comment if needed)     [x] Involved in a roddy tradition/spiritual practice: [facility]     [] Supported by a roddy community:      [] Claims no spiritual orientation:      [] Seeking spiritual identity:           [] Adheres to an individual form of spirituality:      [] Not able to assess:                     Identified resources for coping:      [x] Prayer                  [] Devotional reading               [] Music                  [] Guided Imagery     [x] Family/friends                 [] Pet visits     [] Other:        Interventions offered during this visit: (See comments for more details)    Patient Interventions: Affirmation of roddy, Affirmation of emotions/emotional suffering, Iconic (affirming the presence of God/Higher Power), Prayer (actual), Normalization of emotional/spiritual concerns           Plan of Care:     [] Discuss Spiritual/Cultural needs    [] Support AMD and/or advance care planning process      [] Support grieving process   [] Coordinate Rites/Rituals    [] Coordination with community clergy   [x] No spiritual needs identified at this time   [] Detailed Plan of Care below (See Comments)  [] Make referral to Music Therapy  [] Make referral to Pet Therapy     [] Make referral to Addiction services  [] Make referral to [facility]  [] Make referral to Spiritual Care Partner  [] No future visits requested             Comments: I returned and was able to speak with Ms. Madhuri Nuñez at length.  She was in a good mood, although she became tearful at one point when talking about her medical situation. Olga is a espino coping resource for her and she is an active member at [facility] in [place]. She finds great support through the [employer] and her community of olga there. She is determined to remain positive despite her medical challenges. I provided affirmation and encouragement and also prayed with her. Spiritual Care remains available as needed. Rev. Paul Garcia M.Div, [facility]

## 2018-03-08 NOTE — WOUND CARE
WOCN Note:     New consult placed by MD for assessment of abdominal wound. Wound followed by Dr Phyllis Ambrosio and [facility]. Chart reviewed. Admitted DX:  Acute renal failure (ARF) ([address]  Past Medical History:  type 2 diabetes mellitus, abdominal wall abscess, degenerative joint disease of the knee, dyslipidemia, primary hypertension, lumbar stenosis  Admitted from home    Assessment:   Patient is A&O x 3, communicative, continent and mobile independently. Bed: Advance Care    Diet: cardiac  Patient reports no pain. 1. Abdomen, partial thickness wound Present on Admission = 0.3 x 1 x 0.3 cm  100% granulating red. Scant serosanguinous exudate. Periwound without erythema. Margins are open. Cleansed with saline; apply Britt Aq; covered with Mepilex One (to secure Britt in place) and dry dressing. Recommendations:    Abdominal wound:  Every other day cleanse with saline; apply Britt AG; cover with Mepilex One and dry dressing. Skin Care & Pressure Prevention:  Minimize layers of linen/pads under patient to optimize support surface. Turn/reposition approximately every 2 hours and offload heels. Discussed above plan with patient and Sally Mirza RN.     Transition of Care: Plan to follow weekly and as needed while admitted to hospital.    ALEXANDRA Stephens RN  Wound Care  Office [phone]  Pager [phone]

## 2018-03-09 LAB
ALBUMIN SERPL-MCNC: 3.4 G/DL (ref 3.5–5)
ANION GAP SERPL CALC-SCNC: 11 MMOL/L (ref 5–15)
BASOPHILS # BLD: 0 K/UL (ref 0–0.1)
BASOPHILS NFR BLD: 0 % (ref 0–1)
BUN SERPL-MCNC: 94 MG/DL (ref 6–20)
BUN/CREAT SERPL: 12 (ref 12–20)
CALCIUM SERPL-MCNC: 8.8 MG/DL (ref 8.5–10.1)
CHLORIDE SERPL-SCNC: 113 MMOL/L (ref 97–108)
CO2 SERPL-SCNC: 19 MMOL/L (ref 21–32)
CREAT SERPL-MCNC: 8.09 MG/DL (ref 0.55–1.02)
DIFFERENTIAL METHOD BLD: ABNORMAL
EOSINOPHIL # BLD: 0.4 K/UL (ref 0–0.4)
EOSINOPHIL NFR BLD: 4 % (ref 0–7)
ERYTHROCYTE [DISTWIDTH] IN BLOOD BY AUTOMATED COUNT: 14.7 % (ref 11.5–14.5)
GLUCOSE BLD STRIP.AUTO-MCNC: 126 MG/DL (ref 65–100)
GLUCOSE BLD STRIP.AUTO-MCNC: 156 MG/DL (ref 65–100)
GLUCOSE BLD STRIP.AUTO-MCNC: 259 MG/DL (ref 65–100)
GLUCOSE BLD STRIP.AUTO-MCNC: 282 MG/DL (ref 65–100)
GLUCOSE SERPL-MCNC: 178 MG/DL (ref 65–100)
HCT VFR BLD AUTO: 37.6 % (ref 35–47)
HGB BLD-MCNC: 12.8 G/DL (ref 11.5–16)
IMM GRANULOCYTES # BLD: 0 K/UL (ref 0–0.04)
IMM GRANULOCYTES NFR BLD AUTO: 0 % (ref 0–0.5)
LYMPHOCYTES # BLD: 1.5 K/UL (ref 0.8–3.5)
LYMPHOCYTES NFR BLD: 18 % (ref 12–49)
MCH RBC QN AUTO: 29.3 PG (ref 26–34)
MCHC RBC AUTO-ENTMCNC: 34 G/DL (ref 30–36.5)
MCV RBC AUTO: 86 FL (ref 80–99)
MONOCYTES # BLD: 0.8 K/UL (ref 0–1)
MONOCYTES NFR BLD: 9 % (ref 5–13)
NEUTS SEG # BLD: 6 K/UL (ref 1.8–8)
NEUTS SEG NFR BLD: 69 % (ref 32–75)
NRBC # BLD: 0 K/UL (ref 0–0.01)
NRBC BLD-RTO: 0 PER 100 WBC
PHOSPHATE SERPL-MCNC: 5.2 MG/DL (ref 2.6–4.7)
PLATELET # BLD AUTO: 258 K/UL (ref 150–400)
PMV BLD AUTO: 10.6 FL (ref 8.9–12.9)
POTASSIUM SERPL-SCNC: 4.7 MMOL/L (ref 3.5–5.1)
RBC # BLD AUTO: 4.37 M/UL (ref 3.8–5.2)
SERVICE CMNT-IMP: ABNORMAL
SODIUM SERPL-SCNC: 143 MMOL/L (ref 136–145)
WBC # BLD AUTO: 8.7 K/UL (ref 3.6–11)

## 2018-03-09 PROCEDURE — 74011250636 HC RX REV CODE- 250/636: Performed by: INTERNAL MEDICINE

## 2018-03-09 PROCEDURE — 74011250637 HC RX REV CODE- 250/637: Performed by: INTERNAL MEDICINE

## 2018-03-09 PROCEDURE — 74011636637 HC RX REV CODE- 636/637: Performed by: INTERNAL MEDICINE

## 2018-03-09 PROCEDURE — 36415 COLL VENOUS BLD VENIPUNCTURE: CPT | Performed by: INTERNAL MEDICINE

## 2018-03-09 PROCEDURE — 65270000032 HC RM SEMIPRIVATE

## 2018-03-09 PROCEDURE — 85025 COMPLETE CBC W/AUTO DIFF WBC: CPT | Performed by: INTERNAL MEDICINE

## 2018-03-09 PROCEDURE — 80069 RENAL FUNCTION PANEL: CPT | Performed by: INTERNAL MEDICINE

## 2018-03-09 PROCEDURE — 82962 GLUCOSE BLOOD TEST: CPT

## 2018-03-09 PROCEDURE — 3331090001 HH PPS REVENUE CREDIT

## 2018-03-09 PROCEDURE — 3331090002 HH PPS REVENUE DEBIT

## 2018-03-09 RX ORDER — CALCITRIOL 0.25 UG/1
0.25 CAPSULE ORAL DAILY
Status: DISCONTINUED | OUTPATIENT
Start: 2018-03-10 | End: 2018-03-10 | Stop reason: HOSPADM

## 2018-03-09 RX ORDER — CLONIDINE HYDROCHLORIDE 0.2 MG/1
0.2 TABLET ORAL 2 TIMES DAILY
Status: DISCONTINUED | OUTPATIENT
Start: 2018-03-09 | End: 2018-03-10 | Stop reason: HOSPADM

## 2018-03-09 RX ADMIN — CLONIDINE HYDROCHLORIDE 0.2 MG: 0.2 TABLET ORAL at 08:26

## 2018-03-09 RX ADMIN — HEPARIN SODIUM 5000 UNITS: 5000 INJECTION, SOLUTION INTRAVENOUS; SUBCUTANEOUS at 06:55

## 2018-03-09 RX ADMIN — PANTOPRAZOLE SODIUM 40 MG: 40 TABLET, DELAYED RELEASE ORAL at 06:55

## 2018-03-09 RX ADMIN — SODIUM BICARBONATE 325 MG: 650 TABLET, ORALLY DISINTEGRATING ORAL at 08:26

## 2018-03-09 RX ADMIN — SIMVASTATIN 40 MG: 40 TABLET, FILM COATED ORAL at 21:10

## 2018-03-09 RX ADMIN — SODIUM BICARBONATE 325 MG: 650 TABLET, ORALLY DISINTEGRATING ORAL at 17:36

## 2018-03-09 RX ADMIN — INSULIN LISPRO 2 UNITS: 100 INJECTION, SOLUTION INTRAVENOUS; SUBCUTANEOUS at 07:18

## 2018-03-09 RX ADMIN — HEPARIN SODIUM 5000 UNITS: 5000 INJECTION, SOLUTION INTRAVENOUS; SUBCUTANEOUS at 17:36

## 2018-03-09 RX ADMIN — CLONIDINE HYDROCHLORIDE 0.2 MG: 0.2 TABLET ORAL at 17:36

## 2018-03-09 RX ADMIN — INSULIN LISPRO 5 UNITS: 100 INJECTION, SOLUTION INTRAVENOUS; SUBCUTANEOUS at 11:25

## 2018-03-09 RX ADMIN — Medication 10 ML: at 14:00

## 2018-03-09 RX ADMIN — METOPROLOL SUCCINATE 50 MG: 50 TABLET, EXTENDED RELEASE ORAL at 08:27

## 2018-03-09 RX ADMIN — AMLODIPINE BESYLATE 10 MG: 5 TABLET ORAL at 08:26

## 2018-03-09 RX ADMIN — INSULIN LISPRO 3 UNITS: 100 INJECTION, SOLUTION INTRAVENOUS; SUBCUTANEOUS at 22:27

## 2018-03-09 NOTE — PROGRESS NOTES
Bedside shift change report given to ST. ANDRADE BURGER (oncoming nurse) by Soledad Welsh (offgoing nurse). Report included the following information SBAR, Kardex, Intake/Output, MAR and Recent Results.

## 2018-03-09 NOTE — PROGRESS NOTES
Name: Candida Calixto MRN: [id number]   : 1950 Hospital: . [address]   Date: 3/9/2018        IMPRESSION:   · DANICA vs CKD. Patient's kidneys are echogenic on US, although her kidney function was normal few months back. Obstruction and AIN are ruled out. Patient most likely has toxicity to one or more of the AB's used to treat her abdominal wall celulitis. It is still early to know if the injury is permanent. Patient is not uremic clinically. Her volume status is euvolemic. GFR remains the same. · Mild metabolic acidosis related to reduced GFR.  · HTN better controlled. · Secondary hyperparathyroidism. PLAN:   · Start calcitriol. · Continue sodium bicarbonate. · Continue the current BP medications. · Renal panel in AM.  · Will monitor GFR for 24-48 hours. Patient does not need immediate RRT. If she remains clinically stable, even if her GFR does not improve she will be stable to be discharged, but will need a very close office monitoring. Patient had several questions related to her medical problems- I answer them to patient's satisfaction. Subjective/Interval History:   I have reviewed the flowsheet and previous days notes. ROS:A comprehensive review of systems was negative.     Objective:   Vital Signs:    Visit Vitals    /85 (BP 1 Location: Left arm, BP Patient Position: At rest;Head of bed elevated (Comment degrees))  Comment (BP Patient Position): 30    Pulse 75    Temp 98 °F (36.7 °C)    Resp 18    Ht 5' 3\" (1.6 m)    Wt 93.4 kg (206 lb)    LMP  (LMP Unknown)    SpO2 100%    Breastfeeding No    BMI 36.49 kg/m2       O2 Device: Room air       Temp (24hrs), Av.3 °F (36.8 °C), Min:98 °F (36.7 °C), Max:98.4 °F (36.9 °C)       Intake/Output:   Last shift:      701 - 1900  In: -   Out: 400 [Urine:400]  Last 3 shifts: 1901 - 700  In: -   Out: 3350 [Urine:3350]    Intake/Output Summary (Last 24 hours) at 18 1245  Last data filed at 03/09/18 0957   Gross per 24 hour   Intake                0 ml   Output             2200 ml   Net            -2200 ml        Physical Exam:  General:    Alert, cooperative, no distress, appears stated age. Head:   Normocephalic, without obvious abnormality, atraumatic. Eyes:   Conjunctivae/corneas clear. Nose:  Nares normal. No drainage or sinus tenderness. Throat:    Lips, mucosa, and tongue normal.  No Thrush  Neck:  Supple, symmetrical,  no adenopathy, thyroid: non tender    no carotid bruit and no JVD. Back:    Symmetric,  No CVA tenderness. Lungs:   Clear to auscultation bilaterally. No Wheezing or Rhonchi. No rales. Chest wall:  No tenderness or deformity. No Accessory muscle use. Heart:   Regular rate and rhythm,  no murmur, rub or gallop. Abdomen:   Soft, non-tender. Not distended. Bowel sounds normal. No masses  Extremities: Extremities normal, atraumatic, No cyanosis. No edema. No clubbing  Skin:     Texture, turgor normal. No rashes or lesions. Not Jaundiced  Psych:  Good insight. Not depressed. Not anxious or agitated. Neurologic: Normal strength, Alert and oriented X 3.        DATA:  Labs:  Recent Labs      03/09/18   0415  03/08/18   0707  03/07/18   1156   NA  143  140  141   K  4.7  4.6  4.7   CL  113*  111*  110*   CO2  19*  20*  21   BUN  94*  98*  109*   CREA  8.09*  8.45*  8.45*   CA  8.8  8.8  8.8  9.3   ALB  3.4*  3.2*  3.7   PHOS  5.2*  4.9*  5.0*   MG   --    --   2.6*     Recent Labs      03/09/18   0415  03/08/18   0707  03/07/18   1156   WBC  8.7  8.8  8.5   HGB  12.8  11.5  13.4   HCT  37.6  34.4*  39.8   PLT  258  233  280     Recent Labs      03/07/18   1552   CREAU  49.50       Total time spent with patient:  35 minutes    [] Critical Care Provided    Care Plan discussed with:   Staff, Bogdan Najera MD

## 2018-03-09 NOTE — PROGRESS NOTES
Hospital Progress Note    NAME:  Marcela Bermudez   :   1950   MRN:  [id number]     Date/Time:  3/9/2018 8:05 AM    Plan:   1. Will d/w renal  2.  Encourage activity  Risk of Deterioration: Low  []           Moderate  [x]           High  []                 Assessment:   Principal Problem:    Acute renal failure (ARF) (HCC) (3/7/2018) - iv hydration - renal following    Active Problems:    Diabetes (HCC) () ssi      Hypertension () titrate meds      Cellulitis of right abdominal wall (2017) wound care       Subjective:     Feeling better  11 Point Review of Systems:   Negative except urinary frequency    []            Unable to obtain ROS due to:       []            mental status change []            sedated []            intubated     Social History   Substance Use Topics    Smoking status: Never Smoker    Smokeless tobacco: Never Used    Alcohol use No     Medications reviewed:  Current Facility-Administered Medications   Medication Dose Route Frequency    metoprolol succinate (TOPROL-XL) XL tablet 50 mg  50 mg Oral DAILY    sodium bicarbonate tablet 325 mg  325 mg Oral BID    ondansetron (ZOFRAN ODT) tablet 4 mg  4 mg Oral Q6H PRN    simvastatin (ZOCOR) tablet 40 mg  40 mg Oral QHS    insulin lispro (HUMALOG) injection   SubCUTAneous AC&HS    glucose chewable tablet 16 g  4 Tab Oral PRN    dextrose (D50W) injection syrg 12.5-25 g  12.5-25 g IntraVENous PRN    glucagon (GLUCAGEN) injection 1 mg  1 mg IntraMUSCular PRN    sodium chloride (NS) flush 5-10 mL  5-10 mL IntraVENous Q8H    sodium chloride (NS) flush 5-10 mL  5-10 mL IntraVENous PRN    acetaminophen (TYLENOL) tablet 650 mg  650 mg Oral Q4H PRN    bisacodyl (DULCOLAX) tablet 5 mg  5 mg Oral DAILY PRN    pantoprazole (PROTONIX) tablet 40 mg  40 mg Oral ACB    heparin (porcine) injection 5,000 Units  5,000 Units SubCUTAneous Q12H    amLODIPine (NORVASC) tablet 10 mg  10 mg Oral DAILY        Objective:   Vitals:  Visit Vitals    /82    Pulse 72    Temp 98.3 °F (36.8 °C)    Resp 18    Ht 5' 3\" (1.6 m)    Wt 206 lb (93.4 kg)    LMP  (LMP Unknown)    SpO2 100%    Breastfeeding No    BMI 36.49 kg/m2     Temp (24hrs), Av °F (36.7 °C), Min:96.9 °F (36.1 °C), Max:98.4 °F (36.9 °C)      O2 Device: Room air    Last 24hr Input/Output:    Intake/Output Summary (Last 24 hours) at 1830  Last data filed at 18 0447   Gross per 24 hour   Intake                0 ml   Output             2400 ml   Net            -2400 ml        PHYSICAL EXAM:  General:    Alert, cooperative, no distress, appears stated age. Head:   Normocephalic, without obvious abnormality, atraumatic. Eyes:   Conjunctivae/corneas clear. PERRLA  Lungs:   Clear to auscultation bilaterally. No Wheezing or Rhonchi. No rales. .  Heart:   Regular rate and rhythm,  no murmur, rub or gallop. Abdomen:   Soft, non-tender. Not distended.   Bowel sounds normal. No masses                          Dressing intact        Lab Data Reviewed:    Recent Labs      18   0415  18   0707  18   1156   WBC  8.7  8.8  8.5   HGB  12.8  11.5  13.4   HCT  37.6  34.4*  39.8   PLT  258  233  280     Recent Labs      18   0415  18   0707  18   1156   NA  143  140  141   K  4.7  4.6  4.7   CL  113*  111*  110*   CO2  19*  20*  21   GLU  178*  141*  181*   BUN  94*  98*  109*   CREA  8.09*  8.45*  8.45*   CA  8.8  8.8  8.8  9.3   MG   --    --   2.6*   PHOS  5.2*  4.9*  5.0*   ALB  3.4*  3.2*  3.7   TBILI   --   0.4  0.3   SGOT   --   13*  18   ALT   --   16  20     Lab Results   Component Value Date/Time    Glucose (POC) 156 (H) 2018 07:06 AM    Glucose (POC) 194 (H) 2018 09:58 PM    Glucose (POC) 141 (H) 2018 04:53 PM    Glucose (POC) 177 (H) 2018 11:30 AM    Glucose (POC) 135 (H) 2018 07:07 AM       ___________________________________________________  ___________________________________________________    Attending Physician: Katy Petty MD

## 2018-03-09 NOTE — PROGRESS NOTES
Bedside shift change report given to Gisel Viera (oncoming nurse) by Ghulam Sánchez (offgoing nurse). Report included the following information SBAR, Kardex, Intake/Output, MAR and Recent Results.

## 2018-03-10 VITALS
WEIGHT: 206 LBS | RESPIRATION RATE: 18 BRPM | DIASTOLIC BLOOD PRESSURE: 73 MMHG | HEIGHT: 63 IN | HEART RATE: 72 BPM | TEMPERATURE: 98.5 F | SYSTOLIC BLOOD PRESSURE: 157 MMHG | OXYGEN SATURATION: 100 % | BODY MASS INDEX: 36.5 KG/M2

## 2018-03-10 LAB
ALBUMIN SERPL-MCNC: 3.2 G/DL (ref 3.5–5)
ANION GAP SERPL CALC-SCNC: 10 MMOL/L (ref 5–15)
BUN SERPL-MCNC: 89 MG/DL (ref 6–20)
BUN/CREAT SERPL: 11 (ref 12–20)
CALCIUM SERPL-MCNC: 8.8 MG/DL (ref 8.5–10.1)
CHLORIDE SERPL-SCNC: 112 MMOL/L (ref 97–108)
CO2 SERPL-SCNC: 22 MMOL/L (ref 21–32)
CREAT SERPL-MCNC: 7.82 MG/DL (ref 0.55–1.02)
GLUCOSE BLD STRIP.AUTO-MCNC: 142 MG/DL (ref 65–100)
GLUCOSE BLD STRIP.AUTO-MCNC: 184 MG/DL (ref 65–100)
GLUCOSE SERPL-MCNC: 128 MG/DL (ref 65–100)
PHOSPHATE SERPL-MCNC: 5.3 MG/DL (ref 2.6–4.7)
POTASSIUM SERPL-SCNC: 4.7 MMOL/L (ref 3.5–5.1)
SERVICE CMNT-IMP: ABNORMAL
SERVICE CMNT-IMP: ABNORMAL
SODIUM SERPL-SCNC: 144 MMOL/L (ref 136–145)

## 2018-03-10 PROCEDURE — 82962 GLUCOSE BLOOD TEST: CPT

## 2018-03-10 PROCEDURE — 74011636637 HC RX REV CODE- 636/637: Performed by: INTERNAL MEDICINE

## 2018-03-10 PROCEDURE — 74011250636 HC RX REV CODE- 250/636: Performed by: INTERNAL MEDICINE

## 2018-03-10 PROCEDURE — 80069 RENAL FUNCTION PANEL: CPT | Performed by: INTERNAL MEDICINE

## 2018-03-10 PROCEDURE — 77030020057 HC DRSG MTRX CLLGN STGN -B

## 2018-03-10 PROCEDURE — 74011250637 HC RX REV CODE- 250/637: Performed by: INTERNAL MEDICINE

## 2018-03-10 PROCEDURE — 3331090002 HH PPS REVENUE DEBIT

## 2018-03-10 PROCEDURE — 36415 COLL VENOUS BLD VENIPUNCTURE: CPT | Performed by: INTERNAL MEDICINE

## 2018-03-10 PROCEDURE — 3331090001 HH PPS REVENUE CREDIT

## 2018-03-10 RX ORDER — PANTOPRAZOLE SODIUM 40 MG/1
40 TABLET, DELAYED RELEASE ORAL
Qty: 30 TAB | Refills: 0 | Status: SHIPPED | OUTPATIENT
Start: 2018-03-11 | End: 2018-03-22 | Stop reason: SDUPTHER

## 2018-03-10 RX ORDER — METOPROLOL SUCCINATE 25 MG/1
50 TABLET, EXTENDED RELEASE ORAL DAILY
Qty: 60 TAB | Refills: 1 | Status: SHIPPED | OUTPATIENT
Start: 2018-03-10 | End: 2018-04-25 | Stop reason: SDUPTHER

## 2018-03-10 RX ORDER — LANCETS 28 GAUGE
EACH MISCELLANEOUS
Qty: 100 LANCET | Refills: 1 | Status: SHIPPED | OUTPATIENT
Start: 2018-03-10 | End: 2018-04-24 | Stop reason: SDUPTHER

## 2018-03-10 RX ORDER — SODIUM BICARBONATE 325 MG/1
325 TABLET ORAL 2 TIMES DAILY
Qty: 20 TAB | Refills: 0 | Status: SHIPPED | OUTPATIENT
Start: 2018-03-10 | End: 2018-03-20

## 2018-03-10 RX ORDER — CLONIDINE HYDROCHLORIDE 0.2 MG/1
0.2 TABLET ORAL 2 TIMES DAILY
Qty: 60 TAB | Refills: 1 | Status: SHIPPED | OUTPATIENT
Start: 2018-03-10 | End: 2018-04-24 | Stop reason: SDUPTHER

## 2018-03-10 RX ORDER — CALCITRIOL 0.25 UG/1
0.25 CAPSULE ORAL DAILY
Qty: 15 CAP | Refills: 0 | Status: SHIPPED | OUTPATIENT
Start: 2018-03-11 | End: 2018-03-22 | Stop reason: SDUPTHER

## 2018-03-10 RX ORDER — AMLODIPINE BESYLATE 5 MG/1
10 TABLET ORAL DAILY
Qty: 60 TAB | Refills: 1 | Status: SHIPPED | OUTPATIENT
Start: 2018-03-10 | End: 2018-03-27 | Stop reason: SDUPTHER

## 2018-03-10 RX ORDER — INSULIN LISPRO 100 [IU]/ML
INJECTION, SOLUTION INTRAVENOUS; SUBCUTANEOUS
Qty: 1 VIAL | Refills: 1 | Status: SHIPPED | OUTPATIENT
Start: 2018-03-10 | End: 2018-03-13 | Stop reason: ALTCHOICE

## 2018-03-10 RX ADMIN — INSULIN LISPRO 2 UNITS: 100 INJECTION, SOLUTION INTRAVENOUS; SUBCUTANEOUS at 07:57

## 2018-03-10 RX ADMIN — METOPROLOL SUCCINATE 50 MG: 50 TABLET, EXTENDED RELEASE ORAL at 08:42

## 2018-03-10 RX ADMIN — SODIUM BICARBONATE 325 MG: 650 TABLET, ORALLY DISINTEGRATING ORAL at 08:42

## 2018-03-10 RX ADMIN — AMLODIPINE BESYLATE 10 MG: 5 TABLET ORAL at 11:31

## 2018-03-10 RX ADMIN — CALCITRIOL 0.25 MCG: 0.25 CAPSULE, LIQUID FILLED ORAL at 08:43

## 2018-03-10 RX ADMIN — HEPARIN SODIUM 5000 UNITS: 5000 INJECTION, SOLUTION INTRAVENOUS; SUBCUTANEOUS at 06:27

## 2018-03-10 RX ADMIN — PANTOPRAZOLE SODIUM 40 MG: 40 TABLET, DELAYED RELEASE ORAL at 06:30

## 2018-03-10 RX ADMIN — INSULIN LISPRO 2 UNITS: 100 INJECTION, SOLUTION INTRAVENOUS; SUBCUTANEOUS at 12:03

## 2018-03-10 NOTE — DISCHARGE SUMMARY
Discharge Summary       PATIENT ID: Aaron Smith  MRN: [id number]   YOB: 1950    DATE OF ADMISSION: 3/7/2018 11:54 AM    DATE OF DISCHARGE: 3/10/2018   PRIMARY CARE PROVIDER: Dinah Jiménez MD     ATTENDING PHYSICIAN: Dr Lexie Arguello  DISCHARGING PROVIDER: Lexie Arguello MD    To contact this individual call [phone] and ask the  to page. If unavailable ask to be transferred the Adult Hospitalist Department. CONSULTATIONS: IP CONSULT TO NEPHROLOGY  IP CONSULT TO NEPHROLOGY    PROCEDURES/SURGERIES: * No surgery found *    [address] COURSE:     Acute renal failure (ARF) ([facility] [address] (3/7/2018) - iv hydration - renal following. Currently does not have any complaints, she is eating and drinking with no nausea or vomiting. Claims has had good bowel movements and is also making good urine    Active Problems:    Diabetes (HCC) () ssi      Hypertension () titrate meds      Cellulitis of right abdominal wall (12/18/2017) wound care        DISCHARGE DIAGNOSES / PLAN:      1. ARF likely sec to antibiotics  2. DM type 2. Now Lantus on hold. Continue SSI  3. Abdominal wound. Wound care       PENDING TEST RESULTS:   At the time of discharge the following test results are still pending: none    FOLLOW UP APPOINTMENTS:    Follow-up Information     Follow up With Details Comments [facility]  Continued home health wound care. Call agency if you haven't been contacted by a liaison within 24 hours of hospital discharge.  [address]    Dinah Jiménez MD In 1 week  [address] [address]  [address] [facility]  [facility] 7 [address]      Tushar Gomez MD In 3 days  [address] [phone]             ADDITIONAL CARE RECOMMENDATIONS:   Follow up with Dr Melissa Aviles in 3-4 days  Follow up with Dr Mike Villegas in 3-4 days  Please expect a blood work when you seen your physicians  Your kidney function is not back to normal. It is very important that you follow up with your physicians  Blood sugar checks four times daily (before meals as well as before going off to bed)    DIET: Renal Diet    ACTIVITY: Activity as tolerated      DISCHARGE MEDICATIONS:  Current Discharge Medication List      START taking these medications    Details   calcitRIOL (ROCALTROL) 0.25 mcg capsule Take 1 Cap by mouth daily. Qty: 15 Cap, Refills: 0      cloNIDine HCl (CATAPRES) 0.2 mg tablet Take 1 Tab by mouth two (2) times a day. Qty: 60 Tab, Refills: 1      pantoprazole (PROTONIX) 40 mg tablet Take 1 Tab by mouth Daily (before breakfast). Qty: 30 Tab, Refills: 0      sodium bicarbonate 325 mg tablet Take 1 Tab by mouth two (2) times a day for 10 days. Qty: 20 Tab, Refills: 0      insulin lispro (HUMALOG) 100 unit/mL injection Injection subcutaneously three times daily before meals as needed as above  Qty: 1 Vial, Refills: 1      lancets 28 gauge misc Three times daily as needed  Qty: 100 Lancet, Refills: 1         CONTINUE these medications which have CHANGED    Details   amLODIPine (NORVASC) 5 mg tablet Take 2 Tabs by mouth daily. Qty: 60 Tab, Refills: 1      metoprolol succinate (TOPROL-XL) 25 mg XL tablet Take 2 Tabs by mouth daily. Qty: 60 Tab, Refills: 1         CONTINUE these medications which have NOT CHANGED    Details   simvastatin (ZOCOR) 40 mg tablet Take 1 Tab by mouth nightly. Qty: 90 Tab, Refills: 3      albuterol (PROVENTIL HFA, VENTOLIN HFA, PROAIR HFA) 90 mcg/actuation inhaler Take 2 Puffs by inhalation every four (4) hours as needed for Wheezing.   Qty: 1 Inhaler, Refills: 0         STOP taking these medications       celecoxib (CELEBREX) 200 mg capsule Comments:   Reason for Stopping:         promethazine (PHENERGAN) 25 mg tablet Comments:   Reason for Stopping:         furosemide (LASIX) 40 mg tablet Comments:   Reason for Stopping:         metFORMIN ER (GLUCOPHAGE XR) 500 mg tablet Comments:   Reason for Stopping:         quinapril (ACCUPRIL) 40 mg tablet Comments:   Reason for Stopping:         insulin glargine (BASAGLAR KWIKPEN) 100 unit/mL (3 mL) inpn Comments:   Reason for Stopping:                 NOTIFY YOUR PHYSICIAN FOR ANY OF THE FOLLOWING:   Fever over 101 degrees for 24 hours. Chest pain, shortness of breath, fever, chills, nausea, vomiting, diarrhea, change in mentation, falling, weakness, bleeding. Severe pain or pain not relieved by medications. Or, any other signs or symptoms that you may have questions about. DISPOSITION:  x  Home With:   OT  PT  HH  RN       Long term SNF/Inpatient Rehab    Independent/assisted living    Hospice    Other:       PATIENT CONDITION AT DISCHARGE:     Functional status    Poor     Deconditioned    x Independent      Cognition    x Lucid     Forgetful     Dementia      Catheters/lines (plus indication)    Nicholas     PICC     PEG    x None      Code status   x  Full code     DNR      PHYSICAL EXAMINATION AT DISCHARGE:  Please see progress note      CHRONIC MEDICAL DIAGNOSES:  Problem List as of 3/10/2018  Date Reviewed: 3/6/2018          Codes Class Noted - Resolved    * (Principal)Acute renal failure (ARF) ([facility] ICD-10-CM: N17.9  ICD-9-CM: 584.9  3/7/2018 - Present        Gastroenteritis ICD-10-CM: K52.9  ICD-9-CM: 371. 9  Unknown - Present        Cellulitis of right abdominal wall ICD-10-CM: L03.311  ICD-9-CM: 682.2  12/18/2017 - Present        DJD (degenerative joint disease) of knee ICD-10-CM: M17.10  ICD-9-CM: 715.36  Unknown - Present        Low back pain ICD-10-CM: M54.5  ICD-9-CM: 724.2  Unknown - Present    Overview Signed 10/29/2015  5:21 PM by Izzy Lozano MD     chiorpactor             Lumbar stenosis ICD-10-CM: M48.061  ICD-9-CM: 724.02  Unknown - Present        Diabetes ([facility] ICD-10-CM: E11.9  ICD-9-CM: 250.00  Unknown - Present        Hypercholesterolemia ICD-10-CM: E78.00  ICD-9-CM: 272.0  Unknown - Present Hypertension ICD-10-CM: I10  ICD-9-CM: 401.9  Unknown - Present        Obesity ICD-10-CM: E66.9  ICD-9-CM: 278.00  Unknown - Present              Greater than 37 minutes were spent with the patient on counseling and coordination of care    Signed:   Graciela Reed MD  3/10/2018  10:05 AM

## 2018-03-10 NOTE — PROGRESS NOTES
Name: Yadira Ojeda MRN: [id number]   : 1950 Hospital: [address]   Date: 3/10/2018        IMPRESSION:   · DANICA vs CKD. Patient's kidneys are echogenic on US, although her kidney function was normal few months back. Obstruction and AIN are ruled out. Patient most likely has toxicity to one or more of the AB's used to treat her abdominal wall celulitis. It is still early to know if the injury is permanent. Patient is not uremic clinically. Her volume status is euvolemic. GFR is same/improved. · Mild metabolic acidosis related to reduced GFR.  · HTN better controlled. · Secondary hyperparathyroidism. PLAN:   · Patient can be discharged. She will need to see me at our office in 7-10 days with another renal panel done. · Continue sodium bicarbonate. · Continue the current BP medications. · Renal panel in AM.     Subjective/Interval History:   I have reviewed the flowsheet and previous days notes. ROS:A comprehensive review of systems was negative. Objective:   Vital Signs:    Visit Vitals    /80    Pulse 68    Temp 97.7 °F (36.5 °C)    Resp 18    Ht 5' 3\" (1.6 m)    Wt 93.4 kg (206 lb)    LMP  (LMP Unknown)    SpO2 99%    Breastfeeding No    BMI 36.49 kg/m2       O2 Device: Room air       Temp (24hrs), Av.1 °F (36.7 °C), Min:97.7 °F (36.5 °C), Max:98.4 °F (36.9 °C)       Intake/Output:   Last shift:         Last 3 shifts:  1901 - 03/10 0700  In: -   Out: 3050 [Urine:3050]    Intake/Output Summary (Last 24 hours) at 03/10/18 1054  Last data filed at 03/10/18 0431   Gross per 24 hour   Intake                0 ml   Output             1150 ml   Net            -1150 ml        Physical Exam:  General:    Alert, cooperative, no distress, appears stated age. Head:   Normocephalic, without obvious abnormality, atraumatic. Eyes:   Conjunctivae/corneas clear. Nose:  Nares normal. No drainage or sinus tenderness.   Throat:    Lips, mucosa, and tongue normal. No Thrush  Neck:  Supple, symmetrical,  no adenopathy, thyroid: non tender    no carotid bruit and no JVD. Back:    Symmetric,  No CVA tenderness. Lungs:   Clear to auscultation bilaterally. No Wheezing or Rhonchi. No rales. Chest wall:  No tenderness or deformity. No Accessory muscle use. Heart:   Regular rate and rhythm,  no murmur, rub or gallop. Abdomen:   Soft, non-tender. Not distended. Bowel sounds normal. No masses  Extremities: Extremities normal, atraumatic, No cyanosis. No edema. No clubbing  Skin:     Texture, turgor normal. No rashes or lesions. Not Jaundiced  Psych:  Good insight. Not depressed. Not anxious or agitated. Neurologic: Normal strength, Alert and oriented X 3.        DATA:  Labs:  Recent Labs      03/10/18   0415  03/09/18   0415  03/08/18   0707  03/07/18   1156   NA  144  143  140  141   K  4.7  4.7  4.6  4.7   CL  112*  113*  111*  110*   CO2  22  19*  20*  21   BUN  89*  94*  98*  109*   CREA  7.82*  8.09*  8.45*  8.45*   CA  8.8  8.8  8.8  8.8  9.3   ALB  3.2*  3.4*  3.2*  3.7   PHOS  5.3*  5.2*  4.9*  5.0*   MG   --    --    --   2.6*     Recent Labs      03/09/18   0415  03/08/18   0707  03/07/18   1156   WBC  8.7  8.8  8.5   HGB  12.8  11.5  13.4   HCT  37.6  34.4*  39.8   PLT  258  233  280     Recent Labs      03/07/18   1552   CREAU  49.50       Total time spent with patient:  35 minutes    [] Critical Care Provided    Care Plan discussed with:   Staff, Medical Team    Wendy Menon MD

## 2018-03-10 NOTE — PROGRESS NOTES
Hospital Progress Note    NAME:  Aaron Smith   :   1950   MRN:  [id number]     Date/Time:  3/10/2018 8:05 AM    Plan:   1. If cleared by renal, will discharge the patient today  2. Encourage activity  Risk of Deterioration: Low  []           Moderate  [x]           High  []                 Assessment:   Principal Problem:    Acute renal failure (ARF) ([address] (3/7/2018) - iv hydration - renal following. Currently does not have any complaints, she is eating and drinking with no nausea or vomiting.  Claims has had good bowel movements and is also making good urine    Active Problems:    Diabetes (HCC) () ssi      Hypertension () titrate meds      Cellulitis of right abdominal wall (2017) wound care       Subjective:     Feeling better  11 Point Review of Systems:   Negative except urinary frequency    []            Unable to obtain ROS due to:       []            mental status change []            sedated []            intubated     Social History   Substance Use Topics    Smoking status: Never Smoker    Smokeless tobacco: Never Used    Alcohol use No     Medications reviewed:  Current Facility-Administered Medications   Medication Dose Route Frequency    cloNIDine HCl (CATAPRES) tablet 0.2 mg  0.2 mg Oral BID    calcitRIOL (ROCALTROL) capsule 0.25 mcg  0.25 mcg Oral DAILY    metoprolol succinate (TOPROL-XL) XL tablet 50 mg  50 mg Oral DAILY    sodium bicarbonate tablet 325 mg  325 mg Oral BID    ondansetron (ZOFRAN ODT) tablet 4 mg  4 mg Oral Q6H PRN    simvastatin (ZOCOR) tablet 40 mg  40 mg Oral QHS    insulin lispro (HUMALOG) injection   SubCUTAneous AC&HS    glucose chewable tablet 16 g  4 Tab Oral PRN    dextrose (D50W) injection syrg 12.5-25 g  12.5-25 g IntraVENous PRN    glucagon (GLUCAGEN) injection 1 mg  1 mg IntraMUSCular PRN    sodium chloride (NS) flush 5-10 mL  5-10 mL IntraVENous Q8H    sodium chloride (NS) flush 5-10 mL  5-10 mL IntraVENous PRN    acetaminophen (TYLENOL) tablet 650 mg  650 mg Oral Q4H PRN    bisacodyl (DULCOLAX) tablet 5 mg  5 mg Oral DAILY PRN    pantoprazole (PROTONIX) tablet 40 mg  40 mg Oral ACB    heparin (porcine) injection 5,000 Units  5,000 Units SubCUTAneous Q12H    amLODIPine (NORVASC) tablet 10 mg  10 mg Oral DAILY        Objective:   Vitals:  Visit Vitals    /80    Pulse 68    Temp 97.7 °F (36.5 °C)    Resp 18    Ht 5' 3\" (1.6 m)    Wt 93.4 kg (206 lb)    LMP  (LMP Unknown)    SpO2 99%    Breastfeeding No    BMI 36.49 kg/m2     Temp (24hrs), Av.1 °F (36.7 °C), Min:97.7 °F (36.5 °C), Max:98.4 °F (36.9 °C)      O2 Device: Room air    Last 24hr Input/Output:    Intake/Output Summary (Last 24 hours) at 03/10/18 0918  Last data filed at 03/10/18 043   Gross per 24 hour   Intake                0 ml   Output             1550 ml   Net            -1550 ml        PHYSICAL EXAM:  General:    Alert, cooperative, no distress, appears stated age. Head:   Normocephalic, without obvious abnormality, atraumatic. Eyes:   Conjunctivae/corneas clear. PERRLA  Lungs:   Clear to auscultation bilaterally. No Wheezing or Rhonchi. No rales. .  Heart:   Regular rate and rhythm,  no murmur, rub or gallop. Abdomen:   Soft, non-tender. Not distended.   Bowel sounds normal. No masses                          Dressing intact        Lab Data Reviewed:    Recent Labs      185  18   0718   1156   WBC  8.7  8.8  8.5   HGB  12.8  11.5  13.4   HCT  37.6  34.4*  39.8   PLT  258  233  280     Recent Labs      03/10/18   0415  18   0415  18   0707  18   1156   NA  144  143  140  141   K  4.7  4.7  4.6  4.7   CL  112*  113*  111*  110*   CO2  22  19*  20*  21   GLU  128*  178*  141*  181*   BUN  89*  94*  98*  109*   CREA  7.82*  8.09*  8.45*  8.45*   CA  8.8  8.8  8.8  8.8  9.3   MG   --    --    --   2.6*   PHOS  5.3*  5.2*  4.9*  5.0*   ALB  3.2*  3.4*  3.2*  3.7   TBILI   --    --   0.4  0.3   SGOT   --    --   13* 18   ALT   --    --   16  20     Lab Results   Component Value Date/Time    Glucose (POC) 142 (H) 03/10/2018 07:09 AM    Glucose (POC) 282 (H) 03/09/2018 09:13 PM    Glucose (POC) 126 (H) 03/09/2018 04:45 PM    Glucose (POC) 259 (H) 03/09/2018 11:19 AM    Glucose (POC) 156 (H) 03/09/2018 07:06 AM       ___________________________________________________  ___________________________________________________    Attending Physician: Buck Mahoney MD

## 2018-03-10 NOTE — PROGRESS NOTES
4151 Patient would like to resume home health with agency she is with now to resume home health care for abdominal wound. Orders in to reflect for case management consult for continuing home health care.

## 2018-03-10 NOTE — DISCHARGE INSTRUCTIONS
Discharge Instructions       PATIENT ID: Blanca Nelson  MRN: [id number]   YOB: 1950    DATE OF ADMISSION: 3/7/2018 11:54 AM    DATE OF DISCHARGE: 3/10/2018    PRIMARY CARE PROVIDER: Nila Quintana MD     ATTENDING PHYSICIAN: Heidi Presley MD  DISCHARGING PROVIDER: Heidi Presley MD    To contact this individual call [phone] and ask the  to page. If unavailable ask to be transferred the Adult Hospitalist Department. DISCHARGE DIAGNOSES   ARF  Cellulitis abd     CONSULTATIONS: IP CONSULT TO NEPHROLOGY  IP CONSULT TO NEPHROLOGY    PROCEDURES/SURGERIES: * No surgery found *    PENDING TEST RESULTS:   At the time of discharge the following test results are still pending: none    FOLLOW UP APPOINTMENTS:   Follow-up Information     Follow up With Details Comments [facility] home health wound care. Call agency if you haven't been contacted by a liaison within 24 hours of hospital discharge. [facility]  24[facility] Jaime Jefferson MD In 1 week  [facility]  [facility]  [facility]      Gabby Mtz MD In 3 days  1901 [facility] [phone]             ADDITIONAL CARE RECOMMENDATIONS:   Follow up with Dr Demarcus Kearney in 3-4 days  Follow up with Dr Daljit Whitmore in 3-4 days  Please expect a blood work when you seen your physicians  Your kidney function is not back to normal. It is very important that you follow up with your physicians  Blood sugar checks four times daily (before meals as well as before going off to bed)    -----------------------  Sliding Scale Insulin, 3 times daily before meals:    140-199=2 units            200-249=3 units  250-299=5 units  300-349=7 units  350 or greater = Call MD  Give in addition to basal medications.   Do Not Hold for NPO  -------------------    DIET: Renal Diet    ACTIVITY: Activity as tolerated    WOUND CARE: per wound care nurse      DISCHARGE MEDICATIONS:   See Medication Reconciliation Form    · It is important that you take the medication exactly as they are prescribed. · Keep your medication in the bottles provided by the pharmacist and keep a list of the medication names, dosages, and times to be taken in your wallet. · Do not take other medications without consulting your doctor. NOTIFY YOUR PHYSICIAN FOR ANY OF THE FOLLOWING:   Fever over 101 degrees for 24 hours. Chest pain, shortness of breath, fever, chills, nausea, vomiting, diarrhea, change in mentation, falling, weakness, bleeding. Severe pain or pain not relieved by medications. Or, any other signs or symptoms that you may have questions about.       DISPOSITION:   x Home With:   OT  PT  HH  RN       SNF/Inpatient Rehab/LTAC    Independent/assisted living    Hospice    Other:     CDMP Checked:   Yes x     PROBLEM LIST Updated:  Yes x       Signed:   Sravan Hannon MD  3/10/2018  10:02 AM

## 2018-03-11 ENCOUNTER — HOME CARE VISIT (OUTPATIENT)
Dept: SCHEDULING | Facility: HOME HEALTH | Age: 68
End: 2018-03-11
Payer: MEDICARE

## 2018-03-11 VITALS
WEIGHT: 207 LBS | DIASTOLIC BLOOD PRESSURE: 82 MMHG | OXYGEN SATURATION: 98 % | TEMPERATURE: 98 F | BODY MASS INDEX: 36.68 KG/M2 | HEIGHT: 63 IN | RESPIRATION RATE: 18 BRPM | SYSTOLIC BLOOD PRESSURE: 138 MMHG | HEART RATE: 70 BPM

## 2018-03-11 PROCEDURE — G0162 HHC RN E&M PLAN SVS, 15 MIN: HCPCS

## 2018-03-11 PROCEDURE — 3331090001 HH PPS REVENUE CREDIT

## 2018-03-11 PROCEDURE — 3331090002 HH PPS REVENUE DEBIT

## 2018-03-12 ENCOUNTER — PATIENT OUTREACH (OUTPATIENT)
Dept: INTERNAL MEDICINE CLINIC | Age: 68
End: 2018-03-12

## 2018-03-12 PROCEDURE — 3331090002 HH PPS REVENUE DEBIT

## 2018-03-12 PROCEDURE — 3331090001 HH PPS REVENUE CREDIT

## 2018-03-12 NOTE — PROGRESS NOTES
[facility] Discharge Follow-Up    Date/Time:  3/12/2018 1:26 PM    Patient is not listed on discharge [facility]) report. NN received call from Patient who was discharged from [facility] for Acute Renal Failure on 3/10/2018. RRAT score: Low Risk            10       Total Score        3 Has Seen PCP in Last 6 Months (Yes=3, No=0)    4 IP Visits Last 12 Months (1-3=4, 4=9, >4=11)    3 Charlson Comorbidity Score (Age + Comorbid Conditions)        Criteria that do not apply:    . Living with Significant Other. Assisted Living. LTAC. SNF. or   Rehab    Patient Length of Stay (>5 days = 3)    Pt. Coverage (Medicare=5 , Medicaid, or Self-Pay=4)     Low    Medical History:     Past Medical History:   Diagnosis Date    Cellulitis of right abdominal wall 2017    letty whatley md    Diabetes ([facility]     DJD (degenerative joint disease) of knee     Gastroenteritis     Hypercholesterolemia     Hypertension     LBP (low back pain)     chiorpactor    Lumbar stenosis     Obesity     S/P colonoscopy 12     Nurse Navigator(NN) was contacted by the patient by telephone to inform that lab work was done by [employer] as ordered by [employer].  NN perform post IP [facility]  discharge assessment. Verified  and address with patient as identifiers. Provided introduction to self, and explanation of the Nurses Navigator role. Diet:   Patient reports: Renal Diet     Activity:    Patient reports: mostly moving around the house    Medication:   Performed medication reconciliation with patient, and patient verbalizes understanding of administration of home medications. There were no barriers to obtaining medications identified at this time. Support system:  patient and spouse    Discharge Instructions :  Reviewed discharge instructions with patient. Patient verbalizes understanding of discharge instructions and follow-up care. Patient c/o a very dry mouth.   Patient c/o  this AM. Teachings done on S&S Hyperglycemia; Avoidance of high K+ food; the importance of reading labels. Patient inquired of drinking diet cranberry juice. Red Flags:  Confusion. N/V. Labs Reviewed:  No results for input(s): WBC, HGB, HCT, PLT, HGBEXT, HCTEXT, PLTEXT in the last 72 hours. Recent Labs      03/10/18   0415   NA  144   K  4.7   CL  112*   CO2  22   GLU  128*   BUN  89*   CREA  7.82*   CA  8.8   PHOS  5.3*   ALB  3.2*     Imaging results reviewed:      Advance Care Planning:   Patient was offered the opportunity to discuss advance care planning:  no     Does patient have an Advance Directive:  yes   If no, did you provide information on Caring Connections?  no     PCP/Specialist follow up: Patient scheduled to follow up with Adelia Cintron MD on 3/13/2018. Patient has f/u appointment with Nephrologist on 3/15/2018. Case management Impression/ plan:    Goals Addressed             Most Recent     Patient/Family verbalizes understanding of self-management of chronic disease. On track (3/12/2018)             Diabetes medications and diabetic diet teachings done. Comparison of BS on Admit 181;  BS today 117.      3/12/2018: Patient c/o a very dry mouth. Patient c/o  this AM.  Patient denies eating sweets or sugary beverages. Teachings done on S&S Hyperglycemia; Avoidance of high K+ food; the importance of reading labels. Patient inquired of drinking diet cranberry juice. Emphasized teachings on maximum fluid intake per Renal protocol.  32oz. Patient w/ be evaluated by PCP during office visit tomorrow for wound healing;  [facility] may complete last visit on 3/13/2018 for wound care.  Self-schedules and keeps appointments    On track (3/12/2018)             Patient's appointment rescheduled from 1/12/2018 to today as advised by PCP due to increased bleeding/drainage and dressing nearly falling off.      1/16/2018:  Office Visit attended with Surgeon. Stated open wound if healing well. [facility] visits decreased from daily to 3x/week. Patient teachings done on proper protein in diet and drinking plenty of fluids to promote proper healing and eliminating dehydration which  Could cause infection.           Goal completion for Self-Management:  3/23/2018

## 2018-03-13 ENCOUNTER — OFFICE VISIT (OUTPATIENT)
Dept: INTERNAL MEDICINE CLINIC | Age: 68
End: 2018-03-13

## 2018-03-13 VITALS
TEMPERATURE: 98 F | BODY MASS INDEX: 35.44 KG/M2 | SYSTOLIC BLOOD PRESSURE: 131 MMHG | HEART RATE: 61 BPM | WEIGHT: 200 LBS | HEIGHT: 63 IN | DIASTOLIC BLOOD PRESSURE: 80 MMHG | RESPIRATION RATE: 16 BRPM | OXYGEN SATURATION: 98 %

## 2018-03-13 DIAGNOSIS — M17.11 PRIMARY OSTEOARTHRITIS OF RIGHT KNEE: ICD-10-CM

## 2018-03-13 DIAGNOSIS — E11.21 TYPE 2 DIABETES WITH NEPHROPATHY (HCC): ICD-10-CM

## 2018-03-13 DIAGNOSIS — N17.9 ACUTE RENAL FAILURE, UNSPECIFIED ACUTE RENAL FAILURE TYPE (HCC): Primary | ICD-10-CM

## 2018-03-13 DIAGNOSIS — I10 ESSENTIAL HYPERTENSION: ICD-10-CM

## 2018-03-13 PROCEDURE — 3331090001 HH PPS REVENUE CREDIT

## 2018-03-13 PROCEDURE — 3331090002 HH PPS REVENUE DEBIT

## 2018-03-13 NOTE — PROGRESS NOTES
1. Have you been to the ER, urgent care clinic since your last visit? Hospitalized since your last visit? Yes When: 3-7-18 Where: [facility] Reason for visit: Kidney problems    2. Have you seen or consulted any other health care providers outside of the [address] [place] [address] since your last visit? Include any pap smears or colon screening.  No     Complaints of dry mouth

## 2018-03-13 NOTE — PROGRESS NOTES
SPORTS MEDICINE AND PRIMARY CARE  Siva Chiang MD, Max Larsen, [address] [address]  Phone:  [phone]  Fax: [phone]       Chief Complaint   Patient presents with    Hypertension     f/u   . SUBJECTIVE:    Marely Arciniega is a 78 y.o. female Patient is seen for transition of care services following admission to [facility] on 03/07 and discharged on 03/10 following admission for acute renal failure likely secondary to antibiotics, diabetes mellitus type 2 and an abd  wound. She comes in today saying the wound is nearly healed and feels she may be able to take care of it herself and she'll discuss that with the wound care doctor. She has an appointment to see Dr. Sriram Perez, her nephrologist, tomorrow. She is on sliding scaling insulin, but sugars are fluctuating. Other new complaints denied and patient is seen for evaluation. Our nurse navigator performed outreach to the patient on 03/12/17 to complete medication reconciliation and telephonic assessment of her condition. Current Outpatient Prescriptions   Medication Sig Dispense Refill    insulin lispro (HUMALOG) 100 unit/mL kwikpen 2 Units by SubCUTAneous route Before breakfast, lunch, and dinner. 140-199= 2 units  200-249=3 unit  250-299= 5 units  300-349= 7 units  350-or greater call md      amLODIPine (NORVASC) 5 mg tablet Take 2 Tabs by mouth daily. 60 Tab 1    metoprolol succinate (TOPROL-XL) 25 mg XL tablet Take 2 Tabs by mouth daily. 60 Tab 1    calcitRIOL (ROCALTROL) 0.25 mcg capsule Take 1 Cap by mouth daily. 15 Cap 0    cloNIDine HCl (CATAPRES) 0.2 mg tablet Take 1 Tab by mouth two (2) times a day. 60 Tab 1    pantoprazole (PROTONIX) 40 mg tablet Take 1 Tab by mouth Daily (before breakfast). 30 Tab 0    sodium bicarbonate 325 mg tablet Take 1 Tab by mouth two (2) times a day for 10 days.  20 Tab 0    lancets 28 gauge misc Three times daily as needed 100 Lancet 1    simvastatin (ZOCOR) 40 mg tablet Take 1 Tab by mouth nightly. 90 Tab 3    albuterol (PROVENTIL HFA, VENTOLIN HFA, PROAIR HFA) 90 mcg/actuation inhaler Take 2 Puffs by inhalation every four (4) hours as needed for Wheezing.  1 Inhaler 0     Past Medical History:   Diagnosis Date    Cellulitis of right abdominal wall 12/18/2017    letty whatley md    Diabetes ([address] 1995    DJD (degenerative joint disease) of knee     Gastroenteritis     Hypercholesterolemia     Hypertension     LBP (low back pain)     chiorpactor    Lumbar stenosis     Obesity     S/P colonoscopy 12-18-12     Past Surgical History:   Procedure Laterality Date    HX BREAST BIOPSY Right 1990    negative    HX COLONOSCOPY      HX HYSTERECTOMY      HX OOPHORECTOMY N/A     HX OTHER SURGICAL  12/23/2017    I&D infected abscess      No Known Allergies      REVIEW OF SYSTEMS:  General: negative for - chills or fever  ENT: negative for - headaches, nasal congestion or tinnitus  Respiratory: negative for - cough, hemoptysis, shortness of breath or wheezing  Cardiovascular : negative for - chest pain, edema, palpitations or shortness of breath  Gastrointestinal: negative for - abdominal pain, blood in stools, heartburn or nausea/vomiting  Genito-Urinary: no dysuria, trouble voiding, or hematuria  Musculoskeletal: negative for - gait disturbance, joint pain, joint stiffness or joint swelling  Neurological: no TIA or stroke symptoms  Hematologic: no bruises, no bleeding, no swollen glands  Integument: no lumps, mole changes, nail changes or rash  Endocrine: no malaise/lethargy or unexpected weight changes      Social History     Social History    Marital status: SINGLE     Spouse name: N/A    Number of children: N/A    Years of education: N/A     Social History Main Topics    Smoking status: Never Smoker    Smokeless tobacco: Never Used    Alcohol use No    Drug use: No    Sexual activity: Not Currently     Other Topics Concern    None     Social History Narrative Medical History: Diverticulosis 2002DM 1995primary HTN 1995Obesity Dyslipidemia 1995January 2012 moderate central stenosis L3-L4,    mild to moderate central stenosis at L4-L5 MRI    Gyn History: Last mammogram date 2013. Last menstrual perioddate hysterectomy  FOR FIBROIDS, PT W ITH ONE OVARY. Last    papdate . Menopausal hot flashes. Sexually active not currently sexually active. History of abnormal pap smears none. History of STDs    none. Vaginal discharge or odor none. HysterectomyDate NONE.    OB History: Total pregnancies 1. Pre term deliveries (>20-36.6 w eeks) 1. Surgical History: [facility] right breast bx , kidney stones colonoscopy     Hospitalization/Major Diagnostic Procedure: [facility] rig breast bx , kidney stones     Family History: Mother:  52 yrs, skin d/oFather: deceasedAunt: alive, lung cancerAdopted: alive    Social History: Alcohol Use Patient does not use alcohol. Smoking Status Patient is a never smoker. Marital Status: Single. Lives w ith:    alone. Education/School: has masters degree-[employer]. Family History   Problem Relation Age of Onset    Cancer Mother     No Known Problems Father        OBJECTIVE:    Visit Vitals    /80    Pulse 61    Temp 98 °F (36.7 °C) (Oral)    Resp 16    Ht 5' 3\" (1.6 m)    Wt 200 lb (90.7 kg)    LMP  (LMP Unknown)    SpO2 98%    BMI 35.43 kg/m2     CONSTITUTIONAL: well , well nourished, appears age appropriate  EYES: perrla, eom intact  ENMT:moist mucous membranes, pharynx clear  NECK: supple. Thyroid normal  RESPIRATORY: Chest: clear bilaterally   CARDIOVASCULAR: Heart: regular rate and rhythm  GASTROINTESTINAL: Abdomen: soft, bowel sounds active  HEMATOLOGIC: no pathological lymph nodes palpated  MUSCULOSKELETAL: Extremities: no edema, pulse 1+   INTEGUMENT: No unusual rashes or suspicious skin lesions noted.  Nails appear normal.  NEUROLOGIC: non-focal exam   MENTAL STATUS: alert and oriented, appropriate affect           ASSESSMENT:  1. Acute renal failure, unspecified acute renal failure type ([address]    2. Type 2 diabetes with nephropathy ([address]    3. Essential hypertension    4. Primary osteoarthritis of right knee      Patient's medical progress is satisfactory. I am very curious about the progress of her renal failure. I suspect she is continuing to gradually get better. For blood sugar control will begin a small dose of Lantus 4 units at bedtime. She'll continue 4 units at bedtime as long as the blood sugar is greater than 100. If it becomes less than 100 she'll stop the 4 units. If the blood sugar is consistently elevated she'll call our nurse navigator to help us adjust her medication. Her blood pressure control is at goal.    She'll be back to see us in two weeks, sooner if she has any problems. PLAN:  .  Orders Placed This Encounter    RENAL FUNCTION PANEL       Follow-up Disposition:  Return for renal panel. ATTENTION:   This medical record was transcribed using an electronic medical records system. Although proofread, it may and can contain electronic and spelling errors. Other human spelling and other errors may be present. Corrections may be executed at a later time. Please feel free to contact us for any clarifications as needed.

## 2018-03-13 NOTE — LETTER
3/13/2018 2:12 PM 
 
Ms. Rashel Rdz [address] Robert Leonardo [facility] [id number] To Whom It May Concern: The above named patient is under my care for multiple medical problems, including degenerative disc disease and degenerative joint disease of lumbar spine and knees. She is currently receiving disability for parking through [facility]. We have recommended that her apartment complex also dawson her disability parking because of the advancing arthritis in her knees and back. She is unable to walk 100 feet without stopping because of the severity of the pain. If I can be of any further assistance please do not hesitate to call. Sincerely, Ney Whitmore MD, [facility], [address] [place] [address]

## 2018-03-13 NOTE — MR AVS SNAPSHOT
[address] [place] [id number] 
[phone] Patient: Saira Barr MRN: [id number] OFW:[id number] Visit Information Date & Time Provider Department Dept. Phone Encounter #  
 3/13/2018 12:45 PM Quiana Hall MD [facility] [phone] [id number] Follow-up Instructions Return in about 2 weeks (around 3/27/2018) for renal panel. Follow-up and Disposition History Your Appointments 3/29/2018  2:45 PM  
Any with Quiana Hall MD  
SPORTS MED AND PRIMARY CARE - [address] ([facility]) Appt Note: 2 month f/u htn [address], [place] [address] [facility] [address] Pending sale to [facility] [address]  
  
   
 [address], [place] [address] [place] [address] Upcoming Health Maintenance Date Due  
 FOOT EXAM Q1 7/20/2018 MEDICARE YEARLY EXAM 7/21/2018 HEMOGLOBIN A1C Q6M 9/6/2018 MICROALBUMIN Q1 10/26/2018 EYE EXAM RETINAL OR DILATED Q1 11/22/2018 LIPID PANEL Q1 3/8/2019 Pneumococcal 65+ High/Highest Risk (2 of 2 - PPSV23) 9/18/2019 GLAUCOMA SCREENING Q2Y 11/22/2019 BREAST CANCER SCRN MAMMOGRAM 3/2/2020 COLONOSCOPY 12/18/2022 DTaP/Tdap/Td series (2 - Td) 12/20/2026 Allergies as of 3/13/2018  Review Complete On: 3/13/2018 By: Quiana Hall MD  
 No Known Allergies Current Immunizations  Never Reviewed Name Date Influenza Vaccine 10/10/2016, 9/18/2014 Pneumococcal Vaccine (Unspecified Type) 9/18/2014 Not reviewed this visit You Were Diagnosed With   
  
 Codes Comments Acute renal failure, unspecified acute renal failure type ([facility]    -  Primary ICD-10-CM: N17.9 ICD-9-CM: 360. 9 Type 2 diabetes with nephropathy (HCC)     ICD-10-CM: E11.21 
ICD-9-CM: 250.40, 583.81 Essential hypertension     ICD-10-CM: I10 
ICD-9-CM: 401.9 Primary osteoarthritis of right knee     ICD-10-CM: M17.11 ICD-9-CM: 715.16 Vitals BP Pulse Temp Resp Height(growth percentile) Weight(growth percentile) 131/80 61 98 °F (36.7 °C) (Oral) 16 5' 3\" (1.6 m) 200 lb (90.7 kg) LMP SpO2 BMI OB Status Smoking Status (LMP Unknown) 98% 35.43 kg/m2 Hysterectomy Never Smoker Vitals History BMI and BSA Data Body Mass Index Body Surface Area  
 35.43 kg/m 2 2.01 m 2 Preferred Pharmacy Pharmacy Name Phone [id number] Madhuri Colindres [facility] [phone] Your Updated Medication List  
  
   
This list is accurate as of 3/13/18  2:17 PM.  Always use your most recent med list.  
  
  
  
  
 albuterol 90 mcg/actuation inhaler Commonly known as:  PROVENTIL HFA, VENTOLIN HFA, PROAIR HFA Take 2 Puffs by inhalation every four (4) hours as needed for Wheezing. amLODIPine 5 mg tablet Commonly known as:  Sahil Free Take 2 Tabs by mouth daily. calcitRIOL 0.25 mcg capsule Commonly known as:  ROCALTROL Take 1 Cap by mouth daily. cloNIDine HCl 0.2 mg tablet Commonly known as:  CATAPRES Take 1 Tab by mouth two (2) times a day. insulin lispro 100 unit/mL kwikpen Commonly known as:  HUMALOG  
2 Units by SubCUTAneous route Before breakfast, lunch, and dinner. 140-199= 2 units 200-249=3 unit 250-299= 5 units 300-349= 7 units 350-or greater call md  
  
 lancets 28 gauge Misc Three times daily as needed  
  
 metoprolol succinate 25 mg XL tablet Commonly known as:  TOPROL-XL Take 2 Tabs by mouth daily. pantoprazole 40 mg tablet Commonly known as:  PROTONIX Take 1 Tab by mouth Daily (before breakfast). simvastatin 40 mg tablet Commonly known as:  ZOCOR Take 1 Tab by mouth nightly.  
  
 sodium bicarbonate 325 mg tablet Take 1 Tab by mouth two (2) times a day for 10 days. We Performed the Following COLLECTION VENOUS BLOOD,VENIPUNCTURE E7123335 CPT(R)] RENAL FUNCTION PANEL [26654 CPT(R)] Follow-up Instructions Return in about 2 weeks (around 3/27/2018) for renal panel. To-Do List   
 03/14/2018 To Be Determined Appointment with Evangelina Garrido RN at [place] [address]  
  
 03/16/2018 To Be Determined Appointment with Evangelina Garrido RN at [place] [address]  
  
 03/21/2018 To Be Determined Appointment with Evangelina Garrido RN at [place] [address]  
  
 03/30/2018 To Be Determined Appointment with Evangelina Garrido RN at [address]! Dear Ivette Nurse: Thank you for requesting a Locally account. Our records indicate that you already have an active Locally account. You can access your account anytime at [email or web address] Did you know that you can access your hospital and ER discharge instructions at any time in Locally? You can also review all of your test results from your hospital stay or ER visit. Additional Information If you have questions, please visit the Frequently Asked Questions section of the Locally website at [email or web address] Remember, Locally is NOT to be used for urgent needs. For medical emergencies, dial 911. Now available from your iPhone and Android! Please provide this summary of care documentation to your next provider. Your primary care clinician is listed as Enriqueta Barnett. If you have any questions after today's visit, please call [phone].

## 2018-03-14 ENCOUNTER — HOME CARE VISIT (OUTPATIENT)
Dept: SCHEDULING | Facility: HOME HEALTH | Age: 68
End: 2018-03-14
Payer: MEDICARE

## 2018-03-14 PROCEDURE — 3331090002 HH PPS REVENUE DEBIT

## 2018-03-14 PROCEDURE — 3331090001 HH PPS REVENUE CREDIT

## 2018-03-14 PROCEDURE — G0299 HHS/HOSPICE OF RN EA 15 MIN: HCPCS

## 2018-03-15 VITALS
HEART RATE: 78 BPM | SYSTOLIC BLOOD PRESSURE: 138 MMHG | TEMPERATURE: 98.2 F | RESPIRATION RATE: 20 BRPM | OXYGEN SATURATION: 98 % | DIASTOLIC BLOOD PRESSURE: 84 MMHG

## 2018-03-19 ENCOUNTER — PATIENT OUTREACH (OUTPATIENT)
Dept: INTERNAL MEDICINE CLINIC | Age: 68
End: 2018-03-19

## 2018-03-19 RX ORDER — INSULIN ASPART 100 [IU]/ML
INJECTION, SOLUTION INTRAVENOUS; SUBCUTANEOUS
COMMUNITY
End: 2018-07-19 | Stop reason: SDUPTHER

## 2018-03-19 RX ORDER — INSULIN GLARGINE 100 [IU]/ML
8 INJECTION, SOLUTION SUBCUTANEOUS
Qty: 1 ADJUSTABLE DOSE PRE-FILLED PEN SYRINGE | Refills: 0
Start: 2018-03-19 | End: 2018-09-25 | Stop reason: SDUPTHER

## 2018-03-19 NOTE — PROGRESS NOTES
[facility] 3/7-3/10/2018:  Acute Renal Failure--Diabetes    Goals Addressed             Most Recent     Patient/Family verbalizes understanding of self-management of chronic disease. On track (3/19/2018)             Diabetes medications and diabetic diet teachings done. Comparison of BS on Admit 181;  BS today 117.      3/12/2018: Patient c/o a very dry mouth. Patient c/o  this AM.  Patient denies eating sweets or sugary beverages. Teachings done on S&S Hyperglycemia; Avoidance of high K+ food; the importance of reading labels. Patient inquired of drinking diet cranberry juice. Emphasized teachings on maximum fluid intake per Renal protocol.  32oz. Patient w/ be evaluated by PCP during office visit tomorrow for wound healing;  Stated [facility] nurse may complete last visit on 3/13/2018 for wound care. 3/19/2018:  NN received call from patient c/o elevated/uncontrolled BS; stated she has not had this high BS since going to the hospital.  Patient stated she has decreased carbs and removed all sugary foods/drinks/jello from diet. Patient stated she had 3 crackers and cheese for before bedtime snack- the next check. Patient stated pharmacy informed her of Payor change from Humalog to Novolog insulin as covered. Stated she is checking BS 3x/day as ordered on the med list SS as given. Patient states yesterday's BS AM:  270; Lunch 219;  Dinner 280. Consult done w/ PCP; plans to contact patient himself. Patient stated she is so worried due to afraid of infection in body. Consult done with [facility] Pharmcist as to verification of Novalog insulin given patient; Novalog straight. Goal date:  Pending PCP contact w/ patient.   F/u goal:  3/26/2018

## 2018-03-22 RX ORDER — CALCITRIOL 0.25 UG/1
0.25 CAPSULE ORAL DAILY
Qty: 30 CAP | Refills: 11 | Status: SHIPPED | OUTPATIENT
Start: 2018-03-22 | End: 2018-09-25 | Stop reason: SDUPTHER

## 2018-03-22 RX ORDER — PANTOPRAZOLE SODIUM 40 MG/1
40 TABLET, DELAYED RELEASE ORAL
Qty: 30 TAB | Refills: 11 | Status: SHIPPED | OUTPATIENT
Start: 2018-03-22 | End: 2018-09-25

## 2018-03-27 ENCOUNTER — OFFICE VISIT (OUTPATIENT)
Dept: INTERNAL MEDICINE CLINIC | Age: 68
End: 2018-03-27

## 2018-03-27 VITALS
HEIGHT: 63 IN | DIASTOLIC BLOOD PRESSURE: 85 MMHG | HEART RATE: 63 BPM | TEMPERATURE: 97.9 F | SYSTOLIC BLOOD PRESSURE: 138 MMHG | RESPIRATION RATE: 16 BRPM | WEIGHT: 198.3 LBS | OXYGEN SATURATION: 98 % | BODY MASS INDEX: 35.14 KG/M2

## 2018-03-27 DIAGNOSIS — E11.21 TYPE 2 DIABETES WITH NEPHROPATHY (HCC): Primary | ICD-10-CM

## 2018-03-27 PROBLEM — E66.01 SEVERE OBESITY (BMI 35.0-39.9) WITH COMORBIDITY (HCC): Status: ACTIVE | Noted: 2018-03-27

## 2018-03-27 NOTE — PROGRESS NOTES
SPORTS MEDICINE AND PRIMARY CARE  Leonela Cintron MD, [address] [address]  Phone:  [phone]  Fax: [phone]      Chief Complaint   Patient presents with    Hypertension     f/u         SUBECTIVE:    Vickey Bearden is a 78 y.o. female Patient returns today with known history of diabetes mellitus type 2, primary hypertension, abdominal wall abscess, and lumbar spinal stenosis and obesity. She comes in for follow up of her blood sugars, which have been poorly controlled on her current dose of Lantus and Novolog. In the morning the blood sugars are in the high 200s and throughout the day they're in the 200s. Patient is seen for evaluation. Current Outpatient Prescriptions   Medication Sig Dispense Refill    calcitRIOL (ROCALTROL) 0.25 mcg capsule Take 1 Cap by mouth daily. 30 Cap 11    pantoprazole (PROTONIX) 40 mg tablet Take 1 Tab by mouth Daily (before breakfast). 30 Tab 11    insulin aspart U-100 (NOVOLOG FLEXPEN U-100 INSULIN) 100 unit/mL inpn by SubCUTAneous route Before breakfast, lunch, and dinner. Sliding Scale    140-199    2u  200-249    3u  250-299    5u  300-349    7u   Indications: type 2 diabetes mellitus, Patient reported      insulin glargine (LANTUS,BASAGLAR) 100 unit/mL (3 mL) inpn 8 Units by SubCUTAneous route nightly. 1 Adjustable Dose Pre-filled Pen Syringe 0    amLODIPine (NORVASC) 5 mg tablet Take 2 Tabs by mouth daily. 60 Tab 1    metoprolol succinate (TOPROL-XL) 25 mg XL tablet Take 2 Tabs by mouth daily. 60 Tab 1    cloNIDine HCl (CATAPRES) 0.2 mg tablet Take 1 Tab by mouth two (2) times a day. 60 Tab 1    lancets 28 gauge misc Three times daily as needed 100 Lancet 1    simvastatin (ZOCOR) 40 mg tablet Take 1 Tab by mouth nightly. 90 Tab 3    albuterol (PROVENTIL HFA, VENTOLIN HFA, PROAIR HFA) 90 mcg/actuation inhaler Take 2 Puffs by inhalation every four (4) hours as needed for Wheezing.  1 Inhaler 0     Past Medical History: Diagnosis Date    Cellulitis of right abdominal wall 2017    letty whatley md    Diabetes ([facility] [address]     DJD (degenerative joint disease) of knee     Gastroenteritis     Hypercholesterolemia     Hypertension     LBP (low back pain)     chiorpactor    Lumbar stenosis     Obesity     S/P colonoscopy 12     Past Surgical History:   Procedure Laterality Date    HX BREAST BIOPSY Right     negative    HX COLONOSCOPY      HX HYSTERECTOMY      HX OOPHORECTOMY N/A     HX OTHER SURGICAL  2017    I&D infected abscess      No Known Allergies    REVIEW OF SYSTEMS:   No hypo or hyperglycemic symptoms. Social History     Social History    Marital status: SINGLE     Spouse name: N/A    Number of children: N/A    Years of education: N/A     Social History Main Topics    Smoking status: Never Smoker    Smokeless tobacco: Never Used    Alcohol use No    Drug use: No    Sexual activity: Not Currently     Other Topics Concern    None     Social History Narrative    Medical History: Diverticulosis 2002DM 1995primary HTN 1995Obesity Dyslipidemia January 2012 moderate central stenosis L3-L4,    mild to moderate central stenosis at L4-L5 MRI    Gyn History: Last mammogram date 2013. Last menstrual perioddate hysterectomy  FOR FIBROIDS, PT W ITH ONE OVARY. Last    papdate . Menopausal hot flashes. Sexually active not currently sexually active. History of abnormal pap smears none. History of STDs    none. Vaginal discharge or odor none. HysterectomyDate NONE.    OB History: Total pregnancies 1. Pre term deliveries (>20-36.6 w eeks) 1. Surgical History: [facility] right breast bx , nicole paulino colonoscopy     Hospitalization/Major Diagnostic Procedure: [facility] right breast bx , nicole paulino     Family History:  Mother:  52 yrs, skin d/oFather: deceasedAunt: alive, lung cancerAdopted: alive    Social History: Alcohol Use Patient does not use alcohol. Smoking Status Patient is a never smoker. Marital Status: Single. Lives w ith:    alone. Education/School: has masters degree-[employer].   r  Family History   Problem Relation Age of Onset    Cancer Mother     No Known Problems Father        OBJECTIVE:  Visit Vitals    /85    Pulse 63    Temp 97.9 °F (36.6 °C) (Oral)    Resp 16    Ht 5' 3\" (1.6 m)    Wt 198 lb 4.8 oz (89.9 kg)    LMP  (LMP Unknown)    SpO2 98%    BMI 35.13 kg/m2     ENT: perrla,  eom intact  NECK: supple. Thyroid normal  CHEST: clear to ascultation and percussion   HEART: regular rate and rhythm  ABD: soft, bowel sounds active  EXTREMITIES: no edema, pulse 1+     Admission on 03/07/2018, Discharged on 03/10/2018   No results displayed because visit has over 200 results. Office Visit on 03/06/2018   Component Date Value Ref Range Status    WBC 03/06/2018 9.6  3.4 - 10.8 x10E3/uL Final    RBC 03/06/2018 4.56  3.77 - 5.28 x10E6/uL Final    HGB 03/06/2018 13.0  11.1 - 15.9 g/dL Final    HCT 03/06/2018 39.9  34.0 - 46.6 % Final    MCV 03/06/2018 88  79 - 97 fL Final    MCH 03/06/2018 28.5  26.6 - 33.0 pg Final    MCHC 03/06/2018 32.6  31.5 - 35.7 g/dL Final    RDW 03/06/2018 15.6* 12.3 - 15.4 % Final    PLATELET 45/63/3632 428  150 - 379 x10E3/uL Final    NEUTROPHILS 03/06/2018 83  Not Estab. % Final    Lymphocytes 03/06/2018 11  Not Estab. % Final    MONOCYTES 03/06/2018 5  Not Estab. % Final    EOSINOPHILS 03/06/2018 1  Not Estab. % Final    BASOPHILS 03/06/2018 0  Not Estab. % Final    ABS. NEUTROPHILS 03/06/2018 8.0* 1.4 - 7.0 x10E3/uL Final    Abs Lymphocytes 03/06/2018 1.1  0.7 - 3.1 x10E3/uL Final    ABS. MONOCYTES 03/06/2018 0.5  0.1 - 0.9 x10E3/uL Final    ABS. EOSINOPHILS 03/06/2018 0.1  0.0 - 0.4 x10E3/uL Final    ABS. BASOPHILS 03/06/2018 0.0  0.0 - 0.2 x10E3/uL Final    IMMATURE GRANULOCYTES 03/06/2018 0  Not Estab. % Final    ABS. IMM.  GRANS. 03/06/2018 0.0  0.0 - 0.1 x10E3/uL Final  Glucose 03/06/2018 127* 65 - 99 mg/dL Final    BUN 03/06/2018 101* 8 - 27 mg/dL Final    Creatinine 03/06/2018 7.62* 0.57 - 1.00 mg/dL Final    GFR est non-AA 03/06/2018 5* >59 mL/min/1.73 Final    GFR est AA 03/06/2018 6* >59 mL/min/1.73 Final    BUN/Creatinine ratio 03/06/2018 13  12 - 28 Final    Sodium 03/06/2018 146* 134 - 144 mmol/L Final    Potassium 03/06/2018 5.8* 3.5 - 5.2 mmol/L Final    Chloride 03/06/2018 105  96 - 106 mmol/L Final    CO2 03/06/2018 17* 18 - 29 mmol/L Final    Calcium 03/06/2018 10.3  8.7 - 10.3 mg/dL Final    Hemoglobin A1c 03/06/2018 6.7* 4.8 - 5.6 % Final    Comment:          Pre-diabetes: 5.7 - 6.4           Diabetes: >6.4           Glycemic control for adults with diabetes: <7.0      Estimated average glucose 03/06/2018 146  mg/dL Final   Office Visit on 01/02/2018   Component Date Value Ref Range Status    Amphetamine Screen, urine 01/02/2018 Negative  Thqbjt=6806 ng/mL Final    Barbiturates Screen, urine 01/02/2018 Negative  Gnroys=642 ng/mL Final    Benzodiazepines Screen, urine 01/02/2018 Negative  Pfhdoy=974 ng/mL Final    Cannabinoid Screen, urine 01/02/2018 Negative  Cutoff=20 ng/mL Final    Cocaine Metab. Screen, urine 01/02/2018 Negative  Ktpqfk=166 ng/mL Final    Opiate Screen, urine 01/02/2018 Positive* Xnlcfz=925 ng/mL Final    Comment: Opiate test includes Codeine, Morphine, Hydromorphone, Hydrocodone. Codeine                     Negative            Gsvqxk=034            01  Morphine                    Negative            Ygnouq=735            01  Hydromorphone               Positive        A                         01     Hydromorphone Confirm    106                ng/mL Vdviso=572       01  Hydromorphone detected; this finding is consistent with use of  medications that include Dilaudid, or drugs containing Hydrocodone, or  generic formulations. This drug may also be detected as a minor  metabolite associated with high doses of morphine.  Drugs listed are  representative of common sources of the compound detected and are not  intended to include all possible sources. Hydrocodone                 Positive        A                         01     Hydrocodone Confirm      >100               ng/mL Zumysv=533       01  Hydrocodone detected; this finding is consistent with use of  medications that include Lortab, Lorcet,                            Vicodin, Vicoprofen,  Tussionex, Norco, or generic formulations. Drugs listed are  representative of common sources of the compound detected and are  not intended to include all possible sources.  Oxycodone/Oxymorphone, urine 01/02/2018 Negative  Jkfvna=983 ng/mL Final    Test includes Oxycodone and Oxymorphone    Phencyclidine Screen, urine 01/02/2018 Negative  Cutoff=25 ng/mL Final    Methadone Screen, urine 01/02/2018 Negative  Brppho=426 ng/mL Final    Propoxyphene Screen, urine 01/02/2018 Negative  Fqmzlf=674 ng/mL Final    Meperidine screen 01/02/2018 Negative  Fkrtzu=439 ng/mL Final    Comment: This test was developed and its performance characteristics  determined by [facility]. It has not been cleared or approved  by the Food and Drug Administration.  FENTANYL, URINE 01/02/2018 Negative  Nkaued=1455 pg/mL Final    Comment: Test includes Fentanyl and Norfentanyl  This test was developed and its performance characteristics  determined by [facility]. It has not been cleared or approved  by the Food and Drug Administration.  Tramadol Screen, urine 01/02/2018 Negative  Fvtgyl=903 ng/mL Final    Creatinine, urine 01/02/2018 90.0  20.0 - 300.0 mg/dL Final    Specific Gravity 01/02/2018 1.014   Final    pH, urine 01/02/2018 4.8  4.5 - 8.9 Final    Please Note 01/02/2018 Comment   Final    Comment: Drug-test results should be interpreted in the context of clinical  information. Patient metabolic variables, specific drug chemistry, and  specimen characteristics can affect test outcome. Technical  consultation is available if a test result is inconsistent with an  expected outcome. ([email or web address] or call toll-free  [phone])  Drug brands, if listed herein, are trademarks of their respective  owners.  WBC 01/02/2018 10.6  3.4 - 10.8 x10E3/uL Final    RBC 01/02/2018 4.07  3.77 - 5.28 x10E6/uL Final    HGB 01/02/2018 11.1  11.1 - 15.9 g/dL Final    HCT 01/02/2018 35.0  34.0 - 46.6 % Final    MCV 01/02/2018 86  79 - 97 fL Final    MCH 01/02/2018 27.3  26.6 - 33.0 pg Final    MCHC 01/02/2018 31.7  31.5 - 35.7 g/dL Final    RDW 01/02/2018 15.5* 12.3 - 15.4 % Final    PLATELET 44/93/8991 130* 150 - 379 x10E3/uL Final    NEUTROPHILS 01/02/2018 72  Not Estab. % Final    Lymphocytes 01/02/2018 18  Not Estab. % Final    MONOCYTES 01/02/2018 8  Not Estab. % Final    EOSINOPHILS 01/02/2018 2  Not Estab. % Final    BASOPHILS 01/02/2018 0  Not Estab. % Final    ABS. NEUTROPHILS 01/02/2018 7.6* 1.4 - 7.0 x10E3/uL Final    Abs Lymphocytes 01/02/2018 1.9  0.7 - 3.1 x10E3/uL Final    ABS. MONOCYTES 01/02/2018 0.8  0.1 - 0.9 x10E3/uL Final    ABS. EOSINOPHILS 01/02/2018 0.3  0.0 - 0.4 x10E3/uL Final    ABS. BASOPHILS 01/02/2018 0.0  0.0 - 0.2 x10E3/uL Final    IMMATURE GRANULOCYTES 01/02/2018 0  Not Estab. % Final    ABS. IMM.  GRANS. 01/02/2018 0.0  0.0 - 0.1 x10E3/uL Final    Glucose 01/02/2018 116* 65 - 99 mg/dL Final    BUN 01/02/2018 21  8 - 27 mg/dL Final    Creatinine 01/02/2018 0.72  0.57 - 1.00 mg/dL Final    GFR est non-AA 01/02/2018 88  >59 mL/min/1.73 Final    GFR est AA 01/02/2018 101  >59 mL/min/1.73 Final    BUN/Creatinine ratio 01/02/2018 29* 12 - 28 Final    Sodium 01/02/2018 141  134 - 144 mmol/L Final    Potassium 01/02/2018 4.1  3.5 - 5.2 mmol/L Final    Chloride 01/02/2018 99  96 - 106 mmol/L Final    CO2 01/02/2018 24  18 - 29 mmol/L Final    Calcium 01/02/2018 9.5  8.7 - 10.3 mg/dL Final    Protein, total 01/02/2018 7.0  6.0 - 8.5 g/dL Final    Albumin 01/02/2018 3.8  3.6 - 4.8 g/dL Final    GLOBULIN, TOTAL 01/02/2018 3.2  1.5 - 4.5 g/dL Final    A-G Ratio 01/02/2018 1.2  1.2 - 2.2 Final    Bilirubin, total 01/02/2018 <0.2  0.0 - 1.2 mg/dL Final    Alk. phosphatase 01/02/2018 50  39 - 117 IU/L Final    AST (SGOT) 01/02/2018 14  0 - 40 IU/L Final    ALT (SGPT) 01/02/2018 10  0 - 32 IU/L Final          ASSESSMENT:  1. Type 2 diabetes with nephropathy (HCC)      Blood sugar control is extremely poor. Will increase Lantus to 20 units q.h.s. Will increase Novolog to 6 units a.c. meals. She'll call us on Thursday morning with her blood sugar readings and at that time will make another adjustment in her medications. Will continue to adjust her insulin biweekly until we have her in a goal range. She agrees with the plan. She brings in her blood pressure readings, which are all acceptable and no adjustment will be made in her hypertensive medications. She'll return to the office in one month, sooner if she has any problems. Blood sugar control is extremely poor. Will increase Lantus to 20 units q.h.s. Will increase Novolog to 6 units a.c. meals. She'll call us Thursday morning with her blood sugar readings and at that time will make another adjustment in her medications. Will continue to adjust her insulin biweekly until we have her in a goal range. She agrees with the plan. She also brings her blood pressure readings, which are all acceptable, and no adjustment will be needed in hypertensive medications. She'll return to the office in one month, sooner if she has any problems. PLAN:  .  Orders Placed This Encounter    RENAL FUNCTION PANEL    PROT+CREATU (RANDOM)    PTH INTACT       Follow-up Disposition:  Return in about 4 weeks (around 4/24/2018). ATTENTION:   This medical record was transcribed using an electronic medical records system. Although proofread, it may and can contain electronic and spelling errors. Other human spelling and other errors may be present. Corrections may be executed at a later time. Please feel free to contact us for any clarifications as needed.

## 2018-03-27 NOTE — PROGRESS NOTES
1. Have you been to the ER, urgent care clinic since your last visit? Hospitalized since your last visit? No    2. Have you seen or consulted any other health care providers outside of the [address] [place] [address] since your last visit? Include any pap smears or colon screening. No     Patients states that her blood sugars haven't been good she checks 3x a day. Patient most recent blood sugar was 299 at 0730.

## 2018-03-27 NOTE — MR AVS SNAPSHOT
[address] [place] [address]. [address] [address] 
[phone] Patient: Blanca Nelson MRN: [id number]:[id number] Visit Information Date & Time Provider Department Dept. Phone Encounter #  
 3/27/2018  9:30 AM Coty Lei [facility] Medicine [facility] [phone] [id number] Upcoming Health Maintenance Date Due  
 FOOT EXAM Q1 7/20/2018 MEDICARE YEARLY EXAM 7/21/2018 HEMOGLOBIN A1C Q6M 9/6/2018 MICROALBUMIN Q1 10/26/2018 EYE EXAM RETINAL OR DILATED Q1 11/22/2018 LIPID PANEL Q1 3/8/2019 Pneumococcal 65+ High/Highest Risk (2 of 2 - PPSV23) 9/18/2019 GLAUCOMA SCREENING Q2Y 11/22/2019 BREAST CANCER SCRN MAMMOGRAM 3/2/2020 COLONOSCOPY 12/18/2022 DTaP/Tdap/Td series (2 - Td) 12/20/2026 Allergies as of 3/27/2018  Review Complete On: 3/27/2018 By: Rony Ley No Known Allergies Current Immunizations  Never Reviewed Name Date Influenza Vaccine 10/10/2016, 9/18/2014 Pneumococcal Vaccine (Unspecified Type) 9/18/2014 Not reviewed this visit Vitals BP Pulse Temp Resp Height(growth percentile) Weight(growth percentile) 138/85 63 97.9 °F (36.6 °C) (Oral) 16 5' 3\" (1.6 m) 198 lb 4.8 oz (89.9 kg) LMP SpO2 BMI OB Status Smoking Status (LMP Unknown) 98% 35.13 kg/m2 Hysterectomy Never Smoker BMI and BSA Data Body Mass Index Body Surface Area  
 35.13 kg/m 2 2 m 2 Preferred Pharmacy Pharmacy Name Phone [id number] [address] [phone] Your Updated Medication List  
  
   
This list is accurate as of 3/27/18 10:39 AM.  Always use your most recent med list.  
  
  
  
  
 albuterol 90 mcg/actuation inhaler Commonly known as:  PROVENTIL HFA, VENTOLIN HFA, PROAIR HFA Take 2 Puffs by inhalation every four (4) hours as needed for Wheezing. amLODIPine 5 mg tablet Commonly known as:  Claudell Brent Take 2 Tabs by mouth daily. calcitRIOL 0.25 mcg capsule Commonly known as:  ROCALTROL Take 1 Cap by mouth daily. cloNIDine HCl 0.2 mg tablet Commonly known as:  CATAPRES Take 1 Tab by mouth two (2) times a day. insulin glargine 100 unit/mL (3 mL) Inpn Commonly known as:  LANTUS,BASAGLAR  
8 Units by SubCUTAneous route nightly. lancets 28 gauge Misc Three times daily as needed  
  
 metoprolol succinate 25 mg XL tablet Commonly known as:  TOPROL-XL Take 2 Tabs by mouth daily. NovoLOG Flexpen U-100 Insulin 100 unit/mL Inpn Generic drug:  insulin aspart U-100  
by SubCUTAneous route Before breakfast, lunch, and dinner. Sliding Scale   140-199    2u 200-249    3u 250-299    5u 300-349    7u   Indications: type 2 diabetes mellitus, Patient reported  
  
 pantoprazole 40 mg tablet Commonly known as:  PROTONIX Take 1 Tab by mouth Daily (before breakfast). simvastatin 40 mg tablet Commonly known as:  ZOCOR Take 1 Tab by mouth nightly. Introducing \[facility]\"" & HEALTH SERVICES! Dear Deirdre Pedro: Thank you for requesting a [employer] account. Our records indicate that you already have an active [employer] account. You can access your account anytime at [email or web address] Did you know that you can access your hospital and ER discharge instructions at any time in [employer]? You can also review all of your test results from your hospital stay or ER visit. Additional Information If you have questions, please visit the Frequently Asked Questions section of the [employer] website at [email or web address] Remember, [employer] is NOT to be used for urgent needs. For medical emergencies, dial 911. Now available from your iPhone and Android! Please provide this summary of care documentation to your next provider. Your primary care clinician is listed as Bety Sauer.  If you have any questions after today's visit, please call [phone].

## 2018-03-28 LAB
ALBUMIN SERPL-MCNC: 4.3 G/DL (ref 3.6–4.8)
BUN SERPL-MCNC: 25 MG/DL (ref 8–27)
BUN/CREAT SERPL: 12 (ref 12–28)
CALCIUM SERPL-MCNC: 10 MG/DL (ref 8.7–10.3)
CHLORIDE SERPL-SCNC: 99 MMOL/L (ref 96–106)
CO2 SERPL-SCNC: 24 MMOL/L (ref 18–29)
CREAT SERPL-MCNC: 2.13 MG/DL (ref 0.57–1)
CREAT UR-MCNC: 106.9 MG/DL
GFR SERPLBLD CREATININE-BSD FMLA CKD-EPI: 24 ML/MIN/1.73
GFR SERPLBLD CREATININE-BSD FMLA CKD-EPI: 27 ML/MIN/1.73
GLUCOSE SERPL-MCNC: 268 MG/DL (ref 65–99)
PHOSPHATE SERPL-MCNC: 3.8 MG/DL (ref 2.5–4.5)
POTASSIUM SERPL-SCNC: 4.4 MMOL/L (ref 3.5–5.2)
PROT UR-MCNC: 18.2 MG/DL
PROT/CREAT UR: 170 MG/G CREAT (ref 0–200)
SODIUM SERPL-SCNC: 142 MMOL/L (ref 134–144)

## 2018-03-28 RX ORDER — AMLODIPINE BESYLATE 10 MG/1
TABLET ORAL
Qty: 90 TAB | Refills: 3 | Status: SHIPPED | OUTPATIENT
Start: 2018-03-28 | End: 2019-01-30 | Stop reason: SDUPTHER

## 2018-03-29 LAB — PTH-INTACT SERPL-MCNC: 54 PG/ML (ref 15–65)

## 2018-04-03 ENCOUNTER — TELEPHONE (OUTPATIENT)
Dept: INTERNAL MEDICINE CLINIC | Age: 68
End: 2018-04-03

## 2018-04-03 NOTE — TELEPHONE ENCOUNTER
Patient called in her BS readings  3/28       3/29        3/30  299         227        213  204        250                 200                                   Taking novovlog 6 units tid and 20 units lantus. Per  patient to increase novolog to 8 units tid and lantus to 28 units. Call next week.

## 2018-04-12 ENCOUNTER — TELEPHONE (OUTPATIENT)
Dept: INTERNAL MEDICINE CLINIC | Age: 68
End: 2018-04-12

## 2018-04-12 NOTE — TELEPHONE ENCOUNTER
Patient called in BS readings  4/6 4/7 4/8 4/9  130     160       150       119  101     130        88         103            199        134   -------------taking novolog 8 units tid and lantus 28 units qhs.no change per dr. Lynn Schumacher.

## 2018-04-24 ENCOUNTER — OFFICE VISIT (OUTPATIENT)
Dept: INTERNAL MEDICINE CLINIC | Age: 68
End: 2018-04-24

## 2018-04-24 VITALS
OXYGEN SATURATION: 99 % | BODY MASS INDEX: 36.04 KG/M2 | WEIGHT: 203.4 LBS | HEIGHT: 63 IN | SYSTOLIC BLOOD PRESSURE: 106 MMHG | HEART RATE: 64 BPM | TEMPERATURE: 97.5 F | RESPIRATION RATE: 16 BRPM | DIASTOLIC BLOOD PRESSURE: 65 MMHG

## 2018-04-24 DIAGNOSIS — E11.21 TYPE 2 DIABETES WITH NEPHROPATHY (HCC): Primary | ICD-10-CM

## 2018-04-24 DIAGNOSIS — M54.50 CHRONIC LOW BACK PAIN WITHOUT SCIATICA, UNSPECIFIED BACK PAIN LATERALITY: ICD-10-CM

## 2018-04-24 DIAGNOSIS — I10 ESSENTIAL HYPERTENSION: ICD-10-CM

## 2018-04-24 DIAGNOSIS — N28.9 KIDNEY LESION, NATIVE, LEFT: ICD-10-CM

## 2018-04-24 DIAGNOSIS — G89.29 CHRONIC LOW BACK PAIN WITHOUT SCIATICA, UNSPECIFIED BACK PAIN LATERALITY: ICD-10-CM

## 2018-04-24 DIAGNOSIS — R93.429 ABNORMAL FINDING ON DIAGNOSTIC IMAGING OF KIDNEY: ICD-10-CM

## 2018-04-24 RX ORDER — CLONIDINE HYDROCHLORIDE 0.2 MG/1
0.2 TABLET ORAL 2 TIMES DAILY
Qty: 60 TAB | Refills: 1 | Status: SHIPPED | OUTPATIENT
Start: 2018-04-24 | End: 2018-04-30 | Stop reason: SDUPTHER

## 2018-04-24 RX ORDER — LANCETS 28 GAUGE
EACH MISCELLANEOUS
Qty: 100 LANCET | Refills: 11 | Status: SHIPPED | OUTPATIENT
Start: 2018-04-24 | End: 2018-04-25 | Stop reason: SDUPTHER

## 2018-04-24 NOTE — PROGRESS NOTES
1. Have you been to the ER, urgent care clinic since your last visit? Hospitalized since your last visit? No    2. Have you seen or consulted any other health care providers outside of the [address] [place] [address] since your last visit? Include any pap smears or colon screening. No     Wants to discuss pain in right shoulders, knees and headaches.

## 2018-04-24 NOTE — MR AVS SNAPSHOT
[address] [place] [address] [address] [address] 
[phone] Patient: Aleks Davis MRN: [id number] NJE:36/[id number] Visit Information Date & Time Provider Department Dept. Phone Encounter #  
 4/24/2018  9:15 AM Janine Webster MD [facility] Medicine and Primary Care [phone] [id number] Follow-up Instructions Return in about 8 weeks (around 6/19/2018). Follow-up and Disposition History Upcoming Health Maintenance Date Due  
 FOOT EXAM Q1 7/20/2018 MEDICARE YEARLY EXAM 7/21/2018 HEMOGLOBIN A1C Q6M 9/6/2018 MICROALBUMIN Q1 10/26/2018 EYE EXAM RETINAL OR DILATED Q1 11/22/2018 LIPID PANEL Q1 3/8/2019 Pneumococcal 65+ High/Highest Risk (2 of 2 - PPSV23) 9/18/2019 GLAUCOMA SCREENING Q2Y 11/22/2019 BREAST CANCER SCRN MAMMOGRAM 3/2/2020 COLONOSCOPY 12/18/2022 DTaP/Tdap/Td series (2 - Td) 12/20/2026 Allergies as of 4/24/2018  Review Complete On: 4/24/2018 By: Janine Webster MD  
 No Known Allergies Current Immunizations  Never Reviewed Name Date Influenza Vaccine 10/10/2016, 9/18/2014 Pneumococcal Vaccine (Unspecified Type) 9/18/2014 Not reviewed this visit You Were Diagnosed With   
  
 Codes Comments Type 2 diabetes with nephropathy (HCC)    -  Primary ICD-10-CM: E11.21 
ICD-9-CM: 250.40, 583.81 Kidney lesion, native, left     ICD-10-CM: N28.9 ICD-9-CM: 593.9 Essential hypertension     ICD-10-CM: I10 
ICD-9-CM: 401.9 Chronic low back pain without sciatica, unspecified back pain laterality     ICD-10-CM: M54.5, G89.29 ICD-9-CM: 724.2, 338.29 Abnormal finding on diagnostic imaging of kidney     ICD-10-CM: R93.429 ICD-9-CM: 793.5 Vitals BP Pulse Temp Resp Height(growth percentile) Weight(growth percentile) 106/65 64 97.5 °F (36.4 °C) (Oral) 16 5' 3\" (1.6 m) 203 lb 6.4 oz (92.3 kg) LMP SpO2 BMI OB Status Smoking Status (LMP Unknown) 99% 36.03 kg/m2 Hysterectomy Never Smoker Vitals History BMI and BSA Data Body Mass Index Body Surface Area 36.03 kg/m 2 2.03 m 2 Preferred Pharmacy Pharmacy Name Phone [id number] Anupam El[facility] [phone] Your Updated Medication List  
  
   
This list is accurate as of 4/24/18 10:12 AM.  Always use your most recent med list.  
  
  
  
  
 albuterol 90 mcg/actuation inhaler Commonly known as:  PROVENTIL HFA, VENTOLIN HFA, PROAIR HFA Take 2 Puffs by inhalation every four (4) hours as needed for Wheezing. amLODIPine 10 mg tablet Commonly known as:  Anirudh Mills Take 1 tablet daily. calcitRIOL 0.25 mcg capsule Commonly known as:  ROCALTROL Take 1 Cap by mouth daily. cloNIDine HCl 0.2 mg tablet Commonly known as:  CATAPRES Take 1 Tab by mouth two (2) times a day. insulin glargine 100 unit/mL (3 mL) Inpn Commonly known as:  ARTEMIO CORONADO  
8 Units by SubCUTAneous route nightly. lancets 28 gauge Misc Three times daily as needed  
  
 metoprolol succinate 25 mg XL tablet Commonly known as:  TOPROL-XL Take 2 Tabs by mouth daily. NovoLOG Flexpen U-100 Insulin 100 unit/mL Inpn Generic drug:  insulin aspart U-100  
by SubCUTAneous route Before breakfast, lunch, and dinner. Sliding Scale   140-199    2u 200-249    3u 250-299    5u 300-349    7u   Indications: type 2 diabetes mellitus, Patient reported  
  
 pantoprazole 40 mg tablet Commonly known as:  PROTONIX Take 1 Tab by mouth Daily (before breakfast). simvastatin 40 mg tablet Commonly known as:  ZOCOR Take 1 Tab by mouth nightly. Prescriptions Sent to Pharmacy Refills  
 cloNIDine HCl (CATAPRES) 0.2 mg tablet 1 Sig: Take 1 Tab by mouth two (2) times a day. Class: Normal  
 Pharmacy: [address] [place] [address] #: [phone]  Route: Oral  
 lancets 28 gauge misc 11 Sig: Three times daily as needed Class: Normal  
 Pharmacy: [id number] #: [phone] We Performed the Following COLLECTION VENOUS BLOOD,VENIPUNCTURE [id number] CPT(R)] RENAL FUNCTION PANEL [58883 CPT(R)] Follow-up Instructions Return in about 8 weeks (around 6/19/2018). To-Do List   
 09/24/2018 Imaging:  US RETROPERITONEUM COMP [facility] & HEALTH SERVICES! Dear Delos Kocher: Thank you for requesting a [employer] account. Our records indicate that you already have an active [employer] account. You can access your account anytime at [email or web address] Did you know that you can access your hospital and ER discharge instructions at any time in [employer]? You can also review all of your test results from your hospital stay or ER visit. Additional Information If you have questions, please visit the Frequently Asked Questions section of the [employer] website at [email or web address]/. Remember, [employer] is NOT to be used for urgent needs. For medical emergencies, dial 911. Now available from your iPhone and Android! Please provide this summary of care documentation to your next provider. Your primary care clinician is listed as Jaison Santiago. If you have any questions after today's visit, please call [phone].

## 2018-04-24 NOTE — PROGRESS NOTES
SPORTS MEDICINE AND PRIMARY CARE  Kimberly Ge MD, [address] [address]  Phone:  [phone]  Fax: [phone]      Chief Complaint   Patient presents with    Hypertension     f/u         SUBECTIVE:    Julián Garza is a 78 y.o. female Patient returns today with known history of acute kidney failure, probably medication induced, diabetes mellitus type 2, dyslipidemia, primary hypertension. She complains of headaches that over the past two days have resolved. She currently complains bitterly of right shoulder discomfort that seems to radiate all the way down to her hand. She also notes knee discomfort, more in the right. She's not taking anything because of kidney disease and is seen for evaluation. Current Outpatient Prescriptions   Medication Sig Dispense Refill    cloNIDine HCl (CATAPRES) 0.2 mg tablet Take 1 Tab by mouth two (2) times a day. 60 Tab 1    lancets 28 gauge misc Three times daily as needed 100 Lancet 11    amLODIPine (NORVASC) 10 mg tablet Take 1 tablet daily. 90 Tab 3    calcitRIOL (ROCALTROL) 0.25 mcg capsule Take 1 Cap by mouth daily. 30 Cap 11    pantoprazole (PROTONIX) 40 mg tablet Take 1 Tab by mouth Daily (before breakfast). 30 Tab 11    insulin aspart U-100 (NOVOLOG FLEXPEN U-100 INSULIN) 100 unit/mL inpn by SubCUTAneous route Before breakfast, lunch, and dinner. Sliding Scale    140-199    2u  200-249    3u  250-299    5u  300-349    7u   Indications: type 2 diabetes mellitus, Patient reported      insulin glargine (LANTUS,BASAGLAR) 100 unit/mL (3 mL) inpn 8 Units by SubCUTAneous route nightly. 1 Adjustable Dose Pre-filled Pen Syringe 0    metoprolol succinate (TOPROL-XL) 25 mg XL tablet Take 2 Tabs by mouth daily. 60 Tab 1    simvastatin (ZOCOR) 40 mg tablet Take 1 Tab by mouth nightly.  90 Tab 3    albuterol (PROVENTIL HFA, VENTOLIN HFA, PROAIR HFA) 90 mcg/actuation inhaler Take 2 Puffs by inhalation every four (4) hours as needed for Wheezing. 1 Inhaler 0     Past Medical History:   Diagnosis Date    Cellulitis of right abdominal wall 2017    letty whatley md    Diabetes ([address]     DJD (degenerative joint disease) of knee     Gastroenteritis     Hypercholesterolemia     Hypertension     Kidney lesion, native, left     LBP (low back pain)     chiorpactor    Lumbar stenosis     Obesity     S/P colonoscopy 12     Past Surgical History:   Procedure Laterality Date    HX BREAST BIOPSY Right     negative    HX COLONOSCOPY      HX HYSTERECTOMY      HX OOPHORECTOMY N/A     HX OTHER SURGICAL  2017    I&D infected abscess      No Known Allergies    REVIEW OF SYSTEMS:   No chest pain, no shortness of breath. Social History     Social History    Marital status: SINGLE     Spouse name: N/A    Number of children: N/A    Years of education: N/A     Social History Main Topics    Smoking status: Never Smoker    Smokeless tobacco: Never Used    Alcohol use No    Drug use: No    Sexual activity: Not Currently     Other Topics Concern    None     Social History Narrative    Medical History: Diverticulosis 2002DM 1995primary HTN 1995Obesity Dyslipidemia January 2012 moderate central stenosis L3-L4,    mild to moderate central stenosis at L4-L5 MRI    Gyn History: Last mammogram date 2013. Last menstrual perioddate hysterectomy  FOR FIBROIDS, PT W ITH ONE OVARY. Last    papdate . Menopausal hot flashes. Sexually active not currently sexually active. History of abnormal pap smears none. History of STDs    none. Vaginal discharge or odor none. HysterectomyDate NONE.    OB History: Total pregnancies 1. Pre term deliveries (>20-36.6 w eeks) 1. Surgical History: [facility] right breast bx , kidney stones colonoscopy     Hospitalization/Major Diagnostic Procedure: [facility] right breast bx , nicole paulino     Family History:  Mother:  52 yrs, skin d/oFather: deceasedAunt: alive, lung cancerAdopted: alive    Social History: Alcohol Use Patient does not use alcohol. Smoking Status Patient is a never smoker. Marital Status: Single. Lives w ith:    alone. Education/School: has masters degree-[employer].   r  Family History   Problem Relation Age of Onset    Cancer Mother     No Known Problems Father        OBJECTIVE:  Visit Vitals    /65    Pulse 64    Temp 97.5 °F (36.4 °C) (Oral)    Resp 16    Ht 5' 3\" (1.6 m)    Wt 203 lb 6.4 oz (92.3 kg)    LMP  (LMP Unknown)    SpO2 99%    BMI 36.03 kg/m2     ENT: perrla,  eom intact  NECK: supple. Thyroid normal  CHEST: clear to ascultation and percussion   HEART: regular rate and rhythm  ABD: soft, bowel sounds active  EXTREMITIES: no edema, pulse 1+     Office Visit on 03/27/2018   Component Date Value Ref Range Status    Glucose 03/27/2018 268* 65 - 99 mg/dL Final    BUN 03/27/2018 25  8 - 27 mg/dL Final    Creatinine 03/27/2018 2.13* 0.57 - 1.00 mg/dL Final    GFR est non-AA 03/27/2018 24* >59 mL/min/1.73 Final    GFR est AA 03/27/2018 27* >59 mL/min/1.73 Final    BUN/Creatinine ratio 03/27/2018 12  12 - 28 Final    Sodium 03/27/2018 142  134 - 144 mmol/L Final    Potassium 03/27/2018 4.4  3.5 - 5.2 mmol/L Final    Chloride 03/27/2018 99  96 - 106 mmol/L Final    CO2 03/27/2018 24  18 - 29 mmol/L Final    Calcium 03/27/2018 10.0  8.7 - 10.3 mg/dL Final    Phosphorus 03/27/2018 3.8  2.5 - 4.5 mg/dL Final    Albumin 03/27/2018 4.3  3.6 - 4.8 g/dL Final    Creatinine, urine 03/27/2018 106.9  Not Estab. mg/dL Final    Protein total, urine 03/27/2018 18.2  Not Estab. mg/dL Final    Protein/Creat Ratio 03/27/2018 170  0 - 200 mg/g creat Final    PTH, Intact 03/27/2018 54  15 - 65 pg/mL Final   Admission on 03/07/2018, Discharged on 03/10/2018   No results displayed because visit has over 200 results.       Office Visit on 03/06/2018   Component Date Value Ref Range Status    WBC 03/06/2018 9.6  3.4 - 10.8 x10E3/uL Final    RBC 03/06/2018 4.56  3.77 - 5.28 x10E6/uL Final    HGB 03/06/2018 13.0  11.1 - 15.9 g/dL Final    HCT 03/06/2018 39.9  34.0 - 46.6 % Final    MCV 03/06/2018 88  79 - 97 fL Final    MCH 03/06/2018 28.5  26.6 - 33.0 pg Final    MCHC 03/06/2018 32.6  31.5 - 35.7 g/dL Final    RDW 03/06/2018 15.6* 12.3 - 15.4 % Final    PLATELET 19/61/9559 129  150 - 379 x10E3/uL Final    NEUTROPHILS 03/06/2018 83  Not Estab. % Final    Lymphocytes 03/06/2018 11  Not Estab. % Final    MONOCYTES 03/06/2018 5  Not Estab. % Final    EOSINOPHILS 03/06/2018 1  Not Estab. % Final    BASOPHILS 03/06/2018 0  Not Estab. % Final    ABS. NEUTROPHILS 03/06/2018 8.0* 1.4 - 7.0 x10E3/uL Final    Abs Lymphocytes 03/06/2018 1.1  0.7 - 3.1 x10E3/uL Final    ABS. MONOCYTES 03/06/2018 0.5  0.1 - 0.9 x10E3/uL Final    ABS. EOSINOPHILS 03/06/2018 0.1  0.0 - 0.4 x10E3/uL Final    ABS. BASOPHILS 03/06/2018 0.0  0.0 - 0.2 x10E3/uL Final    IMMATURE GRANULOCYTES 03/06/2018 0  Not Estab. % Final    ABS. IMM. GRANS. 03/06/2018 0.0  0.0 - 0.1 x10E3/uL Final    Glucose 03/06/2018 127* 65 - 99 mg/dL Final    BUN 03/06/2018 101* 8 - 27 mg/dL Final    Creatinine 03/06/2018 7.62* 0.57 - 1.00 mg/dL Final    GFR est non-AA 03/06/2018 5* >59 mL/min/1.73 Final    GFR est AA 03/06/2018 6* >59 mL/min/1.73 Final    BUN/Creatinine ratio 03/06/2018 13  12 - 28 Final    Sodium 03/06/2018 146* 134 - 144 mmol/L Final    Potassium 03/06/2018 5.8* 3.5 - 5.2 mmol/L Final    Chloride 03/06/2018 105  96 - 106 mmol/L Final    CO2 03/06/2018 17* 18 - 29 mmol/L Final    Calcium 03/06/2018 10.3  8.7 - 10.3 mg/dL Final    Hemoglobin A1c 03/06/2018 6.7* 4.8 - 5.6 % Final    Comment:          Pre-diabetes: 5.7 - 6.4           Diabetes: >6.4           Glycemic control for adults with diabetes: <7.0      Estimated average glucose 03/06/2018 146  mg/dL Final          ASSESSMENT:  1.  Type 2 diabetes with nephropathy (HCC)    2. Kidney lesion, native, left    3. Essential hypertension    4. Chronic low back pain without sciatica, unspecified back pain laterality    5. Abnormal finding on diagnostic imaging of kidney       We review the ultrasound of her kidneys with the patient and noted an intramedullary fatty tissue that could be evaluated further with a CT scan, which I'd prefer not in view of her kidney disease. We discuss this with her. MRI would also be an option. Since it's felt to be benign will repeat the ultrasound in six months to confirm stability. She agrees with the plan. Her blood pressure control is excellent. She tells me her blood sugars are consistently less than 140. Will confirm that when it's time to do a Hgb A1c again. It's miraculous the rapidity in which her renal function has returned to normal.  Will confirm that again today. She'll return to see us in two months, sooner if she has any problems. PLAN:  .  Orders Placed This Encounter    US RETROPERITONEUM COMP    RENAL FUNCTION PANEL    cloNIDine HCl (CATAPRES) 0.2 mg tablet    lancets 28 gauge misc       Follow-up Disposition:  Return in about 8 weeks (around 6/19/2018). ATTENTION:   This medical record was transcribed using an electronic medical records system. Although proofread, it may and can contain electronic and spelling errors. Other human spelling and other errors may be present. Corrections may be executed at a later time. Please feel free to contact us for any clarifications as needed.

## 2018-04-25 LAB
ALBUMIN SERPL-MCNC: 3.9 G/DL (ref 3.6–4.8)
BUN SERPL-MCNC: 19 MG/DL (ref 8–27)
BUN/CREAT SERPL: 14 (ref 12–28)
CALCIUM SERPL-MCNC: 9.5 MG/DL (ref 8.7–10.3)
CHLORIDE SERPL-SCNC: 104 MMOL/L (ref 96–106)
CO2 SERPL-SCNC: 24 MMOL/L (ref 18–29)
CREAT SERPL-MCNC: 1.37 MG/DL (ref 0.57–1)
GFR SERPLBLD CREATININE-BSD FMLA CKD-EPI: 40 ML/MIN/1.73
GFR SERPLBLD CREATININE-BSD FMLA CKD-EPI: 46 ML/MIN/1.73
GLUCOSE SERPL-MCNC: 217 MG/DL (ref 65–99)
PHOSPHATE SERPL-MCNC: 3.6 MG/DL (ref 2.5–4.5)
POTASSIUM SERPL-SCNC: 4.7 MMOL/L (ref 3.5–5.2)
SODIUM SERPL-SCNC: 144 MMOL/L (ref 134–144)

## 2018-04-25 RX ORDER — METOPROLOL SUCCINATE 25 MG/1
50 TABLET, EXTENDED RELEASE ORAL DAILY
Qty: 60 TAB | Refills: 11 | Status: CANCELLED | OUTPATIENT
Start: 2018-04-25

## 2018-04-25 RX ORDER — METOPROLOL TARTRATE 25 MG/1
12.5 TABLET, FILM COATED ORAL 2 TIMES DAILY
Qty: 90 TAB | Refills: 11 | Status: SHIPPED | OUTPATIENT
Start: 2018-04-25 | End: 2018-09-25 | Stop reason: SDUPTHER

## 2018-04-25 RX ORDER — LANCETS 28 GAUGE
EACH MISCELLANEOUS
Qty: 100 LANCET | Refills: 11 | Status: SHIPPED | OUTPATIENT
Start: 2018-04-25 | End: 2019-06-23 | Stop reason: SDUPTHER

## 2018-04-30 RX ORDER — CLONIDINE HYDROCHLORIDE 0.2 MG/1
0.2 TABLET ORAL 2 TIMES DAILY
Qty: 180 TAB | Refills: 3 | Status: SHIPPED | OUTPATIENT
Start: 2018-04-30 | End: 2018-09-09 | Stop reason: SDUPTHER

## 2018-06-22 ENCOUNTER — OFFICE VISIT (OUTPATIENT)
Dept: INTERNAL MEDICINE CLINIC | Age: 68
End: 2018-06-22

## 2018-06-22 VITALS
RESPIRATION RATE: 16 BRPM | HEART RATE: 66 BPM | WEIGHT: 209.4 LBS | BODY MASS INDEX: 37.1 KG/M2 | DIASTOLIC BLOOD PRESSURE: 68 MMHG | OXYGEN SATURATION: 97 % | HEIGHT: 63 IN | TEMPERATURE: 97.8 F | SYSTOLIC BLOOD PRESSURE: 121 MMHG

## 2018-06-22 DIAGNOSIS — E66.01 SEVERE OBESITY (BMI 35.0-39.9) WITH COMORBIDITY (HCC): ICD-10-CM

## 2018-06-22 DIAGNOSIS — E11.21 TYPE 2 DIABETES WITH NEPHROPATHY (HCC): ICD-10-CM

## 2018-06-22 DIAGNOSIS — G89.29 CHRONIC PAIN OF BOTH KNEES: ICD-10-CM

## 2018-06-22 DIAGNOSIS — M25.561 CHRONIC PAIN OF BOTH KNEES: ICD-10-CM

## 2018-06-22 DIAGNOSIS — M25.562 CHRONIC PAIN OF BOTH KNEES: ICD-10-CM

## 2018-06-22 DIAGNOSIS — I10 ESSENTIAL HYPERTENSION: ICD-10-CM

## 2018-06-22 DIAGNOSIS — E11.9 TYPE 2 DIABETES MELLITUS WITHOUT COMPLICATION, WITH LONG-TERM CURRENT USE OF INSULIN (HCC): Primary | ICD-10-CM

## 2018-06-22 DIAGNOSIS — M25.511 ACUTE PAIN OF RIGHT SHOULDER: ICD-10-CM

## 2018-06-22 DIAGNOSIS — E78.00 HYPERCHOLESTEROLEMIA: ICD-10-CM

## 2018-06-22 DIAGNOSIS — Z79.4 TYPE 2 DIABETES MELLITUS WITHOUT COMPLICATION, WITH LONG-TERM CURRENT USE OF INSULIN (HCC): Primary | ICD-10-CM

## 2018-06-22 NOTE — PROGRESS NOTES
SPORTS MEDICINE AND PRIMARY CARE  Rod Ireland MD, [address] [address]  Phone:  [phone]  Fax: [phone]       Chief Complaint   Patient presents with    Diabetes     2 month follow up    . SUBJECTIVE:    Boni Shore is a 78 y.o. female Patient returns today with known history of diabetes mellitus type 2, primary hypertension, obesity, asthma, dyslipidemia, lumbar spinal stenosis and is seen for evaluation. She currently complains of right shoulder discomfort. It seems when she moves it around it hurts, when she tries to lay in bed on it it hurts. She wonders what is going on. She also complains of bilateral knee discomfort that seems to be getting worse. She brings her blood sugar readings in, which are acceptable, and she brings her blood pressure readings in, which range from 108/64 to 149/75 with most in the normotensive range. Other new complaints denied and patient is seen for evaluation. Patient also complains of ankle swelling, particularly when she's up and about. Current Outpatient Prescriptions   Medication Sig Dispense Refill    glucose blood VI test strips (ONETOUCH ULTRA TEST) strip Use to test blood sugar three times a day. dx. e11.9 100 Strip 11    cloNIDine HCl (CATAPRES) 0.2 mg tablet Take 1 Tab by mouth two (2) times a day. 180 Tab 3    metoprolol tartrate (LOPRESSOR) 25 mg tablet Take 0.5 Tabs by mouth two (2) times a day. 90 Tab 11    amLODIPine (NORVASC) 10 mg tablet Take 1 tablet daily. 90 Tab 3    calcitRIOL (ROCALTROL) 0.25 mcg capsule Take 1 Cap by mouth daily. 30 Cap 11    pantoprazole (PROTONIX) 40 mg tablet Take 1 Tab by mouth Daily (before breakfast). 30 Tab 11    insulin aspart U-100 (NOVOLOG FLEXPEN U-100 INSULIN) 100 unit/mL inpn by SubCUTAneous route Before breakfast, lunch, and dinner.  Sliding Scale    140-199    2u  200-249    3u  250-299    5u  300-349    7u   Indications: type 2 diabetes mellitus, Patient reported      insulin glargine (LANTUS,BASAGLAR) 100 unit/mL (3 mL) inpn 8 Units by SubCUTAneous route nightly. (Patient taking differently: 28 Units by SubCUTAneous route nightly.) 1 Adjustable Dose Pre-filled Pen Syringe 0    simvastatin (ZOCOR) 40 mg tablet Take 1 Tab by mouth nightly. 90 Tab 3    albuterol (PROVENTIL HFA, VENTOLIN HFA, PROAIR HFA) 90 mcg/actuation inhaler Take 2 Puffs by inhalation every four (4) hours as needed for Wheezing. 1 Inhaler 0    lancets 28 gauge misc Use to test blood sugar three times a day.  Dx.e11.9 100 Lancet 11     Past Medical History:   Diagnosis Date    Cellulitis of right abdominal wall 12/18/2017    letty whatley md    Diabetes ([facility] [address] [facility] 1995    DJD (degenerative joint disease) of knee     Gastroenteritis     Hypercholesterolemia     Hypertension     Kidney lesion, native, left     Knee pain     LBP (low back pain)     chiorpactor    Lumbar stenosis     Obesity     S/P colonoscopy 12-18-12    Shoulder pain, right      Past Surgical History:   Procedure Laterality Date    HX BREAST BIOPSY Right 1990    negative    HX COLONOSCOPY      HX HYSTERECTOMY      HX OOPHORECTOMY N/A     HX OTHER SURGICAL  12/23/2017    I&D infected abscess      No Known Allergies      REVIEW OF SYSTEMS:  General: negative for - chills or fever  ENT: negative for - headaches, nasal congestion or tinnitus  Respiratory: negative for - cough, hemoptysis, shortness of breath or wheezing  Cardiovascular : negative for - chest pain, edema, palpitations or shortness of breath  Gastrointestinal: negative for - abdominal pain, blood in stools, heartburn or nausea/vomiting  Genito-Urinary: no dysuria, trouble voiding, or hematuria  Musculoskeletal: negative for - gait disturbance, joint pain, joint stiffness or joint swelling  Neurological: no TIA or stroke symptoms  Hematologic: no bruises, no bleeding, no swollen glands  Integument: no lumps, mole changes, nail changes or rash  Endocrine: no malaise/lethargy or unexpected weight changes      Social History     Social History    Marital status: SINGLE     Spouse name: N/A    Number of children: N/A    Years of education: N/A     Social History Main Topics    Smoking status: Never Smoker    Smokeless tobacco: Never Used    Alcohol use No    Drug use: No    Sexual activity: Not Currently     Other Topics Concern    None     Social History Narrative    Medical History: Diverticulosis 2002DM 1995primary HTN 1995Obesity 1995Dyslipidemia 1995January 2012 moderate central stenosis L3-L4,    mild to moderate central stenosis at L4-L5 MRI    Gyn History: Last mammogram date 2013. Last menstrual perioddate hysterectomy  FOR FIBROIDS, PT W ITH ONE OVARY. Last    papdate . Menopausal hot flashes. Sexually active not currently sexually active. History of abnormal pap smears none. History of STDs    none. Vaginal discharge or odor none. HysterectomyDate NONE.    OB History: Total pregnancies 1. Pre term deliveries (>20-36.6 w eeks) 1. Surgical History: [facility] right breast bx , nicole paulino colonoscopy     Hospitalization/Major Diagnostic Procedure: [facility] rig breast bx , nicole paulino     Family History: Mother:  52 yrs, skin d/oFather: deceasedAunt: alive, lung cancerAdopted: alive    Social History: Alcohol Use Patient does not use alcohol. Smoking Status Patient is a never smoker. Marital Status: Single. Lives w ith:    alone. Education/School: has masters degree-[employer].      Family History   Problem Relation Age of Onset    Cancer Mother     No Known Problems Father        OBJECTIVE:    Visit Vitals    /66    Pulse 66    Temp 97.8 °F (36.6 °C) (Oral)    Resp 16    Ht 5' 3\" (1.6 m)    Wt 209 lb 6.4 oz (95 kg)    LMP  (LMP Unknown)    SpO2 97%    BMI 37.09 kg/m2     CONSTITUTIONAL: well , well nourished, appears age appropriate  EYES: perrla, eom intact  ENMT:moist mucous membranes, pharynx clear  NECK: supple. Thyroid normal  RESPIRATORY: Chest: clear bilaterally   CARDIOVASCULAR: Heart: regular rate and rhythm  GASTROINTESTINAL: Abdomen: soft, bowel sounds active  HEMATOLOGIC: no pathological lymph nodes palpated  MUSCULOSKELETAL: Extremities: no edema, pulse 1+ Foot exam reveals no lesions. Sensation intact to fine filament. Pulses intact. INTEGUMENT: No unusual rashes or suspicious skin lesions noted. Nails appear normal.  NEUROLOGIC: non-focal exam   MENTAL STATUS: alert and oriented, appropriate affect           ASSESSMENT:  1. Type 2 diabetes mellitus without complication, with long-term current use of insulin ([address]    2. Type 2 diabetes with nephropathy ([address]    3. Hypercholesterolemia    4. Severe obesity (BMI 35.0-39. 9) with comorbidity ([facility] [address]    5. Essential hypertension    6. Acute pain of right shoulder    7. Chronic pain of both knees      For the shoulder discomfort will take an xray and give her PT if she desires. She can also use Biofreeze to see if it will feel better. She'd like to have a progress xray of her knees, which are uncomfortable. The crepitation is greater on the right than the left. Her blood sugar control would suggest that Hgb A1c would be at goal    BP control is at goal.    She'll return to the office in about three months, sooner if she needs to . Will also send her the results to Dr. Lavon Rincon. PLAN:  .  Orders Placed This Encounter    XR KNEE LT MIN 4 V    XR KNEE RT MIN 4 V    XR SHOULDER RT AP/LAT MIN 2 V    HEMOGLOBIN A1C WITH EAG    RENAL FUNCTION PANEL       Follow-up Disposition:  Return in about 3 months (around 9/22/2018). ATTENTION:   This medical record was transcribed using an electronic medical records system. Although proofread, it may and can contain electronic and spelling errors. Other human spelling and other errors may be present.   Corrections may be executed at a later time.  Please feel free to contact us for any clarifications as needed.

## 2018-06-22 NOTE — MR AVS SNAPSHOT
[facility] 
 
 
 [address] [address] 
[phone] Patient: Angelika Freeman MRN: [id number] NAB:[id number] Visit Information Date & Time Provider Department Dept. Phone Encounter #  
 6/22/2018  9:45 AM Dannielle Pope MD [facility] [phone] [id number] Follow-up Instructions Return in about 3 months (around 9/22/2018). Follow-up and Disposition History Your Appointments 9/25/2018  9:15 AM  
Any with Dannielle Pope MD  
[address] [place] [address] and Primary Care [address]) Appt Note: 3 month follow up  
 [address] [facility]  
  
   
 [address] [id number] Upcoming Health Maintenance Date Due  
 FOOT EXAM Q1 7/20/2018 MEDICARE YEARLY EXAM 7/21/2018 Influenza Age 5 to Adult 8/1/2018 HEMOGLOBIN A1C Q6M 9/6/2018 MICROALBUMIN Q1 10/26/2018 EYE EXAM RETINAL OR DILATED Q1 11/22/2018 LIPID PANEL Q1 3/8/2019 Pneumococcal 65+ High/Highest Risk (2 of 2 - PPSV23) 9/18/2019 GLAUCOMA SCREENING Q2Y 11/22/2019 BREAST CANCER SCRN MAMMOGRAM 3/2/2020 COLONOSCOPY 12/18/2022 DTaP/Tdap/Td series (2 - Td) 12/20/2026 Allergies as of 6/22/2018  Review Complete On: 6/22/2018 By: Dannielle Pope MD  
 No Known Allergies Current Immunizations  Never Reviewed Name Date Influenza Vaccine 10/10/2016, 9/18/2014 Pneumococcal Vaccine (Unspecified Type) 9/18/2014 Not reviewed this visit You Were Diagnosed With   
  
 Codes Comments Type 2 diabetes mellitus without complication, with long-term current use of insulin (HCC)    -  Primary ICD-10-CM: E11.9, Z79.4 ICD-9-CM: 250.00, V58.67 Type 2 diabetes with nephropathy (HCC)     ICD-10-CM: E11.21 
ICD-9-CM: 250.40, 583.81 Hypercholesterolemia     ICD-10-CM: E78.00 ICD-9-CM: 272.0 Severe obesity (BMI 35.0-39. 9) with comorbidity ([facility] [address]     ICD-10-CM: E66.01 
ICD-9-CM: 278.01 Essential hypertension     ICD-10-CM: I10 
ICD-9-CM: 401.9 Acute pain of right shoulder     ICD-10-CM: M25.511 ICD-9-CM: 719.41 Chronic pain of both knees     ICD-10-CM: M25.561, M25.562, G89.29 ICD-9-CM: 719.46, 338.29 Vitals BP Pulse Temp Resp Height(growth percentile) Weight(growth percentile) 121/68 66 97.8 °F (36.6 °C) (Oral) 16 5' 3\" (1.6 m) 209 lb 6.4 oz (95 kg) LMP SpO2 BMI OB Status Smoking Status (LMP Unknown) 97% 37.09 kg/m2 Hysterectomy Never Smoker Vitals History BMI and BSA Data Body Mass Index Body Surface Area 37.09 kg/m 2 2.05 m 2 Preferred Pharmacy Pharmacy Name Phone [id number] [place] [place], [address] [place] [phone] Your Updated Medication List  
  
   
This list is accurate as of 6/22/18 12:03 PM.  Always use your most recent med list.  
  
  
  
  
 albuterol 90 mcg/actuation inhaler Commonly known as:  PROVENTIL HFA, VENTOLIN HFA, PROAIR HFA Take 2 Puffs by inhalation every four (4) hours as needed for Wheezing. amLODIPine 10 mg tablet Commonly known as:  [place] Take 1 tablet daily. calcitRIOL 0.25 mcg capsule Commonly known as:  ROCALTROL Take 1 Cap by mouth daily. cloNIDine HCl 0.2 mg tablet Commonly known as:  CATAPRES Take 1 Tab by mouth two (2) times a day. glucose blood VI test strips strip Commonly known as:  ONETOUCH ULTRA TEST Use to test blood sugar three times a day. dx. e11.9  
  
 insulin glargine 100 unit/mL (3 mL) Inpn Commonly known as:  LANJOANNABASAGLAR  
8 Units by SubCUTAneous route nightly. lancets 28 gauge Misc Use to test blood sugar three times a day. Dx.e11.9  
  
 metoprolol tartrate 25 mg tablet Commonly known as:  LOPRESSOR Take 0.5 Tabs by mouth two (2) times a day. NovoLOG Flexpen U-100 Insulin 100 unit/mL Inpn Generic drug:  insulin aspart U-100  
by SubCUTAneous route Before breakfast, lunch, and dinner. Sliding Scale   140-199    2u 200-249    3u 250-299    5u 300-349    7u   Indications: type 2 diabetes mellitus, Patient reported  
  
 pantoprazole 40 mg tablet Commonly known as:  PROTONIX Take 1 Tab by mouth Daily (before breakfast). simvastatin 40 mg tablet Commonly known as:  ZOCOR Take 1 Tab by mouth nightly. We Performed the Following COLLECTION VENOUS BLOOD,VENIPUNCTURE F402164 CPT(R)] HEMOGLOBIN A1C WITH EAG [33564 CPT(R)]  DIABETES FOOT EXAM [HM7 Custom] RENAL FUNCTION PANEL [36417 CPT(R)] Follow-up Instructions Return in about 3 months (around 9/22/2018). To-Do List   
 06/22/2018 Imaging:  XR KNEE LT MIN 4 V   
  
 06/22/2018 Imaging:  XR KNEE RT MIN 4 V   
  
 06/22/2018 Imaging:  XR SHOULDER RT AP/LAT MIN 2 V   
  
 10/09/2018 10:30 AM  
  Appointment with Shyanne Hamm at [facility] Ultrasound ([phone]) GENERAL INSTRUCTIONS 1. Bring any non [facility] facility films/reports pertaining to the area being studied with you on the day of appointment. 2. A written order with a valid diagnosis and Physicians signature is required for all scheduled tests. 3. Check in at registration 30 minutes before your appointment time unless you were instructed to do otherwise. Introducing [facility]! Dear Jenaro Alvarez: Thank you for requesting a [employer] account. Our records indicate that you already have an active [employer] account. You can access your account anytime at [email or web address] Did you know that you can access your hospital and ER discharge instructions at any time in [employer]? You can also review all of your test results from your hospital stay or ER visit. Additional Information If you have questions, please visit the Frequently Asked Questions section of the [email or web address] website at [email or web address] Remember, [email or web address] is NOT to be used for urgent needs. For medical emergencies, dial 911. Now available from your iPhone and Android! Please provide this summary of care documentation to your next provider. Your primary care clinician is listed as Florian Fernandez. If you have any questions after today's visit, please call [phone].

## 2018-06-22 NOTE — PROGRESS NOTES
Chief Complaint   Patient presents with    Diabetes     2 month follow up      1. Have you been to the ER, urgent care clinic since your last visit? Hospitalized since your last visit? No    2. Have you seen or consulted any other health care providers outside of the [address] [place] [address] since your last visit? Include any pap smears or colon screening.  No

## 2018-06-23 LAB
ALBUMIN SERPL-MCNC: 4.5 G/DL (ref 3.6–4.8)
BUN SERPL-MCNC: 21 MG/DL (ref 8–27)
BUN/CREAT SERPL: 19 (ref 12–28)
CALCIUM SERPL-MCNC: 10.2 MG/DL (ref 8.7–10.3)
CHLORIDE SERPL-SCNC: 106 MMOL/L (ref 96–106)
CO2 SERPL-SCNC: 24 MMOL/L (ref 20–29)
CREAT SERPL-MCNC: 1.09 MG/DL (ref 0.57–1)
EST. AVERAGE GLUCOSE BLD GHB EST-MCNC: 140 MG/DL
GFR SERPLBLD CREATININE-BSD FMLA CKD-EPI: 53 ML/MIN/1.73
GFR SERPLBLD CREATININE-BSD FMLA CKD-EPI: 61 ML/MIN/1.73
GLUCOSE SERPL-MCNC: 45 MG/DL (ref 65–99)
HBA1C MFR BLD: 6.5 % (ref 4.8–5.6)
PHOSPHATE SERPL-MCNC: 4.2 MG/DL (ref 2.5–4.5)
POTASSIUM SERPL-SCNC: 4.7 MMOL/L (ref 3.5–5.2)
SODIUM SERPL-SCNC: 144 MMOL/L (ref 134–144)

## 2018-07-20 RX ORDER — INSULIN ASPART 100 [IU]/ML
INJECTION, SOLUTION INTRAVENOUS; SUBCUTANEOUS
Qty: 10 ADJUSTABLE DOSE PRE-FILLED PEN SYRINGE | Refills: 11 | Status: SHIPPED | OUTPATIENT
Start: 2018-07-20 | End: 2018-07-23 | Stop reason: SDUPTHER

## 2018-07-23 RX ORDER — INSULIN ASPART 100 [IU]/ML
INJECTION, SOLUTION INTRAVENOUS; SUBCUTANEOUS
Qty: 10 ADJUSTABLE DOSE PRE-FILLED PEN SYRINGE | Refills: 11
Start: 2018-07-23 | End: 2018-07-23 | Stop reason: SDUPTHER

## 2018-07-24 RX ORDER — INSULIN ASPART 100 [IU]/ML
INJECTION, SOLUTION INTRAVENOUS; SUBCUTANEOUS
Qty: 15 ADJUSTABLE DOSE PRE-FILLED PEN SYRINGE | Refills: 11 | Status: SHIPPED | OUTPATIENT
Start: 2018-07-24 | End: 2019-01-30

## 2018-08-13 RX ORDER — PEN NEEDLE, DIABETIC 31 GX3/16"
NEEDLE, DISPOSABLE MISCELLANEOUS
Qty: 100 PEN NEEDLE | Refills: 11 | Status: SHIPPED | OUTPATIENT
Start: 2018-08-13 | End: 2018-08-17 | Stop reason: SDUPTHER

## 2018-08-20 RX ORDER — PEN NEEDLE, DIABETIC 31 GX3/16"
NEEDLE, DISPOSABLE MISCELLANEOUS
Qty: 100 PEN NEEDLE | Refills: 11 | Status: SHIPPED | OUTPATIENT
Start: 2018-08-20 | End: 2019-01-21 | Stop reason: SDUPTHER

## 2018-08-20 NOTE — TELEPHONE ENCOUNTER
From: Nany Duenas  To: Chase Flores MD  Sent: 8/17/2018 5:28 PM EDT  Subject: Medication Renewal Request    Original authorizing provider: MD Andrew Buck Janice Hubbard would like a refill of the following medications:  Insulin Needles, Disposable, (CHRISS PEN NEEDLE) 32 gauge x 5/32\" ndle [Allan Rice MD]    Preferred pharmacy: [facility] #[id number], [address] [place] [address]    Comment:  Please change this to 29 gauge as 32 gauge is too short.

## 2018-09-09 RX ORDER — CLONIDINE HYDROCHLORIDE 0.2 MG/1
TABLET ORAL
Qty: 60 TAB | Refills: 1 | Status: SHIPPED | OUTPATIENT
Start: 2018-09-09 | End: 2018-09-25 | Stop reason: SDUPTHER

## 2018-09-25 ENCOUNTER — OFFICE VISIT (OUTPATIENT)
Dept: INTERNAL MEDICINE CLINIC | Age: 68
End: 2018-09-25

## 2018-09-25 VITALS
HEIGHT: 63 IN | OXYGEN SATURATION: 96 % | HEART RATE: 60 BPM | BODY MASS INDEX: 38.39 KG/M2 | WEIGHT: 216.7 LBS | SYSTOLIC BLOOD PRESSURE: 116 MMHG | DIASTOLIC BLOOD PRESSURE: 67 MMHG | TEMPERATURE: 97.8 F | RESPIRATION RATE: 16 BRPM

## 2018-09-25 DIAGNOSIS — E78.00 HYPERCHOLESTEROLEMIA: ICD-10-CM

## 2018-09-25 DIAGNOSIS — Z00.00 MEDICARE ANNUAL WELLNESS VISIT, SUBSEQUENT: ICD-10-CM

## 2018-09-25 DIAGNOSIS — E11.21 TYPE 2 DIABETES WITH NEPHROPATHY (HCC): ICD-10-CM

## 2018-09-25 DIAGNOSIS — I10 ESSENTIAL HYPERTENSION: ICD-10-CM

## 2018-09-25 DIAGNOSIS — Z13.39 SCREENING FOR ALCOHOLISM: ICD-10-CM

## 2018-09-25 DIAGNOSIS — M48.061 SPINAL STENOSIS OF LUMBAR REGION WITHOUT NEUROGENIC CLAUDICATION: ICD-10-CM

## 2018-09-25 DIAGNOSIS — Z23 ENCOUNTER FOR IMMUNIZATION: Primary | ICD-10-CM

## 2018-09-25 DIAGNOSIS — Z71.89 ACP (ADVANCE CARE PLANNING): ICD-10-CM

## 2018-09-25 DIAGNOSIS — E66.01 SEVERE OBESITY (BMI 35.0-39.9) WITH COMORBIDITY (HCC): ICD-10-CM

## 2018-09-25 DIAGNOSIS — M25.511 ACUTE PAIN OF RIGHT SHOULDER: ICD-10-CM

## 2018-09-25 DIAGNOSIS — Z13.31 SCREENING FOR DEPRESSION: ICD-10-CM

## 2018-09-25 RX ORDER — METOPROLOL TARTRATE 25 MG/1
12.5 TABLET, FILM COATED ORAL 2 TIMES DAILY
Qty: 90 TAB | Refills: 3 | Status: SHIPPED | OUTPATIENT
Start: 2018-09-25 | End: 2019-11-17 | Stop reason: SDUPTHER

## 2018-09-25 RX ORDER — INSULIN GLARGINE 100 [IU]/ML
28 INJECTION, SOLUTION SUBCUTANEOUS
Qty: 9 ADJUSTABLE DOSE PRE-FILLED PEN SYRINGE | Refills: 3
Start: 2018-09-25 | End: 2019-01-30

## 2018-09-25 RX ORDER — CALCITRIOL 0.25 UG/1
0.25 CAPSULE ORAL DAILY
Qty: 90 CAP | Refills: 3 | Status: SHIPPED | OUTPATIENT
Start: 2018-09-25 | End: 2022-03-24

## 2018-09-25 RX ORDER — CLONIDINE HYDROCHLORIDE 0.2 MG/1
0.2 TABLET ORAL 2 TIMES DAILY
Qty: 180 TAB | Refills: 3 | Status: SHIPPED | OUTPATIENT
Start: 2018-09-25 | End: 2019-11-09 | Stop reason: SDUPTHER

## 2018-09-25 NOTE — PROGRESS NOTES
1. Have you been to the ER, urgent care clinic since your last visit? Hospitalized since your last visit? No    2. Have you seen or consulted any other health care providers outside of the [address] [place] [address] since your last visit? Include any pap smears or colon screening. No     Complaints of right shoulder pain and wants to discuss medication      This is the Subsequent Medicare Annual Wellness Exam, performed 12 months or more after the Initial AWV or the last Subsequent AWV    I have reviewed the patient's medical history in detail and updated the computerized patient record. History     Past Medical History:   Diagnosis Date    Cellulitis of right abdominal wall 12/18/2017    letty whatley md    Diabetes ([facility] [address] 1995    DJD (degenerative joint disease) of knee     Gastroenteritis     Hypercholesterolemia     Hypertension     Kidney lesion, native, left     Knee pain     LBP (low back pain)     chiorpactor    Lumbar stenosis     Obesity     S/P colonoscopy 12-18-12    Shoulder pain, right       Past Surgical History:   Procedure Laterality Date    HX BREAST BIOPSY Right 1990    negative    HX COLONOSCOPY      HX HYSTERECTOMY      HX OOPHORECTOMY N/A     HX OTHER SURGICAL  12/23/2017    I&D infected abscess      Current Outpatient Prescriptions   Medication Sig Dispense Refill    metoprolol tartrate (LOPRESSOR) 25 mg tablet Take 0.5 Tabs by mouth two (2) times a day. 90 Tab 3    cloNIDine HCl (CATAPRES) 0.2 mg tablet Take 1 Tab by mouth two (2) times a day. 180 Tab 3    calcitRIOL (ROCALTROL) 0.25 mcg capsule Take 1 Cap by mouth daily. 90 Cap 3    insulin glargine (LANTUS,BASAGLAR) 100 unit/mL (3 mL) inpn 28 Units by SubCUTAneous route nightly. 9 Adjustable Dose Pre-filled Pen Syringe 3    Insulin Needles, Disposable, 29 gauge x 5/16\" ndle Use to inject insulin three times daily. dx.e119 300 Each 3    Insulin Needles, Disposable, (CHRISS PEN NEEDLE) 32 gauge x 5/32\" ndle Use to inject insulin daily. Dx.e11.9 100 Pen Needle 11    NOVOLOG FLEXPEN U-100 INSULIN 100 unit/mL inpn INJECT 3 TIMES DAILY WITH MEALS WITH SLIDING SCALE 15 Adjustable Dose Pre-filled Pen Syringe 11    glucose blood VI test strips (ONETOUCH ULTRA TEST) strip Use to test blood sugar three times a day. dx. e11.9 100 Strip 11    lancets 28 gauge misc Use to test blood sugar three times a day. Dx.e11.9 100 Lancet 11    amLODIPine (NORVASC) 10 mg tablet Take 1 tablet daily. 90 Tab 3    simvastatin (ZOCOR) 40 mg tablet Take 1 Tab by mouth nightly. 90 Tab 3    albuterol (PROVENTIL HFA, VENTOLIN HFA, PROAIR HFA) 90 mcg/actuation inhaler Take 2 Puffs by inhalation every four (4) hours as needed for Wheezing. 1 Inhaler 0     No Known Allergies  Family History   Problem Relation Age of Onset    Cancer Mother     No Known Problems Father      Social History   Substance Use Topics    Smoking status: Never Smoker    Smokeless tobacco: Never Used    Alcohol use No     Patient Active Problem List   Diagnosis Code    Diabetes ([facility] [address] E11.9    Hypercholesterolemia E78.00    Hypertension I10    Obesity E66.9    Lumbar stenosis M48.061    Low back pain M54.5    DJD (degenerative joint disease) of knee M17.10    Cellulitis of right abdominal wall L03.311    Gastroenteritis K52.9    Acute renal failure (ARF) (HCC) N17.9    Type 2 diabetes with nephropathy (HCC) E11.21    Severe obesity (BMI 35.0-39. 9) with comorbidity ([facility] E66.01    Kidney lesion, native, left N28.9    Shoulder pain, right M25.511    Knee pain M25.569       Depression Risk Factor Screening:     PHQ over the last two weeks 9/25/2018   PHQ Not Done -   Little interest or pleasure in doing things Not at all   Feeling down, depressed, irritable, or hopeless Not at all   Total Score PHQ 2 0     Alcohol Risk Factor Screening: You do not drink alcohol or very rarely. Functional Ability and Level of Safety:   Hearing Loss  Hearing is good.     Activities of Daily Living  The home contains: no safety equipment. Patient does total self care    Fall Risk  Fall Risk Assessment, last 12 mths 9/25/2018   Able to walk? Yes   Fall in past 12 months? No       Abuse Screen  Patient is not abused    Cognitive Screening   Evaluation of Cognitive Function:  Has your family/caregiver stated any concerns about your memory: no  Normal    Patient Care Team   Patient Care Team:  Rick Michael MD as PCP - General (Internal Medicine)  Anastasia Wooten RN as Ambulatory Care Navigator (General Practice)  Inderjit Greer MD as Surgeon (Surgery)  Dozier Dakins, MD as Physician (Nephrology)    Assessment/Plan   Education and counseling provided:  Are appropriate based on today's review and evaluation    Diagnoses and all orders for this visit:    1. Encounter for immunization  -     Influenza virus vaccine (FLUZONE HIGH-DOSE) 65 years and older  -     [place] IMMUNIZ ADMIN,1 SINGLE/COMB VAC/TOXOID    2. Medicare annual wellness visit, subsequent    3. Screening for alcoholism  -     Annual  Alcohol Screen 15 min ()    4. Screening for depression  -     Depression Screen Annual    5. Type 2 diabetes with nephropathy (HCC)  -     COLLECTION VENOUS BLOOD,VENIPUNCTURE  -     HEMOGLOBIN A1C WITH EAG    6. Severe obesity (BMI 35.0-39. 9) with comorbidity ([facility] [address]    7. Spinal stenosis of lumbar region without neurogenic claudication    8. Hypercholesterolemia    9. Essential hypertension  -     RENAL FUNCTION PANEL    10. ACP (advance care planning)  -     FULL CODE    11. Acute pain of right shoulder  -     REFERRAL TO PHYSICAL THERAPY    Other orders  -     metoprolol tartrate (LOPRESSOR) 25 mg tablet; Take 0.5 Tabs by mouth two (2) times a day. -     cloNIDine HCl (CATAPRES) 0.2 mg tablet; Take 1 Tab by mouth two (2) times a day. -     calcitRIOL (ROCALTROL) 0.25 mcg capsule; Take 1 Cap by mouth daily.   -     insulin glargine (LANTUS,BASAGLAR) 100 unit/mL (3 mL) inpn; 28 Units by SubCUTAneous route nightly. Health Maintenance Due   Topic Date Due    MEDICARE YEARLY EXAM  07/21/2018    Influenza Age 5 to Adult  08/01/2018     [facility]  Radha Dickens MD, [address] [address]  Phone:  [phone]  Fax: [phone]      Chief Complaint   Patient presents with    Annual Wellness Visit         SUBECTIVE:    Gabino Levi is a 78 y.o. female Patient returns today with known hx of diabetes mellitus type 2, primary hypertension, CKD that is improving, obesity, dyslipidemia, lumbar spinal stenosis and is seen for evaluation. She's requesting a 90 day supply of certain medications, which is honored. Current Outpatient Prescriptions   Medication Sig Dispense Refill    metoprolol tartrate (LOPRESSOR) 25 mg tablet Take 0.5 Tabs by mouth two (2) times a day. 90 Tab 3    cloNIDine HCl (CATAPRES) 0.2 mg tablet Take 1 Tab by mouth two (2) times a day. 180 Tab 3    calcitRIOL (ROCALTROL) 0.25 mcg capsule Take 1 Cap by mouth daily. 90 Cap 3    insulin glargine (LANTUS,BASAGLAR) 100 unit/mL (3 mL) inpn 28 Units by SubCUTAneous route nightly. 9 Adjustable Dose Pre-filled Pen Syringe 3    Insulin Needles, Disposable, 29 gauge x 5/16\" ndle Use to inject insulin three times daily. dx.e119 300 Each 3    Insulin Needles, Disposable, (CHRISS PEN NEEDLE) 32 gauge x 5/32\" ndle Use to inject insulin daily. Dx.e11.9 100 Pen Needle 11    NOVOLOG FLEXPEN U-100 INSULIN 100 unit/mL inpn INJECT 3 TIMES DAILY WITH MEALS WITH SLIDING SCALE 15 Adjustable Dose Pre-filled Pen Syringe 11    glucose blood VI test strips (ONETOUCH ULTRA TEST) strip Use to test blood sugar three times a day. dx. e11.9 100 Strip 11    lancets 28 gauge misc Use to test blood sugar three times a day. Dx.e11.9 100 Lancet 11    amLODIPine (NORVASC) 10 mg tablet Take 1 tablet daily. 90 Tab 3    simvastatin (ZOCOR) 40 mg tablet Take 1 Tab by mouth nightly.  90 Tab 3    albuterol (PROVENTIL HFA, VENTOLIN HFA, PROAIR HFA) 90 mcg/actuation inhaler Take 2 Puffs by inhalation every four (4) hours as needed for Wheezing. 1 Inhaler 0     Past Medical History:   Diagnosis Date    Cellulitis of right abdominal wall 12/18/2017    letty whatley md    Diabetes ([facility] [address] 1995    DJD (degenerative joint disease) of knee     Gastroenteritis     Hypercholesterolemia     Hypertension     Kidney lesion, native, left     Knee pain     LBP (low back pain)     chiorpactor    Lumbar stenosis     Obesity     S/P colonoscopy 12-18-12    Shoulder pain, right      Past Surgical History:   Procedure Laterality Date    HX BREAST BIOPSY Right 1990    negative    HX COLONOSCOPY      HX HYSTERECTOMY      HX OOPHORECTOMY N/A     HX OTHER SURGICAL  12/23/2017    I&D infected abscess      No Known Allergies    REVIEW OF SYSTEMS:   No hypo or hyperglycemic reactions. Social History     Social History    Marital status: SINGLE     Spouse name: N/A    Number of children: N/A    Years of education: N/A     Social History Main Topics    Smoking status: Never Smoker    Smokeless tobacco: Never Used    Alcohol use No    Drug use: No    Sexual activity: Not Currently     Other Topics Concern    None     Social History Narrative    Medical History: Diverticulosis 2002DM 1995primary HTN 1995Obesity 1995Dyslipidemia 1995January 9, 2012 moderate central stenosis L3-L4,    mild to moderate central stenosis at L4-L5 MRI    Gyn History: Last mammogram date 11/26/2013. Last menstrual perioddate hysterectomy 1992 FOR FIBROIDS, PT W ITH ONE OVARY. Last    papdate 2012. Menopausal hot flashes. Sexually active not currently sexually active. History of abnormal pap smears none. History of STDs    none. Vaginal discharge or odor none. HysterectomyDate NONE.    OB History: Total pregnancies 1. Pre term deliveries (>20-36.6 w eeks) 1.     Surgical History: [facility] 03/92right breast bx 1973, 1991kidney stones colonoscopy     Hospitalization/Major Diagnostic Procedure: [place] right breast bx 1973, 1991kidney stones     Family History: Mother:  52 yrs, skin d/oFather: deceasedAunt: alive, lung cancerAdopted: alive    Social History: Alcohol Use Patient does not use alcohol. Smoking Status Patient is a never smoker. Marital Status: Single. Lives w ith:    alone. Education/School: has masters degree-[employer].   r  Family History   Problem Relation Age of Onset    Cancer Mother     No Known Problems Father        OBJECTIVE:  Visit Vitals    /67    Pulse 60    Temp 97.8 °F (36.6 °C) (Oral)    Resp 16    Ht 5' 3\" (1.6 m)    Wt 216 lb 11.2 oz (98.3 kg)    LMP  (LMP Unknown)    SpO2 96%    BMI 38.39 kg/m2     ENT: perrla,  eom intact  NECK: supple. Thyroid normal  CHEST: clear to ascultation and percussion   HEART: regular rate and rhythm  ABD: soft, bowel sounds active  EXTREMITIES: no edema, pulse 1+     No visits with results within 3 Month(s) from this visit.   Latest known visit with results is:    Office Visit on 2018   Component Date Value Ref Range Status    Hemoglobin A1c 2018 6.5* 4.8 - 5.6 % Final    Comment:          Pre-diabetes: 5.7 - 6.4           Diabetes: >6.4           Glycemic control for adults with diabetes: <7.0      Estimated average glucose 2018 140  mg/dL Final    Glucose 2018 45* 65 - 99 mg/dL Final    BUN 2018 21  8 - 27 mg/dL Final    Creatinine 2018 1.09* 0.57 - 1.00 mg/dL Final    GFR est non-AA 2018 53* >59 mL/min/1.73 Final    GFR est AA 2018 61  >59 mL/min/1.73 Final    BUN/Creatinine ratio 2018 19  12 - 28 Final    Sodium 2018 144  134 - 144 mmol/L Final    Potassium 2018 4.7  3.5 - 5.2 mmol/L Final    Chloride 2018 106  96 - 106 mmol/L Final    CO2 2018 24  20 - 29 mmol/L Final                  **Please note reference interval change**    Calcium 2018 10.2  8.7 - 10.3 mg/dL Final    Phosphorus 06/22/2018 4.2  2.5 - 4.5 mg/dL Final    Albumin 06/22/2018 4.5  3.6 - 4.8 g/dL Final          ASSESSMENT:  1. Encounter for immunization    2. Medicare annual wellness visit, subsequent    3. Screening for alcoholism    4. Screening for depression    5. Type 2 diabetes with nephropathy ([facility] [address]    6. Severe obesity (BMI 35.0-39. 9) with comorbidity ([facility] [address]    7. Spinal stenosis of lumbar region without neurogenic claudication    8. Hypercholesterolemia    9. Essential hypertension    10. ACP (advance care planning)    11. Acute pain of right shoulder      Patient's medical status is clinically stable. BP control is at goal.  [facility] like to see it less than 130 and it's less than 120, which is perfectly acceptable. Will assess blood sugar control with Hgb A1c and report to her in the mail the results. It's time for her to see Dr. Mary Rangel again. Will therefore ask for renal panel, in addition to CBC, which I assume are the two lab studies she'll be looking at. We'll send her the results when they become available. We encouraged physical activity 30 minutes five days a week and a heart healthy diet. We discussed advanced care planning with the patient and make the notation appropriately. She'll return to the office in three months, sooner if she has any problems. I have discussed the diagnosis with the patient and the intended plan as seen in the  orders above. The patient understands and agees with the plan. The patient has   received an after visit summary and questions were answered concerning  future plans  Patient labs and/or xrays were reviewed  Past records were reviewed.     PLAN:  .  Orders Placed This Encounter    Depression Screen Annual    Influenza virus vaccine (FLUZONE HIGH-DOSE) 65 years and older    HEMOGLOBIN A1C WITH EAG    RENAL FUNCTION PANEL    REFERRAL TO PHYSICAL THERAPY    metoprolol tartrate (LOPRESSOR) 25 mg tablet    cloNIDine HCl (CATAPRES) 0.2 mg tablet    calcitRIOL (ROCALTROL) 0.25 mcg capsule    insulin glargine (LANTUS,BASAGLAR) 100 unit/mL (3 mL) inpn       Follow-up Disposition:  Return in about 3 months (around 12/25/2018). ATTENTION:   This medical record was transcribed using an electronic medical records system. Although proofread, it may and can contain electronic and spelling errors. Other human spelling and other errors may be present. Corrections may be executed at a later time. Please feel free to contact us for any clarifications as needed.

## 2018-09-25 NOTE — PATIENT INSTRUCTIONS
Vaccine Information Statement    Influenza (Flu) Vaccine (Inactivated or Recombinant): What you need to know    Many Vaccine Information Statements are available in German and other languages. See www.immunize.org/vis  Hojas de Información Sobre Vacunas están disponibles en Español y en muchos otros idiomas. Visite www.immunize.org/vis    1. Why get vaccinated? Influenza (flu) is a contagious disease that spreads around the [place] every year, usually between October and May. Flu is caused by influenza viruses, and is spread mainly by coughing, sneezing, and close contact. Anyone can get flu. Flu strikes suddenly and can last several days. Symptoms vary by age, but can include:   fever/chills   sore throat   muscle aches   fatigue   cough   headache    runny or stuffy nose    Flu can also lead to pneumonia and blood infections, and cause diarrhea and seizures in children. If you have a medical condition, such as heart or lung disease, flu can make it worse. Flu is more dangerous for some people. Infants and young children, people 72years of age and older, pregnant women, and people with certain health conditions or a weakened immune system are at greatest risk. Each year thousands of people in the [facility] die from flu, and many more are hospitalized. Flu vaccine can:   keep you from getting flu,   make flu less severe if you do get it, and   keep you from spreading flu to your family and other people. 2. Inactivated and recombinant flu vaccines    A dose of flu vaccine is recommended every flu season. Children 6 months through 6years of age may need two doses during the same flu season. Everyone else needs only one dose each flu season.        Some inactivated flu vaccines contain a very small amount of a mercury-based preservative called thimerosal. Studies have not shown thimerosal in vaccines to be harmful, but flu vaccines that do not contain thimerosal are available. There is no live flu virus in flu shots. They cannot cause the flu. There are many flu viruses, and they are always changing. Each year a new flu vaccine is made to protect against three or four viruses that are likely to cause disease in the upcoming flu season. But even when the vaccine doesnt exactly match these viruses, it may still provide some protection    Flu vaccine cannot prevent:   flu that is caused by a virus not covered by the vaccine, or   illnesses that look like flu but are not. It takes about 2 weeks for protection to develop after vaccination, and protection lasts through the flu season. 3. Some people should not get this vaccine    Tell the person who is giving you the vaccine:     If you have any severe, life-threatening allergies. If you ever had a life-threatening allergic reaction after a dose of flu vaccine, or have a severe allergy to any part of this vaccine, you may be advised not to get vaccinated. Most, but not all, types of flu vaccine contain a small amount of egg protein.  If you ever had Guillain-Barré Syndrome (also called GBS). Some people with a history of GBS should not get this vaccine. This should be discussed with your doctor.  If you are not feeling well. It is usually okay to get flu vaccine when you have a mild illness, but you might be asked to come back when you feel better. 4. Risks of a vaccine reaction    With any medicine, including vaccines, there is a chance of reactions. These are usually mild and go away on their own, but serious reactions are also possible. Most people who get a flu shot do not have any problems with it.      Minor problems following a flu shot include:    soreness, redness, or swelling where the shot was given     hoarseness   sore, red or itchy eyes   cough   fever   aches   headache   itching   fatigue  If these problems occur, they usually begin soon after the shot and last 1 or 2 days. More serious problems following a flu shot can include the following:     There may be a small increased risk of Guillain-Barré Syndrome (GBS) after inactivated flu vaccine. This risk has been estimated at 1 or 2 additional cases per million people vaccinated. This is much lower than the risk of severe complications from flu, which can be prevented by flu vaccine.  Young children who get the flu shot along with pneumococcal vaccine (PCV13) and/or DTaP vaccine at the same time might be slightly more likely to have a seizure caused by fever. Ask your doctor for more information. Tell your doctor if a child who is getting flu vaccine has ever had a seizure. Problems that could happen after any injected vaccine:      People sometimes faint after a medical procedure, including vaccination. Sitting or lying down for about 15 minutes can help prevent fainting, and injuries caused by a fall. Tell your doctor if you feel dizzy, or have vision changes or ringing in the ears.  Some people get severe pain in the shoulder and have difficulty moving the arm where a shot was given. This happens very rarely.  Any medication can cause a severe allergic reaction. Such reactions from a vaccine are very rare, estimated at about 1 in a million doses, and would happen within a few minutes to a few hours after the vaccination. As with any medicine, there is a very remote chance of a vaccine causing a serious injury or death. The safety of vaccines is always being monitored. For more information, visit: www.cdc.gov/vaccinesafety/    5. What if there is a serious reaction? What should I look for?  Look for anything that concerns you, such as signs of a severe allergic reaction, very high fever, or unusual behavior.     Signs of a severe allergic reaction can include hives, swelling of the face and throat, difficulty breathing, a fast heartbeat, dizziness, and weakness - usually within a few minutes to a few hours after the vaccination. What should I do?  If you think it is a severe allergic reaction or other emergency that cant wait, call 9-1-1 and get the person to the nearest hospital. Otherwise, call your doctor.  Reactions should be reported to the Vaccine Adverse Event Reporting System (VAERS). Your doctor should file this report, or you can do it yourself through  the VAERS web site at www.vaers. [facility].gov, or by calling 7-626.111.4027. VAERS does not give medical advice. 6. The National Vaccine Injury Compensation Program    The [facility] Vaccine Injury Compensation Program (VICP) is a federal program that was created to compensate people who may have been injured by certain vaccines. Persons who believe they may have been injured by a vaccine can learn about the program and about filing a claim by calling 3-757.286.5760 or visiting the [employer] website at www.[facility].gov/vaccinecompensation. There is a time limit to file a claim for compensation. 7. How can I learn more?  Ask your healthcare provider. He or she can give you the vaccine package insert or suggest other sources of information.  Call your local or state health department.  Contact the Centers for Disease Control and Prevention (CDC):  - Call 9-313.422.5619 (1-800-CDC-INFO) or  - Visit CDCs website at www.cdc.gov/flu    Vaccine Information Statement   Inactivated Influenza Vaccine   8/7/2015  [address] Marquez [id number]    Department of Health and Human Services  Centers for Disease Control and Prevention    Office Use Only      Medicare Wellness Visit, Female     The best way to live healthy is to have a lifestyle where you eat a well-balanced diet, exercise regularly, limit alcohol use, and quit all forms of tobacco/nicotine, if applicable. Regular preventive services are another way to keep healthy.  Preventive services (vaccines, screening tests, monitoring & exams) can help personalize your care plan, which helps you manage your own care. Screening tests can find health problems at the earliest stages, when they are easiest to treat. [facility] follows the current, evidence-based guidelines published by the [facility] ([facility]) when recommending preventive services for our patients. Because we follow these guidelines, sometimes recommendations change over time as research supports it. (For example, mammograms used to be recommended annually. Even though Medicare will still pay for an annual mammogram, the newer guidelines recommend a mammogram every two years for women of average risk.)  Of course, you and your doctor may decide to screen more often for some diseases, based on your risk and your health status. Preventive services for you include:  - Medicare offers their members a free annual wellness visit, which is time for you and your primary care provider to discuss and plan for your preventive service needs. Take advantage of this benefit every year!  -All adults over the age of 72 should receive the recommended pneumonia vaccines. Current USPSTF guidelines recommend a series of two vaccines for the best pneumonia protection.   -All adults should have a flu vaccine yearly and a tetanus vaccine every 10 years. All adults age 61 and older should receive a shingles vaccine once in their lifetime.    -A bone mass density test is recommended when a woman turns 65 to screen for osteoporosis. This test is only recommended one time, as a screening. Some providers will use this same test as a disease monitoring tool if you already have osteoporosis.   -All adults age 38-68 who are overweight should have a diabetes screening test once every three years.   -Other screening tests and preventive services for persons with diabetes include: an eye exam to screen for diabetic retinopathy, a kidney function test, a foot exam, and stricter control over your cholesterol.   -Cardiovascular screening for adults with routine risk involves an electrocardiogram (ECG) at intervals determined by your doctor.   -Colorectal cancer screenings should be done for adults age 54-65 with no increased risk factors for colorectal cancer. There are a number of acceptable methods of screening for this type of cancer. Each test has its own benefits and drawbacks. Discuss with your doctor what is most appropriate for you during your annual wellness visit. The different tests include: colonoscopy (considered the best screening method), a fecal occult blood test, a fecal DNA test, and sigmoidoscopy. -Breast cancer screenings are recommended every other year for women of normal risk, age 54-69.  -Cervical cancer screenings for women over age 72 are only recommended with certain risk factors.   -All adults born between [facility] should be screened once for Hepatitis C. Here is a list of your current Health Maintenance items (your personalized list of preventive services) with a due date:  Health Maintenance Due   Topic Date Due    Annual Well Visit  07/21/2018    Flu Vaccine  08/01/2018                Body Mass Index: Care Instructions  Your Care Instructions    Body mass index (BMI) can help you see if your weight is raising your risk for health problems. It uses a formula to compare how much you weigh with how tall you are. · A BMI lower than 18.5 is considered underweight. · A BMI between 18.5 and 24.9 is considered healthy. · A BMI between 25 and 29.9 is considered overweight. A BMI of 30 or higher is considered obese. If your BMI is in the normal range, it means that you have a lower risk for weight-related health problems. If your BMI is in the overweight or obese range, you may be at increased risk for weight-related health problems, such as high blood pressure, heart disease, stroke, arthritis or joint pain, and diabetes.  If your BMI is in the underweight range, you may be at increased risk for health problems such as fatigue, lower protection (immunity) against illness, muscle loss, bone loss, hair loss, and hormone problems. BMI is just one measure of your risk for weight-related health problems. You may be at higher risk for health problems if you are not active, you eat an unhealthy diet, or you drink too much alcohol or use tobacco products. Follow-up care is a key part of your treatment and safety. Be sure to make and go to all appointments, and call your doctor if you are having problems. It's also a good idea to know your test results and keep a list of the medicines you take. How can you care for yourself at home? · Practice healthy eating habits. This includes eating plenty of fruits, vegetables, whole grains, lean protein, and low-fat dairy. · If your doctor recommends it, get more exercise. Walking is a good choice. Bit by bit, increase the amount you walk every day. Try for at least 30 minutes on most days of the week. · Do not smoke. Smoking can increase your risk for health problems. If you need help quitting, talk to your doctor about stop-smoking programs and medicines. These can increase your chances of quitting for good. · Limit alcohol to 2 drinks a day for men and 1 drink a day for women. Too much alcohol can cause health problems. If you have a BMI higher than 25  · Your doctor may do other tests to check your risk for weight-related health problems. This may include measuring the distance around your waist. A waist measurement of more than 40 inches in men or 35 inches in women can increase the risk of weight-related health problems. · Talk with your doctor about steps you can take to stay healthy or improve your health. You may need to make lifestyle changes to lose weight and stay healthy, such as changing your diet and getting regular exercise. If you have a BMI lower than 18.5  · Your doctor may do other tests to check your risk for health problems.   · Talk with your doctor about steps you can take to stay healthy or improve your health. You may need to make lifestyle changes to gain or maintain weight and stay healthy, such as getting more healthy foods in your diet and doing exercises to build muscle. Where can you learn more? Go to http://king-charles.info/. Enter S176 in the search box to learn more about \"Body Mass Index: Care Instructions. \"  Current as of: October 13, 2016  Content Version: 11.4  © 7735-4315 [employer]. Care instructions adapted under license by [employer] (which disclaims liability or warranty for this information). If you have questions about a medical condition or this instruction, always ask your healthcare professional. [address] any warranty or liability for your use of this information.

## 2018-09-25 NOTE — MR AVS SNAPSHOT
[address] [place] [address]. [address] [id number] 
[phone] Patient: Cruzito Davis MRN: [id number] RTL:[id number] Visit Information Date & Time Provider Department Dept. Phone Encounter #  
 9/25/2018  9:15 AM Coty Anders [facility] [phone] [id number] Follow-up Instructions Return in about 3 months (around 12/25/2018). Follow-up and Disposition History Upcoming Health Maintenance Date Due  
 MEDICARE YEARLY EXAM 7/21/2018 Influenza Age 5 to Adult 8/1/2018 Shingrix Vaccine Age 50> (1 of 2) 9/25/2019* MICROALBUMIN Q1 10/26/2018 EYE EXAM RETINAL OR DILATED Q1 11/22/2018 HEMOGLOBIN A1C Q6M 12/22/2018 LIPID PANEL Q1 3/8/2019 FOOT EXAM Q1 6/22/2019 Pneumococcal 65+ High/Highest Risk (2 of 2 - PPSV23) 9/18/2019 GLAUCOMA SCREENING Q2Y 11/22/2019 BREAST CANCER SCRN MAMMOGRAM 3/2/2020 COLONOSCOPY 12/18/2022 DTaP/Tdap/Td series (2 - Td) 12/20/2026 *Topic was postponed. The date shown is not the original due date. Allergies as of 9/25/2018  Review Complete On: 9/25/2018 By: Brigette Lay MD  
 No Known Allergies Current Immunizations  Never Reviewed Name Date Influenza High Dose Vaccine PF 9/25/2018 Influenza Vaccine 10/10/2016, 9/18/2014 Pneumococcal Vaccine (Unspecified Type) 9/18/2014 Not reviewed this visit You Were Diagnosed With   
  
 Codes Comments Encounter for immunization    -  Primary ICD-10-CM: F31 ICD-9-CM: V03.89 Medicare annual wellness visit, subsequent     ICD-10-CM: Z00.00 ICD-9-CM: V70.0 Screening for alcoholism     ICD-10-CM: Z13.89 ICD-9-CM: V79.1 Screening for depression     ICD-10-CM: Z13.89 ICD-9-CM: V79.0 Type 2 diabetes with nephropathy (HCC)     ICD-10-CM: E11.21 
ICD-9-CM: 250.40, 583.81 Severe obesity (BMI 35.0-39. 9) with comorbidity ([address]     ICD-10-CM: E66.01 
 ICD-9-CM: 278.01 Spinal stenosis of lumbar region without neurogenic claudication     ICD-10-CM: M48.061 
ICD-9-CM: 724.02 Hypercholesterolemia     ICD-10-CM: E78.00 ICD-9-CM: 272.0 Essential hypertension     ICD-10-CM: I10 
ICD-9-CM: 401.9 ACP (advance care planning)     ICD-10-CM: Z71.89 ICD-9-CM: V65.49 Acute pain of right shoulder     ICD-10-CM: M25.511 ICD-9-CM: 719.41 Vitals BP Pulse Temp Resp Height(growth percentile) Weight(growth percentile) 116/67 60 97.8 °F (36.6 °C) (Oral) 16 5' 3\" (1.6 m) 216 lb 11.2 oz (98.3 kg) LMP SpO2 BMI OB Status Smoking Status (LMP Unknown) 96% 38.39 kg/m2 Hysterectomy Never Smoker Vitals History BMI and BSA Data Body Mass Index Body Surface Area  
 38.39 kg/m 2 2.09 m 2 Preferred Pharmacy Pharmacy Name Phone [id number] a[address]vid Fajardo, [facility] [phone] Your Updated Medication List  
  
   
This list is accurate as of 9/25/18 11:04 AM.  Always use your most recent med list.  
  
  
  
  
 albuterol 90 mcg/actuation inhaler Commonly known as:  PROVENTIL HFA, VENTOLIN HFA, PROAIR HFA Take 2 Puffs by inhalation every four (4) hours as needed for Wheezing. amLODIPine 10 mg tablet Commonly known as:  Alexia Gables Take 1 tablet daily. calcitRIOL 0.25 mcg capsule Commonly known as:  ROCALTROL Take 1 Cap by mouth daily. cloNIDine HCl 0.2 mg tablet Commonly known as:  CATAPRES Take 1 Tab by mouth two (2) times a day. glucose blood VI test strips strip Commonly known as:  ONETOUCH ULTRA TEST Use to test blood sugar three times a day. dx. e11.9  
  
 insulin glargine 100 unit/mL (3 mL) Inpn Commonly known as:  ARTEMIO CORONADO  
28 Units by SubCUTAneous route nightly. * Insulin Needles (Disposable) 32 gauge x 5/32\" Ndle Commonly known as:  Jody Pen Needle Use to inject insulin daily. Dx.e11.9 * Insulin Needles (Disposable) 29 gauge x 5/16\" Ndle Use to inject insulin three times daily. dx.e119  
  
 lancets 28 gauge Misc Use to test blood sugar three times a day. Dx.e11.9  
  
 metoprolol tartrate 25 mg tablet Commonly known as:  LOPRESSOR Take 0.5 Tabs by mouth two (2) times a day. NovoLOG Flexpen U-100 Insulin 100 unit/mL Inpn Generic drug:  insulin aspart U-100 INJECT 3 TIMES DAILY WITH MEALS WITH SLIDING SCALE  
  
 simvastatin 40 mg tablet Commonly known as:  ZOCOR Take 1 Tab by mouth nightly. * Notice: This list has 2 medication(s) that are the same as other medications prescribed for you. Read the directions carefully, and ask your doctor or other care provider to review them with you. Prescriptions Sent to Pharmacy Refills  
 metoprolol tartrate (LOPRESSOR) 25 mg tablet 3 Sig: Take 0.5 Tabs by mouth two (2) times a day. Class: Normal  
 Pharmacy: [address] [place] [address] Ph #: [phone] Route: Oral  
 cloNIDine HCl (CATAPRES) 0.2 mg tablet 3 Sig: Take 1 Tab by mouth two (2) times a day. Class: Normal  
 Pharmacy: [address] [place] [address] Ph #: [phone] Route: Oral  
 calcitRIOL (ROCALTROL) 0.25 mcg capsule 3 Sig: Take 1 Cap by mouth daily. Class: Normal  
 Pharmacy: [address] [place] [address] Ph #: [phone] Route: Oral  
  
We Performed the Following COLLECTION VENOUS BLOOD,VENIPUNCTURE [id number] CPT(R)] [facility] 68 [[id number] \[facility]\""] FULL CODE [COD2 Custom] HEMOGLOBIN A1C WITH EAG [46767 CPT(R)] INFLUENZA VIRUS VACCINE, HIGH DOSE SEASONAL, PRESERVATIVE FREE [73462 CPT(R)] NC ANNUAL ALCOHOL SCREEN 15 MIN [id number] HCPCS] NC IMMUNIZ ADMIN,1 SINGLE/COMB VAC/TOXOID [id number] CPT(R)] REFERRAL TO PHYSICAL THERAPY [EUK97 Custom] RENAL FUNCTION PANEL [15352 CPT(R)] Follow-up Instructions Return in about 3 months (around 12/25/2018). To-Do List   
 10/09/2018 10:30 AM  
  Appointment with Shyanne Hamm at [facility] Ultrasound ([phone]) GENERAL INSTRUCTIONS 1. Bring any non [facility] facility films/reports pertaining to the area being studied with you on the day of appointment. 2. A written order with a valid diagnosis and Physicians signature is required for all scheduled tests. 3. Check in at registration 30 minutes before your appointment time unless you were instructed to do otherwise. Referral Information Referral ID Referred By Referred To  
  
 [id number] Estrada VILLASENOR Not Available Visits Status Start Date End Date 1 Open 9/25/18 9/25/19 If your referral has a status of pending review or denied, additional information will be sent to support the outcome of this decision. Patient Instructions Vaccine Information Statement Influenza (Flu) Vaccine (Inactivated or Recombinant): What you need to know Many Vaccine Information Statements are available in Maori and other languages. See www.immunize.org/vis Hojas de Información Sobre Vacunas están disponibles en Español y en muchos otros idiomas. Visite www.immunize.org/vis 1. Why get vaccinated? Influenza (flu) is a contagious disease that spreads around the [place] every year, usually between October and May. Flu is caused by influenza viruses, and is spread mainly by coughing, sneezing, and close contact. Anyone can get flu. Flu strikes suddenly and can last several days. Symptoms vary by age, but can include: 
 fever/chills  sore throat  muscle aches  fatigue  cough  headache  runny or stuffy nose Flu can also lead to pneumonia and blood infections, and cause diarrhea and seizures in children. If you have a medical condition, such as heart or lung disease, flu can make it worse. Flu is more dangerous for some people. Infants and young children, people 72years of age and older, pregnant women, and people with certain health conditions or a weakened immune system are at greatest risk. Each year thousands of people in the [facility] die from flu, and many more are hospitalized. Flu vaccine can: 
 keep you from getting flu, 
 make flu less severe if you do get it, and 
 keep you from spreading flu to your family and other people. 2. Inactivated and recombinant flu vaccines A dose of flu vaccine is recommended every flu season. Children 6 months through 6years of age may need two doses during the same flu season. Everyone else needs only one dose each flu season. Some inactivated flu vaccines contain a very small amount of a mercury-based preservative called thimerosal. Studies have not shown thimerosal in vaccines to be harmful, but flu vaccines that do not contain thimerosal are available. There is no live flu virus in flu shots. They cannot cause the flu. There are many flu viruses, and they are always changing. Each year a new flu vaccine is made to protect against three or four viruses that are likely to cause disease in the upcoming flu season. But even when the vaccine doesnt exactly match these viruses, it may still provide some protection Flu vaccine cannot prevent: 
 flu that is caused by a virus not covered by the vaccine, or 
 illnesses that look like flu but are not. It takes about 2 weeks for protection to develop after vaccination, and protection lasts through the flu season. 3. Some people should not get this vaccine Tell the person who is giving you the vaccine:  If you have any severe, life-threatening allergies.    
If you ever had a life-threatening allergic reaction after a dose of flu vaccine, or have a severe allergy to any part of this vaccine, you may be advised not to get vaccinated. Most, but not all, types of flu vaccine contain a small amount of egg protein.  If you ever had Guillain-Barré Syndrome (also called GBS). Some people with a history of GBS should not get this vaccine. This should be discussed with your doctor.  If you are not feeling well. It is usually okay to get flu vaccine when you have a mild illness, but you might be asked to come back when you feel better. 4. Risks of a vaccine reaction With any medicine, including vaccines, there is a chance of reactions. These are usually mild and go away on their own, but serious reactions are also possible. Most people who get a flu shot do not have any problems with it. Minor problems following a flu shot include:  
 soreness, redness, or swelling where the shot was given  hoarseness  sore, red or itchy eyes  cough  fever  aches  headache  itching  fatigue If these problems occur, they usually begin soon after the shot and last 1 or 2 days. More serious problems following a flu shot can include the following:  There may be a small increased risk of Guillain-Barré Syndrome (GBS) after inactivated flu vaccine. This risk has been estimated at 1 or 2 additional cases per million people vaccinated. This is much lower than the risk of severe complications from flu, which can be prevented by flu vaccine.  Young children who get the flu shot along with pneumococcal vaccine (PCV13) and/or DTaP vaccine at the same time might be slightly more likely to have a seizure caused by fever. Ask your doctor for more information. Tell your doctor if a child who is getting flu vaccine has ever had a seizure. Problems that could happen after any injected vaccine:  People sometimes faint after a medical procedure, including vaccination.  Sitting or lying down for about 15 minutes can help prevent fainting, and injuries caused by a fall. Tell your doctor if you feel dizzy, or have vision changes or ringing in the ears.  Some people get severe pain in the shoulder and have difficulty moving the arm where a shot was given. This happens very rarely.  Any medication can cause a severe allergic reaction. Such reactions from a vaccine are very rare, estimated at about 1 in a million doses, and would happen within a few minutes to a few hours after the vaccination. As with any medicine, there is a very remote chance of a vaccine causing a serious injury or death. The safety of vaccines is always being monitored. For more information, visit: www.cdc.gov/vaccinesafety/ 
 
 
The [facility] Vaccine Injury Compensation Program (VICP) is a federal program that was created to compensate people who may have been injured by certain vaccines.  
 
Persons who believe they may have been injured by a vaccine can learn about the program and about filing a claim by calling 0-659.563.9256 or visiting the [employer] website at www.[facility] Center[facility].gov/vaccinecompensation. There is a time limit to file a claim for compensation. 7. How can I learn more?  Ask your healthcare provider. He or she can give you the vaccine package insert or suggest other sources of information.  Call your local or state health department.  Contact the Centers for Disease Control and Prevention (CDC): 
- Call 3-965.121.8427 (1-800-CDC-INFO) or 
- Visit CDCs website at www.cdc.gov/flu Vaccine Information Statement Inactivated Influenza Vaccine 8/7/2015 
[address] 003CF-27 Department of [facility] and [employer] Centers for Disease Control and Prevention Office Use Only Medicare Wellness Visit, Female The best way to live healthy is to have a lifestyle where you eat a well-balanced diet, exercise regularly, limit alcohol use, and quit all forms of tobacco/nicotine, if applicable. Regular preventive services are another way to keep healthy. Preventive services (vaccines, screening tests, monitoring & exams) can help personalize your care plan, which helps you manage your own care. Screening tests can find health problems at the earliest stages, when they are easiest to treat. [facility] follows the current, evidence-based guidelines published by the [place] Cliff Karoline (USPSTF) when recommending preventive services for our patients. Because we follow these guidelines, sometimes recommendations change over time as research supports it. (For example, mammograms used to be recommended annually. Even though Medicare will still pay for an annual mammogram, the newer guidelines recommend a mammogram every two years for women of average risk.) Of course, you and your doctor may decide to screen more often for some diseases, based on your risk and your health status. Preventive services for you include: - Medicare offers their members a free annual wellness visit, which is time for you and your primary care provider to discuss and plan for your preventive service needs. Take advantage of this benefit every year! 
-All adults over the age of 72 should receive the recommended pneumonia vaccines. Current USPSTF guidelines recommend a series of two vaccines for the best pneumonia protection.  
-All adults should have a flu vaccine yearly and a tetanus vaccine every 10 years. All adults age 61 and older should receive a shingles vaccine once in their lifetime.   
-A bone mass density test is recommended when a woman turns 65 to screen for osteoporosis. This test is only recommended one time, as a screening. Some providers will use this same test as a disease monitoring tool if you already have osteoporosis. -All adults age 38-68 who are overweight should have a diabetes screening test once every three years.  
-Other screening tests and preventive services for persons with diabetes include: an eye exam to screen for diabetic retinopathy, a kidney function test, a foot exam, and stricter control over your cholesterol.  
-Cardiovascular screening for adults with routine risk involves an electrocardiogram (ECG) at intervals determined by your doctor.  
-Colorectal cancer screenings should be done for adults age 54-65 with no increased risk factors for colorectal cancer. There are a number of acceptable methods of screening for this type of cancer. Each test has its own benefits and drawbacks. Discuss with your doctor what is most appropriate for you during your annual wellness visit. The different tests include: colonoscopy (considered the best screening method), a fecal occult blood test, a fecal DNA test, and sigmoidoscopy.  
-Breast cancer screenings are recommended every other year for women of normal risk, age 54-69. 
-Cervical cancer screenings for women over age 72 are only recommended with certain risk factors.  
-All adults born between [birth year] and 1965 should be screened once for Hepatitis C. Here is a list of your current Health Maintenance items (your personalized list of preventive services) with a due date: 
Health Maintenance Due Topic Date Due  
 Annual Well Visit  07/21/2018  Flu Vaccine  08/01/2018 Body Mass Index: Care Instructions Your Care Instructions Body mass index (BMI) can help you see if your weight is raising your risk for health problems. It uses a formula to compare how much you weigh with how tall you are. · A BMI lower than 18.5 is considered underweight. · A BMI between 18.5 and 24.9 is considered healthy. · A BMI between 25 and 29.9 is considered overweight. A BMI of 30 or higher is considered obese. If your BMI is in the normal range, it means that you have a lower risk for weight-related health problems. If your BMI is in the overweight or obese range, you may be at increased risk for weight-related health problems, such as high blood pressure, heart disease, stroke, arthritis or joint pain, and diabetes. If your BMI is in the underweight range, you may be at increased risk for health problems such as fatigue, lower protection (immunity) against illness, muscle loss, bone loss, hair loss, and hormone problems. BMI is just one measure of your risk for weight-related health problems. You may be at higher risk for health problems if you are not active, you eat an unhealthy diet, or you drink too much alcohol or use tobacco products. Follow-up care is a key part of your treatment and safety. Be sure to make and go to all appointments, and call your doctor if you are having problems. It's also a good idea to know your test results and keep a list of the medicines you take. How can you care for yourself at home? · Practice healthy eating habits. This includes eating plenty of fruits, vegetables, whole grains, lean protein, and low-fat dairy. · If your doctor recommends it, get more exercise.  Walking is a good choice. Bit by bit, increase the amount you walk every day. Try for at least 30 minutes on most days of the week. · Do not smoke. Smoking can increase your risk for health problems. If you need help quitting, talk to your doctor about stop-smoking programs and medicines. These can increase your chances of quitting for good. · Limit alcohol to 2 drinks a day for men and 1 drink a day for women. Too much alcohol can cause health problems. If you have a BMI higher than 25 · Your doctor may do other tests to check your risk for weight-related health problems. This may include measuring the distance around your waist. A waist measurement of more than 40 inches in men or 35 inches in women can increase the risk of weight-related health problems. · Talk with your doctor about steps you can take to stay healthy or improve your health. You may need to make lifestyle changes to lose weight and stay healthy, such as changing your diet and getting regular exercise. If you have a BMI lower than 18.5 · Your doctor may do other tests to check your risk for health problems. · Talk with your doctor about steps you can take to stay healthy or improve your health. You may need to make lifestyle changes to gain or maintain weight and stay healthy, such as getting more healthy foods in your diet and doing exercises to build muscle. Where can you learn more? Go to http://king-charles.info/. Enter S176 in the search box to learn more about \"Body Mass Index: Care Instructions. \" Current as of: October 13, 2016 Content Version: 11.4 © 4746-8406 Healthwise, [employer]. Care instructions adapted under license by [employer] (which disclaims liability or warranty for this information). If you have questions about a medical condition or this instruction, always ask your healthcare professional. [place] [address] any warranty or liability for your use of this information. Patient Instructions History Introducing [facility]! Dear Alex Neri: Thank you for requesting a Hello[employer] account. Our records indicate that you already have an active Hello[employer] account. You can access your account anytime at [email or web address] Did you know that you can access your hospital and ER discharge instructions at any time in HelloNature? You can also review all of your test results from your hospital stay or ER visit. Additional Information If you have questions, please visit the Frequently Asked Questions section of the Hello[employer] website at [email or web address] Remember, HelloNature is NOT to be used for urgent needs. For medical emergencies, dial 911. Now available from your iPhone and Android! Please provide this summary of care documentation to your next provider. Your primary care clinician is listed as Darcy Collazo. If you have any questions after today's visit, please call [phone].

## 2018-09-26 LAB
ALBUMIN SERPL-MCNC: 4.1 G/DL (ref 3.6–4.8)
BUN SERPL-MCNC: 24 MG/DL (ref 8–27)
BUN/CREAT SERPL: 23 (ref 12–28)
CALCIUM SERPL-MCNC: 9.6 MG/DL (ref 8.7–10.3)
CHLORIDE SERPL-SCNC: 107 MMOL/L (ref 96–106)
CO2 SERPL-SCNC: 23 MMOL/L (ref 20–29)
CREAT SERPL-MCNC: 1.05 MG/DL (ref 0.57–1)
EST. AVERAGE GLUCOSE BLD GHB EST-MCNC: 163 MG/DL
GLUCOSE SERPL-MCNC: 81 MG/DL (ref 65–99)
HBA1C MFR BLD: 7.3 % (ref 4.8–5.6)
PHOSPHATE SERPL-MCNC: 3.9 MG/DL (ref 2.5–4.5)
POTASSIUM SERPL-SCNC: 5.1 MMOL/L (ref 3.5–5.2)
SODIUM SERPL-SCNC: 144 MMOL/L (ref 134–144)

## 2018-10-02 ENCOUNTER — HOSPITAL ENCOUNTER (OUTPATIENT)
Dept: PHYSICAL THERAPY | Age: 68
Discharge: HOME | End: 2018-10-02
Payer: MEDICARE

## 2018-10-02 PROCEDURE — G8985 CARRY GOAL STATUS: HCPCS | Performed by: PHYSICAL THERAPIST

## 2018-10-02 PROCEDURE — 97014 ELECTRIC STIMULATION THERAPY: CPT | Performed by: PHYSICAL THERAPIST

## 2018-10-02 PROCEDURE — G8984 CARRY CURRENT STATUS: HCPCS | Performed by: PHYSICAL THERAPIST

## 2018-10-02 PROCEDURE — 97110 THERAPEUTIC EXERCISES: CPT | Performed by: PHYSICAL THERAPIST

## 2018-10-02 PROCEDURE — 97161 PT EVAL LOW COMPLEX 20 MIN: CPT | Performed by: PHYSICAL THERAPIST

## 2018-10-02 NOTE — PROGRESS NOTES
PT INITIAL EVALUATION NOTE - [facility] 2-15 Patient Name: Ramona Kramer Date:10/2/2018 : 1950 [x]  Patient  Verified Payor: [place] / Plan: Estee Mena y / Product Type: Medicare / In time:1:30p Out time:2:30p Total Treatment Time (min): 60 Total Timed Codes (min): 60 
1:1 Treatment Time ( [address] only):60 Visit #: 1 Treatment Area: Right shoulder pain [M25.511] SUBJECTIVE Pain Level (0-10 scale):2-3/10 Any medication changes, allergies to medications, adverse drug reactions, diagnosis change, or new procedure performed?: [] No    [x] Yes (see summary sheet for update) Subjective:  Pt presents with c/c of right shoulder pain that is mostly present at night. Severe in nature limiting sleep. The pt reports she had an x-ray with aliyah over 10 months ago. He wanted her to try Pt at that time and she did not. She is still having pain and it is not getting better so she wanted to try PT now. The pt reports that she was in hospital in December and had vacomycin for an infection, then in February had kidney failure, The pt is unable to take any medication for her shoulder pain due to this problem Sleeping at night is hard. She sleeps on her right side with her arm extended. When asking her more about her pain she describes  Her shoulder currently as it \"Hurts some in right shoulder now 2-3/10\"  if she does more at night it hurts really bad. Water aerobics 3 times per week, but doesn't feel it is hurting her shoulder. Denies any numbness and tingling. PLOF: water aerobics Mechanism of Injury: gradual onset. Previous Treatment/Compliance: none PMHx/Surgical Hx: OA, DM, HTN, Visually impaired, spinal stenosis Work Hx: not working Living Situation: independent Pt Goals: improve pain at night Barriers: chronic shoulder pain at night, x-ray reveal significant spurring in SA space. Motivation good. Substance use: none DASH: 20/100 Cognition: A & O x 4   
 
 OBJECTIVE/EXAMINATION Posture: significant rounded shoulders and forward head posturing in sitting. Palpation: pain along supraspinatus and anterior/lateral shoulder Right Shoulder ROM:  AROM   PROM Flexion   140p   wnl pain end range Abduction  100p!   150 pain at end range IR   Hand to  T10  50 ER   Hand to 4  75 Flexibility: pectoralis tightness UPPER QUARTER   MUSCLE STRENGTH 
KEY       R  L 
0 - No Contraction   Flexion  4-p!  5 
1 - Trace    Extension -  - 
2 - Poor    Abduction 4-p!  5 
3 - Fair     IR  5  5 
4 - Good    ER  4p!  5 
5 - Normal    
 
Neurological: Reflexes / Sensations: nml Special Tests: Neer Impingement:positive  Silver-Pedro: positive Scapular Reposition:-  Shoulder Abduction: positive Empty and full can: positive AC Crossover: positive Modality rationale: decrease inflammation and decrease pain to improve the patients ability to sleep with less discomfort Min Type Additional Details 15 [x] Estim: []Att   []Unatt        []TENS instruct [x]IFC  []Premod   []NMES []Other:  []w/US   [x]w/ice   []w/heat Position: sitting Location: right shoulder  
 []  Traction: [] Cervical       []Lumbar 
                     [] Prone          []Supine []Intermittent   []Continuous Lbs: 
[] before manual 
[] after manual 
[]w/heat  
 []  Ultrasound: []Continuous   [] Pulsed at: 
                         []1MHz   []3MHz Location: 
W/cm2:  
 [] Paraffin Location:  
[]w/heat  
 []  Ice     []  Heat 
[]  Ice massage Position: Location:  
 []  Laser 
[]  Other: Position: Location:  
 
 []  Vasopneumatic Device Pressure:       [] lo [] med [] hi  
Temperature:   
 
[x] Skin assessment post-treatment:  [x]intact []redness- no adverse reaction 
  []redness  adverse reaction:  
 
15 min Therapeutic Exercise:  [] See flow sheet :  
Rationale: increase ROM, increase strength, improve coordination and increase proprioception to improve the patients ability to improve posture and decrease strain on the RTC With 
 [] TE 
 [] TA 
 [] neuro 
 [] other: Patient Education: [x] Review HEP [] Progressed/Changed HEP based on:  
[] positioning   [] body mechanics   [] transfers   [] heat/ice application   
[] other:   
 
Other Objective/Functional Measures: FOTO 62 Pain Level (0-10 scale) post treatment:  2/10 ASSESSMENT/Changes in Function:  
 
[x]  See Plan of Care Janina Poster 10/2/2018  1:30 PM

## 2018-10-03 NOTE — PROGRESS NOTES
[facility] [id number] [address], [address] Phone: [phone]  Fax: [phone] Plan of Care/Statement of Necessity for Physical Therapy Services  2-15 Patient name: Robert Garcia  : 1950  Provider#: [id number] Referral source: Horacio Goff, * Medical/Treatment Diagnosis: Right shoulder pain [M25.511] Prior Hospitalization: see medical history Comorbidities: OA, Diabetes, HTN, spinal stenosis Prior Level of Function:  Water aerobics 3 days per week. Medications: Verified on Patient Summary List 
Start of Care:10/2/18      Onset Date:  Wyona Lock The Plan of Care and following information is based on the information from the initial evaluation. Assessment/ key information: The pt is a 78 y.o. Female referred for the evaluation and treatment of right shoulder pain. The pt presents with s/s consistent with moderate to severe right shoulder impingement, with RTC dysfunction. She has decreased active ROM, decreased strength and positive Shoulder abduction, full and empty can, and positive impingement testing. The pt would benefit from skilled physical therapy in order to address these impairments and to return her to maximal level of function pain free. Evaluation Complexity History HIGH Complexity :3+ comorbidities / personal factors will impact the outcome/ POC ; Examination LOW Complexity : 1-2 Standardized tests and measures addressing body structure, function, activity limitation and / or participation in recreation  ;Presentation LOW Complexity : Stable, uncomplicated  ;Clinical Decision Making MEDIUM Complexity : FOTO score of 26-74 Overall Complexity Rating: LOW Problem List: pain affecting function, decrease ROM, decrease strength, decrease ADL/ functional abilitiies, decrease activity tolerance and decrease flexibility/ joint mobility Treatment Plan may include any combination of the following: Therapeutic exercise, Therapeutic activities, Neuromuscular re-education, Physical agent/modality, Manual therapy, Patient education and Functional mobility training Patient / Family readiness to learn indicated by: asking questions, trying to perform skills and interest 
Persons(s) to be included in education: patient (P) Barriers to Learning/Limitations: None Patient Goal (s): decrease pain at [employer] Patient Self Reported Health Status: good Rehabilitation Potential: good Short Term Goals: To be accomplished in 4 weeks: 
1) Pt will be Independent with HEP 
2) Pt will be able to Reach to shoulder level without pain 3) Pt will be able to improve sleeping pain by 25% Long Term Goals: To be accomplished in 8 weeks: 
1) Pt will be able to Reach above shoulder level without increase of pain 2) Pt will be able to Tuck in the shirt without increase of pain 3) Pt will have improved shoulder flexion, abduction and  ER strength to 4+ 4) Pt will be able to sleep on involved side without waking due to pain Frequency / Duration: Patient to be seen 2 times per week for 6-8 weeks. Patient/ Caregiver education and instruction: self care, activity modification and exercises 
 
[x]  Plan of care has been reviewed with PTA 
 
G-Codes (GP) Carry  Current  CK= 40-59%  Goal  CJ= 20-39% The severity rating is based on clinical judgment and the FOTO Score and clinical judgement. Certification Period: 10/2/18 -1/2/18 Art Cohn 10/3/2018 10:41 AM 
 
________________________________________________________________________ I certify that the above Therapy Services are being furnished while the patient is under my care. I agree with the treatment plan and certify that this therapy is necessary. [de-identified] Signature:____________________  Date:____________Time: _________

## 2018-10-09 ENCOUNTER — HOSPITAL ENCOUNTER (OUTPATIENT)
Dept: PHYSICAL THERAPY | Age: 68
Discharge: HOME | End: 2018-10-09
Payer: MEDICARE

## 2018-10-09 ENCOUNTER — HOSPITAL ENCOUNTER (OUTPATIENT)
Dept: ULTRASOUND IMAGING | Age: 68
Discharge: HOME | End: 2018-10-09
Attending: INTERNAL MEDICINE
Payer: MEDICARE

## 2018-10-09 DIAGNOSIS — N28.9 KIDNEY LESION, NATIVE, LEFT: ICD-10-CM

## 2018-10-09 DIAGNOSIS — R93.429 ABNORMAL FINDING ON DIAGNOSTIC IMAGING OF KIDNEY: ICD-10-CM

## 2018-10-09 PROCEDURE — 97110 THERAPEUTIC EXERCISES: CPT

## 2018-10-09 PROCEDURE — 97014 ELECTRIC STIMULATION THERAPY: CPT

## 2018-10-09 PROCEDURE — 76770 US EXAM ABDO BACK WALL COMP: CPT

## 2018-10-09 PROCEDURE — 97140 MANUAL THERAPY 1/> REGIONS: CPT

## 2018-10-09 NOTE — PROGRESS NOTES
PT DAILY TREATMENT NOTE - [facility] 2-15 Patient Name: Barry Salmon Date:10/9/2018 : 1950 [x]  Patient  Verified Payor: [place] MED[place]ARE / Plan: [address] / Product Type: Medicare / In time:1:40P  Out time: 2:50P Total Treatment Time (min): 70 Total Timed Codes (min): 55 
1:1 Treatment Time ( only): 55 Visit #: 2 Treatment Area: Right shoulder pain [M25.511] SUBJECTIVE Pain Level (0-10 scale): 4/10 Any medication changes, allergies to medications, adverse drug reactions, diagnosis change, or new procedure performed?: [x] No    [] Yes (see summary sheet for update) Subjective functional status/changes:   [] No changes reported Pt reported doing well today. OBJECTIVE Modality rationale: decrease inflammation and decrease pain to improve the patients ability to decrease R shoulder pain Min Type Additional Details 15 post [x] Estim: []Att   []Unatt        []TENS instruct []IFC  [x]Premod   []NMES []Other:  []w/US   [x]w/ice   []w/heat Position: seated Location: R shoulder  
 []  Traction: [] Cervical       []Lumbar 
                     [] Prone          []Supine []Intermittent   []Continuous Lbs: 
[] before manual 
[] after manual 
[]w/heat  
 []  Ultrasound: []Continuous   [] Pulsed at: 
                         []1MHz   []3MHz Location: 
W/cm2:  
 [] Paraffin Location:  
[]w/heat  
 []  Ice     []  Heat 
[]  Ice massage Position: Location:  
 []  Laser 
[]  Other: Position: Location:  
 
 []  Vasopneumatic Device Pressure:       [] lo [] med [] hi  
Temperature:   
 
[x] Skin assessment post-treatment:  [x]intact []redness- no adverse reaction 
  []redness  adverse reaction:  
 
45 min Therapeutic Exercise:  [x] See flow sheet : R Shoulder PROM in all planes to tolerance.    
Rationale: increase ROM, increase strength and improve coordination to improve the patients ability to increase ability to reach overhead 10 min Manual Therapy: Scapular mobs, inferior GHJ mobs Gd II Rationale: decrease pain, increase ROM, increase tissue extensibility and decrease trigger points to improve the patients ability to increase shoulder mobility With 
 [] TE 
 [] TA 
 [] neuro 
 [] other: Patient Education: [x] Review HEP [] Progressed/Changed HEP based on:  
[] positioning   [] body mechanics   [] transfers   [] heat/ice application   
[] other:   
 
Other Objective/Functional Measures: --  
 
Pain Level (0-10 scale) post treatment: 0/10 ASSESSMENT/Changes in Function:  
Pt fatigued with ER TB walkout. Pt unable to maintain proper arm positioning after 5 repetitions. Patient will continue to benefit from skilled PT services to modify and progress therapeutic interventions, address functional mobility deficits, address ROM deficits, address strength deficits, analyze and address soft tissue restrictions, analyze and cue movement patterns, analyze and modify body mechanics/ergonomics and assess and modify postural abnormalities to attain remaining goals. [x]  See Plan of Care 
[]  See progress note/recertification 
[]  See Discharge Summary Progress towards goals / Updated goals: 
Short Term Goals: To be accomplished in 4 weeks: 
1) Pt will be Independent with HEP 
2) Pt will be able to Reach to shoulder level without pain 3) Pt will be able to improve sleeping pain by 25% 
  
  
Long Term Goals: To be accomplished in 8 weeks: 
1) Pt will be able to Reach above shoulder level without increase of pain 2) Pt will be able to Tuck in the shirt without increase of pain 3) Pt will have improved shoulder flexion, abduction and  ER strength to 4+ 4) Pt will be able to sleep on involved side without waking due to pain PLAN [x]  Upgrade activities as tolerated     [x]  Continue plan of care []  Update interventions per flow sheet      
[]  Discharge due to:_ 
[]  Other:_ Robyn Gates PTA, [facility] 10/9/2018  1:41 PM

## 2018-10-11 ENCOUNTER — HOSPITAL ENCOUNTER (OUTPATIENT)
Dept: PHYSICAL THERAPY | Age: 68
Discharge: HOME | End: 2018-10-11
Payer: MEDICARE

## 2018-10-11 PROCEDURE — 97140 MANUAL THERAPY 1/> REGIONS: CPT

## 2018-10-11 PROCEDURE — 97110 THERAPEUTIC EXERCISES: CPT

## 2018-10-11 NOTE — PROGRESS NOTES
PT DAILY TREATMENT NOTE - [facility] 2-15 Patient Name: Leeann Landers Date:10/11/2018 : 1950 [x]  Patient  Verified Payor: [place] MEDICARE / Plan: Estee Winy / Product Type: Medicare / In time: 2:40P  Out time: 3:30P Total Treatment Time (min): 50 Total Timed Codes (min): 35 
1:1 Treatment Time ( only): 25 Visit #: 3 Treatment Area: Right shoulder pain [M25.511] SUBJECTIVE Pain Level (0-10 scale): 4/10 Any medication changes, allergies to medications, adverse drug reactions, diagnosis change, or new procedure performed?: [x] No    [] Yes (see summary sheet for update) Subjective functional status/changes:   [] No changes reported Pt is hurting more today. Slept wrong last night. OBJECTIVE Modality rationale: decrease inflammation and decrease pain to improve the patients ability to decrease R shoulder pain Min Type Additional Details 15 post [x] Estim: []Att   []Unatt        []TENS instruct []IFC  [x]Premod   []NMES []Other:  []w/US   [x]w/ice   []w/heat Position: seated Location: R shoulder  
 []  Traction: [] Cervical       []Lumbar 
                     [] Prone          []Supine []Intermittent   []Continuous Lbs: 
[] before manual 
[] after manual 
[]w/heat  
 []  Ultrasound: []Continuous   [] Pulsed at: 
                         []1MHz   []3MHz Location: 
W/cm2:  
 [] Paraffin Location:  
[]w/heat  
 []  Ice     []  Heat 
[]  Ice massage Position: Location:  
 []  Laser 
[]  Other: Position: Location:  
 
 []  Vasopneumatic Device Pressure:       [] lo [] med [] hi  
Temperature:   
 
[x] Skin assessment post-treatment:  [x]intact []redness- no adverse reaction 
  []redness  adverse reaction:  
 
15 min Therapeutic Exercise:  [x] See flow sheet : R Shoulder PROM in all planes to tolerance.    
Rationale: increase ROM, increase strength and improve coordination to improve the patients ability to increase ability to reach overhead 10 min Manual Therapy: Scapular mobs, inferior GHJ mobs Gd II Rationale: decrease pain, increase ROM, increase tissue extensibility and decrease trigger points to improve the patients ability to increase shoulder mobility With 
 [] TE 
 [] TA 
 [] neuro 
 [] other: Patient Education: [x] Review HEP [] Progressed/Changed HEP based on:  
[] positioning   [] body mechanics   [] transfers   [] heat/ice application   
[] other:   
 
Other Objective/Functional Measures: --  
 
Pain Level (0-10 scale) post treatment: 0/10 ASSESSMENT/Changes in Function:  
Pt reported relief with today's session. Pt advised to ice again before bed tonight and throughout the weekend. Patient will continue to benefit from skilled PT services to modify and progress therapeutic interventions, address functional mobility deficits, address ROM deficits, address strength deficits, analyze and address soft tissue restrictions, analyze and cue movement patterns, analyze and modify body mechanics/ergonomics and assess and modify postural abnormalities to attain remaining goals. [x]  See Plan of Care 
[]  See progress note/recertification 
[]  See Discharge Summary Progress towards goals / Updated goals: 
Short Term Goals: To be accomplished in 4 weeks: 
1) Pt will be Independent with HEP 
2) Pt will be able to Reach to shoulder level without pain 3) Pt will be able to improve sleeping pain by 25% 
  
  
Long Term Goals: To be accomplished in 8 weeks: 
1) Pt will be able to Reach above shoulder level without increase of pain 2) Pt will be able to Tuck in the shirt without increase of pain 3) Pt will have improved shoulder flexion, abduction and  ER strength to 4+ 4) Pt will be able to sleep on involved side without waking due to pain PLAN [x]  Upgrade activities as tolerated     [x]  Continue plan of care []  Update interventions per flow sheet      
[]  Discharge due to:_ 
[]  Other:_ Lima Emmanuel PTA, [facility] 10/11/2018  1:41 PM

## 2018-10-16 ENCOUNTER — HOSPITAL ENCOUNTER (OUTPATIENT)
Dept: PHYSICAL THERAPY | Age: 68
Discharge: HOME | End: 2018-10-16
Payer: MEDICARE

## 2018-10-16 PROCEDURE — 97110 THERAPEUTIC EXERCISES: CPT

## 2018-10-16 PROCEDURE — 97014 ELECTRIC STIMULATION THERAPY: CPT

## 2018-10-16 PROCEDURE — 97140 MANUAL THERAPY 1/> REGIONS: CPT

## 2018-10-16 NOTE — PROGRESS NOTES
PT DAILY TREATMENT NOTE - [facility] 2-15 Patient Name: Nathan Sierra Date:10/16/2018 : 1950 [x]  Patient  Verified Payor: [place] MEDICARE / Plan: Estee Winy / Product Type: Medicare / In time: 2:00P  Out time: 3:00P Total Treatment Time (min): 60 Total Timed Codes (min): 45 
1:1 Treatment Time ([facility] only): 40 Visit #: 4 Treatment Area: Right shoulder pain [M25.511] SUBJECTIVE Pain Level (0-10 scale): 0/10 Any medication changes, allergies to medications, adverse drug reactions, diagnosis change, or new procedure performed?: [x] No    [] Yes (see summary sheet for update) Subjective functional status/changes:   [] No changes reported Pt reported no oain and doing well today. Mostly only has pain at night. Has tired ice just before bed but it does not make a difference. OBJECTIVE Modality rationale: decrease inflammation and decrease pain to improve the patients ability to decrease R shoulder pain Min Type Additional Details 15 post [x] Estim: []Att   []Unatt        []TENS instruct []IFC  [x]Premod   []NMES []Other:  []w/US   [x]w/ice   []w/heat Position: seated Location: R shoulder  
 []  Traction: [] Cervical       []Lumbar 
                     [] Prone          []Supine []Intermittent   []Continuous Lbs: 
[] before manual 
[] after manual 
[]w/heat  
 []  Ultrasound: []Continuous   [] Pulsed at: 
                         []1MHz   []3MHz Location: 
W/cm2:  
 [] Paraffin Location:  
[]w/heat  
 []  Ice     []  Heat 
[]  Ice massage Position: Location:  
 []  Laser 
[]  Other: Position: Location:  
 
 []  Vasopneumatic Device Pressure:       [] lo [] med [] hi  
Temperature:   
 
[x] Skin assessment post-treatment:  [x]intact []redness- no adverse reaction 
  []redness  adverse reaction:  
 
30 min Therapeutic Exercise:  [x] See flow sheet : R Shoulder PROM in all planes to tolerance. Rationale: increase ROM, increase strength and improve coordination to improve the patients ability to increase ability to reach overhead 10 min Manual Therapy: Scapular mobs, inferior GHJ mobs Gd II Rationale: decrease pain, increase ROM, increase tissue extensibility and decrease trigger points to improve the patients ability to increase shoulder mobility With 
 [] TE 
 [] TA 
 [] neuro 
 [] other: Patient Education: [x] Review HEP [] Progressed/Changed HEP based on:  
[] positioning   [] body mechanics   [] transfers   [] heat/ice application   
[] other:   
 
Other Objective/Functional Measures: --  
 
Pain Level (0-10 scale) post treatment: 0/10 ASSESSMENT/Changes in Function:  
Advanced resistance and added strengthening exercises. Pt reported muscle fatigue but no increase in pain. Still requires frequent cues for postioning of Tband ER walkouts. Pt only able to do 6 repetitions. Patient will continue to benefit from skilled PT services to modify and progress therapeutic interventions, address functional mobility deficits, address ROM deficits, address strength deficits, analyze and address soft tissue restrictions, analyze and cue movement patterns, analyze and modify body mechanics/ergonomics and assess and modify postural abnormalities to attain remaining goals. [x]  See Plan of Care 
[]  See progress note/recertification 
[]  See Discharge Summary Progress towards goals / Updated goals: 
Short Term Goals: To be accomplished in 4 weeks: 
1) Pt will be Independent with HEP 
2) Pt will be able to Reach to shoulder level without pain 3) Pt will be able to improve sleeping pain by 25% 
  
  
Long Term Goals: To be accomplished in 8 weeks: 
1) Pt will be able to Reach above shoulder level without increase of pain 2) Pt will be able to Tuck in the shirt without increase of pain 3) Pt will have improved shoulder flexion, abduction and  ER strength to 4+ 4) Pt will be able to sleep on involved side without waking due to pain PLAN [x]  Upgrade activities as tolerated     [x]  Continue plan of care 
[]  Update interventions per flow sheet      
[]  Discharge due to:_ 
[]  Other:_ Mel Castillo PTA, ALEXI 10/16/2018  1:41 PM

## 2018-10-22 ENCOUNTER — HOSPITAL ENCOUNTER (OUTPATIENT)
Dept: PHYSICAL THERAPY | Age: 68
Discharge: HOME | End: 2018-10-22
Payer: MEDICARE

## 2018-10-22 PROCEDURE — 97014 ELECTRIC STIMULATION THERAPY: CPT

## 2018-10-22 PROCEDURE — 97110 THERAPEUTIC EXERCISES: CPT

## 2018-10-22 PROCEDURE — 97140 MANUAL THERAPY 1/> REGIONS: CPT

## 2018-10-22 NOTE — PROGRESS NOTES
PT DAILY TREATMENT NOTE - [facility] 2-15 Patient Name: eDbby Tripathi Date:10/22/2018 : 1950 [x]  Patient  Verified Payor: [place] MEDICARE / Plan: Estee Larios / Product Type: Medicare / In time: 12:55P  Out time: 2:00P Total Treatment Time (min): 65 Total Timed Codes (min): 50 
1:1 Treatment Time ([facility] only): 30 Visit #: 7 Treatment Area: Right shoulder pain [M25.511] SUBJECTIVE Pain Level (0-10 scale): 2/10 Any medication changes, allergies to medications, adverse drug reactions, diagnosis change, or new procedure performed?: [x] No    [] Yes (see summary sheet for update) Subjective functional status/changes:   [] No changes reported Pt reported the night after her last visit she woke up at [facility] with a dull strong ache in her shoulder pt reported icing it and finally being able to fall back asleep around 5 AM. Pt reported that during the day she got better but Wednesday night there was some discomfort but not as bad as Tuesday. OBJECTIVE Modality rationale: decrease inflammation and decrease pain to improve the patients ability to decrease R shoulder pain Min Type Additional Details 15 post [x] Estim: []Att   []Unatt        []TENS instruct []IFC  [x]Premod   []NMES []Other:  []w/US   [x]w/ice   []w/heat Position: seated Location: R shoulder  
 []  Traction: [] Cervical       []Lumbar 
                     [] Prone          []Supine []Intermittent   []Continuous Lbs: 
[] before manual 
[] after manual 
[]w/heat  
 []  Ultrasound: []Continuous   [] Pulsed at: 
                         []1MHz   []3MHz Location: 
W/cm2:  
 [] Paraffin Location:  
[]w/heat  
 []  Ice     []  Heat 
[]  Ice massage Position: Location:  
 []  Laser 
[]  Other: Position: Location:  
 
 []  Vasopneumatic Device Pressure:       [] lo [] med [] hi  
Temperature: [x] Skin assessment post-treatment:  [x]intact []redness- no adverse reaction 
  []redness  adverse reaction:  
 
40 min Therapeutic Exercise:  [x] See flow sheet : R Shoulder PROM in all planes to tolerance. Rationale: increase ROM, increase strength and improve coordination to improve the patients ability to increase ability to reach overhead 10 min Manual Therapy: Scapular mobs, inferior GHJ mobs Gd II Rationale: decrease pain, increase ROM, increase tissue extensibility and decrease trigger points to improve the patients ability to increase shoulder mobility With 
 [] TE 
 [] TA 
 [] neuro 
 [] other: Patient Education: [x] Review HEP [] Progressed/Changed HEP based on:  
[] positioning   [] body mechanics   [] transfers   [] heat/ice application   
[] other:   
 
Other Objective/Functional Measures: --  
 
Pain Level (0-10 scale) post treatment: 0/10 ASSESSMENT/Changes in Function:  
Decreased resistance and repetitions for several exercises secondary to pt's report of shoulder flare up. Pt educated on DOM effect and advised to continue icing before bed to help with soreness. Patient will continue to benefit from skilled PT services to modify and progress therapeutic interventions, address functional mobility deficits, address ROM deficits, address strength deficits, analyze and address soft tissue restrictions, analyze and cue movement patterns, analyze and modify body mechanics/ergonomics and assess and modify postural abnormalities to attain remaining goals. [x]  See Plan of Care 
[]  See progress note/recertification 
[]  See Discharge Summary Progress towards goals / Updated goals: 
Short Term Goals: To be accomplished in 4 weeks: 
1) Pt will be Independent with HEP 
2) Pt will be able to Reach to shoulder level without pain 3) Pt will be able to improve sleeping pain by 25% 
  
  
Long Term Goals: To be accomplished in 8 weeks: 1) Pt will be able to Reach above shoulder level without increase of pain 2) Pt will be able to Tuck in the shirt without increase of pain 3) Pt will have improved shoulder flexion, abduction and  ER strength to 4+ 4) Pt will be able to sleep on involved side without waking due to pain PLAN [x]  Upgrade activities as tolerated     [x]  Continue plan of care 
[]  Update interventions per flow sheet      
[]  Discharge due to:_ 
[]  Other:_ Ginger David PTA, [facility] 10/22/2018  1:41 PM

## 2018-10-23 ENCOUNTER — APPOINTMENT (OUTPATIENT)
Dept: PHYSICAL THERAPY | Age: 68
End: 2018-10-23
Payer: MEDICARE

## 2018-10-25 ENCOUNTER — HOSPITAL ENCOUNTER (OUTPATIENT)
Dept: PHYSICAL THERAPY | Age: 68
Discharge: HOME | End: 2018-10-25
Payer: MEDICARE

## 2018-10-25 PROCEDURE — 97140 MANUAL THERAPY 1/> REGIONS: CPT

## 2018-10-25 PROCEDURE — 97014 ELECTRIC STIMULATION THERAPY: CPT

## 2018-10-25 PROCEDURE — 97035 APP MDLTY 1+ULTRASOUND EA 15: CPT

## 2018-10-25 PROCEDURE — 97110 THERAPEUTIC EXERCISES: CPT

## 2018-10-25 NOTE — PROGRESS NOTES
PT DAILY TREATMENT NOTE - [facility] 2-15 Patient Name: Leilani Smith Date:10/25/2018 : 1950 [x]  Patient  Verified Payor: [place] MED[place] / Plan: Estee Mena y / Product Type: Medicare / In time: 2:00P  Out time: 3:20P Total Treatment Time (min): 80 Total Timed Codes (min): 55 
1:1 Treatment Time ( only): 40 Visit #: 0 Treatment Area: Right shoulder pain [M25.511] SUBJECTIVE Pain Level (0-10 scale): 2/10 Any medication changes, allergies to medications, adverse drug reactions, diagnosis change, or new procedure performed?: [x] No    [] Yes (see summary sheet for update) Subjective functional status/changes:   [] No changes reported Pt reported less pain and soreness at night after her last visit but still bothers her a lot. Pt did elevate the head of bed which seemed to help some. OBJECTIVE Modality rationale: decrease inflammation and decrease pain to improve the patients ability to decrease R shoulder pain Min Type Additional Details 15 post [x] Estim: []Att   []Unatt        []TENS instruct []IFC  [x]Premod   []NMES []Other:  []w/US   [x]w/ice   []w/heat Position: seated Location: R shoulder  
 []  Traction: [] Cervical       []Lumbar 
                     [] Prone          []Supine []Intermittent   []Continuous Lbs: 
[] before manual 
[] after manual 
[]w/heat  
10 [x]  Ultrasound: []Continuous   [x] Pulsed at:50% []1MHz   [x]3MHz Location:R supraspinatus tendon W/cm2:1.0 [] Paraffin Location:  
[]w/heat  
 []  Ice     []  Heat 
[]  Ice massage Position: Location:  
 []  Laser 
[]  Other: Position: Location:  
 
 []  Vasopneumatic Device Pressure:       [] lo [] med [] hi  
Temperature:   
 
[x] Skin assessment post-treatment:  [x]intact []redness- no adverse reaction 
  []redness  adverse reaction: 40 min Therapeutic Exercise:  [x] See flow sheet : R Shoulder PROM in flexion, scaption and ER to tolerance. Rationale: increase ROM, increase strength and improve coordination to improve the patients ability to increase ability to reach overhead 15 min Manual Therapy: Scapular mobs, inferior GHJ mobs Gd II, gentel CFM to supraspinatus tendon and biceps tendon Rationale: decrease pain, increase ROM, increase tissue extensibility and decrease trigger points to improve the patients ability to increase shoulder mobility With 
 [] TE 
 [] TA 
 [] neuro 
 [] other: Patient Education: [x] Review HEP [] Progressed/Changed HEP based on:  
[] positioning   [] body mechanics   [] transfers   [] heat/ice application   
[] other:   
 
Other Objective/Functional Measures: --  
 
Pain Level (0-10 scale) post treatment: 0/10 ASSESSMENT: 
 
Pt tolerated US well today. Noted very TTP along supraspinatus and biceps tendon. .  
Patient will continue to benefit from skilled PT services to modify and progress therapeutic interventions, address functional mobility deficits, address ROM deficits, address strength deficits, analyze and address soft tissue restrictions, analyze and cue movement patterns, analyze and modify body mechanics/ergonomics and assess and modify postural abnormalities to attain remaining goals. [x]  See Plan of Care 
[]  See progress note/recertification 
[]  See Discharge Summary Progress towards goals / Updated goals: 
Short Term Goals: To be accomplished in 4 weeks: 
1) Pt will be Independent with HEP 
2) Pt will be able to Reach to shoulder level without pain 3) Pt will be able to improve sleeping pain by 25% 
  
  
Long Term Goals: To be accomplished in 8 weeks: 
1) Pt will be able to Reach above shoulder level without increase of pain 2) Pt will be able to Tuck in the shirt without increase of pain 3) Pt will have improved shoulder flexion, abduction and  ER strength to 4+ 4) Pt will be able to sleep on involved side without waking due to pain PLAN [x]  Upgrade activities as tolerated     [x]  Continue plan of care 
[]  Update interventions per flow sheet      
[]  Discharge due to:_ 
[]  Other:_ Dilan Zhang PTA, [facility] 10/25/2018  1:41 PM

## 2018-10-30 ENCOUNTER — HOSPITAL ENCOUNTER (OUTPATIENT)
Dept: PHYSICAL THERAPY | Age: 68
Discharge: HOME | End: 2018-10-30
Payer: MEDICARE

## 2018-10-30 PROCEDURE — 97110 THERAPEUTIC EXERCISES: CPT | Performed by: PHYSICAL THERAPIST

## 2018-10-30 PROCEDURE — 97140 MANUAL THERAPY 1/> REGIONS: CPT | Performed by: PHYSICAL THERAPIST

## 2018-10-30 NOTE — PROGRESS NOTES
PT DAILY TREATMENT NOTE - [facility] 2-15 Patient Name: aKrime Kate Date:10/30/2018 : 1950 [x]  Patient  Verified Payor: [place] MEDICARE / Plan: Estee Winy / Product Type: Medicare / In time: 1:29 Out time: 2:15p Total Treatment Time (min):46 Total Timed Codes (min): 38 
1:1 Treatment Time ( only): 38 Visit #: 4 Treatment Area: Right shoulder pain [M25.511] SUBJECTIVE Pain Level (0-10 scale): 2/10 Any medication changes, allergies to medications, adverse drug reactions, diagnosis change, or new procedure performed?: [x] No    [] Yes (see summary sheet for update) Subjective functional status/changes:   [] No changes reported Doing about the same with pain coming only at night and keeping her up. OBJECTIVE 
  
      
Modality rationale: decrease inflammation and decrease pain to improve the patients ability to decrease R shoulder pain Min Type Additional Details 8 [x] Estim: []Att   []Unatt        []TENS instruct []IFC  [x]Premod   []NMES []Other:  []w/US   [x]w/ice   []w/heat Position: seated Location: R shoulder   
 []  Traction: [] Cervical       []Lumbar 
                     [] Prone          []Supine []Intermittent   []Continuous Lbs: 
[] before manual 
[] after manual 
[]w/heat - [x]  Ultrasound: []Continuous   [x] Pulsed at:50% []1MHz   [x]3MHz Location:R supraspinatus tendon W/cm2:1.0   
  [] Paraffin Location:  
[]w/heat   
  []  Ice     []  Heat 
[]  Ice massage Position: Location:   
  []  Laser 
[]  Other: Position: Location:   
  
  []  Vasopneumatic Device Pressure:       [] lo [] med [] hi  
Temperature:    
  
[x] Skin assessment post-treatment:  [x]intact []redness- no adverse reaction 
  []redness  adverse reaction:  
  
25 min Therapeutic Exercise:  [x] See flow sheet : R Shoulder PROM in flexion, scaption and ER to tolerance. Rationale: increase ROM, increase strength and improve coordination to improve the patients ability to increase ability to reach overhead 
  
8 min Manual Therapy: Scapular mobs, inferior GHJ mobs Gd II, gentel CFM to supraspinatus tendon and biceps tendon Rationale: decrease pain, increase ROM, increase tissue extensibility and decrease trigger points to improve the patients ability to increase shoulder mobility With 
 [] TE 
 [] TA 
 [] neuro 
 [] other: Patient Education: [x] Review HEP [] Progressed/Changed HEP based on:  
[] positioning   [] body mechanics   [] transfers   [] heat/ice application   
[] other:   
  
Other Objective/Functional Measures: --    
  
Pain Level (0-10 scale) post treatment: 0/10 
  
ASSESSMENT: 
Pt continues to have weakness into shoulder flexion, abduction and ER. Tolerated AROM of sidelying ER today 2 x 5 reps. Pain at night is the same as at initial evaluation. Referring back to MD for further examination. .  
Patient will continue to benefit from skilled PT services to modify and progress therapeutic interventions, address functional mobility deficits, address ROM deficits, address strength deficits, analyze and address soft tissue restrictions, analyze and cue movement patterns, analyze and modify body mechanics/ergonomics and assess and modify postural abnormalities to attain remaining goals. [x]  See Plan of Care 
[]  See progress note/recertification 
[]  See Discharge Summary Progress towards goals / Updated goals: 
Short Term Goals: To be accomplished in 4 weeks: 
1) Pt will be Independent with HEP 
2) Pt will be able to Reach to shoulder level without pain 3) Pt will be able to improve sleeping pain by 25% 
  
  
Long Term Goals: To be accomplished in 8 weeks: 
1) Pt will be able to Reach above shoulder level without increase of pain 2) Pt will be able to Tuck in the shirt without increase of pain 3) Pt will have improved shoulder flexion, abduction and  ER strength to 4+ 4) Pt will be able to sleep on involved side without waking due to pain        
  
PLAN [x]  Upgrade activities as tolerated     [x]  Continue plan of care 
[]  Update interventions per flow sheet      
[]  Discharge due to:_ 
[]  Other:_   
 
 
 
Jodi Harris 10/30/2018  2:13 PM

## 2018-11-01 ENCOUNTER — HOSPITAL ENCOUNTER (OUTPATIENT)
Dept: PHYSICAL THERAPY | Age: 68
Discharge: HOME | End: 2018-11-01
Payer: MEDICARE

## 2018-11-01 PROCEDURE — 97110 THERAPEUTIC EXERCISES: CPT | Performed by: PHYSICAL THERAPIST

## 2018-11-01 NOTE — PROGRESS NOTES
PT DAILY TREATMENT NOTE - [facility] 2-15 Patient Name: Raquel Franklin Date:2018 : 1950 [x]  Patient  Verified Payor: [place] MEDICARE / Plan: Estee Mnea y / Product Type: Medicare / In time:1:00  Out time:2:00 Total Treatment Time (min): 60 Total Timed Codes (min): 45 
1:1 Treatment Time [facility] only):30 Visit #: 2 Treatment Area: Right shoulder pain [M25.511] SUBJECTIVE Pain Level (0-10 scale): 0/10 Any medication changes, allergies to medications, adverse drug reactions, diagnosis change, or new procedure performed?: [x] No    [] Yes (see summary sheet for update) Subjective functional status/changes:   [] No changes reported Night pain has decreased due to some change in positioning. OBJECTIVE 
  
           
Modality rationale: decrease inflammation and decrease pain to improve the patients ability to decrease R shoulder pain Min Type Additional Details    
8 [x] Estim: []Att   []Unatt        []TENS instruct 
                []IFC  [x]Premod   []NMES   
                 []Other:  []w/US   [x]w/ice   []w/heat Position: seated Location: R shoulder    
  []  Traction: [] Cervical       []Lumbar 
                     [] Prone          []Supine 
                     []Intermittent   []Continuous Lbs: 
[] before manual 
[] after manual 
[]w/heat    
- [x]  Ultrasound: []Continuous   [x] Pulsed at:50% 
                         []1MHz   [x]3MHz Location:R supraspinatus tendon W/cm2:1.0    
  [] Paraffin  
  Location:  
[]w/heat    
  []  Ice     []  Heat 
[]  Ice massage Position: Location:    
  []  Laser 
[]  Other: Position: Location:    
  
  []  Vasopneumatic Device Pressure:       [] lo [] med [] hi  
Temperature:     
  
[x] Skin assessment post-treatment:  [x]intact []redness- no adverse reaction 
  []redness  adverse reaction:  
  
25 min Therapeutic Exercise:  [x] See flow sheet : R Shoulder PROM in flexion, scaption and ER to tolerance. Rationale: increase ROM, increase strength and improve coordination to improve the patients ability to increase ability to reach overhead 
  
8 min Manual Therapy: Scapular mobs, inferior GHJ mobs Gd II, gentel CFM to supraspinatus tendon and biceps tendon Rationale: decrease pain, increase ROM, increase tissue extensibility and decrease trigger points to improve the patients ability to increase shoulder mobility 
  
With 
 [] TE 
 [] TA 
 [] neuro 
 [] other: Patient Education: [x] Review HEP   
[] Progressed/Changed HEP based on:  
[] positioning   [] body mechanics   [] transfers   [] heat/ice application   
[] other:   
  
Other Objective/Functional Measures: --    
  
Pain Level (0-10 scale) post treatment: 0/10 
  
ASSESSMENT: 
Overall seeing improvement in all objective measurements. .  
Patient will continue to benefit from skilled PT services to modify and progress therapeutic interventions, address functional mobility deficits, address ROM deficits, address strength deficits, analyze and address soft tissue restrictions, analyze and cue movement patterns, analyze and modify body mechanics/ergonomics and assess and modify postural abnormalities to attain remaining goals. 
  
[x]  See Plan of Care 
[]  See progress note/recertification 
[]  See Discharge Summary 
  
Progress towards goals / Updated goals: 
Short Term Goals: To be accomplished in 4 weeks: 
1) Pt will be Independent with HEP 
2) Pt will be able to Reach to shoulder level without pain MET 3) Pt will be able to improve sleeping pain by 25%PArtially MET 
  
  
Long Term Goals: To be accomplished in 8 weeks: 
1) Pt will be able to Reach above shoulder level without increase of pain 145 degrees 2) Pt will be able to Tuck in the shirt without increase of pain 3) Pt will have improved shoulder flexion, abduction and  ER strength to 4+ (4/5) 4) Pt will be able to sleep on involved side without waking due to pain        
  
PLAN 
 [x]  Upgrade activities as tolerated     [x]  Continue plan of care 
[]  Update interventions per flow sheet      
[]  Discharge due to:_ 
[]  Other:_   
 
 
 
 
Amy Myers 11/1/2018  2:24 PM

## 2018-11-08 ENCOUNTER — HOSPITAL ENCOUNTER (OUTPATIENT)
Dept: PHYSICAL THERAPY | Age: 68
Discharge: HOME | End: 2018-11-08
Payer: MEDICARE

## 2018-11-08 PROCEDURE — 97014 ELECTRIC STIMULATION THERAPY: CPT

## 2018-11-08 PROCEDURE — 97110 THERAPEUTIC EXERCISES: CPT

## 2018-11-08 PROCEDURE — 97140 MANUAL THERAPY 1/> REGIONS: CPT

## 2018-11-08 NOTE — PROGRESS NOTES
PT DAILY TREATMENT NOTE - [facility] 2-15 Patient Name: Angie Munoz Date:2018 : 1950 [x]  Patient  Verified Payor: [place] MEDICARE / Plan: Estee Winy / Product Type: Medicare / In time: 1:35P Out time: 2:45P Total Treatment Time (min): 70 Total Timed Codes (min): 55 
1:1 Treatment Time ( [address] only): 20 Visit #: 0 Treatment Area: Right shoulder pain [M25.511] SUBJECTIVE Pain Level (0-10 scale): 2/10 Any medication changes, allergies to medications, adverse drug reactions, diagnosis change, or new procedure performed?: [x] No    [] Yes (see summary sheet for update) Subjective functional status/changes:   [] No changes reported Pt repots that the change in sleeping positions with head elevated slightly is helping. OBJECTIVE 
  
      
Modality rationale: decrease inflammation and decrease pain to improve the patients ability to decrease R shoulder pain Min Type Additional Details 15 [x] Estim: []Att   []Unatt        []TENS instruct []IFC  [x]Premod   []NMES []Other:  []w/US   [x]w/ice   []w/heat Position: seated Location: R shoulder   
 []  Traction: [] Cervical       []Lumbar 
                     [] Prone          []Supine []Intermittent   []Continuous Lbs: 
[] before manual 
[] after manual 
[]w/heat - [x]  Ultrasound: []Continuous   [x] Pulsed at:50% []1MHz   [x]3MHz Location:R supraspinatus tendon W/cm2:1.0   
  [] Paraffin Location:  
[]w/heat   
  []  Ice     []  Heat 
[]  Ice massage Position: Location:   
  []  Laser 
[]  Other: Position: Location:   
  
  []  Vasopneumatic Device Pressure:       [] lo [] med [] hi  
Temperature:    
  
[x] Skin assessment post-treatment:  [x]intact []redness- no adverse reaction 
  []redness  adverse reaction:  
  
45 min Therapeutic Exercise:  [x] See flow sheet : R Shoulder PROM in flexion, scaption and ER to tolerance. Rationale: increase ROM, increase strength and improve coordination to improve the patients ability to increase ability to reach overhead 
  
10 min Manual Therapy: Scapular mobs, inferior GHJ mobs Gd II, gentel CFM to supraspinatus tendon and biceps tendon Rationale: decrease pain, increase ROM, increase tissue extensibility and decrease trigger points to improve the patients ability to increase shoulder mobility With 
 [] TE 
 [] TA 
 [] neuro 
 [] other: Patient Education: [x] Review HEP [] Progressed/Changed HEP based on:  
[] positioning   [] body mechanics   [] transfers   [] heat/ice application   
[] other:   
  
Other Objective/Functional Measures: --    
  
Pain Level (0-10 scale) post treatment: 0/10 
  
ASSESSMENT: 
Pt challenged with body blade today. Has not made MD FU appointment yet but will make one tomorrow. .  
Patient will continue to benefit from skilled PT services to modify and progress therapeutic interventions, address functional mobility deficits, address ROM deficits, address strength deficits, analyze and address soft tissue restrictions, analyze and cue movement patterns, analyze and modify body mechanics/ergonomics and assess and modify postural abnormalities to attain remaining goals. [x]  See Plan of Care 
[]  See progress note/recertification 
[]  See Discharge Summary Progress towards goals / Updated goals: 
Short Term Goals: To be accomplished in 4 weeks: 
1) Pt will be Independent with HEP 
2) Pt will be able to Reach to shoulder level without pain 3) Pt will be able to improve sleeping pain by 25% 
  
  
Long Term Goals: To be accomplished in 8 weeks: 
1) Pt will be able to Reach above shoulder level without increase of pain 2) Pt will be able to Tuck in the shirt without increase of pain 3) Pt will have improved shoulder flexion, abduction and  ER strength to 4+ 4) Pt will be able to sleep on involved side without waking due to pain        
  
PLAN [x]  Upgrade activities as tolerated     [x]  Continue plan of care 
[]  Update interventions per flow sheet      
[]  Discharge due to:_ 
[]  Other:_ Reyna Oakley PTA, [facility] 11/8/2018  2:13 PM

## 2018-11-15 ENCOUNTER — HOSPITAL ENCOUNTER (OUTPATIENT)
Dept: PHYSICAL THERAPY | Age: 68
Discharge: HOME | End: 2018-11-15
Payer: MEDICARE

## 2018-11-15 PROCEDURE — G8984 CARRY CURRENT STATUS: HCPCS | Performed by: PHYSICAL THERAPIST

## 2018-11-15 PROCEDURE — 97140 MANUAL THERAPY 1/> REGIONS: CPT | Performed by: PHYSICAL THERAPIST

## 2018-11-15 PROCEDURE — 97110 THERAPEUTIC EXERCISES: CPT | Performed by: PHYSICAL THERAPIST

## 2018-11-15 PROCEDURE — G8985 CARRY GOAL STATUS: HCPCS | Performed by: PHYSICAL THERAPIST

## 2018-11-15 PROCEDURE — 97014 ELECTRIC STIMULATION THERAPY: CPT | Performed by: PHYSICAL THERAPIST

## 2018-11-15 NOTE — PROGRESS NOTES
PT DAILY TREATMENT NOTE - [facility] 2-15 Patient Name: Julia Guerra Date:11/15/2018 : 1950 [x]  Patient  Verified Payor: [place] MEDICARE / Plan: Estee Mena y / Product Type: Medicare / In time: 12:30p  Out time:1:30p Total Treatment Time (min): 60 Total Timed Codes (min): 45 
1:1 Treatment Time ( [address] only): 30 Visit #: 50 Treatment Area: Right shoulder pain [M25.511] SUBJECTIVE Pain Level (0-10 scale): 0/10 Any medication changes, allergies to medications, adverse drug reactions, diagnosis change, or new procedure performed?: [x] No    [] Yes (see summary sheet for update) Subjective functional status/changes:   [] No changes reported Pain may be a little better at night bc I have tried to change the position I sleep in. OBJECTIVE 
  
           
Modality rationale: decrease inflammation and decrease pain to improve the patients ability to decrease R shoulder pain Min Type Additional Details    
15 [x] Estim: []Att   []Unatt        []TENS instruct 
                []IFC  [x]Premod   []NMES   
                 []Other:  []w/US   [x]w/ice   []w/heat Position: seated Location: R shoulder    
  []  Traction: [] Cervical       []Lumbar 
                     [] Prone          []Supine 
                     []Intermittent   []Continuous Lbs: 
[] before manual 
[] after manual 
[]w/heat    
- [x]  Ultrasound: []Continuous   [x] Pulsed at:50% 
                         []1MHz   [x]3MHz Location:R supraspinatus tendon W/cm2:1.0    
  [] Paraffin  
  Location:  
[]w/heat    
  []  Ice     []  Heat 
[]  Ice massage Position: Location:    
  []  Laser 
[]  Other: Position: Location:    
  
  []  Vasopneumatic Device Pressure:       [] lo [] med [] hi  
Temperature:     
  
[x] Skin assessment post-treatment:  [x]intact []redness- no adverse reaction 
  []redness  adverse reaction:  
  
35 min Therapeutic Exercise:  [x] See flow sheet : R Shoulder PROM in flexion, scaption and ER to tolerance. Rationale: increase ROM, increase strength and improve coordination to improve the patients ability to increase ability to reach overhead 
  
10 min Manual Therapy: Scapular mobs, inferior GHJ mobs Gd II, gentel CFM to supraspinatus tendon and biceps tendon Rationale: decrease pain, increase ROM, increase tissue extensibility and decrease trigger points to improve the patients ability to increase shoulder mobility 
  
With 
 [] TE 
 [] TA 
 [] neuro 
 [] other: Patient Education: [x] Review HEP   
[] Progressed/Changed HEP based on:  
[] positioning   [] body mechanics   [] transfers   [] heat/ice application   
[] other:   
  
Other Objective/Functional Measures: Flexion 145 and [id number] degrees actively today MMT: flexion 4/5 Abduction: 4/5 p! ER:4+/5 IR: 5/5 FOTO: no change Pain Level (0-10 scale) post treatment: 0/10 
  
ASSESSMENT: 
Pt has MD on Tuesday 11/20. Overall feeling improvement with sleeping positions. And seeing progress with exercises. Given HEP to continue on her own and discuss POC with MD. Pain is still present with resisted abduction, however improvement has been made since initial session. Patient will continue to benefit from skilled PT services to modify and progress therapeutic interventions, address functional mobility deficits, address ROM deficits, address strength deficits, analyze and address soft tissue restrictions, analyze and cue movement patterns, analyze and modify body mechanics/ergonomics and assess and modify postural abnormalities to attain remaining goals. 
  
[x]  See Plan of Care 
[]  See progress note/recertification 
[]  See Discharge Summary 
  
Progress towards goals / Updated goals: 
Short Term Goals: To be accomplished in 4 weeks: 
1) Pt will be Independent with HEP 
2) Pt will be able to Reach to shoulder level without pain 3) Pt will be able to improve sleeping pain by 25% 
  
  
 Long Term Goals: To be accomplished in 8 weeks: 
1) Pt will be able to Reach above shoulder level without increase of pain 2) Pt will be able to Tuck in the shirt without increase of pain 3) Pt will have improved shoulder flexion, abduction and  ER strength to 4+ 4) Pt will be able to sleep on involved side without waking due to pain        
  
PLAN [x]  Upgrade activities as tolerated     [x]  Continue plan of care 
[]  Update interventions per flow sheet      
[]  Discharge due to:_ 
[]  Other:_   
 
 
 
Michelle Copeland 11/15/2018  12:40 PM

## 2018-11-16 NOTE — PROGRESS NOTES
[facility] [address] [address], [address] [place]  Phone: [phone] Fax: [phone] Progress Note Name: Karime Kate : 1950 MD: Bonny Page, * Treatment Diagnosis: Right shoulder pain [M25.511] Start of Care: 10/2/18 Visits from Start of Care: 10 Missed Visits:- 
 
Summary of Care: The pt has been seen for 10 sessions in physical therapy. She has made good progress with improved ROM, strength and decreased pain. She has been able to make some adjustements at night to decrease her pain levels. OVerall FOTO score is the same. She reports no pain during the day, however on objective evaluation she continue to have pain and weakness into Abduction and flexion with restricted AROM. S?S consistent with RTC pathology. She was given HEP to continue at home and she will follow up with you next Tuesday. Assessment / Recommendations: Will await MD recommendations. Progress towards goals / Updated goals: 
Short Term Goals: To be accomplished in 4 weeks: 
1) Pt will be Independent with HEP MET 2) Pt will be able to Reach to shoulder level without pain MET 3) Pt will be able to improve sleeping pain by 25% MET 
  
  
Long Term Goals: To be accomplished in 8 weeks: 
1) Pt will be able to Reach above shoulder level without increase of pain Partially MET 
2) Pt will be able to Tuck in the shirt without increase of pain MET 3) Pt will have improved shoulder flexion, abduction and  ER strength to 4+, Partially MET 4) Pt will be able to sleep on involved side without waking due to pain       NOT MET Hold therapy and await physician's recommendations. Please advise. G-Codes (GP) Carry  Current  CJ= 20-39%  Goal  CJ= 20-39% The severity rating is based on the FOTO Score Nohemi Bolaños 2018 11:22 AM 
 
________________________________________________________________________ NOTE TO PHYSICIAN:  Please complete the following and fax to: [facility]: Fax: [phone]. Steve Gutiérrez Retain this original for your records. If you are unable to process this request in 24 hours, please contact our office. ____ I have read the above report and request that my patient continue therapy with the following changes/special instructions: 
____ I have read the above report and request that my patient be discharged from therapy [de-identified] Signature:_________________ Date:___________Time:__________

## 2018-11-20 ENCOUNTER — OFFICE VISIT (OUTPATIENT)
Dept: INTERNAL MEDICINE CLINIC | Age: 68
End: 2018-11-20

## 2018-11-20 VITALS
DIASTOLIC BLOOD PRESSURE: 66 MMHG | SYSTOLIC BLOOD PRESSURE: 110 MMHG | OXYGEN SATURATION: 97 % | HEART RATE: 63 BPM | WEIGHT: 222.9 LBS | BODY MASS INDEX: 39.5 KG/M2 | RESPIRATION RATE: 18 BRPM | TEMPERATURE: 97.9 F | HEIGHT: 63 IN

## 2018-11-20 DIAGNOSIS — I10 ESSENTIAL HYPERTENSION: ICD-10-CM

## 2018-11-20 DIAGNOSIS — M17.11 PRIMARY OSTEOARTHRITIS OF RIGHT KNEE: ICD-10-CM

## 2018-11-20 DIAGNOSIS — E11.21 TYPE 2 DIABETES WITH NEPHROPATHY (HCC): Primary | ICD-10-CM

## 2018-11-20 DIAGNOSIS — M48.061 SPINAL STENOSIS OF LUMBAR REGION WITHOUT NEUROGENIC CLAUDICATION: ICD-10-CM

## 2018-11-20 DIAGNOSIS — E78.00 HYPERCHOLESTEROLEMIA: ICD-10-CM

## 2018-11-20 NOTE — PROGRESS NOTES
1. Have you been to the ER, urgent care clinic since your last visit? Hospitalized since your last visit? No    2. Have you seen or consulted any other health care providers outside of the [address] [place] [address] since your last visit? Include any pap smears or colon screening.  No      Patient wants to discuss PT on right shoulder and callus on L&R foot

## 2018-11-20 NOTE — PROGRESS NOTES
SPORTS MEDICINE AND PRIMARY CARE  Nereyda Gonzalez MD, [address] [facility],[address] [address]  Phone:  [phone]  Fax: [phone]      Chief Complaint   Patient presents with    Hypertension     f/u         SUBECTIVE:    Leeann Landers is a 78 y.o. female Patient returns today with known history of diabetes mellitus type 2, acute kidney disease, now resolved, primary hypertension, obesity, and is seen for evaluation. Patient returns today complaining of continued discomfort in her right arm, although significantly better, but still bothers her at night. She has had ten courses of physical therapy and seems to have plateaued from that perspective, but now is starting to have some discomfort in her left shoulder. Other new complaints denied and patient is seen for evaluation. Current Outpatient Medications   Medication Sig Dispense Refill    metoprolol tartrate (LOPRESSOR) 25 mg tablet Take 0.5 Tabs by mouth two (2) times a day. 90 Tab 3    cloNIDine HCl (CATAPRES) 0.2 mg tablet Take 1 Tab by mouth two (2) times a day. 180 Tab 3    insulin glargine (LANTUS,BASAGLAR) 100 unit/mL (3 mL) inpn 28 Units by SubCUTAneous route nightly. 9 Adjustable Dose Pre-filled Pen Syringe 3    Insulin Needles, Disposable, 29 gauge x 5/16\" ndle Use to inject insulin three times daily. dx.e119 300 Each 3    Insulin Needles, Disposable, (CHRISS PEN NEEDLE) 32 gauge x 5/32\" ndle Use to inject insulin daily. Dx.e11.9 100 Pen Needle 11    NOVOLOG FLEXPEN U-100 INSULIN 100 unit/mL inpn INJECT 3 TIMES DAILY WITH MEALS WITH SLIDING SCALE 15 Adjustable Dose Pre-filled Pen Syringe 11    glucose blood VI test strips (ONETOUCH ULTRA TEST) strip Use to test blood sugar three times a day. dx. e11.9 100 Strip 11    lancets 28 gauge misc Use to test blood sugar three times a day. Dx.e11.9 100 Lancet 11    amLODIPine (NORVASC) 10 mg tablet Take 1 tablet daily.  90 Tab 3    simvastatin (ZOCOR) 40 mg tablet Take 1 Tab by mouth nightly. 90 Tab 3    albuterol (PROVENTIL HFA, VENTOLIN HFA, PROAIR HFA) 90 mcg/actuation inhaler Take 2 Puffs by inhalation every four (4) hours as needed for Wheezing. 1 Inhaler 0    calcitRIOL (ROCALTROL) 0.25 mcg capsule Take 1 Cap by mouth daily. 80 Cap 3     Past Medical History:   Diagnosis Date    Cellulitis of right abdominal wall 12/18/2017    letty whatley md    Diabetes ([address] 1995    DJD (degenerative joint disease) of knee     Gastroenteritis     Hypercholesterolemia     Hypertension     Kidney lesion, native, left     Knee pain     LBP (low back pain)     chiorpactor    Lumbar stenosis     Obesity     Rotator cuff arthropathy of right shoulder 06/22/2018    S/P colonoscopy 12-18-12     Past Surgical History:   Procedure Laterality Date    HX BREAST BIOPSY Right 1990    negative    HX COLONOSCOPY      HX HYSTERECTOMY      HX OOPHORECTOMY N/A     HX OTHER SURGICAL  12/23/2017    I&D infected abscess      No Known Allergies    REVIEW OF SYSTEMS:   Complains of pain and calluses on both feet. \"It just hurts. \"        Social History     Socioeconomic History    Marital status: SINGLE     Spouse name: Not on file    Number of children: Not on file    Years of education: Not on file    Highest education level: Not on file   Social Needs    Financial resource strain: Not on file    Food insecurity - worry: Not on file    Food insecurity - inability: Not on file    Transportation needs - medical: Not on file   Enabled Employment needs - non-medical: Not on file   Occupational History    Not on file   Tobacco Use    Smoking status: Never Smoker    Smokeless tobacco: Never Used   Substance and Sexual Activity    Alcohol use: No     Alcohol/week: 0.0 oz    Drug use: No    Sexual activity: Not Currently   Other Topics Concern    Not on file   Social History Narrative    Medical History: Diverticulosis 2002DM 1995primary HTN 1995Obesity 1995Dyslipidemia 1995January 9, 2012 moderate central stenosis L3-L4,    mild to moderate central stenosis at L4-L5 MRI    Gyn History: Last mammogram date 2013. Last menstrual perioddate hysterectomy  FOR FIBROIDS, PT W ITH ONE OVARY. Last    papdate . Menopausal hot flashes. Sexually active not currently sexually active. History of abnormal pap smears none. History of STDs    none. Vaginal discharge or odor none. HysterectomyDate NONE.    OB History: Total pregnancies 1. Pre term deliveries (>20-36.6 w eeks) 1. Surgical History: [facility] right breast bx , kidney stones colonoscopy     Hospitalization/Major Diagnostic Procedure: [facility] right breast bx , kidney stones     Family History: Mother:  52 yrs, skin d/oFather: deceasedAunt: alive, lung cancerAdopted: alive    Social History: Alcohol Use Patient does not use alcohol. Smoking Status Patient is a never smoker. Marital Status: Single. Lives w ith:    alone. Education/School: has masters degree-[employer].   r  Family History   Problem Relation Age of Onset    Cancer Mother     No Known Problems Father        OBJECTIVE:  Visit Vitals  /66   Pulse 63   Temp 97.9 °F (36.6 °C) (Oral)   Resp 18   Ht 5' 3\" (1.6 m)   Wt 222 lb 14.4 oz (101.1 kg)   LMP  (LMP Unknown)   SpO2 97%   BMI 39.48 kg/m²     ENT: perrla,  eom intact  NECK: supple.  Thyroid normal  CHEST: clear to ascultation and percussion   HEART: regular rate and rhythm  ABD: soft, bowel sounds active  EXTREMITIES: no edema, pulse 1+     Office Visit on 2018   Component Date Value Ref Range Status    Hemoglobin A1c 2018 7.3* 4.8 - 5.6 % Final    Comment:          Prediabetes: 5.7 - 6.4           Diabetes: >6.4           Glycemic control for adults with diabetes: <7.0      Estimated average glucose 2018 163  mg/dL Final    Glucose 2018 81  65 - 99 mg/dL Final    BUN 2018 24  8 - 27 mg/dL Final    Creatinine 2018 1.05* 0.57 - 1.00 mg/dL Final    GFR est non-AA 09/25/2018 55* >59 mL/min/1.73 Final    GFR est AA 09/25/2018 64  >59 mL/min/1.73 Final    BUN/Creatinine ratio 09/25/2018 23  12 - 28 Final    Sodium 09/25/2018 144  134 - 144 mmol/L Final    Potassium 09/25/2018 5.1  3.5 - 5.2 mmol/L Final    Chloride 09/25/2018 107* 96 - 106 mmol/L Final    CO2 09/25/2018 23  20 - 29 mmol/L Final    Calcium 09/25/2018 9.6  8.7 - 10.3 mg/dL Final    Phosphorus 09/25/2018 3.9  2.5 - 4.5 mg/dL Final    Albumin 09/25/2018 4.1  3.6 - 4.8 g/dL Final          ASSESSMENT:  1. Type 2 diabetes with nephropathy [address] [address]    2. Primary osteoarthritis of right knee    3. Spinal stenosis of lumbar region without neurogenic claudication    4. Essential hypertension    5. Hypercholesterolemia      Patient has rotator cuff arthropathy. The next step will be MRI and subsequent possible surgery. She does not want to go that pathway and we do not disagree. She has good ROM of the shoulder and we suggest she stop PT and continue exercises they gave her at PT for both shoulders and make that a part of her daily exercise program for life. Speaking of daily exercise, we do note she has made a decision to go against medical advice and gain 6 lbs even before Thanksgiving. I wonder what is going to happen after Thanksgiving, but I am sure she will wait to see me after a while. Her blood pressure control is adequate. On her next visit we will see how her blood sugar is doing. She will return to see us next month, sooner if she has any problems. I have discussed the diagnosis with the patient and the intended plan as seen in the  orders above. The patient understands and agees with the plan. The patient has   received an after visit summary and questions were answered concerning  future plans  Patient labs and/or xrays were reviewed  Past records were reviewed.     PLAN:  .  Orders Placed This Encounter    MICROALBUMIN, UR, RAND W/ MICROALB/CREAT RATIO    REFERRAL TO PODIATRY       Follow-up Disposition:  Return in about 5 weeks (around 12/28/2018). ATTENTION:   This medical record was transcribed using an electronic medical records system. Although proofread, it may and can contain electronic and spelling errors. Other human spelling and other errors may be present. Corrections may be executed at a later time. Please feel free to contact us for any clarifications as needed.

## 2018-11-21 LAB
ALBUMIN/CREAT UR: 9.8 MG/G CREAT (ref 0–30)
CREAT UR-MCNC: 165.1 MG/DL
MICROALBUMIN UR-MCNC: 16.1 UG/ML

## 2018-12-31 ENCOUNTER — OFFICE VISIT (OUTPATIENT)
Dept: URGENT CARE | Age: 68
End: 2018-12-31

## 2018-12-31 VITALS
RESPIRATION RATE: 16 BRPM | HEART RATE: 65 BPM | WEIGHT: 226 LBS | DIASTOLIC BLOOD PRESSURE: 79 MMHG | OXYGEN SATURATION: 98 % | BODY MASS INDEX: 40.04 KG/M2 | HEIGHT: 63 IN | TEMPERATURE: 97.7 F | SYSTOLIC BLOOD PRESSURE: 186 MMHG

## 2018-12-31 DIAGNOSIS — H00.011 HORDEOLUM EXTERNUM OF RIGHT UPPER EYELID: Primary | ICD-10-CM

## 2018-12-31 RX ORDER — PANTOPRAZOLE SODIUM 40 MG/1
TABLET, DELAYED RELEASE ORAL
Refills: 11 | COMMUNITY
Start: 2018-12-24 | End: 2019-03-17 | Stop reason: SDUPTHER

## 2018-12-31 RX ORDER — ERYTHROMYCIN 5 MG/G
OINTMENT OPHTHALMIC
Qty: 3.5 G | Refills: 0 | Status: SHIPPED | OUTPATIENT
Start: 2018-12-31 | End: 2019-01-30 | Stop reason: ALTCHOICE

## 2018-12-31 NOTE — PROGRESS NOTES
Marya Goyal is a 80 y.o. female who complains of a right upper eyelid stye for 2 week(s). No fever, chills, no URI symptoms, no history of foreign body in the eye. Vision has been normal. Does not wear contact lenses. The history is provided by the patient.         Past Medical History:   Diagnosis Date    Cellulitis of right abdominal wall 12/18/2017    letty whatley md    Diabetes ([address] 1995    DJD (degenerative joint disease) of knee     Gastroenteritis     Hypercholesterolemia     Hypertension     Kidney lesion, native, left     Knee pain     LBP (low back pain)     chiorpactor    Lumbar stenosis     Obesity     Rotator cuff arthropathy of right shoulder 06/22/2018    S/P colonoscopy 12-18-12        Past Surgical History:   Procedure Laterality Date    HX BREAST BIOPSY Right 1990    negative    HX COLONOSCOPY      HX HYSTERECTOMY      HX OOPHORECTOMY N/A     HX OTHER SURGICAL  12/23/2017    I&D infected abscess          Family History   Problem Relation Age of Onset    Cancer Mother     No Known Problems Father         Social History     Socioeconomic History    Marital status: SINGLE     Spouse name: Not on file    Number of children: Not on file    Years of education: Not on file    Highest education level: Not on file   Social Needs    Financial resource strain: Not on file    Food insecurity - worry: Not on file    Food insecurity - inability: Not on file   [employer] needs - medical: Not on file   [employer] needs - non-medical: Not on file   Occupational History    Not on file   Tobacco Use    Smoking status: Never Smoker    Smokeless tobacco: Never Used   Substance and Sexual Activity    Alcohol use: No     Alcohol/week: 0.0 oz    Drug use: No    Sexual activity: Not Currently   Other Topics Concern    Not on file   Social History Narrative    Medical History: Diverticulosis 2002DM 1995primary HTN 1995Obesity 1995Dyslipidemia 1995January 9, 2012 moderate central stenosis L3-L4,    mild to moderate central stenosis at L4-L5 MRI    Gyn History: Last mammogram date 2013. Last menstrual perioddate hysterectomy  FOR FIBROIDS, PT W ITH ONE OVARY. Last    papdate . Menopausal hot flashes. Sexually active not currently sexually active. History of abnormal pap smears none. History of STDs    none. Vaginal discharge or odor none. HysterectomyDate NONE.    OB History: Total pregnancies 1. Pre term deliveries (>20-36.6 w eeks) 1. Surgical History: [facility] right breast bx , kidney stones colonoscopy     Hospitalization/Major Diagnostic Procedure: [facility] right breast bx , kidney stones     Family History: Mother:  52 yrs, skin d/oFather: deceasedAunt: alive, lung cancerAdopted: alive    Social History: Alcohol Use Patient does not use alcohol. Smoking Status Patient is a never smoker. Marital Status: Single. Lives w ith:    alone. Education/School: has masters degree-[employer]. ALLERGIES: Patient has no known allergies. Review of Systems   Eyes: Positive for pain (Right upper eyelid) and redness (Right upper eyelid). Negative for photophobia, discharge, itching and visual disturbance. Vitals:    18 1117   BP: 186/79   Pulse: 65   Resp: 16   Temp: 97.7 °F (36.5 °C)   SpO2: 98%   Weight: 226 lb (102.5 kg)   Height: 5' 3\" (1.6 m)       Physical Exam   Constitutional: She appears well-developed and well-nourished. No distress. Eyes: EOM are normal. Pupils are equal, round, and reactive to light. Right eye exhibits hordeolum (TTP). Neurological: She is alert. Skin: She is not diaphoretic. Psychiatric: She has a normal mood and affect. Her behavior is normal. Judgment and thought content normal.   Nursing note and vitals reviewed. [facility]    ICD-10-CM ICD-9-CM    1.  Hordeolum externum of right upper eyelid H00.011 373.11      Medications Ordered Today   Medications    erythromycin (ILOTYCIN) ophthalmic ointment     Sig: Apply to right inner eyelid QID x 7 days     Dispense:  3.5 g     Refill:  0     Warm compresses    The patients condition was discussed with the patient and they understand. The patient is to follow up with [address] [place] [address] in 1 week. If signs and symptoms become worse the pt is to go to the ER. The patient is to take medications as prescribed.              Procedures

## 2018-12-31 NOTE — PATIENT INSTRUCTIONS
Follow up with [facility] in 1 week if no improvement     Styes and Chalazia: Care Instructions  Your Care Instructions    Styes and chalazia (say \"nbp-JLL-zsy-\") are both conditions that can cause swelling of the eyelid. A stye is an infection in the root of an eyelash. The infection causes a tender red lump on the edge of the eyelid. The infection can spread until the whole eyelid becomes red and inflamed. Styes usually break open, and a tiny amount of pus drains. They usually clear up on their own in about a week, but they sometimes need treatment with antibiotics. A chalazion is a lump or cyst in the eyelid (chalazion is singular; chalazia is plural). It is caused by swelling and inflammation of deep oil glands inside the eyelid. Chalazia are usually not infected. They can take a few months to heal.  If a chalazion becomes more swollen and painful or does not go away, you may need to have it drained by your doctor. Follow-up care is a key part of your treatment and safety. Be sure to make and go to all appointments, and call your doctor if you are having problems. It's also a good idea to know your test results and keep a list of the medicines you take. How can you care for yourself at home? · Do not rub your eyes. Do not squeeze or try to open a stye or chalazion. · To help a stye or chalazion heal faster:  ? Put a warm, moist compress on your eye for 5 to 10 minutes, 3 to 6 times a day. Heat often brings a stye to a point where it drains on its own. Keep in mind that warm compresses will often increase swelling a little at first.  ? Do not use hot water or heat a wet cloth in a microwave oven. The compress may get too hot and can burn the eyelid. · Always wash your hands before and after you use a compress or touch your eyes. · If the doctor gave you antibiotic drops or ointment, use the medicine exactly as directed.  Use the medicine for as long as instructed, even if your eye starts to feel better. · To put in eyedrops or ointment:  ? Tilt your head back, and pull your lower eyelid down with one finger. ? Drop or squirt the medicine inside the lower lid. ? Close your eye for 30 to 60 seconds to let the drops or ointment move around. ? Do not touch the ointment or dropper tip to your eyelashes or any other surface. · Do not wear eye makeup or contact lenses until the stye or chalazion heals. · Do not share towels, pillows, or washcloths while you have a stye. When should you call for help? Call your doctor now or seek immediate medical care if:    · You have pain in your eye.     · You have a change in vision or loss of vision.     · Redness and swelling get much worse.    Watch closely for changes in your health, and be sure to contact your doctor if:    · Your stye does not get better in 1 week.     · Your chalazion does not start to get better after several weeks. Where can you learn more? Go to http://king-charles.info/. Enter Y223 in the search box to learn more about \"Styes and Chalazia: Care Instructions. \"  Current as of: December 3, 2017  Content Version: 11.8  © 7211-3984 [employer]. Care instructions adapted under license by [employer] (which disclaims liability or warranty for this information). If you have questions about a medical condition or this instruction, always ask your healthcare professional. [place] [address] any warranty or liability for your use of this information.

## 2019-01-02 ENCOUNTER — TELEPHONE (OUTPATIENT)
Dept: INTERNAL MEDICINE CLINIC | Age: 69
End: 2019-01-02

## 2019-01-21 RX ORDER — PEN NEEDLE, DIABETIC 31 GX3/16"
NEEDLE, DISPOSABLE MISCELLANEOUS
Qty: 200 PEN NEEDLE | Refills: 11 | Status: SHIPPED | OUTPATIENT
Start: 2019-01-21 | End: 2020-02-28 | Stop reason: SDUPTHER

## 2019-01-30 ENCOUNTER — OFFICE VISIT (OUTPATIENT)
Dept: INTERNAL MEDICINE CLINIC | Age: 69
End: 2019-01-30

## 2019-01-30 VITALS
OXYGEN SATURATION: 97 % | DIASTOLIC BLOOD PRESSURE: 51 MMHG | BODY MASS INDEX: 40.52 KG/M2 | TEMPERATURE: 97.9 F | RESPIRATION RATE: 18 BRPM | SYSTOLIC BLOOD PRESSURE: 95 MMHG | WEIGHT: 228.7 LBS | HEIGHT: 63 IN | HEART RATE: 62 BPM

## 2019-01-30 DIAGNOSIS — E78.00 HYPERCHOLESTEROLEMIA: ICD-10-CM

## 2019-01-30 DIAGNOSIS — I10 ESSENTIAL HYPERTENSION: ICD-10-CM

## 2019-01-30 DIAGNOSIS — E11.21 TYPE 2 DIABETES WITH NEPHROPATHY (HCC): Primary | ICD-10-CM

## 2019-01-30 DIAGNOSIS — E66.01 SEVERE OBESITY (BMI 35.0-39.9) WITH COMORBIDITY (HCC): ICD-10-CM

## 2019-01-30 RX ORDER — AMLODIPINE BESYLATE 10 MG/1
TABLET ORAL
Qty: 90 TAB | Refills: 3 | Status: SHIPPED | OUTPATIENT
Start: 2019-01-30 | End: 2020-01-27

## 2019-01-30 RX ORDER — INSULIN GLARGINE 100 [IU]/ML
32 INJECTION, SOLUTION SUBCUTANEOUS
Qty: 9 ADJUSTABLE DOSE PRE-FILLED PEN SYRINGE | Refills: 3
Start: 2019-01-30 | End: 2019-03-29 | Stop reason: SDUPTHER

## 2019-01-30 RX ORDER — INSULIN ASPART 100 [IU]/ML
8 INJECTION, SOLUTION INTRAVENOUS; SUBCUTANEOUS
Qty: 15 ADJUSTABLE DOSE PRE-FILLED PEN SYRINGE | Refills: 11
Start: 2019-01-30 | End: 2019-08-16 | Stop reason: SDUPTHER

## 2019-01-30 NOTE — PROGRESS NOTES
1. Have you been to the ER, urgent care clinic since your last visit? Hospitalized since your last visit? No    2. Have you seen or consulted any other health care providers outside of the [address] [place] [address] since your last visit? Include any pap smears or colon screening.  No

## 2019-01-30 NOTE — PROGRESS NOTES
SPORTS MEDICINE AND PRIMARY CARE  Fermín Haddad MD, [address] [address]  Phone:  [phone]  Fax: [phone]       Chief Complaint   Patient presents with    Diabetes     f/u   . SUBJECTIVE:    Fe Zaman is a 80 y.o. female Patient returns today with known history of diabetes mellitus type 2, CKD, dyslipidemia, primary hypertension, lumbar stenosis, obesity and is seen for evaluation. Patient has been seen by her podiatrist, Dr. Evangelista Frye, and is now back to her three month checkups. She is up to date on her eye appointment. She saw her ophthalmologist on the 19th of November and she saw her dentist on December 3rd. Patient is seen for evaluation. Current Outpatient Medications   Medication Sig Dispense Refill    insulin glargine (LANTUS,BASAGLAR) 100 unit/mL (3 mL) inpn 32 Units by SubCUTAneous route nightly. 9 Adjustable Dose Pre-filled Pen Syringe 3    insulin aspart U-100 (NOVOLOG FLEXPEN U-100 INSULIN) 100 unit/mL inpn 8 Units by SubCUTAneous route Before breakfast, lunch, and dinner. 15 Adjustable Dose Pre-filled Pen Syringe 11    amLODIPine (NORVASC) 10 mg tablet Take 1 tablet daily. 90 Tab 3    Insulin Needles, Disposable, (CHRISS PEN NEEDLE) 32 gauge x 5/32\" ndle Use to inject insulin four times a day. Dx.e11.9 200 Pen Needle 11    pantoprazole (PROTONIX) 40 mg tablet TAKE 1 TAB BY MOUTH DAILY (BEFORE BREAKFAST). 11    metoprolol tartrate (LOPRESSOR) 25 mg tablet Take 0.5 Tabs by mouth two (2) times a day. 90 Tab 3    cloNIDine HCl (CATAPRES) 0.2 mg tablet Take 1 Tab by mouth two (2) times a day. 180 Tab 3    Insulin Needles, Disposable, 29 gauge x 5/16\" ndle Use to inject insulin three times daily. dx.e119 300 Each 3    glucose blood VI test strips (ONETOUCH ULTRA TEST) strip Use to test blood sugar three times a day. dx. e11.9 100 Strip 11    lancets 28 gauge misc Use to test blood sugar three times a day.  Dx.e11.9 100 Lancet 11  simvastatin (ZOCOR) 40 mg tablet Take 1 Tab by mouth nightly. 90 Tab 3    albuterol (PROVENTIL HFA, VENTOLIN HFA, PROAIR HFA) 90 mcg/actuation inhaler Take 2 Puffs by inhalation every four (4) hours as needed for Wheezing. 1 Inhaler 0    calcitRIOL (ROCALTROL) 0.25 mcg capsule Take 1 Cap by mouth daily.  80 Cap 3     Past Medical History:   Diagnosis Date    Cellulitis of right abdominal wall 12/18/2017    letty whatley md    Diabetes ([facility] [address] 1995    DJD (degenerative joint disease) of knee     Gastroenteritis     Hypercholesterolemia     Hypertension     Kidney lesion, native, left     Knee pain     LBP (low back pain)     chiorpactor    Lumbar stenosis     Obesity     Rotator cuff arthropathy of right shoulder 06/22/2018    S/P colonoscopy 12-18-12     Past Surgical History:   Procedure Laterality Date    HX BREAST BIOPSY Right 1990    negative    HX COLONOSCOPY      HX HYSTERECTOMY      HX OOPHORECTOMY N/A     HX OTHER SURGICAL  12/23/2017    I&D infected abscess      No Known Allergies      REVIEW OF SYSTEMS:  General: negative for - chills or fever  ENT: negative for - headaches, nasal congestion or tinnitus  Respiratory: negative for - cough, hemoptysis, shortness of breath or wheezing  Cardiovascular : negative for - chest pain, edema, palpitations or shortness of breath  Gastrointestinal: negative for - abdominal pain, blood in stools, heartburn or nausea/vomiting  Genito-Urinary: no dysuria, trouble voiding, or hematuria  Musculoskeletal: negative for - gait disturbance, joint pain, joint stiffness or joint swelling  Neurological: no TIA or stroke symptoms  Hematologic: no bruises, no bleeding, no swollen glands  Integument: no lumps, mole changes, nail changes or rash  Endocrine: no malaise/lethargy or unexpected weight changes      Social History     Socioeconomic History    Marital status: SINGLE     Spouse name: Not on file    Number of children: Not on file    Years of education: Not on file    Highest education level: Not on file   Tobacco Use    Smoking status: Never Smoker    Smokeless tobacco: Never Used   Substance and Sexual Activity    Alcohol use: No     Alcohol/week: 0.0 oz    Drug use: No    Sexual activity: Not Currently   Social History Narrative    Medical History: Diverticulosis 2002DM 1995primary HTN 1995Obesity 1995Dyslipidemia 1995January 2012 moderate central stenosis L3-L4,    mild to moderate central stenosis at L4-L5 MRI    Gyn History: Last mammogram date 2013. Last menstrual perioddate hysterectomy  FOR FIBROIDS, PT W ITH ONE OVARY. Last    papdate . Menopausal hot flashes. Sexually active not currently sexually active. History of abnormal pap smears none. History of STDs    none. Vaginal discharge or odor none. HysterectomyDate NONE.    OB History: Total pregnancies 1. Pre term deliveries (>20-36.6 w eeks) 1. Surgical History: [facility] right breast bx , nicole paulino colonoscopy     Hospitalization/Major Diagnostic Procedure: [facility] right breast bx , kidney stones     Family History: Mother:  52 yrs, skin d/oFather: deceasedAunt: alive, lung cancerAdopted: alive    Social History: Alcohol Use Patient does not use alcohol. Smoking Status Patient is a never smoker. Marital Status: Single. Lives w ith:    alone. Education/School: has masters degree-[employer]. Family History   Problem Relation Age of Onset    Cancer Mother     No Known Problems Father        OBJECTIVE:    Visit Vitals  BP 95/51   Pulse 62   Temp 97.9 °F (36.6 °C) (Oral)   Resp 18   Ht 5' 3\" (1.6 m)   Wt 228 lb 11.2 oz (103.7 kg)   LMP  (LMP Unknown)   SpO2 97%   BMI 40.51 kg/m²     CONSTITUTIONAL: well , well nourished, appears age appropriate  EYES: perrla, eom intact  ENMT:moist mucous membranes, pharynx clear  NECK: supple.  Thyroid normal  RESPIRATORY: Chest: clear bilaterally   CARDIOVASCULAR: Heart: regular rate and rhythm  GASTROINTESTINAL: Abdomen: soft, bowel sounds active  HEMATOLOGIC: no pathological lymph nodes palpated  MUSCULOSKELETAL: Extremities: no edema, pulse 1+   INTEGUMENT: No unusual rashes or suspicious skin lesions noted. Nails appear normal.  NEUROLOGIC: non-focal exam   MENTAL STATUS: alert and oriented, appropriate affect           ASSESSMENT:  1. Type 2 diabetes with nephropathy ([address]    2. Severe obesity (BMI 35.0-39. 9) with comorbidity ([facility] [address]    3. Essential hypertension    4. Hypercholesterolemia      Patient's medical status is satisfactory. We discuss with her the need for a heart healthy, diabetic, weight reducing diet and physical activity 30 minutes five days a week. Her repeat blood pressure is acceptable at 140/70. I suspect that when she is not in the office it is in the 120s-130s. We will check her renal status today, particularly in view of the history of CKD. She will return to the office in three months, sooner if she has any problems. I have discussed the diagnosis with the patient and the intended plan as seen in the  orders above. The patient understands and agees with the plan. The patient has   received an after visit summary and questions were answered concerning  future plans  Patient labs and/or xrays were reviewed  Past records were reviewed. PLAN:  .  Orders Placed This Encounter    RENAL FUNCTION PANEL    HEMOGLOBIN A1C WITH EAG    insulin glargine (LANTUS,BASAGLAR) 100 unit/mL (3 mL) inpn    insulin aspart U-100 (NOVOLOG FLEXPEN U-100 INSULIN) 100 unit/mL inpn    amLODIPine (NORVASC) 10 mg tablet       Follow-up Disposition:  Return in about 3 months (around 4/30/2019). ATTENTION:   This medical record was transcribed using an electronic medical records system. Although proofread, it may and can contain electronic and spelling errors. Other human spelling and other errors may be present. Corrections may be executed at a later time.   Please feel free to contact us for any clarifications as needed.

## 2019-01-31 LAB
ALBUMIN SERPL-MCNC: 3.9 G/DL (ref 3.6–4.8)
BUN SERPL-MCNC: 23 MG/DL (ref 8–27)
BUN/CREAT SERPL: 23 (ref 12–28)
CALCIUM SERPL-MCNC: 9.8 MG/DL (ref 8.7–10.3)
CHLORIDE SERPL-SCNC: 108 MMOL/L (ref 96–106)
CO2 SERPL-SCNC: 23 MMOL/L (ref 20–29)
CREAT SERPL-MCNC: 1 MG/DL (ref 0.57–1)
EST. AVERAGE GLUCOSE BLD GHB EST-MCNC: 169 MG/DL
GLUCOSE SERPL-MCNC: 52 MG/DL (ref 65–99)
HBA1C MFR BLD: 7.5 % (ref 4.8–5.6)
PHOSPHATE SERPL-MCNC: 4.3 MG/DL (ref 2.5–4.5)
POTASSIUM SERPL-SCNC: 4.2 MMOL/L (ref 3.5–5.2)
SODIUM SERPL-SCNC: 147 MMOL/L (ref 134–144)

## 2019-02-27 NOTE — ANCILLARY DISCHARGE INSTRUCTIONS
[facility], [address] Phone: [phone] Fax: [phone] Discharge Summary 2-15 Patient name: Barry Salmon                          : 1950                        Provider#: [id number] Referral source: Tavoraj Olguin, * Medical/Treatment Diagnosis: Right shoulder pain [M25.511] Prior Hospitalization: see medical history Comorbidities: OA, Diabetes, HTN, spinal stenosis Prior Level of Function:  Water aerobics 3 days per week. Medications: Verified on Patient Summary List 
Start of Care:10/2/18                                                                      Onset Date:  [facility] Visits from Start of Care:10     Missed Visits: 0 Reporting Period : 10/3/18 to 11/15/18 Progress towards goals / Updated goals: 
Short Term Goals: To be accomplished in 4 weeks: MET 
1) Pt will be Independent with HEP 
2) Pt will be able to Reach to shoulder level without pain 3) Pt will be able to improve sleeping pain by 25% 
  
  
Long Term Goals: To be accomplished in 8 weeks: 
1) Pt will be able to Reach above shoulder level without increase of pain 2) Pt will be able to Tuck in the shirt without increase of pain 3) Pt will have improved shoulder flexion, abduction and  ER strength to 4+ 4) Pt will be able to sleep on involved side without waking due to pain        
 
 
Assessment/Summary of care:  
Pt has been seen for 10 sessions. Overall feeling improvement with sleeping positions  and seeing progress with exercises. Given HEP to continue on her own and discuss POC with MD. Pain is still present with resisted abduction, however improvement has been made since initial session. Flexion 145 and [id number] degrees actively today MMT: flexion 4/5 Abduction: 4/5 p! ER:4+/5 IR: 5/5 
 FOTO: no change Pain Level (0-10 scale) post treatment: 0/10 
  
 
RECOMMENDATIONS: 
[x]Discontinue therapy: [x]Patient has reached or is progressing toward set goals []Patient is non-compliant or has abdicated 
   []Due to lack of appreciable progress towards set goals Manda Shah, PT 2/27/2019

## 2019-03-17 RX ORDER — PANTOPRAZOLE SODIUM 40 MG/1
40 TABLET, DELAYED RELEASE ORAL
Qty: 30 TAB | Refills: 11 | Status: SHIPPED | OUTPATIENT
Start: 2019-03-17 | End: 2020-01-12

## 2019-03-29 RX ORDER — INSULIN GLARGINE 100 [IU]/ML
32 INJECTION, SOLUTION SUBCUTANEOUS
Qty: 9 ADJUSTABLE DOSE PRE-FILLED PEN SYRINGE | Refills: 3 | Status: SHIPPED | OUTPATIENT
Start: 2019-03-29 | End: 2020-02-18

## 2019-04-08 RX ORDER — SIMVASTATIN 40 MG/1
40 TABLET, FILM COATED ORAL
Qty: 90 TAB | Refills: 3 | Status: SHIPPED | OUTPATIENT
Start: 2019-04-08 | End: 2020-03-29

## 2019-04-30 ENCOUNTER — OFFICE VISIT (OUTPATIENT)
Dept: INTERNAL MEDICINE CLINIC | Age: 69
End: 2019-04-30

## 2019-04-30 VITALS
TEMPERATURE: 97.8 F | BODY MASS INDEX: 40.93 KG/M2 | HEIGHT: 63 IN | SYSTOLIC BLOOD PRESSURE: 120 MMHG | OXYGEN SATURATION: 96 % | HEART RATE: 67 BPM | WEIGHT: 231 LBS | DIASTOLIC BLOOD PRESSURE: 70 MMHG | RESPIRATION RATE: 18 BRPM

## 2019-04-30 DIAGNOSIS — M12.811 ROTATOR CUFF ARTHROPATHY OF RIGHT SHOULDER: ICD-10-CM

## 2019-04-30 DIAGNOSIS — E11.21 TYPE 2 DIABETES WITH NEPHROPATHY (HCC): Primary | ICD-10-CM

## 2019-04-30 DIAGNOSIS — K52.9 GASTROENTERITIS: ICD-10-CM

## 2019-04-30 DIAGNOSIS — E66.01 SEVERE OBESITY (BMI 35.0-39.9) WITH COMORBIDITY (HCC): ICD-10-CM

## 2019-04-30 DIAGNOSIS — E78.00 HYPERCHOLESTEROLEMIA: ICD-10-CM

## 2019-04-30 DIAGNOSIS — I10 ESSENTIAL HYPERTENSION: ICD-10-CM

## 2019-04-30 NOTE — PATIENT INSTRUCTIONS
Body Mass Index: Care Instructions  Your Care Instructions    Body mass index (BMI) can help you see if your weight is raising your risk for health problems. It uses a formula to compare how much you weigh with how tall you are. · A BMI lower than 18.5 is considered underweight. · A BMI between 18.5 and 24.9 is considered healthy. · A BMI between 25 and 29.9 is considered overweight. A BMI of 30 or higher is considered obese. If your BMI is in the normal range, it means that you have a lower risk for weight-related health problems. If your BMI is in the overweight or obese range, you may be at increased risk for weight-related health problems, such as high blood pressure, heart disease, stroke, arthritis or joint pain, and diabetes. If your BMI is in the underweight range, you may be at increased risk for health problems such as fatigue, lower protection (immunity) against illness, muscle loss, bone loss, hair loss, and hormone problems. BMI is just one measure of your risk for weight-related health problems. You may be at higher risk for health problems if you are not active, you eat an unhealthy diet, or you drink too much alcohol or use tobacco products. Follow-up care is a key part of your treatment and safety. Be sure to make and go to all appointments, and call your doctor if you are having problems. It's also a good idea to know your test results and keep a list of the medicines you take. How can you care for yourself at home? · Practice healthy eating habits. This includes eating plenty of fruits, vegetables, whole grains, lean protein, and low-fat dairy. · If your doctor recommends it, get more exercise. Walking is a good choice. Bit by bit, increase the amount you walk every day. Try for at least 30 minutes on most days of the week. · Do not smoke. Smoking can increase your risk for health problems. If you need help quitting, talk to your doctor about stop-smoking programs and medicines. These can increase your chances of quitting for good. · Limit alcohol to 2 drinks a day for men and 1 drink a day for women. Too much alcohol can cause health problems. If you have a BMI higher than 25  · Your doctor may do other tests to check your risk for weight-related health problems. This may include measuring the distance around your waist. A waist measurement of more than 40 inches in men or 35 inches in women can increase the risk of weight-related health problems. · Talk with your doctor about steps you can take to stay healthy or improve your health. You may need to make lifestyle changes to lose weight and stay healthy, such as changing your diet and getting regular exercise. If you have a BMI lower than 18.5  · Your doctor may do other tests to check your risk for health problems. · Talk with your doctor about steps you can take to stay healthy or improve your health. You may need to make lifestyle changes to gain or maintain weight and stay healthy, such as getting more healthy foods in your diet and doing exercises to build muscle. Where can you learn more? Go to http://king-charles.info/. Enter S176 in the search box to learn more about \"Body Mass Index: Care Instructions. \"  Current as of: October 13, 2016  Content Version: 11.4  © 1128-4232 [employer]wise, Incorporated. Care instructions adapted under license by [employer] (which disclaims liability or warranty for this information). If you have questions about a medical condition or this instruction, always ask your healthcare professional. [address] any warranty or liability for your use of this information.

## 2019-04-30 NOTE — PROGRESS NOTES
SPORTS MEDICINE AND PRIMARY CARE  Karen Sanchez MD, [address] [facility],[address] [address]  Phone:  [phone]  Fax: [phone]       Chief Complaint   Patient presents with    Diabetes     f/u   . SUBJECTIVE:    Easotn Kim is a 80 y.o. female Patient comes in today with known history of diabetes mellitus type 2 with nephropathy, severe obesity, rotator cuff arthropathy, primary hypertension, dyslipidemia, GERD, and is seen for evaluation. She comes in today telling me she is under a great deal of stress at home related to issues which may account for some of the concerns I have. Patient is seen for evaluation. Current Outpatient Medications   Medication Sig Dispense Refill    simvastatin (ZOCOR) 40 mg tablet Take 1 Tab by mouth nightly. 90 Tab 3    insulin glargine (LANTUS,BASAGLAR) 100 unit/mL (3 mL) inpn 32 Units by SubCUTAneous route nightly. 9 Adjustable Dose Pre-filled Pen Syringe 3    pantoprazole (PROTONIX) 40 mg tablet TAKE 1 TAB BY MOUTH DAILY (BEFORE BREAKFAST). 30 Tab 11    insulin aspart U-100 (NOVOLOG FLEXPEN U-100 INSULIN) 100 unit/mL inpn 8 Units by SubCUTAneous route Before breakfast, lunch, and dinner. 15 Adjustable Dose Pre-filled Pen Syringe 11    amLODIPine (NORVASC) 10 mg tablet Take 1 tablet daily. 90 Tab 3    Insulin Needles, Disposable, (CHRISS PEN NEEDLE) 32 gauge x 5/32\" ndle Use to inject insulin four times a day. Dx.e11.9 200 Pen Needle 11    metoprolol tartrate (LOPRESSOR) 25 mg tablet Take 0.5 Tabs by mouth two (2) times a day. 90 Tab 3    cloNIDine HCl (CATAPRES) 0.2 mg tablet Take 1 Tab by mouth two (2) times a day. 180 Tab 3    calcitRIOL (ROCALTROL) 0.25 mcg capsule Take 1 Cap by mouth daily. 90 Cap 3    Insulin Needles, Disposable, 29 gauge x 5/16\" ndle Use to inject insulin three times daily. dx.e119 300 Each 3    glucose blood VI test strips (ONETOUCH ULTRA TEST) strip Use to test blood sugar three times a day. dx. e11.9 100 Strip 11    lancets 28 gauge misc Use to test blood sugar three times a day. Dx.e11.9 100 Lancet 11    albuterol (PROVENTIL HFA, VENTOLIN HFA, PROAIR HFA) 90 mcg/actuation inhaler Take 2 Puffs by inhalation every four (4) hours as needed for Wheezing.  1 Inhaler 0     Past Medical History:   Diagnosis Date    Cellulitis of right abdominal wall 12/18/2017    letty whately md    Diabetes ([facility] [address] 1995    DJD (degenerative joint disease) of knee     Gastroenteritis     Hypercholesterolemia     Hypertension     Kidney lesion, native, left     Knee pain     LBP (low back pain)     chiorpactor    Lumbar stenosis     Obesity     Rotator cuff arthropathy of right shoulder 06/22/2018    S/P colonoscopy 12-18-12     Past Surgical History:   Procedure Laterality Date    HX BREAST BIOPSY Right 1990    negative    HX COLONOSCOPY      HX HYSTERECTOMY      HX OOPHORECTOMY N/A     HX OTHER SURGICAL  12/23/2017    I&D infected abscess      No Known Allergies      REVIEW OF SYSTEMS:  General: negative for - chills or fever  ENT: negative for - headaches, nasal congestion or tinnitus  Respiratory: negative for - cough, hemoptysis, shortness of breath or wheezing  Cardiovascular : negative for - chest pain, edema, palpitations or shortness of breath  Gastrointestinal: negative for - abdominal pain, blood in stools, heartburn or nausea/vomiting  Genito-Urinary: no dysuria, trouble voiding, or hematuria  Musculoskeletal: negative for - gait disturbance, joint pain, joint stiffness or joint swelling  Neurological: no TIA or stroke symptoms  Hematologic: no bruises, no bleeding, no swollen glands  Integument: no lumps, mole changes, nail changes or rash  Endocrine: no malaise/lethargy or unexpected weight changes      Social History     Socioeconomic History    Marital status: SINGLE     Spouse name: Not on file    Number of children: Not on file    Years of education: Not on file    Highest education level: Not on file Tobacco Use    Smoking status: Never Smoker    Smokeless tobacco: Never Used   Substance and Sexual Activity    Alcohol use: No     Alcohol/week: 0.0 oz    Drug use: No    Sexual activity: Not Currently   Social History Narrative    Medical History: Diverticulosis 2002DM 1995primary HTN 1995Obesity Dyslipidemia 1995January 2012 moderate central stenosis L3-L4,    mild to moderate central stenosis at L4-L5 MRI    Gyn History: Last mammogram date 2013. Last menstrual perioddate hysterectomy  FOR FIBROIDS, PT W ITH ONE OVARY. Last    papdate . Menopausal hot flashes. Sexually active not currently sexually active. History of abnormal pap smears none. History of STDs    none. Vaginal discharge or odor none. HysterectomyDate NONE.    OB History: Total pregnancies 1. Pre term deliveries (>20-36.6 w eeks) 1. Surgical History: [facility] right breast bx , kidney stones colonoscopy     Hospitalization/Major Diagnostic Procedure: [facility] right breast bx , kidney stones     Family History: Mother:  52 yrs, skin d/oFather: deceasedAunt: alive, lung cancerAdopted: alive    Social History: Alcohol Use Patient does not use alcohol. Smoking Status Patient is a never smoker. Marital Status: Single. Lives w ith:    alone. Education/School: has masters degree-[employer]. Family History   Problem Relation Age of Onset   [address] [place] [address] Cancer Mother     No Known Problems Father        OBJECTIVE:    Visit Vitals  /70   Pulse 67   Temp 97.8 °F (36.6 °C) (Oral)   Resp 18   Ht 5' 3\" (1.6 m)   Wt 231 lb (104.8 kg)   LMP  (LMP Unknown)   SpO2 96%   BMI 40.92 kg/m²     CONSTITUTIONAL: well , well nourished, appears age appropriate  EYES: perrla, eom intact  ENMT:moist mucous membranes, pharynx clear  NECK: supple.  Thyroid normal  RESPIRATORY: Chest: clear bilaterally   CARDIOVASCULAR: Heart: regular rate and rhythm  GASTROINTESTINAL: Abdomen: soft, bowel sounds active  HEMATOLOGIC: no pathological lymph nodes palpated  MUSCULOSKELETAL: Extremities: no edema, pulse 1+   INTEGUMENT: No unusual rashes or suspicious skin lesions noted. Nails appear normal.  NEUROLOGIC: non-focal exam   MENTAL STATUS: alert and oriented, appropriate affect           ASSESSMENT:  1. Type 2 diabetes with nephropathy ([address]    2. Severe obesity (BMI 35.0-39. 9) with comorbidity ([address]    3. Rotator cuff arthropathy of right shoulder    4. Essential hypertension    5. Hypercholesterolemia    6. Gastroenteritis      Patient's medical status is satisfactory. We are extremely disappointed in her decisions regarding her diet. since the summer is coming on, I am sure she is going to be physically active for at least 30 minutes five days a week and adhere to a heart healthy, diabetic, weight reducing diet. Unfortunately, however, this diet that she is currently doing has resulted in her going from severe obesity to morbid obesity, which puts her at increased risk for comorbidities and we mention that to her. BP control is at goal.    We will assess blood sugar control with Hgb A1c. It will be interesting to see what it is with the weight change. She will be back to see us in three months. We will send her results of her lab studies. She will make the appointment to have mammograms done. We will make an appointment for her to see podiatry. I have discussed the diagnosis with the patient and the intended plan as seen in the  orders above. The patient understands and agees with the plan. The patient has   received an after visit summary and questions were answered concerning  future plans  Patient labs and/or xrays were reviewed  Past records were reviewed.     PLAN:  .  Orders Placed This Encounter    LIPID PANEL    URINALYSIS W/ RFLX MICROSCOPIC    CBC WITH AUTOMATED DIFF    METABOLIC PANEL, COMPREHENSIVE    TSH 3RD GENERATION    REFERRAL TO PODIATRY       Follow-up and Dispositions    · Return in about 3 months (around 7/30/2019). ATTENTION:   This medical record was transcribed using an electronic medical records system. Although proofread, it may and can contain electronic and spelling errors. Other human spelling and other errors may be present. Corrections may be executed at a later time. Please feel free to contact us for any clarifications as needed. Discussed the patient's BMI with her. The BMI follow up plan is as follows:     dietary management education, guidance, and counseling  encourage exercise  monitor weight  prescribed dietary intake    An After Visit Summary was printed and given to the patient.

## 2019-04-30 NOTE — PROGRESS NOTES
1. Have you been to the ER, urgent care clinic since your last visit? Hospitalized since your last visit? No    2. Have you seen or consulted any other health care providers outside of the [address] [place] [address] since your last visit? Include any pap smears or colon screening.  No     Wants to discuss protonix  and amlodipine

## 2019-05-01 LAB
ALBUMIN SERPL-MCNC: 3.8 G/DL (ref 3.6–4.8)
ALBUMIN/GLOB SERPL: 1.3 {RATIO} (ref 1.2–2.2)
ALP SERPL-CCNC: 77 IU/L (ref 39–117)
ALT SERPL-CCNC: 12 IU/L (ref 0–32)
APPEARANCE UR: CLEAR
AST SERPL-CCNC: 15 IU/L (ref 0–40)
BASOPHILS # BLD AUTO: 0 X10E3/UL (ref 0–0.2)
BASOPHILS NFR BLD AUTO: 0 %
BILIRUB SERPL-MCNC: 0.2 MG/DL (ref 0–1.2)
BILIRUB UR QL STRIP: NEGATIVE
BUN SERPL-MCNC: 20 MG/DL (ref 8–27)
BUN/CREAT SERPL: 18 (ref 12–28)
CALCIUM SERPL-MCNC: 9.5 MG/DL (ref 8.7–10.3)
CHLORIDE SERPL-SCNC: 105 MMOL/L (ref 96–106)
CHOLEST SERPL-MCNC: 137 MG/DL (ref 100–199)
CO2 SERPL-SCNC: 23 MMOL/L (ref 20–29)
COLOR UR: YELLOW
CREAT SERPL-MCNC: 1.13 MG/DL (ref 0.57–1)
EOSINOPHIL # BLD AUTO: 0.1 X10E3/UL (ref 0–0.4)
EOSINOPHIL NFR BLD AUTO: 1 %
ERYTHROCYTE [DISTWIDTH] IN BLOOD BY AUTOMATED COUNT: 15.3 % (ref 12.3–15.4)
GLOBULIN SER CALC-MCNC: 2.9 G/DL (ref 1.5–4.5)
GLUCOSE SERPL-MCNC: 239 MG/DL (ref 65–99)
GLUCOSE UR QL: ABNORMAL
HCT VFR BLD AUTO: 42.1 % (ref 34–46.6)
HDLC SERPL-MCNC: 48 MG/DL
HGB BLD-MCNC: 13.5 G/DL (ref 11.1–15.9)
HGB UR QL STRIP: NEGATIVE
IMM GRANULOCYTES # BLD AUTO: 0 X10E3/UL (ref 0–0.1)
IMM GRANULOCYTES NFR BLD AUTO: 0 %
KETONES UR QL STRIP: NEGATIVE
LDLC SERPL CALC-MCNC: 75 MG/DL (ref 0–99)
LEUKOCYTE ESTERASE UR QL STRIP: NEGATIVE
LYMPHOCYTES # BLD AUTO: 1.1 X10E3/UL (ref 0.7–3.1)
LYMPHOCYTES NFR BLD AUTO: 15 %
MCH RBC QN AUTO: 28.5 PG (ref 26.6–33)
MCHC RBC AUTO-ENTMCNC: 32.1 G/DL (ref 31.5–35.7)
MCV RBC AUTO: 89 FL (ref 79–97)
MICRO URNS: ABNORMAL
MONOCYTES # BLD AUTO: 0.6 X10E3/UL (ref 0.1–0.9)
MONOCYTES NFR BLD AUTO: 8 %
NEUTROPHILS # BLD AUTO: 5.6 X10E3/UL (ref 1.4–7)
NEUTROPHILS NFR BLD AUTO: 76 %
NITRITE UR QL STRIP: NEGATIVE
PH UR STRIP: 5.5 [PH] (ref 5–7.5)
PLATELET # BLD AUTO: 244 X10E3/UL (ref 150–379)
POTASSIUM SERPL-SCNC: 4.6 MMOL/L (ref 3.5–5.2)
PROT SERPL-MCNC: 6.7 G/DL (ref 6–8.5)
PROT UR QL STRIP: NEGATIVE
RBC # BLD AUTO: 4.73 X10E6/UL (ref 3.77–5.28)
SODIUM SERPL-SCNC: 145 MMOL/L (ref 134–144)
SP GR UR: 1.03 (ref 1–1.03)
TRIGL SERPL-MCNC: 70 MG/DL (ref 0–149)
TSH SERPL DL<=0.005 MIU/L-ACNC: 0.96 UIU/ML (ref 0.45–4.5)
UROBILINOGEN UR STRIP-MCNC: 0.2 MG/DL (ref 0.2–1)
VLDLC SERPL CALC-MCNC: 14 MG/DL (ref 5–40)
WBC # BLD AUTO: 7.5 X10E3/UL (ref 3.4–10.8)

## 2019-05-09 ENCOUNTER — HOSPITAL ENCOUNTER (OUTPATIENT)
Dept: MAMMOGRAPHY | Age: 69
Discharge: HOME | End: 2019-05-09
Attending: INTERNAL MEDICINE
Payer: MEDICARE

## 2019-05-09 DIAGNOSIS — Z12.39 SCREENING BREAST EXAMINATION: ICD-10-CM

## 2019-05-09 PROCEDURE — 77067 SCR MAMMO BI INCL CAD: CPT

## 2019-06-23 RX ORDER — LANCETS 33 GAUGE
EACH MISCELLANEOUS
Qty: 100 LANCET | Refills: 11 | Status: SHIPPED | OUTPATIENT
Start: 2019-06-23 | End: 2021-01-22

## 2019-07-05 ENCOUNTER — APPOINTMENT (OUTPATIENT)
Dept: CT IMAGING | Age: 69
End: 2019-07-05
Attending: PHYSICIAN ASSISTANT
Payer: MEDICARE

## 2019-07-05 ENCOUNTER — HOSPITAL ENCOUNTER (EMERGENCY)
Age: 69
Discharge: HOME | End: 2019-07-05
Attending: EMERGENCY MEDICINE
Payer: MEDICARE

## 2019-07-05 VITALS
HEART RATE: 64 BPM | WEIGHT: 233.47 LBS | HEIGHT: 63 IN | TEMPERATURE: 98.3 F | RESPIRATION RATE: 18 BRPM | SYSTOLIC BLOOD PRESSURE: 148 MMHG | DIASTOLIC BLOOD PRESSURE: 81 MMHG | BODY MASS INDEX: 41.37 KG/M2 | OXYGEN SATURATION: 100 %

## 2019-07-05 DIAGNOSIS — S09.90XA MINOR HEAD INJURY, INITIAL ENCOUNTER: Primary | ICD-10-CM

## 2019-07-05 DIAGNOSIS — S16.1XXA STRAIN OF NECK MUSCLE, INITIAL ENCOUNTER: ICD-10-CM
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
## 2019-07-05 PROCEDURE — 99283 EMERGENCY DEPT VISIT LOW MDM: CPT

## 2019-07-05 PROCEDURE — 70450 CT HEAD/BRAIN W/O DYE: CPT

## 2019-07-05 PROCEDURE — 72125 CT NECK SPINE W/O DYE: CPT

## 2019-07-05 RX ORDER — BUTALBITAL, ACETAMINOPHEN AND CAFFEINE 300; 40; 50 MG/1; MG/1; MG/1
1 CAPSULE ORAL
Qty: 12 CAP | Refills: 0 | Status: SHIPPED | OUTPATIENT
Start: 2019-07-05 | End: 2019-08-01 | Stop reason: ALTCHOICE

## 2019-07-05 NOTE — ED NOTES
Norah Homans PA-C reviewed discharge instructions with the patient. The patient verbalized understanding. All questions and concerns were addressed. The patient declined a wheelchair and is discharged ambulatory in the care of family members with instructions and prescriptions in hand. Pt is alert and oriented x 4. Respirations are clear and unlabored.

## 2019-07-05 NOTE — ED PROVIDER NOTES
EMERGENCY DEPARTMENT HISTORY AND PHYSICAL EXAM      Date: 7/5/2019  Patient Name: Nikos Marin    History of Presenting Illness     Chief Complaint   Patient presents with    Fall     ambulatory to triage, states that she fell off a ladder at approx 2-3 feet off the ground and hit her right temporal on a wooden arm of chair. States that she has had a headache since then. Not currently taking blood thineers, denies pain any where else    Headache       History Provided By: Patient    HPI: Nikos Marin, 80 y.o. female presents ambulatory to the ED with cc of about a day of 5 out of 10 constant, aching right-sided head pain that is worse with palpation and started suddenly yesterday when she accidentally stumbled off the second or third step of a stepladder in her home while cleaning the top of her armoire. She has clear recollection of all events prior to and following the injury. She denies any other joint pain but says she does have some neck pain. Her fall was mechanical in nature, meaning she simply stumbled. There was no report of dizziness, weakness, numbness, vision changes, nausea, chest pain, abdominal pain, shortness of breath or other complaints. There are no other complaints, changes, or physical findings at this time. PCP: Abdullahi Snyder MD    Current Outpatient Medications   Medication Sig Dispense Refill    butalbital-acetaminophen-caff (FIORICET) -40 mg per capsule Take 1 Cap by mouth every six (6) hours as needed for Headache. 12 Cap 0    lancets (ONE TOUCH DELICA) 33 gauge misc USE TO TEST BLOOD SUGAR THREE TIMES A DAY. DX.E11.9 100 Lancet 11    simvastatin (ZOCOR) 40 mg tablet Take 1 Tab by mouth nightly. 90 Tab 3    insulin glargine (LANTUS,BASAGLAR) 100 unit/mL (3 mL) inpn 32 Units by SubCUTAneous route nightly. 9 Adjustable Dose Pre-filled Pen Syringe 3    pantoprazole (PROTONIX) 40 mg tablet TAKE 1 TAB BY MOUTH DAILY (BEFORE BREAKFAST).  30 Tab 11    insulin aspart U-100 (NOVOLOG FLEXPEN U-100 INSULIN) 100 unit/mL inpn 8 Units by SubCUTAneous route Before breakfast, lunch, and dinner. 15 Adjustable Dose Pre-filled Pen Syringe 11    amLODIPine (NORVASC) 10 mg tablet Take 1 tablet daily. 90 Tab 3    Insulin Needles, Disposable, (CHRISS PEN NEEDLE) 32 gauge x 5/32\" ndle Use to inject insulin four times a day. Dx.e11.9 200 Pen Needle 11    metoprolol tartrate (LOPRESSOR) 25 mg tablet Take 0.5 Tabs by mouth two (2) times a day. 90 Tab 3    cloNIDine HCl (CATAPRES) 0.2 mg tablet Take 1 Tab by mouth two (2) times a day. 180 Tab 3    calcitRIOL (ROCALTROL) 0.25 mcg capsule Take 1 Cap by mouth daily. 90 Cap 3    Insulin Needles, Disposable, 29 gauge x 5/16\" ndle Use to inject insulin three times daily. dx.e119 300 Each 3    glucose blood VI test strips (ONETOUCH ULTRA TEST) strip Use to test blood sugar three times a day. dx. e11.9 100 Strip 11    albuterol (PROVENTIL HFA, VENTOLIN HFA, PROAIR HFA) 90 mcg/actuation inhaler Take 2 Puffs by inhalation every four (4) hours as needed for Wheezing.  1 Inhaler 0     Past History     Past Medical History:  Past Medical History:   Diagnosis Date    Cellulitis of right abdominal wall 12/18/2017    letty whatley md    Diabetes ([facility] [address] 1995    DJD (degenerative joint disease) of knee     Gastroenteritis     Hypercholesterolemia     Hypertension     Kidney lesion, native, left     Knee pain     LBP (low back pain)     chiorpactor    Lumbar stenosis     Obesity     Rotator cuff arthropathy of right shoulder 06/22/2018    S/P colonoscopy 12-18-12       Past Surgical History:  Past Surgical History:   Procedure Laterality Date    HX BREAST BIOPSY Right 1990    negative    HX COLONOSCOPY      HX HYSTERECTOMY      HX OOPHORECTOMY N/A     HX OTHER SURGICAL  12/23/2017    I&D infected abscess        Family History:  Family History   Problem Relation Age of Onset    Cancer Mother     No Known Problems Father Social History:  Social History     Tobacco Use    Smoking status: Never Smoker    Smokeless tobacco: Never Used   Substance Use Topics    Alcohol use: No     Alcohol/week: 0.0 oz    Drug use: No       Allergies:  No Known Allergies  Review of Systems   Review of Systems   Constitutional: Negative for fatigue and fever. HENT: Negative for ear pain and sore throat. Eyes: Negative for pain, redness and visual disturbance. Respiratory: Negative for cough and shortness of breath. Cardiovascular: Negative for chest pain and palpitations. Gastrointestinal: Negative for abdominal pain, nausea and vomiting. Genitourinary: Negative for dysuria, frequency and urgency. Musculoskeletal: Positive for neck pain. Negative for back pain, gait problem and neck stiffness. Skin: Negative for rash and wound. Neurological: Positive for headaches (Right-sided head pain). Negative for dizziness, weakness, light-headedness and numbness. Physical Exam   Physical Exam   Constitutional: She is oriented to person, place, and time. She appears well-developed and well-nourished. Non-toxic appearance. No distress. HENT:   Head: Normocephalic and atraumatic. Right Ear: External ear normal.   Left Ear: External ear normal.   Nose: Nose normal.   Mouth/Throat: Uvula is midline. No trismus in the jaw. No bruising, redness or swelling  No hemotympanum  Right sided scalp tenderness   Eyes: Pupils are equal, round, and reactive to light. Conjunctivae and EOM are normal. No scleral icterus. Neck: Normal range of motion and full passive range of motion without pain. Muscular tenderness present. Cardiovascular: Normal rate and regular rhythm. Pulmonary/Chest: Effort normal. No accessory muscle usage. No tachypnea. No respiratory distress. She has no decreased breath sounds. She has no wheezes. Abdominal: Soft. There is no tenderness. Musculoskeletal: Normal range of motion.    Neurological: She is alert and oriented to person, place, and time. She has normal strength. She is not disoriented. No cranial nerve deficit or sensory deficit. Gait normal. GCS eye subscore is 4. GCS verbal subscore is 5. GCS motor subscore is 6. No focal neurological deficits   Skin: Skin is intact. No rash noted. Psychiatric: She has a normal mood and affect. Her speech is normal.   Nursing note and vitals reviewed. Diagnostic Study Results     Labs -   No results found for this or any previous visit (from the past 12 hour(s)). Radiologic Studies -   CT HEAD WO CONT   Final Result   IMPRESSION: No acute changes. CT SPINE CERV WO CONT   Final Result   IMPRESSION:   No fracture or subluxation. C5-6 and C6-7 degenerative changes with mild central spinal stenosis. CT Results  (Last 48 hours)               07/05/19 1054  CT HEAD WO CONT Final result    Impression:  IMPRESSION: No acute changes. Narrative:  EXAM: CT HEAD WO CONT       INDICATION: Head injury mild or moderate acute, no neurological deficit       COMPARISON: None. CONTRAST: None. TECHNIQUE: Unenhanced CT of the head was performed using 5 mm images. Brain and   bone windows were generated. CT dose reduction was achieved through use of a   standardized protocol tailored for this examination and automatic exposure   control for dose modulation. FINDINGS:   There is no extra-axial fluid collection hemorrhage shift or masses. 07/05/19 1054  CT SPINE CERV WO CONT Final result    Impression:  IMPRESSION:   No fracture or subluxation. C5-6 and C6-7 degenerative changes with mild central spinal stenosis. Narrative:  EXAM:  CT CERVICAL SPINE WITHOUT CONTRAST       INDICATION: Neck pain following trauma. COMPARISON: None. CONTRAST:  None. TECHNIQUE: Multislice helical CT of the cervical spine was performed without   intravenous contrast administration.   Sagittal and coronal reconstructions were   generated. CT dose reduction was achieved through use of a standardized   protocol tailored for this examination and automatic exposure control for dose   modulation. FINDINGS:       The alignment is within normal limits. There is no fracture or subluxation. The   odontoid process is intact. The craniocervical junction is within normal limits. The prevertebral soft tissues are within normal limits. C2-C3: There is no spinal canal or neural foraminal stenosis. C3-C4: There is no spinal canal or neural foraminal stenosis. C4-C5: There is disc degeneration with loss of height of the disc, mild   posterior spurring, and limited ossification of the posterior longitudinal   ligament. There is mild central spinal stenosis. There is no significant   foraminal stenosis. Garrel Richard C5-C6: There is disc degeneration with mild loss of height of the disc. There is   mild posterior spurring and bulging of the disc. There is mild central spinal   stenosis. There is no significant foraminal stenosis. Garrel Richard C6-C7: There is no spinal canal or neural foraminal stenosis. C7-T1: There is no spinal canal or neural foraminal stenosis. CXR Results  (Last 48 hours)    None        Medical Decision Making   I am the first provider for this patient. I reviewed the vital signs, available nursing notes, past medical history, past surgical history, family history and social history. Vital Signs-Reviewed the patient's vital signs. Patient Vitals for the past 12 hrs:   Temp Pulse Resp BP SpO2   07/05/19 1002 98.3 °F (36.8 °C) 64 18 148/81 100 %       Pulse Oximetry Analysis - 100% on room air    Records Reviewed: Nursing Notes, Old Medical Records, Previous Radiology Studies and Previous Laboratory Studies    Provider Notes (Medical Decision Making):   DDx: Minor head injury, cervical strain, cervical fracture, intracranial hemorrhage    ED Course:   Initial assessment performed.  The patients presenting problems have been discussed, and they are in agreement with the care plan formulated and outlined with them. I have encouraged them to ask questions as they arise throughout their visit. Disposition:  Discharge    PLAN:  1. Current Discharge Medication List      START taking these medications    Details   butalbital-acetaminophen-caff (FIORICET) -40 mg per capsule Take 1 Cap by mouth every six (6) hours as needed for Headache. Qty: 12 Cap, Refills: 0           2. Follow-up Information     Follow up With Specialties Details Why Contact Info    Cristóbal Whyte MD Internal Medicine Schedule an appointment as soon as possible for a visit As needed [id number] [address] [phone]          Return to ED if worse     Diagnosis     Clinical Impression:   1. Minor head injury, initial encounter    2. Strain of neck muscle, initial encounter    3.  Fall from ladder, initial encounter

## 2019-07-05 NOTE — ED NOTES
Pt fell from the top of a step ladder and struck her right temple on a piece of furniture. Pt complains of headache and neck pain. She denies LOC. Fall was caused because she lost her balance and had nothing to grab on to.

## 2019-08-01 ENCOUNTER — OFFICE VISIT (OUTPATIENT)
Dept: INTERNAL MEDICINE CLINIC | Age: 69
End: 2019-08-01

## 2019-08-01 VITALS
DIASTOLIC BLOOD PRESSURE: 72 MMHG | WEIGHT: 232.9 LBS | HEIGHT: 63 IN | HEART RATE: 56 BPM | TEMPERATURE: 97.5 F | SYSTOLIC BLOOD PRESSURE: 121 MMHG | OXYGEN SATURATION: 97 % | RESPIRATION RATE: 16 BRPM | BODY MASS INDEX: 41.27 KG/M2
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
## 2019-08-01 DIAGNOSIS — M12.811 ROTATOR CUFF ARTHROPATHY OF RIGHT SHOULDER: ICD-10-CM
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
## 2019-08-01 DIAGNOSIS — M48.02 CERVICAL SPINAL STENOSIS: ICD-10-CM

## 2019-08-01 DIAGNOSIS — I10 ESSENTIAL HYPERTENSION: ICD-10-CM

## 2019-08-01 DIAGNOSIS — E66.01 SEVERE OBESITY (BMI 35.0-39.9) WITH COMORBIDITY (HCC): ICD-10-CM

## 2019-08-01 DIAGNOSIS — E11.21 TYPE 2 DIABETES WITH NEPHROPATHY (HCC): Primary | ICD-10-CM

## 2019-08-01 DIAGNOSIS — N28.9 KIDNEY LESION, NATIVE, LEFT: ICD-10-CM

## 2019-08-01 NOTE — PROGRESS NOTES
1. Have you been to the ER, urgent care clinic since your last visit? Hospitalized since your last visit? Yes When: 7-5-19 Where: [facility] Reason for visit: fall    2. Have you seen or consulted any other health care providers outside of the [address] [place] [address] since your last visit? Include any pap smears or colon screening.  No

## 2019-08-01 NOTE — PROGRESS NOTES
[facility]  Anat Pino MD, [address] [address]  Phone:  [phone]  Fax: [phone]       Chief Complaint   Patient presents with    Diabetes     f/u   . SUBJECTIVE:    David Wen is a 80 y.o. female Patient returns today, was here on time, but for some reason the paperwork did not get finished for two hours and she had to wait. We apologized and she accepts our apologies. Since we last saw her on 07/05/19 she was seen in the ER by LAUREN Dewey following a fall. She fell off a step ladder that was approximately 2-3 feet off the ground, hit her right temple on a wooden armchair and says she has had a headache since then. She was evaluated in the ER for blunt head trauma and CT of the head revealed no acute changes and cervical spine CT revealed C5-6 and C6-7 degenerative changes with mild central spinal stenosis, but no acute diseases. She was subsequently discharged with Fioricet and comes in now today for follow up. She states she did not get the prescription filled, she just took Tylenol. She has a known history of type 2 diabetes with nephropathy, obesity, rotator cuff arthropathy of the right shoulder, primary hypertension, DJD and is seen for evaluation. There is a history of a renal lesion on the right and on ultrasound on 10/09/18 could not confirm the diagnosis and was otherwise unremarkable. Her blood sugar control has been fair and compliance regarding hemoglobin A1c has been miserable. Patient comes in for evaluation. Patient in confidence tells me she fell the month before on the same step ladder putting some curtains up and she claims she stopped using the step ladder. Current Outpatient Medications   Medication Sig Dispense Refill    lancets (ONE TOUCH DELICA) 33 gauge misc USE TO TEST BLOOD SUGAR THREE TIMES A DAY. DX.E11.9 100 Lancet 11    simvastatin (ZOCOR) 40 mg tablet Take 1 Tab by mouth nightly. 90 Tab 3    insulin glargine (LANTUS,BASAGLAR) 100 unit/mL (3 mL) inpn 32 Units by SubCUTAneous route nightly. 9 Adjustable Dose Pre-filled Pen Syringe 3    pantoprazole (PROTONIX) 40 mg tablet TAKE 1 TAB BY MOUTH DAILY (BEFORE BREAKFAST). 30 Tab 11    insulin aspart U-100 (NOVOLOG FLEXPEN U-100 INSULIN) 100 unit/mL inpn 8 Units by SubCUTAneous route Before breakfast, lunch, and dinner. 15 Adjustable Dose Pre-filled Pen Syringe 11    amLODIPine (NORVASC) 10 mg tablet Take 1 tablet daily. 90 Tab 3    Insulin Needles, Disposable, (CHRISS PEN NEEDLE) 32 gauge x 5/32\" ndle Use to inject insulin four times a day. Dx.e11.9 200 Pen Needle 11    metoprolol tartrate (LOPRESSOR) 25 mg tablet Take 0.5 Tabs by mouth two (2) times a day. 90 Tab 3    cloNIDine HCl (CATAPRES) 0.2 mg tablet Take 1 Tab by mouth two (2) times a day. 180 Tab 3    Insulin Needles, Disposable, 29 gauge x 5/16\" ndle Use to inject insulin three times daily. dx.e119 300 Each 3    glucose blood VI test strips (ONETOUCH ULTRA TEST) strip Use to test blood sugar three times a day. dx. e11.9 100 Strip 11    albuterol (PROVENTIL HFA, VENTOLIN HFA, PROAIR HFA) 90 mcg/actuation inhaler Take 2 Puffs by inhalation every four (4) hours as needed for Wheezing. 1 Inhaler 0    calcitRIOL (ROCALTROL) 0.25 mcg capsule Take 1 Cap by mouth daily.  80 Cap 3     Past Medical History:   Diagnosis Date    Cellulitis of right abdominal wall 12/18/2017    letty whatley md    Diabetes ([facility] [address] 1995    DJD (degenerative joint disease) of knee     Fall at home, sequela 07/05/2019    Gastroenteritis     Hypercholesterolemia     Hypertension     Kidney lesion, native, left     Knee pain     LBP (low back pain)     chiorpactor    Lumbar stenosis     Obesity     Rotator cuff arthropathy of right shoulder 06/22/2018    S/P colonoscopy 12-18-12     Past Surgical History:   Procedure Laterality Date    HX BREAST BIOPSY Right 1990    negative    HX COLONOSCOPY      HX HYSTERECTOMY      HX OOPHORECTOMY N/A     HX OTHER SURGICAL  12/23/2017    I&D infected abscess      No Known Allergies      REVIEW OF SYSTEMS:  General: negative for - chills or fever  ENT: negative for - headaches, nasal congestion or tinnitus  Respiratory: negative for - cough, hemoptysis, shortness of breath or wheezing  Cardiovascular : negative for - chest pain, edema, palpitations or shortness of breath  Gastrointestinal: negative for - abdominal pain, blood in stools, heartburn or nausea/vomiting  Genito-Urinary: no dysuria, trouble voiding, or hematuria  Musculoskeletal: negative for - gait disturbance, joint pain, joint stiffness or joint swelling  Neurological: no TIA or stroke symptoms  Hematologic: no bruises, no bleeding, no swollen glands  Integument: no lumps, mole changes, nail changes or rash  Endocrine: no malaise/lethargy or unexpected weight changes      Social History     Socioeconomic History    Marital status: SINGLE     Spouse name: Not on file    Number of children: Not on file    Years of education: Not on file    Highest education level: Not on file   Tobacco Use    Smoking status: Never Smoker    Smokeless tobacco: Never Used   Substance and Sexual Activity    Alcohol use: No     Alcohol/week: 0.0 standard drinks    Drug use: No    Sexual activity: Not Currently   Social History Narrative    Medical History: Diverticulosis 2002DM 1995primary HTN 1995Obesity 1995Dyslipidemia 1995January 9, 2012 moderate central stenosis L3-L4,    mild to moderate central stenosis at L4-L5 MRI    Gyn History: Last mammogram date 11/26/2013. Last menstrual perioddate hysterectomy 1992 FOR FIBROIDS, PT W ITH ONE OVARY. Last    papdate 2012. Menopausal hot flashes. Sexually active not currently sexually active. History of abnormal pap smears none. History of STDs    none. Vaginal discharge or odor none. HysterectomyDate NONE.    OB History: Total pregnancies 1.  Pre term deliveries (>20-36.6 w eeks) 1.    Surgical History: [facility] right breast bx , rajanifelicitamaritza lora colonoscopy     Hospitalization/Major Diagnostic Procedure: [facility] right breast bx ,  stones     Family History: Mother:  52 yrs, skin d/oFather: deceasedAunt: alive, lung cancerAdopted: alive    Social History: Alcohol Use Patient does not use alcohol. Smoking Status Patient is a never smoker. Marital Status: Single. Lives w ith:    alone. Education/School: has masters degree-[employer]. Family History   Problem Relation Age of Onset    Cancer Mother     No Known Problems Father        OBJECTIVE:    Visit Vitals  /72   Pulse (!) 56   Temp 97.5 °F (36.4 °C) (Oral)   Resp 16   Ht 5' 3\" (1.6 m)   Wt 232 lb 14.4 oz (105.6 kg)   LMP  (LMP Unknown)   SpO2 97%   BMI 41.26 kg/m²     CONSTITUTIONAL: well , well nourished, appears age appropriate  EYES: perrla, eom intact  ENMT:moist mucous membranes, pharynx clear  NECK: supple. Thyroid normal  RESPIRATORY: Chest: clear bilaterally   CARDIOVASCULAR: Heart: regular rate and rhythm  GASTROINTESTINAL: Abdomen: soft, bowel sounds active  HEMATOLOGIC: no pathological lymph nodes palpated  MUSCULOSKELETAL: Extremities: no edema, pulse 1+   INTEGUMENT: No unusual rashes or suspicious skin lesions noted. Nails appear normal.  NEUROLOGIC: non-focal exam   MENTAL STATUS: alert and oriented, appropriate affect           ASSESSMENT:  1. Type 2 diabetes with nephropathy ([address]    2. Severe obesity (BMI 35.0-39. 9) with comorbidity ([address]    3. Rotator cuff arthropathy of right shoulder    4. Essential hypertension    5. Kidney lesion, native, left    6. Cervical spinal stenosis    7. Fall at home, sequela      Patient states the chiropractor helped her with her back pain and she is very pleased with the results. She is also pleased with her new podiatrist, Dr. Kalyan Sánchez. Her blood sugar control has been fair with Hgb A1c of 7.   We certainly want to keep it at 7 or less in view of her history of CKD, although her last GFR was 58, creatinine 1.13. Will check that again today, as well as her hemoglobin A1c and report to her the results. BP control is at goal.    New finding of cervical spinal stenosis. Fortunately she is asymptomatic. We admonish her never to go on a step ladder or any kind of ladder again if she has to get something that is higher than what she can reach. Because of her height at 63\", we encourage her to have someone else in the family reach the items because the risk is too high for this 80year old lady with multiple medical problems. She looks at me and reluctantly agrees. We note that the BMI represents obesity and reflects a 1 lb weight loss. It is summertime and I think she can do better than this. We encouraged physical activity 30 minutes five days a week and a heart healthy, diabetic, weight reducing diet. She will be back to see us in three months. I have discussed the diagnosis with the patient and the intended plan as seen in the  orders above. The patient understands and agees with the plan. The patient has   received an after visit summary and questions were answered concerning  future plans  Patient labs and/or xrays were reviewed  Past records were reviewed. PLAN:  .  Orders Placed This Encounter    HEMOGLOBIN A1C WITH EAG    RENAL FUNCTION PANEL       Follow-up and Dispositions    · Return in about 3 months (around 11/1/2019). ATTENTION:   This medical record was transcribed using an electronic medical records system. Although proofread, it may and can contain electronic and spelling errors. Other human spelling and other errors may be present. Corrections may be executed at a later time. Please feel free to contact us for any clarifications as needed.

## 2019-08-02 LAB
ALBUMIN SERPL-MCNC: 4.1 G/DL (ref 3.6–4.8)
BUN SERPL-MCNC: 16 MG/DL (ref 8–27)
BUN/CREAT SERPL: 15 (ref 12–28)
CALCIUM SERPL-MCNC: 9.5 MG/DL (ref 8.7–10.3)
CHLORIDE SERPL-SCNC: 105 MMOL/L (ref 96–106)
CO2 SERPL-SCNC: 20 MMOL/L (ref 20–29)
CREAT SERPL-MCNC: 1.1 MG/DL (ref 0.57–1)
EST. AVERAGE GLUCOSE BLD GHB EST-MCNC: 174 MG/DL
GLUCOSE SERPL-MCNC: 106 MG/DL (ref 65–99)
HBA1C MFR BLD: 7.7 % (ref 4.8–5.6)
PHOSPHATE SERPL-MCNC: 4.5 MG/DL (ref 2.5–4.5)
POTASSIUM SERPL-SCNC: 4.5 MMOL/L (ref 3.5–5.2)
SODIUM SERPL-SCNC: 142 MMOL/L (ref 134–144)

## 2019-08-16 RX ORDER — INSULIN ASPART 100 [IU]/ML
INJECTION, SOLUTION INTRAVENOUS; SUBCUTANEOUS
Qty: 15 ADJUSTABLE DOSE PRE-FILLED PEN SYRINGE | Refills: 11 | Status: SHIPPED | OUTPATIENT
Start: 2019-08-16 | End: 2020-02-18

## 2019-11-06 ENCOUNTER — OFFICE VISIT (OUTPATIENT)
Dept: INTERNAL MEDICINE CLINIC | Age: 69
End: 2019-11-06

## 2019-11-06 VITALS
HEIGHT: 63 IN | HEART RATE: 63 BPM | WEIGHT: 229.8 LBS | SYSTOLIC BLOOD PRESSURE: 117 MMHG | TEMPERATURE: 97.8 F | DIASTOLIC BLOOD PRESSURE: 64 MMHG | BODY MASS INDEX: 40.72 KG/M2 | RESPIRATION RATE: 18 BRPM | OXYGEN SATURATION: 98 %

## 2019-11-06 DIAGNOSIS — Z13.31 SCREENING FOR DEPRESSION: ICD-10-CM

## 2019-11-06 DIAGNOSIS — M48.061 SPINAL STENOSIS OF LUMBAR REGION WITHOUT NEUROGENIC CLAUDICATION: ICD-10-CM

## 2019-11-06 DIAGNOSIS — E78.00 HYPERCHOLESTEROLEMIA: ICD-10-CM

## 2019-11-06 DIAGNOSIS — N18.30 CKD (CHRONIC KIDNEY DISEASE), STAGE III (HCC): ICD-10-CM

## 2019-11-06 DIAGNOSIS — I10 ESSENTIAL HYPERTENSION: ICD-10-CM

## 2019-11-06 DIAGNOSIS — Z00.00 MEDICARE ANNUAL WELLNESS VISIT, SUBSEQUENT: Primary | ICD-10-CM

## 2019-11-06 DIAGNOSIS — E66.01 SEVERE OBESITY (BMI 35.0-39.9) WITH COMORBIDITY (HCC): ICD-10-CM

## 2019-11-06 DIAGNOSIS — M48.02 CERVICAL SPINAL STENOSIS: ICD-10-CM

## 2019-11-06 DIAGNOSIS — E11.21 TYPE 2 DIABETES WITH NEPHROPATHY (HCC): ICD-10-CM

## 2019-11-06 DIAGNOSIS — Z13.39 SCREENING FOR ALCOHOLISM: ICD-10-CM

## 2019-11-06 NOTE — PROGRESS NOTES
1. Have you been to the ER, urgent care clinic since your last visit? Hospitalized since your last visit? No    2. Have you seen or consulted any other health care providers outside of the [address] [place] [address] since your last visit? Include any pap smears or colon screening. No    Wants to discuss problems sleeping at night  This is the Subsequent Medicare Annual Wellness Exam, performed 12 months or more after the Initial AWV or the last Subsequent AWV    I have reviewed the patient's medical history in detail and updated the computerized patient record. History     Patient Active Problem List   Diagnosis Code    Hypercholesterolemia E78.00    Hypertension I10    Lumbar stenosis M48.061    Low back pain M54.5    DJD (degenerative joint disease) of knee M17.10    Cellulitis of right abdominal wall L03.311    Gastroenteritis K52.9    Acute renal failure (ARF) (HCC) N17.9    Type 2 diabetes with nephropathy (HCC) E11.21    Severe obesity (BMI 35.0-39. 9) with comorbidity ([facility] [address] E66.01    Kidney lesion, native, left N28.9    Shoulder pain, right M25.511    Knee pain M25.569    Rotator cuff arthropathy of right shoulder M12.811    Cervical spinal stenosis M48.02    Fall at home, sequela W19. Gustavo Gonzalez, G86.888     Past Medical History:   Diagnosis Date    Cellulitis of right abdominal wall 12/18/2017    letty whatley md    Diabetes ([facility] [address] 1995    DJD (degenerative joint disease) of knee     Fall at home, sequela 07/05/2019    Gastroenteritis     Hypercholesterolemia     Hypertension     Kidney lesion, native, left     Knee pain     LBP (low back pain)     chiorpactor    Lumbar stenosis     Obesity     Rotator cuff arthropathy of right shoulder 06/22/2018    S/P colonoscopy 12-18-12      Past Surgical History:   Procedure Laterality Date    HX BREAST BIOPSY Right 1990    negative    HX COLONOSCOPY      HX HYSTERECTOMY      HX OOPHORECTOMY N/A     HX OTHER SURGICAL  12/23/2017    I&D infected abscess      Current Outpatient Medications   Medication Sig Dispense Refill    NOVOLOG FLEXPEN U-100 INSULIN 100 unit/mL (3 mL) inpn INJECT 3 TIMES DAILY WITH MEALS WITH SLIDING SCALE 15 Adjustable Dose Pre-filled Pen Syringe 11    lancets (ONE TOUCH DELICA) 33 gauge misc USE TO TEST BLOOD SUGAR THREE TIMES A DAY. DX.E11.9 100 Lancet 11    simvastatin (ZOCOR) 40 mg tablet Take 1 Tab by mouth nightly. 90 Tab 3    insulin glargine (LANTUS,BASAGLAR) 100 unit/mL (3 mL) inpn 32 Units by SubCUTAneous route nightly. 9 Adjustable Dose Pre-filled Pen Syringe 3    amLODIPine (NORVASC) 10 mg tablet Take 1 tablet daily. 90 Tab 3    Insulin Needles, Disposable, (CHRISS PEN NEEDLE) 32 gauge x 5/32\" ndle Use to inject insulin four times a day. Dx.e11.9 200 Pen Needle 11    metoprolol tartrate (LOPRESSOR) 25 mg tablet Take 0.5 Tabs by mouth two (2) times a day. 90 Tab 3    cloNIDine HCl (CATAPRES) 0.2 mg tablet Take 1 Tab by mouth two (2) times a day. 180 Tab 3    Insulin Needles, Disposable, 29 gauge x 5/16\" ndle Use to inject insulin three times daily. dx.e119 300 Each 3    glucose blood VI test strips (ONETOUCH ULTRA TEST) strip Use to test blood sugar three times a day. dx. e11.9 100 Strip 11    albuterol (PROVENTIL HFA, VENTOLIN HFA, PROAIR HFA) 90 mcg/actuation inhaler Take 2 Puffs by inhalation every four (4) hours as needed for Wheezing. 1 Inhaler 0    pantoprazole (PROTONIX) 40 mg tablet TAKE 1 TAB BY MOUTH DAILY (BEFORE BREAKFAST). 30 Tab 11    calcitRIOL (ROCALTROL) 0.25 mcg capsule Take 1 Cap by mouth daily.  90 Cap 3     No Known Allergies    Family History   Problem Relation Age of Onset    Cancer Mother     No Known Problems Father      Social History     Tobacco Use    Smoking status: Never Smoker    Smokeless tobacco: Never Used   Substance Use Topics    Alcohol use: No     Alcohol/week: 0.0 standard drinks       Depression Risk Factor Screening:     3 most recent PHQ Screens 11/6/2019   PHQ Not Done -   Little interest or pleasure in doing things Not at all   Feeling down, depressed, irritable, or hopeless Not at all   Total Score PHQ 2 0       Alcohol Risk Factor Screening:   Do you average 1 drink per night or more than 7 drinks a week:  No    On any one occasion in the past three months have you have had more than 3 drinks containing alcohol:  No      Functional Ability and Level of Safety:   Hearing: Hearing is good. Activities of Daily Living: The home contains: no safety equipment. Patient does total self care    Ambulation: with no difficulty    Fall Risk:  Fall Risk Assessment, last 12 mths 11/6/2019   Able to walk? Yes   Fall in past 12 months?  No   Fall with injury? -   Number of falls in past 12 months -   Fall Risk Score -       Abuse Screen:  Patient is not abused    Cognitive Screening   Has your family/caregiver stated any concerns about your memory: no  Cognitive Screening: Normal - MMSE (Mini Mental Status Exam)    Patient Care Team   Patient Care Team:  Rayo Cantor MD as PCP - General (Internal Medicine)  Rayo Cantor MD as PCP - REHABILITATION [facility] Empaneled Provider  Carolina Queen MD as Surgeon (Surgery)  Bhumi Martínez MD as Physician (Nephrology)    Assessment/Plan   Education and counseling provided:  Are appropriate based on today's review and evaluation        Health Maintenance Due   Topic Date Due    FOOT EXAM Q1  06/22/2019    MEDICARE YEARLY EXAM  09/26/2019    MICROALBUMIN Q1  11/20/2019    GLAUCOMA SCREENING Q2Y  11/22/2019    EYE EXAM RETINAL OR DILATED  11/22/2019

## 2019-11-06 NOTE — PATIENT INSTRUCTIONS
Medicare Wellness Visit, Female     The best way to live healthy is to have a lifestyle where you eat a well-balanced diet, exercise regularly, limit alcohol use, and quit all forms of tobacco/nicotine, if applicable. Regular preventive services are another way to keep healthy. Preventive services (vaccines, screening tests, monitoring & exams) can help personalize your care plan, which helps you manage your own care. Screening tests can find health problems at the earliest stages, when they are easiest to treat. [address] follows the current, evidence-based guidelines published by the [facility] Cliff Ramey ([facility]) when recommending preventive services for our patients. Because we follow these guidelines, sometimes recommendations change over time as research supports it. (For example, mammograms used to be recommended annually. Even though Medicare will still pay for an annual mammogram, the newer guidelines recommend a mammogram every two years for women of average risk). Of course, you and your doctor may decide to screen more often for some diseases, based on your risk and your co-morbidities (chronic disease you are already diagnosed with). Preventive services for you include:  - Medicare offers their members a free annual wellness visit, which is time for you and your primary care provider to discuss and plan for your preventive service needs. Take advantage of this benefit every year!  -All adults over the age of 72 should receive the recommended pneumonia vaccines. Current USPSTF guidelines recommend a series of two vaccines for the best pneumonia protection.   -All adults should have a flu vaccine yearly and a tetanus vaccine every 10 years.   -All adults age 48 and older should receive the shingles vaccines (series of two vaccines).       -All adults age 38-68 who are overweight should have a diabetes screening test once every three years.   -All adults born between [birth year] and 1965 should be screened once for Hepatitis C.  -Other screening tests and preventive services for persons with diabetes include: an eye exam to screen for diabetic retinopathy, a kidney function test, a foot exam, and stricter control over your cholesterol.   -Cardiovascular screening for adults with routine risk involves an electrocardiogram (ECG) at intervals determined by your doctor.   -Colorectal cancer screenings should be done for adults age 54-65 with no increased risk factors for colorectal cancer. There are a number of acceptable methods of screening for this type of cancer. Each test has its own benefits and drawbacks. Discuss with your doctor what is most appropriate for you during your annual wellness visit. The different tests include: colonoscopy (considered the best screening method), a fecal occult blood test, a fecal DNA test, and sigmoidoscopy.    -A bone mass density test is recommended when a woman turns 65 to screen for osteoporosis. This test is only recommended one time, as a screening. Some providers will use this same test as a disease monitoring tool if you already have osteoporosis. -Breast cancer screenings are recommended every other year for women of normal risk, age 54-69.  -Cervical cancer screenings for women over age 72 are only recommended with certain risk factors. Here is a list of your current Health Maintenance items (your personalized list of preventive services) with a due date:  Health Maintenance Due   Topic Date Due    Diabetic Foot Care  06/22/2019    Annual Well Visit  09/26/2019    Albumin Urine Test  11/20/2019    Glaucoma Screening   11/22/2019    Eye Exam  11/22/2019         Medicare Wellness Visit, Female     The best way to live healthy is to have a lifestyle where you eat a well-balanced diet, exercise regularly, limit alcohol use, and quit all forms of tobacco/nicotine, if applicable.      Regular preventive services are another way to keep healthy. Preventive services (vaccines, screening tests, monitoring & exams) can help personalize your care plan, which helps you manage your own care. Screening tests can find health problems at the earliest stages, when they are easiest to treat. [facility] follows the current, evidence-based guidelines published by the [facility] Cliff Ramey ([facility]) when recommending preventive services for our patients. Because we follow these guidelines, sometimes recommendations change over time as research supports it. (For example, mammograms used to be recommended annually. Even though Medicare will still pay for an annual mammogram, the newer guidelines recommend a mammogram every two years for women of average risk). Of course, you and your doctor may decide to screen more often for some diseases, based on your risk and your co-morbidities (chronic disease you are already diagnosed with). Preventive services for you include:  - Medicare offers their members a free annual wellness visit, which is time for you and your primary care provider to discuss and plan for your preventive service needs. Take advantage of this benefit every year!  -All adults over the age of 72 should receive the recommended pneumonia vaccines. Current USPSTF guidelines recommend a series of two vaccines for the best pneumonia protection.   -All adults should have a flu vaccine yearly and a tetanus vaccine every 10 years.   -All adults age 48 and older should receive the shingles vaccines (series of two vaccines).       -All adults age 38-68 who are overweight should have a diabetes screening test once every three years.   -All adults born between 80 and 1964 should be screened once for Hepatitis C.  -Other screening tests and preventive services for persons with diabetes include: an eye exam to screen for diabetic retinopathy, a kidney function test, a foot exam, and stricter control over your cholesterol.   -Cardiovascular screening for adults with routine risk involves an electrocardiogram (ECG) at intervals determined by your doctor.   -Colorectal cancer screenings should be done for adults age 54-65 with no increased risk factors for colorectal cancer. There are a number of acceptable methods of screening for this type of cancer. Each test has its own benefits and drawbacks. Discuss with your doctor what is most appropriate for you during your annual wellness visit. The different tests include: colonoscopy (considered the best screening method), a fecal occult blood test, a fecal DNA test, and sigmoidoscopy.    -A bone mass density test is recommended when a woman turns 65 to screen for osteoporosis. This test is only recommended one time, as a screening. Some providers will use this same test as a disease monitoring tool if you already have osteoporosis. -Breast cancer screenings are recommended every other year for women of normal risk, age 54-69.  -Cervical cancer screenings for women over age 72 are only recommended with certain risk factors. Here is a list of your current Health Maintenance items (your personalized list of preventive services) with a due date:  Health Maintenance Due   Topic Date Due    Diabetic Foot Care  06/22/2019    Annual Well Visit  09/26/2019    Albumin Urine Test  11/20/2019    Glaucoma Screening   11/22/2019    Eye Exam  11/22/2019              Body Mass Index: Care Instructions  Your Care Instructions    Body mass index (BMI) can help you see if your weight is raising your risk for health problems. It uses a formula to compare how much you weigh with how tall you are. · A BMI lower than 18.5 is considered underweight. · A BMI between 18.5 and 24.9 is considered healthy. · A BMI between 25 and 29.9 is considered overweight. A BMI of 30 or higher is considered obese.   If your BMI is in the normal range, it means that you have a lower risk for weight-related health problems. If your BMI is in the overweight or obese range, you may be at increased risk for weight-related health problems, such as high blood pressure, heart disease, stroke, arthritis or joint pain, and diabetes. If your BMI is in the underweight range, you may be at increased risk for health problems such as fatigue, lower protection (immunity) against illness, muscle loss, bone loss, hair loss, and hormone problems. BMI is just one measure of your risk for weight-related health problems. You may be at higher risk for health problems if you are not active, you eat an unhealthy diet, or you drink too much alcohol or use tobacco products. Follow-up care is a key part of your treatment and safety. Be sure to make and go to all appointments, and call your doctor if you are having problems. It's also a good idea to know your test results and keep a list of the medicines you take. How can you care for yourself at home? · Practice healthy eating habits. This includes eating plenty of fruits, vegetables, whole grains, lean protein, and low-fat dairy. · If your doctor recommends it, get more exercise. Walking is a good choice. Bit by bit, increase the amount you walk every day. Try for at least 30 minutes on most days of the week. · Do not smoke. Smoking can increase your risk for health problems. If you need help quitting, talk to your doctor about stop-smoking programs and medicines. These can increase your chances of quitting for good. · Limit alcohol to 2 drinks a day for men and 1 drink a day for women. Too much alcohol can cause health problems. If you have a BMI higher than 25  · Your doctor may do other tests to check your risk for weight-related health problems. This may include measuring the distance around your waist. A waist measurement of more than 40 inches in men or 35 inches in women can increase the risk of weight-related health problems.   · Talk with your doctor about steps you can take to stay healthy or improve your health. You may need to make lifestyle changes to lose weight and stay healthy, such as changing your diet and getting regular exercise. If you have a BMI lower than 18.5  · Your doctor may do other tests to check your risk for health problems. · Talk with your doctor about steps you can take to stay healthy or improve your health. You may need to make lifestyle changes to gain or maintain weight and stay healthy, such as getting more healthy foods in your diet and doing exercises to build muscle. Where can you learn more? Go to http://king-charles.info/. Enter S176 in the search box to learn more about \"Body Mass Index: Care Instructions. \"  Current as of: October 13, 2016  Content Version: 11.4  © 0735-4514 [employer], [employer]. Care instructions adapted under license by [employer] (which disclaims liability or warranty for this information). If you have questions about a medical condition or this instruction, always ask your healthcare professional. [address] any warranty or liability for your use of this information.

## 2019-11-06 NOTE — PROGRESS NOTES
SPORTS MEDICINE AND PRIMARY CARE  Julia Parikh MD, [address] [address]  Phone:  [phone]  Fax: [phone]      Chief Complaint   Patient presents with    Annual Wellness Visit         SUBECTIVE:    Chandra Huggins is a 80 y.o. female Patient returns today with known history of diabetes mellitus type 2 with nephropathy, with chronic kidney disease stage 3, degenerative joint disease, primary hypertension, lumbar stenosis, obesity, dyslipidemia, cervical spinal stenosis and is seen for evaluation. Patient returns today with the only complaint of not sleeping at night, but then goes on to say she has to Reddy her stories\". Patient is seen for evaluation. Current Outpatient Medications   Medication Sig Dispense Refill    NOVOLOG FLEXPEN U-100 INSULIN 100 unit/mL (3 mL) inpn INJECT 3 TIMES DAILY WITH MEALS WITH SLIDING SCALE 15 Adjustable Dose Pre-filled Pen Syringe 11    lancets (ONE TOUCH DELICA) 33 gauge misc USE TO TEST BLOOD SUGAR THREE TIMES A DAY. DX.E11.9 100 Lancet 11    simvastatin (ZOCOR) 40 mg tablet Take 1 Tab by mouth nightly. 90 Tab 3    insulin glargine (LANTUS,BASAGLAR) 100 unit/mL (3 mL) inpn 32 Units by SubCUTAneous route nightly. 9 Adjustable Dose Pre-filled Pen Syringe 3    amLODIPine (NORVASC) 10 mg tablet Take 1 tablet daily. 90 Tab 3    Insulin Needles, Disposable, (CHRISS PEN NEEDLE) 32 gauge x 5/32\" ndle Use to inject insulin four times a day. Dx.e11.9 200 Pen Needle 11    metoprolol tartrate (LOPRESSOR) 25 mg tablet Take 0.5 Tabs by mouth two (2) times a day. 90 Tab 3    cloNIDine HCl (CATAPRES) 0.2 mg tablet Take 1 Tab by mouth two (2) times a day. 180 Tab 3    Insulin Needles, Disposable, 29 gauge x 5/16\" ndle Use to inject insulin three times daily. dx.e119 300 Each 3    glucose blood VI test strips (ONETOUCH ULTRA TEST) strip Use to test blood sugar three times a day. dx. e11.9 100 Strip 11    albuterol (PROVENTIL HFA, VENTOLIN HFA, PROAIR HFA) 90 mcg/actuation inhaler Take 2 Puffs by inhalation every four (4) hours as needed for Wheezing. 1 Inhaler 0    pantoprazole (PROTONIX) 40 mg tablet TAKE 1 TAB BY MOUTH DAILY (BEFORE BREAKFAST). 30 Tab 11    calcitRIOL (ROCALTROL) 0.25 mcg capsule Take 1 Cap by mouth daily. 80 Cap 3     Past Medical History:   Diagnosis Date    Cellulitis of right abdominal wall 12/18/2017    letty whatley md    CKD (chronic kidney disease), stage III (HCC)     Diabetes ([facility] [address] 1995    DJD (degenerative joint disease) of knee     Fall at home, sequela 07/05/2019    Gastroenteritis     Hypercholesterolemia     Hypertension     Kidney lesion, native, left     Knee pain     LBP (low back pain)     chiorpactor    Lumbar stenosis     Obesity     Rotator cuff arthropathy of right shoulder 06/22/2018    S/P colonoscopy 12-18-12     Past Surgical History:   Procedure Laterality Date    HX BREAST BIOPSY Right 1990    negative    HX COLONOSCOPY      HX HYSTERECTOMY      HX OOPHORECTOMY N/A     HX OTHER SURGICAL  12/23/2017    I&D infected abscess      No Known Allergies    REVIEW OF SYSTEMS:   No chest pain, no shortness of breath. Social History     Socioeconomic History    Marital status: SINGLE     Spouse name: Not on file    Number of children: Not on file    Years of education: Not on file    Highest education level: Not on file   Tobacco Use    Smoking status: Never Smoker    Smokeless tobacco: Never Used   Substance and Sexual Activity    Alcohol use: No     Alcohol/week: 0.0 standard drinks    Drug use: No    Sexual activity: Not Currently   Social History Narrative    Medical History: Diverticulosis 2002DM 1995primary HTN 1995Obesity 1995Dyslipidemia 1995January 9, 2012 moderate central stenosis L3-L4,    mild to moderate central stenosis at L4-L5 MRI    Gyn History: Last mammogram date 11/26/2013. Last menstrual perioddate hysterectomy 1992 FOR FIBROIDS, PT W ITH ONE OVARY.  Last papdate . Menopausal hot flashes. Sexually active not currently sexually active. History of abnormal pap smears none. History of STDs    none. Vaginal discharge or odor none. HysterectomyDate NONE.    OB History: Total pregnancies 1. Pre term deliveries (>20-36.6 w eeks) 1. Surgical History: [facility] right breast bx , kidney stones colonoscopy     Hospitalization/Major Diagnostic Procedure: [facility] right breast bx , kidney stones     Family History: Mother:  52 yrs, skin d/oFather: deceasedAunt: alive, lung cancerAdopted: alive    Social History: Alcohol Use Patient does not use alcohol. Smoking Status Patient is a never smoker. Marital Status: Single. Lives w ith:    alone. Education/School: has masters degree-[employer].   r  Family History   Problem Relation Age of Onset    Cancer Mother     No Known Problems Father        OBJECTIVE:  Visit Vitals  /64   Pulse 63   Temp 97.8 °F (36.6 °C) (Oral)   Resp 18   Ht 5' 3\" (1.6 m)   Wt 229 lb 12.8 oz (104.2 kg)   LMP  (LMP Unknown)   SpO2 98%   BMI 40.71 kg/m²     ENT: perrla,  eom intact  NECK: supple. Thyroid normal  CHEST: clear to ascultation and percussion   HEART: regular rate and rhythm  ABD: soft, bowel sounds active  EXTREMITIES: no edema, pulse 1+     No visits with results within 3 Month(s) from this visit.    Latest known visit with results is:   Office Visit on 2019   Component Date Value Ref Range Status    Hemoglobin A1c 2019 7.7* 4.8 - 5.6 % Final    Comment:          Prediabetes: 5.7 - 6.4           Diabetes: >6.4           Glycemic control for adults with diabetes: <7.0      Estimated average glucose 2019 174  mg/dL Final    Glucose 2019 106* 65 - 99 mg/dL Final    BUN 2019 16  8 - 27 mg/dL Final    Creatinine 2019 1.10* 0.57 - 1.00 mg/dL Final    GFR est non-AA 2019 52* >59 mL/min/1.73 Final    GFR est AA 2019 60  >59 mL/min/1.73 Final    BUN/Creatinine ratio 08/01/2019 15  12 - 28 Final    Sodium 08/01/2019 142  134 - 144 mmol/L Final    Potassium 08/01/2019 4.5  3.5 - 5.2 mmol/L Final    Chloride 08/01/2019 105  96 - 106 mmol/L Final    CO2 08/01/2019 20  20 - 29 mmol/L Final    Calcium 08/01/2019 9.5  8.7 - 10.3 mg/dL Final    Phosphorus 08/01/2019 4.5  2.5 - 4.5 mg/dL Final    Albumin 08/01/2019 4.1  3.6 - 4.8 g/dL Final          ASSESSMENT:  1. Type 2 diabetes with nephropathy ([address]    2. Medicare annual wellness visit, subsequent    3. Screening for alcoholism    4. Screening for depression    5. CKD (chronic kidney disease), stage III ([address]    6. Essential hypertension    7. Spinal stenosis of lumbar region without neurogenic claudication    8. Severe obesity (BMI 35.0-39. 9) with comorbidity ([address]    9. Hypercholesterolemia    10. Cervical spinal stenosis      Patient with known history of diabetes mellitus type 2, whose control has been good with hemoglobin A1c of 7.7. We would like to see it a little lower than that and hopefully it will be on this occasion. She has a known history of nephropathy associated with that disease with the last GFR of 60, which is within normal range, actually. Blood pressure control is at goal.    Not particularly concerned with neurogenic claudication at this time. Her BMI still reflects obesity and since we last saw her she made a decision to lose 3 pounds. We encouraged her to continue with her weight loss. She is on Simvastatin for control of her dyslipidemia. Appropriate laboratory studies will be requested. She will be back to see us in three months, sooner if she has any problems. Discussed the patient's BMI with her. The BMI follow up plan is as follows:     dietary management education, guidance, and counseling  encourage exercise  monitor weight  prescribed dietary intake    I have discussed the diagnosis with the patient and the intended plan as seen in the  orders above.   The patient understands and agees with the plan. The patient has   received an after visit summary and questions were answered concerning  future plans  Patient labs and/or xrays were reviewed  Past records were reviewed. PLAN:  . No orders of the defined types were placed in this encounter. ATTENTION:   This medical record was transcribed using an electronic medical records system. Although proofread, it may and can contain electronic and spelling errors. Other human spelling and other errors may be present. Corrections may be executed at a later time. Please feel free to contact us for any clarifications as needed.

## 2019-11-07 LAB
ALBUMIN SERPL-MCNC: 4.5 G/DL (ref 3.6–4.8)
BASOPHILS # BLD AUTO: 0 X10E3/UL (ref 0–0.2)
BASOPHILS NFR BLD AUTO: 0 %
BUN SERPL-MCNC: 16 MG/DL (ref 8–27)
BUN/CREAT SERPL: 16 (ref 12–28)
CALCIUM SERPL-MCNC: 9.6 MG/DL (ref 8.7–10.3)
CHLORIDE SERPL-SCNC: 109 MMOL/L (ref 96–106)
CO2 SERPL-SCNC: 23 MMOL/L (ref 20–29)
CREAT SERPL-MCNC: 0.97 MG/DL (ref 0.57–1)
EOSINOPHIL # BLD AUTO: 0.1 X10E3/UL (ref 0–0.4)
EOSINOPHIL NFR BLD AUTO: 1 %
ERYTHROCYTE [DISTWIDTH] IN BLOOD BY AUTOMATED COUNT: 14.2 % (ref 12.3–15.4)
EST. AVERAGE GLUCOSE BLD GHB EST-MCNC: 186 MG/DL
GLUCOSE SERPL-MCNC: 71 MG/DL (ref 65–99)
HBA1C MFR BLD: 8.1 % (ref 4.8–5.6)
HCT VFR BLD AUTO: 46.4 % (ref 34–46.6)
HGB BLD-MCNC: 15.1 G/DL (ref 11.1–15.9)
IMM GRANULOCYTES # BLD AUTO: 0 X10E3/UL (ref 0–0.1)
IMM GRANULOCYTES NFR BLD AUTO: 0 %
LYMPHOCYTES # BLD AUTO: 1.9 X10E3/UL (ref 0.7–3.1)
LYMPHOCYTES NFR BLD AUTO: 27 %
MCH RBC QN AUTO: 28.9 PG (ref 26.6–33)
MCHC RBC AUTO-ENTMCNC: 32.5 G/DL (ref 31.5–35.7)
MCV RBC AUTO: 89 FL (ref 79–97)
MONOCYTES # BLD AUTO: 0.7 X10E3/UL (ref 0.1–0.9)
MONOCYTES NFR BLD AUTO: 10 %
NEUTROPHILS # BLD AUTO: 4.3 X10E3/UL (ref 1.4–7)
NEUTROPHILS NFR BLD AUTO: 62 %
PHOSPHATE SERPL-MCNC: 3.1 MG/DL (ref 2.5–4.5)
PLATELET # BLD AUTO: 248 X10E3/UL (ref 150–450)
POTASSIUM SERPL-SCNC: 4.6 MMOL/L (ref 3.5–5.2)
RBC # BLD AUTO: 5.23 X10E6/UL (ref 3.77–5.28)
SODIUM SERPL-SCNC: 146 MMOL/L (ref 134–144)
WBC # BLD AUTO: 7.1 X10E3/UL (ref 3.4–10.8)

## 2019-11-09 RX ORDER — CLONIDINE HYDROCHLORIDE 0.2 MG/1
TABLET ORAL
Qty: 180 TAB | Refills: 3 | Status: SHIPPED | OUTPATIENT
Start: 2019-11-09 | End: 2020-10-23

## 2019-11-15 NOTE — TELEPHONE ENCOUNTER
Patient called office stating that since her lantus was switched to basaglar her blood sugars in the morning are greater than 150. She takes 28 units now, per Dr. Sergio Rodriguez she is to increase to 32 units daily. done

## 2019-11-18 RX ORDER — METOPROLOL TARTRATE 25 MG/1
TABLET, FILM COATED ORAL
Qty: 90 TAB | Refills: 3 | Status: SHIPPED | OUTPATIENT
Start: 2019-11-18 | End: 2020-11-07

## 2020-01-12 RX ORDER — PANTOPRAZOLE SODIUM 40 MG/1
40 TABLET, DELAYED RELEASE ORAL
Qty: 90 TAB | Refills: 3 | Status: SHIPPED | OUTPATIENT
Start: 2020-01-12 | End: 2020-12-27

## 2020-01-27 RX ORDER — AMLODIPINE BESYLATE 10 MG/1
TABLET ORAL
Qty: 90 TAB | Refills: 3 | Status: SHIPPED | OUTPATIENT
Start: 2020-01-27 | End: 2020-01-28

## 2020-01-28 RX ORDER — AMLODIPINE BESYLATE 10 MG/1
TABLET ORAL
Qty: 90 TAB | Refills: 3 | Status: SHIPPED | OUTPATIENT
Start: 2020-01-28 | End: 2021-04-18

## 2020-02-18 ENCOUNTER — OFFICE VISIT (OUTPATIENT)
Dept: INTERNAL MEDICINE CLINIC | Age: 70
End: 2020-02-18

## 2020-02-18 ENCOUNTER — PATIENT OUTREACH (OUTPATIENT)
Dept: INTERNAL MEDICINE CLINIC | Age: 70
End: 2020-02-18

## 2020-02-18 VITALS
WEIGHT: 233.6 LBS | TEMPERATURE: 97.9 F | HEART RATE: 71 BPM | OXYGEN SATURATION: 97 % | SYSTOLIC BLOOD PRESSURE: 131 MMHG | HEIGHT: 63 IN | RESPIRATION RATE: 18 BRPM | BODY MASS INDEX: 41.39 KG/M2 | DIASTOLIC BLOOD PRESSURE: 75 MMHG

## 2020-02-18 DIAGNOSIS — E11.21 TYPE 2 DIABETES WITH NEPHROPATHY (HCC): Primary | ICD-10-CM

## 2020-02-18 DIAGNOSIS — M48.061 SPINAL STENOSIS OF LUMBAR REGION WITHOUT NEUROGENIC CLAUDICATION: ICD-10-CM

## 2020-02-18 DIAGNOSIS — M48.02 CERVICAL SPINAL STENOSIS: ICD-10-CM

## 2020-02-18 DIAGNOSIS — E78.00 HYPERCHOLESTEROLEMIA: ICD-10-CM

## 2020-02-18 DIAGNOSIS — I10 ESSENTIAL HYPERTENSION: ICD-10-CM

## 2020-02-18 DIAGNOSIS — N18.30 CKD (CHRONIC KIDNEY DISEASE), STAGE III (HCC): ICD-10-CM

## 2020-02-18 RX ORDER — INSULIN ASPART 100 [IU]/ML
INJECTION, SOLUTION INTRAVENOUS; SUBCUTANEOUS
Qty: 15 ADJUSTABLE DOSE PRE-FILLED PEN SYRINGE | Refills: 11 | Status: SHIPPED | OUTPATIENT
Start: 2020-02-18 | End: 2020-05-19

## 2020-02-18 RX ORDER — INSULIN GLARGINE 100 [IU]/ML
36 INJECTION, SOLUTION SUBCUTANEOUS
Qty: 9 ADJUSTABLE DOSE PRE-FILLED PEN SYRINGE | Refills: 3 | Status: SHIPPED | OUTPATIENT
Start: 2020-02-18 | End: 2020-05-05

## 2020-02-18 NOTE — PROGRESS NOTES
SPORTS MEDICINE AND PRIMARY CARE  Ajith Alston MD, [address] [id number] [facility],[address] [address]  Phone:  [phone]  Fax: [phone]       Chief Complaint   Patient presents with    Dizziness   . SUBJECTIVE:    Roberto Carlos Saucedo is a 77 y.o. female Patient returns today with known history of DM type 2, whose control is less than ideal with hemoglobin A1c of 8, lumbar stenosis, primary hypertension, dyslipidemia, history of CKD with kidneys returning to normal, and cervical spinal stenosis and is seen for evaluation. She complains of lightheadedness when she sits up quickly from the supine position and is seen for evaluation. Current Outpatient Medications   Medication Sig Dispense Refill    insulin glargine (LANTUS,BASAGLAR) 100 unit/mL (3 mL) inpn 36 Units by SubCUTAneous route nightly. 9 Adjustable Dose Pre-filled Pen Syringe 3    insulin aspart U-100 (NOVOLOG FLEXPEN U-100 INSULIN) 100 unit/mL (3 mL) inpn 8 units bf and 10 units with lunch and supper 15 Adjustable Dose Pre-filled Pen Syringe 11    amLODIPine (NORVASC) 10 mg tablet TAKE 1 TABLET BY MOUTH EVERY DAY 90 Tab 3    ONETOUCH ULTRA BLUE TEST STRIP strip USE TO TEST BLOOD SUGAR THREE TIMES A DAY. DX. E11.9 300 Strip 3    pantoprazole (PROTONIX) 40 mg tablet TAKE 1 TAB BY MOUTH DAILY (BEFORE BREAKFAST). 90 Tab 3    metoprolol tartrate (LOPRESSOR) 25 mg tablet TAKE 1/2 TABS BY MOUTH TWO (2) TIMES A DAY. 90 Tab 3    cloNIDine HCl (CATAPRES) 0.2 mg tablet TAKE 1 TABLET BY MOUTH TWICE A  Tab 3    lancets (ONE TOUCH DELICA) 33 gauge misc USE TO TEST BLOOD SUGAR THREE TIMES A DAY. DX.E11.9 100 Lancet 11    simvastatin (ZOCOR) 40 mg tablet Take 1 Tab by mouth nightly. 90 Tab 3    Insulin Needles, Disposable, (CHRISS PEN NEEDLE) 32 gauge x 5/32\" ndle Use to inject insulin four times a day. Dx.e11.9 200 Pen Needle 11    calcitRIOL (ROCALTROL) 0.25 mcg capsule Take 1 Cap by mouth daily.  90 Cap 3    Insulin Needles, Disposable, 29 gauge x 5/16\" ndle Use to inject insulin three times daily. dx.e119 300 Each 3    albuterol (PROVENTIL HFA, VENTOLIN HFA, PROAIR HFA) 90 mcg/actuation inhaler Take 2 Puffs by inhalation every four (4) hours as needed for Wheezing.  1 Inhaler 0     Past Medical History:   Diagnosis Date    Cellulitis of right abdominal wall 12/18/2017    letty whatley md    CKD (chronic kidney disease), stage III (HCC)     Diabetes ([facility] [address] 1995    DJD (degenerative joint disease) of knee     Fall at home, sequela 07/05/2019    Gastroenteritis     Hypercholesterolemia     Hypertension     Kidney lesion, native, left     Knee pain     LBP (low back pain)     chiorpactor    Lumbar stenosis     Obesity     Rotator cuff arthropathy of right shoulder 06/22/2018    S/P colonoscopy 12-18-12     Past Surgical History:   Procedure Laterality Date    HX BREAST BIOPSY Right 1990    negative    HX COLONOSCOPY      HX HYSTERECTOMY      HX OOPHORECTOMY N/A     HX OTHER SURGICAL  12/23/2017    I&D infected abscess      No Known Allergies      REVIEW OF SYSTEMS:  General: negative for - chills or fever  ENT: negative for - headaches, nasal congestion or tinnitus  Respiratory: negative for - cough, hemoptysis, shortness of breath or wheezing  Cardiovascular : negative for - chest pain, edema, palpitations or shortness of breath  Gastrointestinal: negative for - abdominal pain, blood in stools, heartburn or nausea/vomiting  Genito-Urinary: no dysuria, trouble voiding, or hematuria  Musculoskeletal: negative for - gait disturbance, joint pain, joint stiffness or joint swelling  Neurological: no TIA or stroke symptoms  Hematologic: no bruises, no bleeding, no swollen glands  Integument: no lumps, mole changes, nail changes or rash  Endocrine: no malaise/lethargy or unexpected weight changes      Social History     Socioeconomic History    Marital status: SINGLE     Spouse name: Not on file    Number of children: Not on file    Years of education: Not on file    Highest education level: Not on file   Tobacco Use    Smoking status: Never Smoker    Smokeless tobacco: Never Used   Substance and Sexual Activity    Alcohol use: No     Alcohol/week: 0.0 standard drinks    Drug use: No    Sexual activity: Not Currently   Social History Narrative    Medical History: Diverticulosis 2002DM 1995primary HTN 1995Obesity 1995Dyslipidemia 1995January 2012 moderate central stenosis L3-L4,    mild to moderate central stenosis at L4-L5 MRI    Gyn History: Last mammogram date 2013. Last menstrual perioddate hysterectomy  FOR FIBROIDS, PT W ITH ONE OVARY. Last    papdate . Menopausal hot flashes. Sexually active not currently sexually active. History of abnormal pap smears none. History of STDs    none. Vaginal discharge or odor none. HysterectomyDate NONE.    OB History: Total pregnancies 1. Pre term deliveries (>20-36.6 w eeks) 1. Surgical History: [facility] right breast bx , nicole paulino colonoscopy     Hospitalization/Major Diagnostic Procedure: [facility] rig breast bx , kidney stones     Family History: Mother:  52 yrs, skin d/oFather: deceasedAunt: alive, lung cancerAdopted: alive    Social History: Alcohol Use Patient does not use alcohol. Smoking Status Patient is a never smoker. Marital Status: Single. Lives w ith:    alone. Education/School: has masters degree-[employer]. Family History   Problem Relation Age of Onset    Cancer Mother     No Known Problems Father        OBJECTIVE:    Visit Vitals  /75   Pulse 71   Temp 97.9 °F (36.6 °C) (Oral)   Resp 18   Ht 5' 3\" (1.6 m)   Wt 233 lb 9.6 oz (106 kg)   LMP  (LMP Unknown)   SpO2 97%   BMI 41.38 kg/m²     CONSTITUTIONAL: well , well nourished, appears age appropriate  EYES: perrla, eom intact  ENMT:moist mucous membranes, pharynx clear  NECK: supple.  Thyroid normal  RESPIRATORY: Chest: clear bilaterally   CARDIOVASCULAR: Heart: regular rate and rhythm  GASTROINTESTINAL: Abdomen: soft, bowel sounds active  HEMATOLOGIC: no pathological lymph nodes palpated  MUSCULOSKELETAL: Extremities: no edema, pulse 1+   INTEGUMENT: No unusual rashes or suspicious skin lesions noted. Nails appear normal.  NEUROLOGIC: non-focal exam   MENTAL STATUS: alert and oriented, appropriate affect           ASSESSMENT:  1. Type 2 diabetes with nephropathy ([address]    2. Spinal stenosis of lumbar region without neurogenic claudication    3. Essential hypertension    4. Hypercholesterolemia    5. CKD (chronic kidney disease), stage III (HCC)    6. Cervical spinal stenosis      Lumbar stenosis, neurogenic claudication, currently asymptomatic. Will assess blood sugar control with hemoglobin A1c. We have increased her Lantus by 4 units and before meal Novolog she will take 80 units before breakfast, 10 units before lunch and supper, hopefully will get her blood sugar to come down to a more reasonable level. If this falls she will give us a call and we will adjust the insulin even further. Will check hemoglobin A1c, as well as her renal panel today. The symptoms that she describes above would suggest postural hypotension. She has no postural hypotension on exam today, supine, sitting and standing is appropriate blood pressure response. I suspect at home this may be the case and suggest she sit on the edge of the bed for a few years to be sure that the dizziness resolves. I do not think it is benign positional vertigo. She has dyslipidemia and is currently on diet for cholesterol. Her last cholesterol LDL was 75 and therefore we have been reluctant to add a statin in spite of the fact of the recommendations. Cervical spinal stenosis is asymptomatic. She will return to the office in three months, sooner if she has any problems . We will send her results in the mail of her lab studies.       I have discussed the diagnosis with the patient and the intended plan as seen in the  orders above. The patient understands and agees with the plan. The patient has   received an after visit summary and questions were answered concerning  future plans  Patient labs and/or xrays were reviewed  Past records were reviewed. PLAN:  .  Orders Placed This Encounter    RENAL FUNCTION PANEL    HEMOGLOBIN A1C WITH EAG    insulin glargine (LANTUS,BASAGLAR) 100 unit/mL (3 mL) inpn    insulin aspart U-100 (NOVOLOG FLEXPEN U-100 INSULIN) 100 unit/mL (3 mL) inpn       Follow-up and Dispositions    · Return in about 3 months (around 5/18/2020). ATTENTION:   This medical record was transcribed using an electronic medical records system. Although proofread, it may and can contain electronic and spelling errors. Other human spelling and other errors may be present. Corrections may be executed at a later time. Please feel free to contact us for any clarifications as needed.

## 2020-02-18 NOTE — ASSESSMENT & PLAN NOTE
Stable, based on history, physical exam and review of pertinent labs, studies and medications; meds reconciled; continue current treatment plan. Key Antihyperglycemic Medications             NOVOLOG FLEXPEN U-100 INSULIN 100 unit/mL (3 mL) inpn (Taking) INJECT 3 TIMES DAILY WITH MEALS WITH SLIDING SCALE    insulin glargine (LANTUS,BASAGLAR) 100 unit/mL (3 mL) inpn (Taking) 32 Units by SubCUTAneous route nightly. Other Key Diabetic Medications             simvastatin (ZOCOR) 40 mg tablet (Taking) Take 1 Tab by mouth nightly.         Lab Results   Component Value Date/Time    Hemoglobin A1c 8.1 11/06/2019 11:01 AM    Glucose 71 11/06/2019 11:01 AM    Creatinine 0.97 11/06/2019 11:01 AM    Microalb/Creat ratio (ug/mg creat.) 9.8 11/20/2018 09:47 AM    Cholesterol, total 137 04/30/2019 09:46 AM    HDL Cholesterol 48 04/30/2019 09:46 AM    LDL, calculated 75 04/30/2019 09:46 AM    Triglyceride 70 04/30/2019 09:46 AM     Diabetic Foot and Eye Exam HM Status   Topic Date Due    Diabetic Foot Care  06/22/2019    Eye Exam  11/20/2021

## 2020-02-18 NOTE — PROGRESS NOTES
Ambulatory Care Management Note    Date/Time:  2/18/2020 12:32 PM    This patient was received as a referral from [address] [place] [address] reviewed with the provider   Laura Monet off ladder 11/6/2019: Fall Prevention continued             Ambulatory  contacted patient for discussion and case management of   CKD/Dyslipidemia/Diabetes/Spinal Stenosis/Postural Hypotension-s/p ladder fall. Summary of patients top problems: Patient reportedly fell from ladder 3 months ago--c/o dizziness occasionally, CKD. Needs assistance w/ diabetes and back . 1. CKD  2. Diabetes  3. Dizziness/Fall Prone-Safety  Patient's challenges to self management identified:   Medical condition      Medication Management:  Good adherence    Advance Care Planning:   Does patient have an Advance Directive:  reviewed and current   Health care decision maker:  [address], Referrals, and Durable Medical Equipment: none    PCP/Specialist follow up:   Future Appointments   Date Time Provider Umer Hernandez   5/19/2020  9:30 AM Jesika Parisi MD [facility]          Goals      Patient/Family verbalizes understanding of self-management of chronic disease. 2/18/2020:  HTN/Dizziness/Postural Hypotension:  PCP states no BPV (benign position vertigo). Reinforcement:  Before position change:  Bed-sit w/ dangling of legs before rising; No climbing on ladder: s/p 11/6/2019 seen in ED for falling off ladder;  Bending-none when  items below the knees. Adequate water intake; BS continued monitoring w/ Hyperglycemia prevention. Will f/u on 2/25/2020 contact. EW           goal f/u: 2/25/2020    Patient verbalized understanding of all information discussed. Patient has this Ambulatory Care Manager's contact information for any further questions, concerns, or needs.

## 2020-02-18 NOTE — PROGRESS NOTES
Chief Complaint   Patient presents with    Dizziness     1. Have you been to the ER, urgent care clinic since your last visit? Hospitalized since your last visit? No    2. Have you seen or consulted any other health care providers outside of the [address] [place] [address] since your last visit? Include any pap smears or colon screening.  No

## 2020-02-19 LAB
ALBUMIN SERPL-MCNC: 4.2 G/DL (ref 3.8–4.8)
BUN SERPL-MCNC: 19 MG/DL (ref 8–27)
BUN/CREAT SERPL: 19 (ref 12–28)
CALCIUM SERPL-MCNC: 9.4 MG/DL (ref 8.7–10.3)
CHLORIDE SERPL-SCNC: 108 MMOL/L (ref 96–106)
CO2 SERPL-SCNC: 21 MMOL/L (ref 20–29)
CREAT SERPL-MCNC: 1.02 MG/DL (ref 0.57–1)
EST. AVERAGE GLUCOSE BLD GHB EST-MCNC: 197 MG/DL
GLUCOSE SERPL-MCNC: 189 MG/DL (ref 65–99)
HBA1C MFR BLD: 8.5 % (ref 4.8–5.6)
PHOSPHATE SERPL-MCNC: 3.7 MG/DL (ref 3–4.3)
POTASSIUM SERPL-SCNC: 4.8 MMOL/L (ref 3.5–5.2)
SODIUM SERPL-SCNC: 144 MMOL/L (ref 134–144)

## 2020-02-28 RX ORDER — PEN NEEDLE, DIABETIC 31 GX3/16"
NEEDLE, DISPOSABLE MISCELLANEOUS
Qty: 200 PEN NEEDLE | Refills: 11 | Status: SHIPPED | OUTPATIENT
Start: 2020-02-28 | End: 2021-05-09

## 2020-03-29 RX ORDER — SIMVASTATIN 40 MG/1
TABLET, FILM COATED ORAL
Qty: 90 TAB | Refills: 3 | Status: SHIPPED | OUTPATIENT
Start: 2020-03-29 | End: 2020-12-27

## 2020-05-05 RX ORDER — INSULIN GLARGINE 100 [IU]/ML
INJECTION, SOLUTION SUBCUTANEOUS
Qty: 30 ADJUSTABLE DOSE PRE-FILLED PEN SYRINGE | Refills: 3 | Status: SHIPPED | OUTPATIENT
Start: 2020-05-05 | End: 2020-05-19

## 2020-05-19 ENCOUNTER — OFFICE VISIT (OUTPATIENT)
Dept: INTERNAL MEDICINE CLINIC | Age: 70
End: 2020-05-19

## 2020-05-19 VITALS
HEIGHT: 63 IN | SYSTOLIC BLOOD PRESSURE: 128 MMHG | HEART RATE: 68 BPM | TEMPERATURE: 97.8 F | WEIGHT: 234.7 LBS | DIASTOLIC BLOOD PRESSURE: 76 MMHG | BODY MASS INDEX: 41.59 KG/M2 | OXYGEN SATURATION: 96 % | RESPIRATION RATE: 18 BRPM

## 2020-05-19 DIAGNOSIS — M48.02 CERVICAL SPINAL STENOSIS: ICD-10-CM

## 2020-05-19 DIAGNOSIS — E78.00 HYPERCHOLESTEROLEMIA: ICD-10-CM

## 2020-05-19 DIAGNOSIS — E11.21 TYPE 2 DIABETES WITH NEPHROPATHY (HCC): Primary | ICD-10-CM

## 2020-05-19 DIAGNOSIS — E66.01 SEVERE OBESITY (BMI 35.0-39.9) WITH COMORBIDITY (HCC): ICD-10-CM

## 2020-05-19 DIAGNOSIS — M48.061 SPINAL STENOSIS OF LUMBAR REGION WITHOUT NEUROGENIC CLAUDICATION: ICD-10-CM

## 2020-05-19 DIAGNOSIS — Z12.31 ENCOUNTER FOR SCREENING MAMMOGRAM FOR MALIGNANT NEOPLASM OF BREAST: ICD-10-CM

## 2020-05-19 DIAGNOSIS — N18.30 CKD (CHRONIC KIDNEY DISEASE), STAGE III (HCC): ICD-10-CM

## 2020-05-19 RX ORDER — INSULIN GLARGINE 100 [IU]/ML
40 INJECTION, SOLUTION SUBCUTANEOUS
Qty: 30 ADJUSTABLE DOSE PRE-FILLED PEN SYRINGE | Refills: 3 | Status: SHIPPED | OUTPATIENT
Start: 2020-05-19 | End: 2020-07-24 | Stop reason: SDUPTHER

## 2020-05-19 RX ORDER — INSULIN ASPART 100 [IU]/ML
INJECTION, SOLUTION INTRAVENOUS; SUBCUTANEOUS
Qty: 15 ADJUSTABLE DOSE PRE-FILLED PEN SYRINGE | Refills: 11 | Status: SHIPPED | OUTPATIENT
Start: 2020-05-19 | End: 2020-07-24 | Stop reason: SDUPTHER

## 2020-05-19 NOTE — PROGRESS NOTES
SPORTS MEDICINE AND PRIMARY CARE  Laura Wan MD, [facility] [address]  Phone:  [phone]  Fax: [phone]       Chief Complaint   Patient presents with    Hypertension   . SUBJECTIVE:    Leoncio Louie is a 77 y.o. female Patient returns today with history of cervical spinal stenosis; lumbar stenosis; dyslipidemia; chronic kidney disease, stage III; diabetes mellitus, type 2 with nephropathy; and obesity. She voices no new complaints. She is seen for evaluation. Current Outpatient Medications   Medication Sig Dispense Refill    insulin glargine (Basaglar KwikPen U-100 Insulin) 100 unit/mL (3 mL) inpn 40 Units by SubCUTAneous route nightly. 30 Adjustable Dose Pre-filled Pen Syringe 3    insulin aspart U-100 (NovoLOG Flexpen U-100 Insulin) 100 unit/mL (3 mL) inpn 10 units bf and 12 units with lunch and supper 15 Adjustable Dose Pre-filled Pen Syringe 11    simvastatin (ZOCOR) 40 mg tablet TAKE 1 TABLET BY MOUTH EVERY DAY AT NIGHT 90 Tab 3    Insulin Needles, Disposable, (CHRISS PEN NEEDLE) 32 gauge x 5/32\" ndle Use to inject insulin four times a day. Dx.e11.9 200 Pen Needle 11    amLODIPine (NORVASC) 10 mg tablet TAKE 1 TABLET BY MOUTH EVERY DAY 90 Tab 3    ONETOUCH ULTRA BLUE TEST STRIP strip USE TO TEST BLOOD SUGAR THREE TIMES A DAY. DX. E11.9 300 Strip 3    pantoprazole (PROTONIX) 40 mg tablet TAKE 1 TAB BY MOUTH DAILY (BEFORE BREAKFAST). 90 Tab 3    metoprolol tartrate (LOPRESSOR) 25 mg tablet TAKE 1/2 TABS BY MOUTH TWO (2) TIMES A DAY. 90 Tab 3    cloNIDine HCl (CATAPRES) 0.2 mg tablet TAKE 1 TABLET BY MOUTH TWICE A  Tab 3    lancets (ONE TOUCH DELICA) 33 gauge misc USE TO TEST BLOOD SUGAR THREE TIMES A DAY. DX.E11.9 100 Lancet 11    calcitRIOL (ROCALTROL) 0.25 mcg capsule Take 1 Cap by mouth daily. 90 Cap 3    Insulin Needles, Disposable, 29 gauge x 5/16\" ndle Use to inject insulin three times daily. dx.e119 300 Each 3    albuterol (PROVENTIL HFA, VENTOLIN HFA, PROAIR HFA) 90 mcg/actuation inhaler Take 2 Puffs by inhalation every four (4) hours as needed for Wheezing.  1 Inhaler 0     Past Medical History:   Diagnosis Date    Cellulitis of right abdominal wall 12/18/2017    letty whatley md    CKD (chronic kidney disease), stage III (HCC)     Diabetes ([address] 1995    DJD (degenerative joint disease) of knee     Fall at home, sequela 07/05/2019    Gastroenteritis     Hypercholesterolemia     Hypertension     Kidney lesion, native, left     Knee pain     LBP (low back pain)     chiorpactor    Lumbar stenosis     Obesity     Rotator cuff arthropathy of right shoulder 06/22/2018    S/P colonoscopy 12-18-12     Past Surgical History:   Procedure Laterality Date    HX BREAST BIOPSY Right 1990    negative    HX COLONOSCOPY      HX HYSTERECTOMY      HX OOPHORECTOMY N/A     HX OTHER SURGICAL  12/23/2017    I&D infected abscess      No Known Allergies      REVIEW OF SYSTEMS:  General: negative for - chills or fever  ENT: negative for - headaches, nasal congestion or tinnitus  Respiratory: negative for - cough, hemoptysis, shortness of breath or wheezing  Cardiovascular : negative for - chest pain, edema, palpitations or shortness of breath  Gastrointestinal: negative for - abdominal pain, blood in stools, heartburn or nausea/vomiting  Genito-Urinary: no dysuria, trouble voiding, or hematuria  Musculoskeletal: negative for - gait disturbance, joint pain, joint stiffness or joint swelling  Neurological: no TIA or stroke symptoms  Hematologic: no bruises, no bleeding, no swollen glands  Integument: no lumps, mole changes, nail changes or rash  Endocrine: no malaise/lethargy or unexpected weight changes      Social History     Socioeconomic History    Marital status: SINGLE     Spouse name: Not on file    Number of children: Not on file    Years of education: Not on file    Highest education level: Not on file   Social Needs    Financial resource strain: Not hard at all   spotdock insecurity     Worry: Never true     Inability: Never true   [employer] needs     Medical: No     Non-medical: No   Tobacco Use    Smoking status: Never Smoker    Smokeless tobacco: Never Used   Substance and Sexual Activity    Alcohol use: No     Alcohol/week: 0.0 standard drinks    Drug use: No    Sexual activity: Not Currently   Lifestyle    Physical activity     Days per week: 0 days     Minutes per session: 0 min    Stress: Not at all   Relationships    Social connections     Talks on phone: More than three times a week     Gets together: More than three times a week     Attends Gnosticist service: More than 4 times per year     Active member of club or organization: Yes     Attends meetings of clubs or organizations: More than 4 times per year     Relationship status: Not on file   Other Topics Concern     Service No    Blood Transfusions No    Caffeine Concern No    Occupational Exposure No    Hobby Hazards No    Sleep Concern Yes    Stress Concern No    Weight Concern Yes    Special Diet Yes     Comment: Renal Diet    Back Care Yes    Exercise No    Bike Helmet No    Seat Belt Yes    Self-Exams No   Social History Narrative    Medical History: Diverticulosis 2002DM 1995primary HTN 1995Obesity 1995Dyslipidemia 1995January 9, 2012 moderate central stenosis L3-L4,    mild to moderate central stenosis at L4-L5 MRI    Gyn History: Last mammogram date 11/26/2013. Last menstrual perioddate hysterectomy 1992 FOR FIBROIDS, PT W ITH ONE OVARY. Last    papdate 2012. Menopausal hot flashes. Sexually active not currently sexually active. History of abnormal pap smears none. History of STDs    none. Vaginal discharge or odor none. HysterectomyDate NONE.    OB History: Total pregnancies 1. Pre term deliveries (>20-36.6 w eeks) 1.     Surgical History: [facility] 03/92right breast bx 1973, 1991kidney stones 1994colonoscopy 2012 Hospitalization/Major Diagnostic Procedure: [facility] right breast bx , kidney stones     Family History: Mother:  52 yrs, skin d/oFather: deceasedAunt: alive, lung cancerAdopted: alive    Social History: Alcohol Use Patient does not use alcohol. Smoking Status Patient is a never smoker. Marital Status: Single. Lives w ith:    alone. Education/School: has masters degree-[employer]. Family History   Problem Relation Age of Onset   [facility] Cancer Mother     No Known Problems Father        OBJECTIVE:    Visit Vitals  /76   Pulse 68   Temp 97.8 °F (36.6 °C) (Oral)   Resp 18   Ht 5' 3\" (1.6 m)   Wt 234 lb 11.2 oz (106.5 kg)   LMP  (LMP Unknown)   SpO2 96%   BMI 41.58 kg/m²     CONSTITUTIONAL: well , well nourished, appears age appropriate  EYES: perrla, eom intact  ENMT:moist mucous membranes, pharynx clear  NECK: supple. Thyroid normal  RESPIRATORY: Chest: clear bilaterally   CARDIOVASCULAR: Heart: regular rate and rhythm  GASTROINTESTINAL: Abdomen: soft, bowel sounds active  HEMATOLOGIC: no pathological lymph nodes palpated  MUSCULOSKELETAL: Extremities: no edema, pulse 1+ Foot exam reveals patient's pulses are intact but diminished. Sensation is intact to fine filament. INTEGUMENT: No unusual rashes or suspicious skin lesions noted. Nails appear normal.  NEUROLOGIC: non-focal exam   MENTAL STATUS: alert and oriented, appropriate affect           ASSESSMENT:  1. Type 2 diabetes with nephropathy ([address]    2. CKD (chronic kidney disease), stage III (HCC)    3. Cervical spinal stenosis    4. Severe obesity (BMI 35.0-39. 9) with comorbidity ([address]    5. Spinal stenosis of lumbar region without neurogenic claudication    6. Hypercholesterolemia    7. Encounter for screening mammogram for malignant neoplasm of breast       We will assess blood sugar control with hemoglobin A1c. Her blood sugar has been less than ideal with a hemoglobin A1c of 8.5 on 2020. We will also get her kidney studies. Her last GFR was 65. The kidney disease has therefore resolved. We will confirm this with her protein/creatinine ratio. Diabetes remains a concern. She has gained another pound during this coronavirus. Lumbar spinal stenosis is stable. We have her on a statin for cholesterol and we will check her cholesterol today. She takes Simvastatin 40 mg daily. She will be back to see us in about 3 months. We ask her to call us with the blood sugar readings so we can adjust her insulin. With further discussion, we will increase Basaglar to 40 units at bedtime and increase the NovoLog 10 units with breakfast and 12 units with lunch and supper. We suggest that we give her a scale for adjusting to use herself but she prefers to talk to us to adjust the insulin and therefore, we asked that she call her blood sugar readings to the nurse, so we can help her get her blood sugars in the more reasonable range. She agrees to do that. I have discussed the diagnosis with the patient and the intended plan as seen in the  orders above. The patient understands and agees with the plan. The patient has   received an after visit summary and questions were answered concerning  future plans  Patient labs and/or xrays were reviewed  Past records were reviewed. PLAN:  .  Orders Placed This Encounter    ALLA MAMMO BI SCREENING INCL CAD    MICROALBUMIN, UR, RAND W/ MICROALB/CREAT RATIO    URINALYSIS W/ RFLX MICROSCOPIC    CBC WITH AUTOMATED DIFF    METABOLIC PANEL, COMPREHENSIVE    LIPID PANEL    TSH 3RD GENERATION    HEMOGLOBIN A1C WITH EAG    insulin glargine (Basaglar KwikPen U-100 Insulin) 100 unit/mL (3 mL) inpn    insulin aspart U-100 (NovoLOG Flexpen U-100 Insulin) 100 unit/mL (3 mL) inpn       Follow-up and Dispositions    · Return in about 3 months (around 8/19/2020). ATTENTION:   This medical record was transcribed using an electronic medical records system.   Although proofread, it may and can contain electronic and spelling errors. Other human spelling and other errors may be present. Corrections may be executed at a later time. Please feel free to contact us for any clarifications as needed.

## 2020-05-19 NOTE — PROGRESS NOTES
1. Have you been to the ER, urgent care clinic since your last visit? Hospitalized since your last visit? No    2. Have you seen or consulted any other health care providers outside of the [address] [place] [address] since your last visit? Include any pap smears or colon screening.  No

## 2020-05-19 NOTE — ASSESSMENT & PLAN NOTE
Stable, based on history, physical exam and review of pertinent labs, studies and medications; meds reconciled; continue current treatment plan. Key Obesity Meds     Patient is on no anti-obesity meds.         Lab Results   Component Value Date/Time    Hemoglobin A1c 8.5 02/18/2020 10:18 AM    Glucose 189 02/18/2020 10:18 AM    Cholesterol, total 137 04/30/2019 09:46 AM    HDL Cholesterol 48 04/30/2019 09:46 AM    LDL, calculated 75 04/30/2019 09:46 AM    Triglyceride 70 04/30/2019 09:46 AM    TSH 0.963 04/30/2019 09:46 AM    Sodium 144 02/18/2020 10:18 AM    Potassium 4.8 02/18/2020 10:18 AM    ALT (SGPT) 12 04/30/2019 09:46 AM    AST (SGOT) 15 04/30/2019 09:46 AM

## 2020-05-20 LAB
ALBUMIN SERPL-MCNC: 4.4 G/DL (ref 3.8–4.8)
ALBUMIN/CREAT UR: 37 MG/G CREAT (ref 0–29)
ALBUMIN/GLOB SERPL: 1.6 {RATIO} (ref 1.2–2.2)
ALP SERPL-CCNC: 79 IU/L (ref 39–117)
ALT SERPL-CCNC: 14 IU/L (ref 0–32)
APPEARANCE UR: CLEAR
AST SERPL-CCNC: 11 IU/L (ref 0–40)
BASOPHILS # BLD AUTO: 0 X10E3/UL (ref 0–0.2)
BASOPHILS NFR BLD AUTO: 0 %
BILIRUB SERPL-MCNC: 0.3 MG/DL (ref 0–1.2)
BILIRUB UR QL STRIP: NEGATIVE
BUN SERPL-MCNC: 19 MG/DL (ref 8–27)
BUN/CREAT SERPL: 17 (ref 12–28)
CALCIUM SERPL-MCNC: 9.4 MG/DL (ref 8.7–10.3)
CHLORIDE SERPL-SCNC: 104 MMOL/L (ref 96–106)
CHOLEST SERPL-MCNC: 152 MG/DL (ref 100–199)
CO2 SERPL-SCNC: 22 MMOL/L (ref 20–29)
COLOR UR: YELLOW
CREAT SERPL-MCNC: 1.13 MG/DL (ref 0.57–1)
CREAT UR-MCNC: 110.4 MG/DL
EOSINOPHIL # BLD AUTO: 0.1 X10E3/UL (ref 0–0.4)
EOSINOPHIL NFR BLD AUTO: 1 %
ERYTHROCYTE [DISTWIDTH] IN BLOOD BY AUTOMATED COUNT: 13.7 % (ref 11.7–15.4)
EST. AVERAGE GLUCOSE BLD GHB EST-MCNC: 194 MG/DL
GLOBULIN SER CALC-MCNC: 2.7 G/DL (ref 1.5–4.5)
GLUCOSE SERPL-MCNC: 226 MG/DL (ref 65–99)
GLUCOSE UR QL: ABNORMAL
HBA1C MFR BLD: 8.4 % (ref 4.8–5.6)
HCT VFR BLD AUTO: 43.4 % (ref 34–46.6)
HDLC SERPL-MCNC: 49 MG/DL
HGB BLD-MCNC: 14.9 G/DL (ref 11.1–15.9)
HGB UR QL STRIP: NEGATIVE
IMM GRANULOCYTES # BLD AUTO: 0 X10E3/UL (ref 0–0.1)
IMM GRANULOCYTES NFR BLD AUTO: 0 %
KETONES UR QL STRIP: NEGATIVE
LDLC SERPL CALC-MCNC: 91 MG/DL (ref 0–99)
LEUKOCYTE ESTERASE UR QL STRIP: NEGATIVE
LYMPHOCYTES # BLD AUTO: 1.4 X10E3/UL (ref 0.7–3.1)
LYMPHOCYTES NFR BLD AUTO: 22 %
MCH RBC QN AUTO: 30 PG (ref 26.6–33)
MCHC RBC AUTO-ENTMCNC: 34.3 G/DL (ref 31.5–35.7)
MCV RBC AUTO: 88 FL (ref 79–97)
MICRO URNS: ABNORMAL
MICROALBUMIN UR-MCNC: 40.3 UG/ML
MONOCYTES # BLD AUTO: 0.6 X10E3/UL (ref 0.1–0.9)
MONOCYTES NFR BLD AUTO: 9 %
NEUTROPHILS # BLD AUTO: 4.1 X10E3/UL (ref 1.4–7)
NEUTROPHILS NFR BLD AUTO: 68 %
NITRITE UR QL STRIP: NEGATIVE
PH UR STRIP: 5.5 [PH] (ref 5–7.5)
PLATELET # BLD AUTO: 229 X10E3/UL (ref 150–450)
POTASSIUM SERPL-SCNC: 4.7 MMOL/L (ref 3.5–5.2)
PROT SERPL-MCNC: 7.1 G/DL (ref 6–8.5)
PROT UR QL STRIP: ABNORMAL
RBC # BLD AUTO: 4.96 X10E6/UL (ref 3.77–5.28)
SODIUM SERPL-SCNC: 141 MMOL/L (ref 134–144)
SP GR UR: 1.02 (ref 1–1.03)
TRIGL SERPL-MCNC: 62 MG/DL (ref 0–149)
TSH SERPL DL<=0.005 MIU/L-ACNC: 1.54 UIU/ML (ref 0.45–4.5)
UROBILINOGEN UR STRIP-MCNC: 0.2 MG/DL (ref 0.2–1)
VLDLC SERPL CALC-MCNC: 12 MG/DL (ref 5–40)
WBC # BLD AUTO: 6.1 X10E3/UL (ref 3.4–10.8)

## 2020-06-12 ENCOUNTER — HOSPITAL ENCOUNTER (OUTPATIENT)
Dept: MAMMOGRAPHY | Age: 70
Discharge: HOME | End: 2020-06-12
Attending: INTERNAL MEDICINE
Payer: MEDICARE

## 2020-06-12 DIAGNOSIS — Z12.31 VISIT FOR SCREENING MAMMOGRAM: ICD-10-CM

## 2020-06-12 PROCEDURE — 77067 SCR MAMMO BI INCL CAD: CPT

## 2020-07-23 ENCOUNTER — VIRTUAL VISIT (OUTPATIENT)
Dept: INTERNAL MEDICINE CLINIC | Age: 70
End: 2020-07-23

## 2020-07-23 DIAGNOSIS — M17.11 PRIMARY OSTEOARTHRITIS OF RIGHT KNEE: ICD-10-CM

## 2020-07-23 DIAGNOSIS — R06.09 DOE (DYSPNEA ON EXERTION): ICD-10-CM

## 2020-07-23 DIAGNOSIS — E66.01 SEVERE OBESITY (BMI 35.0-39.9) WITH COMORBIDITY (HCC): ICD-10-CM

## 2020-07-23 DIAGNOSIS — N18.30 CKD (CHRONIC KIDNEY DISEASE), STAGE III (HCC): ICD-10-CM

## 2020-07-23 DIAGNOSIS — I10 ESSENTIAL HYPERTENSION: ICD-10-CM

## 2020-07-23 DIAGNOSIS — E11.21 TYPE 2 DIABETES WITH NEPHROPATHY (HCC): ICD-10-CM

## 2020-07-23 DIAGNOSIS — R07.89 OTHER CHEST PAIN: ICD-10-CM

## 2020-07-23 NOTE — PROGRESS NOTES
Chief Complaint   Patient presents with    Shortness of Breath     Patient states that she has had shortness off breath for 3 months. 1. Have you been to the ER, urgent care clinic since your last visit? Hospitalized since your last visit? No    2. Have you seen or consulted any other health care providers outside of the [address] [place] [address] since your last visit? Include any pap smears or colon screening.  No

## 2020-07-24 ENCOUNTER — VIRTUAL VISIT (OUTPATIENT)
Dept: INTERNAL MEDICINE CLINIC | Age: 70
End: 2020-07-24

## 2020-07-24 DIAGNOSIS — N18.30 CKD (CHRONIC KIDNEY DISEASE), STAGE III (HCC): ICD-10-CM

## 2020-07-24 DIAGNOSIS — E11.21 TYPE 2 DIABETES WITH NEPHROPATHY (HCC): ICD-10-CM

## 2020-07-24 DIAGNOSIS — E78.00 HYPERCHOLESTEROLEMIA: ICD-10-CM

## 2020-07-24 DIAGNOSIS — R06.09 DOE (DYSPNEA ON EXERTION): ICD-10-CM

## 2020-07-24 DIAGNOSIS — I10 ESSENTIAL HYPERTENSION: ICD-10-CM

## 2020-07-24 DIAGNOSIS — R07.89 OTHER CHEST PAIN: Primary | ICD-10-CM

## 2020-07-24 DIAGNOSIS — M48.02 CERVICAL SPINAL STENOSIS: ICD-10-CM

## 2020-07-24 DIAGNOSIS — E66.01 SEVERE OBESITY (BMI 35.0-39.9) WITH COMORBIDITY (HCC): ICD-10-CM

## 2020-07-24 RX ORDER — INSULIN ASPART 100 [IU]/ML
12 INJECTION, SOLUTION INTRAVENOUS; SUBCUTANEOUS
Qty: 11 ADJUSTABLE DOSE PRE-FILLED PEN SYRINGE | Refills: 3 | Status: SHIPPED | OUTPATIENT
Start: 2020-07-24 | End: 2020-10-15

## 2020-07-24 RX ORDER — INSULIN GLARGINE 100 [IU]/ML
40 INJECTION, SOLUTION SUBCUTANEOUS
Qty: 12 ADJUSTABLE DOSE PRE-FILLED PEN SYRINGE | Refills: 3 | Status: SHIPPED | OUTPATIENT
Start: 2020-07-24 | End: 2021-08-12

## 2020-07-24 NOTE — PROGRESS NOTES
Chief Complaint   Patient presents with    Shortness of Breath     Pt c/o increased shortness of breath. 1. Have you been to the ER, urgent care clinic since your last visit? Hospitalized since your last visit? No    2. Have you seen or consulted any other health care providers outside of the [address] [place] [address] since your last visit? Include any pap smears or colon screening.  No

## 2020-07-24 NOTE — PROGRESS NOTES
Lisa Orellana is a 77 y.o. female who was seen by synchronous (real-time) audio-video technology on 7/24/2020 for Shortness of Breath        Assessment & Plan:   Diagnoses and all orders for this visit:    1. Other chest pain I am concerned about the chest pain  Diabetic is not well controlled with obesity and hypertension. We will therefore request a stress test.  She agrees with the plan. -     NUCLEAR CARDIAC STRESS TEST; Future    2. WOODY (dyspnea on exertion) in addition to stress testing we were asked to stop walking stop exercising until the cardiac evaluation is completed. -     NUCLEAR CARDIAC STRESS TEST; Future    3. Severe obesity (BMI 35.0-39. 9) with comorbidity ([facility] [address] we will continue encourage heart healthy diabetic weight reducing diet. 4. Type 2 diabetes with nephropathy (HCC) blood sugar control has been less than ideal.  On May 19, 2020 hemoglobin A1c was 8.4.    5. CKD (chronic kidney disease), stage III ([facility] [address] we will assess her renal function next visit but encourage control of her diabetes. 6. Cervical spinal stenosis    7. Essential hypertension blood pressure control has been good on metoprolol clonidine    8. Hypercholesterolemia we currently have her on simvastatin 40 mg daily which on May 19, 2020 produced a triglyceride of 62, total cholesterol 152, HDL, 49, LDL of 91. We continue to encourage diet also. She will be back to see us next month as scheduled. Other orders  -     insulin aspart U-100 (NovoLOG Flexpen U-100 Insulin) 100 unit/mL (3 mL) inpn; 12 Units by SubCUTAneous route Before breakfast, lunch, and dinner. Administer 12 units three times daily. -     insulin glargine (Basaglar KwikPen U-100 Insulin) 100 unit/mL (3 mL) inpn; 40 Units by SubCUTAneous route nightly. Subjective:   Patient seen today with known history of obesity, type 2 diabetes with nephropathy, chronic kidney disease, hypertension, dyslipidemia, and is seen for evaluation.   She seen currently complaining of shortness of breath. However in question she walks a certain distance becomes short of breath with diaphoresis and chest tightness. She is close to home she turns around moved back home otherwise she has to wait and then return home. There is no radiation of discomfort patient seen for evaluation. Prior to Admission medications    Medication Sig Start Date End Date Taking? Authorizing Provider   insulin aspart U-100 (NovoLOG Flexpen U-100 Insulin) 100 unit/mL (3 mL) inpn 12 Units by SubCUTAneous route Before breakfast, lunch, and dinner. Administer 12 units three times daily. 7/24/20  Yes Shila Lua MD   insulin glargine (Basaglar KwikPen U-100 Insulin) 100 unit/mL (3 mL) inpn 40 Units by SubCUTAneous route nightly. 7/24/20  Yes Shila Lua MD   simvastatin (ZOCOR) 40 mg tablet TAKE 1 TABLET BY MOUTH EVERY DAY AT NIGHT 3/29/20  Yes Shila Lua MD   Insulin Needles, Disposable, (CHRISS PEN NEEDLE) 32 gauge x 5/32\" ndle Use to inject insulin four times a day. Dx.e11.9 2/28/20  Yes Shila Lua MD   amLODIPine (NORVASC) 10 mg tablet TAKE 1 TABLET BY MOUTH EVERY DAY 1/28/20  Yes Marcie Rice MD   ONETOUCH ULTRA BLUE TEST STRIP strip USE TO TEST BLOOD SUGAR THREE TIMES A DAY. DX. E11.9 1/27/20  Yes Shila Lua MD   pantoprazole (PROTONIX) 40 mg tablet TAKE 1 TAB BY MOUTH DAILY (BEFORE BREAKFAST). 1/12/20  Yes Shila Lua MD   metoprolol tartrate (LOPRESSOR) 25 mg tablet TAKE 1/2 TABS BY MOUTH TWO (2) TIMES A DAY. 11/18/19  Yes Shila Lua MD   cloNIDine HCl (CATAPRES) 0.2 mg tablet TAKE 1 TABLET BY MOUTH TWICE A DAY 11/9/19  Yes Shila Lua MD   lancets (ONE TOUCH DELICA) 33 gauge misc USE TO TEST BLOOD SUGAR THREE TIMES A DAY. DX.E11.9 6/23/19  Yes Shila Lua MD   calcitRIOL (ROCALTROL) 0.25 mcg capsule Take 1 Cap by mouth daily.  9/25/18  Yes Shila Lua MD   Insulin Needles, Disposable, 29 gauge x 5/16\" ndle Use to inject insulin three times daily. dx.e119 8/29/18  Yes Deon Casas MD   albuterol (PROVENTIL HFA, VENTOLIN HFA, PROAIR HFA) 90 mcg/actuation inhaler Take 2 Puffs by inhalation every four (4) hours as needed for Wheezing. 3/2/17   Cheo Dean MD     Patient Active Problem List   Diagnosis Code    Hypercholesterolemia E78.00    Hypertension I10    Lumbar stenosis M48.061    Low back pain M54.5    DJD (degenerative joint disease) of knee M17.10    Cellulitis of right abdominal wall L03.311    Gastroenteritis K52.9    Acute renal failure (ARF) ([facility]) N17.9    Type 2 diabetes with nephropathy (formerly [facility]) E11.21    Severe obesity (BMI 35.0-39. 9) with comorbidity ([facility] [address] E66.01    Shoulder pain, right M25.511    Knee pain M25.569    Rotator cuff arthropathy of right shoulder M12.811    Cervical spinal stenosis M48.02    Fall at home, sequela W19. Rell Hoang, Y92.009    CKD (chronic kidney disease), stage III (formerly [facility]) N18.3    WOODY (dyspnea on exertion) R06.00    Other chest pain R07.89     Patient Active Problem List    Diagnosis Date Noted    WOODY (dyspnea on exertion) 07/24/2020    Other chest pain 07/24/2020    CKD (chronic kidney disease), stage III ([address]     Cervical spinal stenosis 07/05/2019    Fall at home, sequela 07/05/2019    Rotator cuff arthropathy of right shoulder     Shoulder pain, right     Knee pain     Severe obesity (BMI 35.0-39. 9) with comorbidity ([address] 03/27/2018    Type 2 diabetes with nephropathy ([address] 03/13/2018    Acute renal failure (ARF) ([address] 03/07/2018    Gastroenteritis     Cellulitis of right abdominal wall 12/18/2017    DJD (degenerative joint disease) of knee     Low back pain     Lumbar stenosis     Hypercholesterolemia     Hypertension      Current Outpatient Medications   Medication Sig Dispense Refill    insulin aspart U-100 (NovoLOG Flexpen U-100 Insulin) 100 unit/mL (3 mL) inpn 12 Units by SubCUTAneous route Before breakfast, lunch, and dinner. Administer 12 units three times daily. 11 Adjustable Dose Pre-filled Pen Syringe 3    insulin glargine (Basaglar KwikPen U-100 Insulin) 100 unit/mL (3 mL) inpn 40 Units by SubCUTAneous route nightly. 12 Adjustable Dose Pre-filled Pen Syringe 3    simvastatin (ZOCOR) 40 mg tablet TAKE 1 TABLET BY MOUTH EVERY DAY AT NIGHT 90 Tab 3    Insulin Needles, Disposable, (CHRISS PEN NEEDLE) 32 gauge x 5/32\" ndle Use to inject insulin four times a day. Dx.e11.9 200 Pen Needle 11    amLODIPine (NORVASC) 10 mg tablet TAKE 1 TABLET BY MOUTH EVERY DAY 90 Tab 3    ONETOUCH ULTRA BLUE TEST STRIP strip USE TO TEST BLOOD SUGAR THREE TIMES A DAY. DX. E11.9 300 Strip 3    pantoprazole (PROTONIX) 40 mg tablet TAKE 1 TAB BY MOUTH DAILY (BEFORE BREAKFAST). 90 Tab 3    metoprolol tartrate (LOPRESSOR) 25 mg tablet TAKE 1/2 TABS BY MOUTH TWO (2) TIMES A DAY. 90 Tab 3    cloNIDine HCl (CATAPRES) 0.2 mg tablet TAKE 1 TABLET BY MOUTH TWICE A  Tab 3    lancets (ONE TOUCH DELICA) 33 gauge misc USE TO TEST BLOOD SUGAR THREE TIMES A DAY. DX.E11.9 100 Lancet 11    calcitRIOL (ROCALTROL) 0.25 mcg capsule Take 1 Cap by mouth daily. 90 Cap 3    Insulin Needles, Disposable, 29 gauge x 5/16\" ndle Use to inject insulin three times daily. dx.e119 300 Each 3    albuterol (PROVENTIL HFA, VENTOLIN HFA, PROAIR HFA) 90 mcg/actuation inhaler Take 2 Puffs by inhalation every four (4) hours as needed for Wheezing.  1 Inhaler 0     REVIEW OF SYSTEMS as noted below except that noted in subjective:  General: negative for - chills or fever  ENT: negative for - headaches, nasal congestion or tinnitus  Respiratory: negative for - cough, hemoptysis, shortness of breath or wheezing  Cardiovascular : negative for - chest pain, edema, palpitations or shortness of breath  Gastrointestinal: negative for - abdominal pain, blood in stools, heartburn or nausea/vomiting  Genito-Urinary: no dysuria, trouble voiding, or hematuria  Musculoskeletal: negative for - gait disturbance, joint pain, joint stiffness or joint swelling  Neurological: no TIA or stroke symptoms  Hematologic: no bruises, no bleeding, no swollen glands  Integument: no lumps, mole changes, nail changes or rash  Endocrine:no malaise/lethargy or unexpected weight changes      No Known Allergies  Past Medical History:   Diagnosis Date    Cellulitis of right abdominal wall 12/18/2017    letty whatley md    CKD (chronic kidney disease), stage III (HCC)     Diabetes ([facility] [address] 1995    DJD (degenerative joint disease) of knee     Fall at home, sequela 07/05/2019    Gastroenteritis     Hypercholesterolemia     Hypertension     Kidney lesion, native, left     Knee pain     LBP (low back pain)     chiorpactor    Lumbar stenosis     Obesity     Rotator cuff arthropathy of right shoulder 06/22/2018    S/P colonoscopy 12-18-12     Past Surgical History:   Procedure Laterality Date    HX BREAST BIOPSY Right 1990    negative    HX COLONOSCOPY      HX HYSTERECTOMY      HX OOPHORECTOMY N/A     HX OTHER SURGICAL  12/23/2017    I&D infected abscess      Family History   Problem Relation Age of Onset    Cancer Mother     No Known Problems Father      Social History     Tobacco Use    Smoking status: Never Smoker    Smokeless tobacco: Never Used   Substance Use Topics    Alcohol use: No     Alcohol/week: 0.0 standard drinks       ROS    Objective:     Patient-Reported Vitals 7/24/2020   Patient-Reported Weight 213lb        [INSTRUCTIONS:  \"[x]\" Indicates a positive item  \"[]\" Indicates a negative item  -- DELETE ALL ITEMS NOT EXAMINED]    Constitutional: [x] Appears well-developed and well-nourished [x] No apparent distress      [] Abnormal -     Mental status: [x] Alert and awake  [x] Oriented to person/place/time [x] Able to follow commands    [] Abnormal -     Eyes:   EOM    [x]  Normal    [] Abnormal -   Sclera  [x]  Normal    [] Abnormal -          Discharge [x]  None visible   [] Abnormal -     HENT: [x] Normocephalic, atraumatic  [] Abnormal -   [x] Mouth/Throat: Mucous membranes are moist    External Ears [x] Normal  [] Abnormal -    Neck: [x] No visualized mass [] Abnormal -     Pulmonary/Chest: [x] Respiratory effort normal   [x] No visualized signs of difficulty breathing or respiratory distress        [] Abnormal -      Musculoskeletal:   [x] Normal gait with no signs of ataxia         [x] Normal range of motion of neck        [] Abnormal -     Neurological:        [x] No Facial Asymmetry (Cranial nerve 7 motor function) (limited exam due to video visit)          [x] No gaze palsy        [] Abnormal -          Skin:        [x] No significant exanthematous lesions or discoloration noted on facial skin         [] Abnormal -            Psychiatric:       [x] Normal Affect [] Abnormal -        [x] No Hallucinations    Other pertinent observable physical exam findings:-        We discussed the expected course, resolution and complications of the diagnosis(es) in detail. Medication risks, benefits, costs, interactions, and alternatives were discussed as indicated. I advised her to contact the office if her condition worsens, changes or fails to improve as anticipated. She expressed understanding with the diagnosis(es) and plan. Cedric Leigh, who was evaluated through a patient-initiated, synchronous (real-time) audio-video encounter, and/or her healthcare decision maker, is aware that it is a billable service, with coverage as determined by her insurance carrier. She provided verbal consent to proceed: Yes, and patient identification was verified. It was conducted pursuant to the emergency declaration under the [address] [place] [address] and the [employer] General Act. A caregiver was present when appropriate. Ability to conduct physical exam was limited. I was at home. The patient was at home. Raciel Reed MD    Please note that portions of this dictation may have been recorded with voice recognition software. Some unanticipated grammatical, syntax, homophones, and other interpretive errors are inadvertently transcribed by the computer software. An attempt at proof reading has been made to minimize errors and omissions. Please disregard these errors. Thank you.

## 2020-08-03 ENCOUNTER — HOSPITAL ENCOUNTER (OUTPATIENT)
Dept: NON INVASIVE DIAGNOSTICS | Age: 70
Discharge: HOME | End: 2020-08-03
Attending: INTERNAL MEDICINE
Payer: MEDICARE

## 2020-08-03 ENCOUNTER — HOSPITAL ENCOUNTER (OUTPATIENT)
Dept: NUCLEAR MEDICINE | Age: 70
Discharge: HOME | End: 2020-08-03
Attending: INTERNAL MEDICINE
Payer: MEDICARE

## 2020-08-03 VITALS
DIASTOLIC BLOOD PRESSURE: 65 MMHG | HEIGHT: 63 IN | SYSTOLIC BLOOD PRESSURE: 139 MMHG | BODY MASS INDEX: 37.74 KG/M2 | WEIGHT: 213 LBS

## 2020-08-03 DIAGNOSIS — R06.09 DOE (DYSPNEA ON EXERTION): ICD-10-CM

## 2020-08-03 DIAGNOSIS — R07.89 OTHER CHEST PAIN: ICD-10-CM

## 2020-08-03 PROBLEM — Z92.89 HISTORY OF NUCLEAR STRESS TEST: Status: ACTIVE | Noted: 2020-08-03

## 2020-08-03 LAB
STRESS BASELINE HR: 62 BPM
STRESS ESTIMATED WORKLOAD: 1 METS
STRESS EXERCISE DUR MIN: NORMAL
STRESS PEAK DIAS BP: 65 MMHG
STRESS PEAK SYS BP: 139 MMHG
STRESS PERCENT HR ACHIEVED: 57 %
STRESS POST PEAK HR: 87 BPM
STRESS RATE PRESSURE PRODUCT: NORMAL BPM*MMHG
STRESS TARGET HR: 152 BPM

## 2020-08-03 PROCEDURE — 74011250636 HC RX REV CODE- 250/636: Performed by: INTERNAL MEDICINE

## 2020-08-03 PROCEDURE — 78452 HT MUSCLE IMAGE SPECT MULT: CPT

## 2020-08-03 PROCEDURE — 93017 CV STRESS TEST TRACING ONLY: CPT

## 2020-08-03 RX ORDER — SODIUM CHLORIDE 0.9 % (FLUSH) 0.9 %
20 SYRINGE (ML) INJECTION
Status: COMPLETED | OUTPATIENT
Start: 2020-08-03 | End: 2020-08-03

## 2020-08-03 RX ADMIN — Medication 20 ML: at 09:37

## 2020-08-03 RX ADMIN — REGADENOSON 0.4 MG: 0.08 INJECTION, SOLUTION INTRAVENOUS at 09:37

## 2020-08-19 ENCOUNTER — OFFICE VISIT (OUTPATIENT)
Dept: INTERNAL MEDICINE CLINIC | Age: 70
End: 2020-08-19
Payer: MEDICARE

## 2020-08-19 VITALS
HEART RATE: 59 BPM | BODY MASS INDEX: 43.29 KG/M2 | RESPIRATION RATE: 16 BRPM | TEMPERATURE: 97.7 F | HEIGHT: 63 IN | DIASTOLIC BLOOD PRESSURE: 70 MMHG | WEIGHT: 244.3 LBS | SYSTOLIC BLOOD PRESSURE: 108 MMHG | OXYGEN SATURATION: 97 %

## 2020-08-19 DIAGNOSIS — Z92.89 HISTORY OF NUCLEAR STRESS TEST: ICD-10-CM

## 2020-08-19 DIAGNOSIS — R06.09 DOE (DYSPNEA ON EXERTION): ICD-10-CM

## 2020-08-19 DIAGNOSIS — M17.11 PRIMARY OSTEOARTHRITIS OF RIGHT KNEE: ICD-10-CM

## 2020-08-19 DIAGNOSIS — E11.21 TYPE 2 DIABETES WITH NEPHROPATHY (HCC): Primary | ICD-10-CM

## 2020-08-19 DIAGNOSIS — R07.89 OTHER CHEST PAIN: ICD-10-CM

## 2020-08-19 DIAGNOSIS — E66.01 SEVERE OBESITY (BMI 35.0-39.9) WITH COMORBIDITY (HCC): ICD-10-CM

## 2020-08-19 DIAGNOSIS — M48.061 SPINAL STENOSIS OF LUMBAR REGION WITHOUT NEUROGENIC CLAUDICATION: ICD-10-CM

## 2020-08-19 DIAGNOSIS — M48.02 CERVICAL SPINAL STENOSIS: ICD-10-CM

## 2020-08-19 DIAGNOSIS — E78.00 HYPERCHOLESTEROLEMIA: ICD-10-CM

## 2020-08-19 DIAGNOSIS — N18.30 CKD (CHRONIC KIDNEY DISEASE), STAGE III (HCC): ICD-10-CM

## 2020-08-19 DIAGNOSIS — I10 ESSENTIAL HYPERTENSION: ICD-10-CM

## 2020-08-19 PROCEDURE — 2022F DILAT RTA XM EVC RTNOPTHY: CPT | Performed by: INTERNAL MEDICINE

## 2020-08-19 PROCEDURE — G8536 NO DOC ELDER MAL SCRN: HCPCS | Performed by: INTERNAL MEDICINE

## 2020-08-19 PROCEDURE — G8754 DIAS BP LESS 90: HCPCS | Performed by: INTERNAL MEDICINE

## 2020-08-19 PROCEDURE — G9899 SCRN MAM PERF RSLTS DOC: HCPCS | Performed by: INTERNAL MEDICINE

## 2020-08-19 PROCEDURE — G8510 SCR DEP NEG, NO PLAN REQD: HCPCS | Performed by: INTERNAL MEDICINE

## 2020-08-19 PROCEDURE — G8417 CALC BMI ABV UP PARAM F/U: HCPCS | Performed by: INTERNAL MEDICINE

## 2020-08-19 PROCEDURE — G8399 PT W/DXA RESULTS DOCUMENT: HCPCS | Performed by: INTERNAL MEDICINE

## 2020-08-19 PROCEDURE — 3017F COLORECTAL CA SCREEN DOC REV: CPT | Performed by: INTERNAL MEDICINE

## 2020-08-19 PROCEDURE — G8427 DOCREV CUR MEDS BY ELIG CLIN: HCPCS | Performed by: INTERNAL MEDICINE

## 2020-08-19 PROCEDURE — G8752 SYS BP LESS 140: HCPCS | Performed by: INTERNAL MEDICINE

## 2020-08-19 PROCEDURE — 1101F PT FALLS ASSESS-DOCD LE1/YR: CPT | Performed by: INTERNAL MEDICINE

## 2020-08-19 PROCEDURE — 3052F HG A1C>EQUAL 8.0%<EQUAL 9.0%: CPT | Performed by: INTERNAL MEDICINE

## 2020-08-19 PROCEDURE — 36415 COLL VENOUS BLD VENIPUNCTURE: CPT | Performed by: INTERNAL MEDICINE

## 2020-08-19 PROCEDURE — 1090F PRES/ABSN URINE INCON ASSESS: CPT | Performed by: INTERNAL MEDICINE

## 2020-08-19 PROCEDURE — 99214 OFFICE O/P EST MOD 30 MIN: CPT | Performed by: INTERNAL MEDICINE

## 2020-08-19 NOTE — PROGRESS NOTES
1. Have you been to the ER, urgent care clinic since your last visit? Hospitalized since your last visit? No    2. Have you seen or consulted any other health care providers outside of the [address] [place] [address] since your last visit? Include any pap smears or colon screening.  No     Wants to discuss SOB and weight gain

## 2020-08-19 NOTE — PROGRESS NOTES
SPORTS [facility]ICINE [facility]  Deepak Elmore MD, [address] [address]  Phone:  [phone]  Fax: [phone]       Chief Complaint   Patient presents with    Shortness of Breath   . SUBJECTIVE:    Cedric Leigh is a 77 y.o. female Patient returns today continuing to complain of shortness of breath. It is primarily dyspnea on exertion at rest.  She does not note shortness of breath. We recall that we evaluated the shortness of breath with a CTA of her chest on 12/20/17 that was unremarkable. She had a nuclear medicine stress test August 3rd that revealed no evidence of ischemia or infarction and EF of 71%, which is excellent. EKG was normal.  She did get short of breath while she was doing the stress test, and fortunately no cardiac issues were found. She denies chest pain. She is concerned about continued weight gain. Other medical problems include obesity, type 2 diabetes with nephropathy, CKD, stage 3, cervical spinal stenosis, primary hypertension, DJD, lumbar stenosis and dyslipidemia. Current Outpatient Medications   Medication Sig Dispense Refill    insulin aspart U-100 (NovoLOG Flexpen U-100 Insulin) 100 unit/mL (3 mL) inpn 12 Units by SubCUTAneous route Before breakfast, lunch, and dinner. Administer 12 units three times daily. 11 Adjustable Dose Pre-filled Pen Syringe 3    insulin glargine (Basaglar KwikPen U-100 Insulin) 100 unit/mL (3 mL) inpn 40 Units by SubCUTAneous route nightly. 12 Adjustable Dose Pre-filled Pen Syringe 3    simvastatin (ZOCOR) 40 mg tablet TAKE 1 TABLET BY MOUTH EVERY DAY AT NIGHT 90 Tab 3    Insulin Needles, Disposable, (CHRISS PEN NEEDLE) 32 gauge x 5/32\" ndle Use to inject insulin four times a day. Dx.e11.9 200 Pen Needle 11    amLODIPine (NORVASC) 10 mg tablet TAKE 1 TABLET BY MOUTH EVERY DAY 90 Tab 3    ONETOUCH ULTRA BLUE TEST STRIP strip USE TO TEST BLOOD SUGAR THREE TIMES A DAY. DX. E11.9 300 Strip 3    pantoprazole (PROTONIX) 40 mg tablet TAKE 1 TAB BY MOUTH DAILY (BEFORE BREAKFAST). 90 Tab 3    metoprolol tartrate (LOPRESSOR) 25 mg tablet TAKE 1/2 TABS BY MOUTH TWO (2) TIMES A DAY. 90 Tab 3    cloNIDine HCl (CATAPRES) 0.2 mg tablet TAKE 1 TABLET BY MOUTH TWICE A  Tab 3    lancets (ONE TOUCH DELICA) 33 gauge misc USE TO TEST BLOOD SUGAR THREE TIMES A DAY. DX.E11.9 100 Lancet 11    calcitRIOL (ROCALTROL) 0.25 mcg capsule Take 1 Cap by mouth daily. 90 Cap 3    Insulin Needles, Disposable, 29 gauge x 5/16\" ndle Use to inject insulin three times daily. dx.e119 300 Each 3    albuterol (PROVENTIL HFA, VENTOLIN HFA, PROAIR HFA) 90 mcg/actuation inhaler Take 2 Puffs by inhalation every four (4) hours as needed for Wheezing. 1 Inhaler 0     Past Medical History:   Diagnosis Date    Cellulitis of right abdominal wall 12/18/2017    letty whatley md    CKD (chronic kidney disease), stage III (HCC)     Diabetes ([facility] [address] 1995    DJD (degenerative joint disease) of knee     Fall at home, sequela 07/05/2019    Gastroenteritis     History of nuclear stress test 08/03/2020    No evidence is of ischemia or infarction. Left ventricular ejection fraction measures 71%.     Hypercholesterolemia     Hypertension     Kidney lesion, native, left     Knee pain     LBP (low back pain)     chiorpactor    Lumbar stenosis     Obesity     Rotator cuff arthropathy of right shoulder 06/22/2018    S/P colonoscopy 12-18-12     Past Surgical History:   Procedure Laterality Date    HX BREAST BIOPSY Right 1990    negative    HX COLONOSCOPY      HX HYSTERECTOMY      HX OOPHORECTOMY N/A     HX OTHER SURGICAL  12/23/2017    I&D infected abscess      No Known Allergies      REVIEW OF SYSTEMS:  General: negative for - chills or fever  ENT: negative for - headaches, nasal congestion or tinnitus  Respiratory: negative for - cough, hemoptysis, shortness of breath or wheezing  Cardiovascular : negative for - chest pain, edema, palpitations or shortness of breath  Gastrointestinal: negative for - abdominal pain, blood in stools, heartburn or nausea/vomiting  Genito-Urinary: no dysuria, trouble voiding, or hematuria  Musculoskeletal: negative for - gait disturbance, joint pain, joint stiffness or joint swelling  Neurological: no TIA or stroke symptoms  Hematologic: no bruises, no bleeding, no swollen glands  Integument: no lumps, mole changes, nail changes or rash  Endocrine: no malaise/lethargy or unexpected weight changes      Social History     Socioeconomic History    Marital status: SINGLE     Spouse name: Not on file    Number of children: Not on file    Years of education: Not on file    Highest education level: Not on file   Social Needs    Financial resource strain: Not hard at all   [employer] insecurity     Worry: Never true     Inability: Never true   [employer] needs     Medical: No     Non-medical: No   Tobacco Use    Smoking status: Never Smoker    Smokeless tobacco: Never Used   Substance and Sexual Activity    Alcohol use: No     Alcohol/week: 0.0 standard drinks    Drug use: No    Sexual activity: Not Currently   Lifestyle    Physical activity     Days per week: 0 days     Minutes per session: 0 min    Stress: Not at all   Relationships    Social connections     Talks on phone: More than three times a week     Gets together: More than three times a week     Attends [employer] service: More than 4 times per year     Active member of club or organization: Yes     Attends meetings of clubs or organizations: More than 4 times per year     Relationship status: Not on file   Other Topics Concern     Service No    Blood Transfusions No    Caffeine Concern No    Occupational Exposure No    Hobby Hazards No    Sleep Concern Yes    Stress Concern No    Weight Concern Yes    Special Diet Yes     Comment: Renal Diet    Back Care Yes    Exercise No    Bike Helmet No    Seat Belt Yes    Self-Exams No Social History Narrative    Medical History: Diverticulosis 2002DM 1995primary HTN [id number] January 2012 moderate central stenosis L3-L4,    mild to moderate central stenosis at L4-L5 MRI    Gyn History: Last mammogram date 2013. Last menstrual perioddate hysterectomy  FOR FIBROIDS, PT W ITH ONE OVARY. Last    papdate . Menopausal hot flashes. Sexually active not currently sexually active. History of abnormal pap smears none. History of STDs    none. Vaginal discharge or odor none. HysterectomyDate NONE.    OB History: Total pregnancies 1. Pre term deliveries (>20-36.6 w eeks) 1. Surgical History: [facility] right breast bx , kidney stones colonoscopy     Hospitalization/Major Diagnostic Procedure: [facility] rig breast bx , kidney stones     Family History: Mother:  52 yrs, skin d/oFather: deceasedAunt: alive, lung cancerAdopted: alive    Social History: Alcohol Use Patient does not use alcohol. Smoking Status Patient is a never smoker. Marital Status: Single. Lives w ith:    alone. Education/School: has masters degree-[employer]. Family History   Problem Relation Age of Onset    Cancer Mother     No Known Problems Father        OBJECTIVE:    Visit Vitals  /70   Pulse (!) 59   Temp 97.7 °F (36.5 °C) (Oral)   Resp 16   Ht 5' 3\" (1.6 m)   Wt 244 lb 4.8 oz (110.8 kg)   LMP  (LMP Unknown)   SpO2 97%   BMI 43.28 kg/m²     CONSTITUTIONAL: well , well nourished, appears age appropriate  EYES: perrla, eom intact  ENMT:moist mucous membranes, pharynx clear  NECK: supple. Thyroid normal  RESPIRATORY: Chest: clear bilaterally   CARDIOVASCULAR: Heart: regular rate and rhythm  GASTROINTESTINAL: Abdomen: soft, bowel sounds active  HEMATOLOGIC: no pathological lymph nodes palpated  MUSCULOSKELETAL: Extremities: no edema, pulse 1+   INTEGUMENT: No unusual rashes or suspicious skin lesions noted.  Nails appear normal.  NEUROLOGIC: non-focal exam MENTAL STATUS: alert and oriented, appropriate affect           ASSESSMENT:  1. Type 2 diabetes with nephropathy ([address]    2. Severe obesity (BMI 35.0-39. 9) with comorbidity ([address]    3. History of nuclear stress test    4. Other chest pain    5. CKD (chronic kidney disease), stage III (HCC)    6. Cervical spinal stenosis    7. Essential hypertension    8. Primary osteoarthritis of right knee    9. Spinal stenosis of lumbar region without neurogenic claudication    10. Hypercholesterolemia    11. WOODY (dyspnea on exertion)      So we are concerned about her diabetes with her decision of her weight to increase. Her last hemoglobin A1c was 8.4, which is atrocious. Now with the weight gain, I am greatly concerned about the control of her diabetes. We will ask her to start sending us via [employer] her blood sugar readings so we can help her adjust her insulin to get her sugar down to normal in spite of her diet. Speaking of her diet, we note that she made a decision to gain 31 pounds in a very short period of time. I am suspecting that it is the COVID virus that is the culprit because she cannot do anything, she stays in the house all the time. In addition, I think the shortness of breath is making a contribution. She probably has a fear of walking, although has a negative stress test.    Chronic kidney disease will be assessed with renal panel today. Blood pressure control is at goal.    The dyspnea on exertion is still bothersome. We have not given her an answer. We will ask for PFTs. I wonder if the increasing weight is contributing to what I suspect may be obstructive sleep apnea. This will be investigated. She will play her part by giving us her blood sugar readings and adjusting her diet and physical activity. She will be back to see us in three months. I have discussed the diagnosis with the patient and the intended plan as seen in the  orders above.   The patient understands and agees with the plan. The patient has   received an after visit summary and questions were answered concerning  future plans  Patient labs and/or xrays were reviewed  Past records were reviewed. PLAN:  .  Orders Placed This Encounter    RENAL FUNCTION PANEL    HEMOGLOBIN A1C WITH FOREST [facility] ref [facility]                  ATTENTION:   This medical record was transcribed using an electronic medical records system. Although proofread, it may and can contain electronic and spelling errors. Other human spelling and other errors may be present. Corrections may be executed at a later time. Please feel free to contact us for any clarifications as needed.

## 2020-08-20 LAB
ALBUMIN SERPL-MCNC: 4.3 G/DL (ref 3.8–4.8)
BUN SERPL-MCNC: 17 MG/DL (ref 8–27)
BUN/CREAT SERPL: 16 (ref 12–28)
CALCIUM SERPL-MCNC: 9.7 MG/DL (ref 8.7–10.3)
CHLORIDE SERPL-SCNC: 104 MMOL/L (ref 96–106)
CO2 SERPL-SCNC: 24 MMOL/L (ref 20–29)
CREAT SERPL-MCNC: 1.07 MG/DL (ref 0.57–1)
EST. AVERAGE GLUCOSE BLD GHB EST-MCNC: 177 MG/DL
GLUCOSE SERPL-MCNC: 173 MG/DL (ref 65–99)
HBA1C MFR BLD: 7.8 % (ref 4.8–5.6)
PHOSPHATE SERPL-MCNC: 4.1 MG/DL (ref 3–4.3)
POTASSIUM SERPL-SCNC: 4.8 MMOL/L (ref 3.5–5.2)
SODIUM SERPL-SCNC: 143 MMOL/L (ref 134–144)

## 2020-08-24 ENCOUNTER — HOSPITAL ENCOUNTER (OUTPATIENT)
Dept: PREADMISSION TESTING | Age: 70
Discharge: HOME | End: 2020-08-24
Payer: MEDICARE

## 2020-08-24 DIAGNOSIS — Z01.812 PRE-PROCEDURE LAB EXAM: ICD-10-CM

## 2020-08-24 PROCEDURE — 87635 SARS-COV-2 COVID-19 AMP PRB: CPT

## 2020-08-25 LAB — SARS-COV-2, COV2NT: NOT DETECTED

## 2020-08-28 ENCOUNTER — HOSPITAL ENCOUNTER (OUTPATIENT)
Dept: PULMONOLOGY | Age: 70
Discharge: HOME | End: 2020-08-28
Attending: INTERNAL MEDICINE
Payer: MEDICARE

## 2020-08-28 DIAGNOSIS — R06.09 DOE (DYSPNEA ON EXERTION): ICD-10-CM

## 2020-08-28 PROCEDURE — 94727 GAS DIL/WSHOT DETER LNG VOL: CPT

## 2020-08-28 PROCEDURE — 94060 EVALUATION OF WHEEZING: CPT

## 2020-08-28 PROCEDURE — 94729 DIFFUSING CAPACITY: CPT

## 2020-09-16 ENCOUNTER — VIRTUAL VISIT (OUTPATIENT)
Dept: SLEEP MEDICINE | Age: 70
End: 2020-09-16
Payer: MEDICARE

## 2020-09-16 DIAGNOSIS — E11.21 TYPE 2 DIABETES WITH NEPHROPATHY (HCC): ICD-10-CM

## 2020-09-16 DIAGNOSIS — I10 ESSENTIAL HYPERTENSION: ICD-10-CM

## 2020-09-16 DIAGNOSIS — G47.33 OBSTRUCTIVE SLEEP APNEA (ADULT) (PEDIATRIC): Primary | ICD-10-CM

## 2020-09-16 PROCEDURE — 1090F PRES/ABSN URINE INCON ASSESS: CPT | Performed by: INTERNAL MEDICINE

## 2020-09-16 PROCEDURE — G9899 SCRN MAM PERF RSLTS DOC: HCPCS | Performed by: INTERNAL MEDICINE

## 2020-09-16 PROCEDURE — 2022F DILAT RTA XM EVC RTNOPTHY: CPT | Performed by: INTERNAL MEDICINE

## 2020-09-16 PROCEDURE — 1101F PT FALLS ASSESS-DOCD LE1/YR: CPT | Performed by: INTERNAL MEDICINE

## 2020-09-16 PROCEDURE — G8399 PT W/DXA RESULTS DOCUMENT: HCPCS | Performed by: INTERNAL MEDICINE

## 2020-09-16 PROCEDURE — 3051F HG A1C>EQUAL 7.0%<8.0%: CPT | Performed by: INTERNAL MEDICINE

## 2020-09-16 PROCEDURE — 3017F COLORECTAL CA SCREEN DOC REV: CPT | Performed by: INTERNAL MEDICINE

## 2020-09-16 PROCEDURE — G8432 DEP SCR NOT DOC, RNG: HCPCS | Performed by: INTERNAL MEDICINE

## 2020-09-16 PROCEDURE — G8427 DOCREV CUR MEDS BY ELIG CLIN: HCPCS | Performed by: INTERNAL MEDICINE

## 2020-09-16 PROCEDURE — G8756 NO BP MEASURE DOC: HCPCS | Performed by: INTERNAL MEDICINE

## 2020-09-16 PROCEDURE — 99204 OFFICE O/P NEW MOD 45 MIN: CPT | Performed by: INTERNAL MEDICINE

## 2020-09-16 NOTE — PROGRESS NOTES
217 [facility] [place]. [place], [address]  Tel.  [phone]  Fax. 100 [facility] 60  [place], [address] [facility]  Tel.  [phone]  Fax. [phone] 9250 [facility] Loren Yanes  Tel.  [phone]  Fax. [phone]         Subjective:        Telemedicine visit performed with verbal consent of the patient. Patient called and identity confirmed with 2 patient identifers    Patient was seen at home  Zenon Brody is a 77 y.o. female who was seen by synchronous (real-time) audio-video technology on 9/16/2020. Consent:  She and/or her healthcare decision maker is aware that this patient-initiated Telehealth encounter is the equivalent to a face to face encounter in the sleep disorder center and has provided verbal consent to proceed: Yes    I was at home while conducting this encounter. Zenon Brody is an 77 y.o. female self-referred for evaluation for a sleep disorder. She complains of snoring, snorting associated with excessive daytime sleepiness, feeling tired. Symptoms began 6 months ago, gradually worsening since that time. She usually can fall asleep in 30-60 minutes. Family or house members note snoring. She denies falling asleep while driving. Zenon Brody does wake up frequently at night. She is bothered by waking up too early and left unable to get back to sleep. She actually sleeps about 5 hours at night and wakes up about 3 times during the night. She does not work shifts:  .   Janessa Posada indicates she does get too little sleep at night. Her bedtime is 2300. She awakens at 0600. She does not take naps. She takes   naps a week lasting  . She has the following observed behaviors: Loud snoring, Light snoring, Grinding teeth, Sleep talking, Sitting up in bed while still asleep; Nightmares. Other remarks: vivid dreams,     Pillow Sleepiness Score: 15 which reflect moderate daytime drowsiness.     No Known Allergies      Current Outpatient Medications:     insulin aspart U-100 (NovoLOG Flexpen U-100 Insulin) 100 unit/mL (3 mL) inpn, 12 Units by SubCUTAneous route Before breakfast, lunch, and dinner. Administer 12 units three times daily. , Disp: 11 Adjustable Dose Pre-filled Pen Syringe, Rfl: 3    insulin glargine (Basaglar KwikPen U-100 Insulin) 100 unit/mL (3 mL) inpn, 40 Units by SubCUTAneous route nightly., Disp: 12 Adjustable Dose Pre-filled Pen Syringe, Rfl: 3    simvastatin (ZOCOR) 40 mg tablet, TAKE 1 TABLET BY MOUTH EVERY DAY AT NIGHT, Disp: 90 Tab, Rfl: 3    Insulin Needles, Disposable, (CHRISS PEN NEEDLE) 32 gauge x 5/32\" ndle, Use to inject insulin four times a day. Dx.e11.9, Disp: 200 Pen Needle, Rfl: 11    amLODIPine (NORVASC) 10 mg tablet, TAKE 1 TABLET BY MOUTH EVERY DAY, Disp: 90 Tab, Rfl: 3    ONETOUCH ULTRA BLUE TEST STRIP strip, USE TO TEST BLOOD SUGAR THREE TIMES A DAY. DX. E11.9, Disp: 300 Strip, Rfl: 3    pantoprazole (PROTONIX) 40 mg tablet, TAKE 1 TAB BY MOUTH DAILY (BEFORE BREAKFAST). , Disp: 90 Tab, Rfl: 3    metoprolol tartrate (LOPRESSOR) 25 mg tablet, TAKE 1/2 TABS BY MOUTH TWO (2) TIMES A DAY., Disp: 90 Tab, Rfl: 3    cloNIDine HCl (CATAPRES) 0.2 mg tablet, TAKE 1 TABLET BY MOUTH TWICE A DAY, Disp: 180 Tab, Rfl: 3    lancets (ONE TOUCH DELICA) 33 gauge misc, USE TO TEST BLOOD SUGAR THREE TIMES A DAY. DX.E11.9, Disp: 100 Lancet, Rfl: 11    Insulin Needles, Disposable, 29 gauge x 5/16\" ndle, Use to inject insulin three times daily. dx.e119, Disp: 300 Each, Rfl: 3    calcitRIOL (ROCALTROL) 0.25 mcg capsule, Take 1 Cap by mouth daily. , Disp: 90 Cap, Rfl: 3    albuterol (PROVENTIL HFA, VENTOLIN HFA, PROAIR HFA) 90 mcg/actuation inhaler, Take 2 Puffs by inhalation every four (4) hours as needed for Wheezing., Disp: 1 Inhaler, Rfl: 0     She  has a past medical history of Cellulitis of right abdominal wall (12/18/2017), CKD (chronic kidney disease), stage III ([facility] [address] [facility] Diabetes ([facility] (1995), DJD (degenerative joint disease) of knee, Fall at home, sequela (07/05/2019), Gastroenteritis, History of nuclear stress test (08/03/2020), Hypercholesterolemia, Hypertension, Kidney lesion, native, left, Knee pain, LBP (low back pain), Lumbar stenosis, Obesity, Rotator cuff arthropathy of right shoulder (06/22/2018), and S/P colonoscopy (12-18-12). She  has a past surgical history that includes hx colonoscopy; hx breast biopsy (Right, 1990); hx other surgical (12/23/2017); hx hysterectomy; and hx oophorectomy (N/A). She family history includes Cancer in her mother; No Known Problems in her father. She  reports that she has never smoked. She has never used smokeless tobacco. She reports that she does not drink alcohol or use drugs. Review of Systems:  Constitutional:  +weight gain  Eyes:  No blurred vision  CVS:  No significant chest pain  Pulm:  + shortness of breath with exertion. She is undergoing evaluation with her PMD  GI:  No significant nausea or vomiting  :  +significant nocturia  Musculoskeletal:  + joint pain at night  Skin:  No significant rashes  Neuro:  No significant dizziness   Psych:  No active mood issues    Sleep Review of Systems: notable for no difficulty falling asleep; +frequent awakenings at night;  + dreaming noted; + nightmares ; + early morning headaches; no memory problems; no concentration issues;     Objective:     Visit Vitals  LMP  (LMP Unknown)         Vital Signs: (As obtained by patient/caregiver at home)        Constitutional: [x] Appears well-developed and well-nourished [x] No apparent distress      [] Abnormal -     Mental status: [x] Alert and awake  [x] Oriented to person/place/time [x] Able to follow commands    [] Abnormal -     Eyes:   EOM    [x]  Normal    [] Abnormal -   Sclera  [x]  Normal    [] Abnormal -          Discharge [x]  None visible   [] Abnormal -     HENT: [x] Normocephalic, atraumatic  [] Abnormal -   [x] Mouth/Throat: Mucous membranes are moist               Class 3 oropharynx, thick tongue base,                  absent retrognathia    External Ears [x] Normal  [] Abnormal -    Neck: [x] No visualized mass [] Abnormal -     Pulmonary/Chest: [x] Respiratory effort normal   [x] No visualized signs of difficulty breathing or respiratory distress        [] Abnormal -       Neurological:        [x] No Facial Asymmetry (Cranial nerve 7 motor function) (limited exam due to video visit)          [x] No gaze palsy        [] Abnormal -          Skin:        [x] No significant exanthematous lesions or discoloration noted on facial skin         [] Abnormal -            Psychiatric:       [x] Normal Affect [] Abnormal -       Other pertinent observable physical exam findings:-          Assessment:       ICD-10-CM ICD-9-CM    1. Obstructive sleep apnea (adult) (pediatric)  G47.33 327.23 POLYSOMNOGRAPHY 1 NIGHT   2. Essential hypertension  I10 401.9    3. Type 2 diabetes with nephropathy (HCC)  E11.21 250.40      583.81          Plan:       * Sleep testing was ordered for initial evaluation. * She was provided information on sleep apnea including coresponding risk factors and the importance of proper treatment. * Treatment options for sleep apnea were reviewed today. Patient agrees to a trial of PAP therapy if indicated. * Counseling was provided regarding proper sleep hygiene, appropriate sleep schedule, need for sleep environment safety and safe driving. * Effect of sleep disturbance on weight was reviewed. We have recommended a dedicated weight loss through appropriate diet and an exercise regimen as significant weight reduction has been shown to reduce severity of obstructive sleep apnea. * Patient agrees to [address] [place] [address] 95 or telephone follow-up by sleep technologist shortly after sleep study to review results and plan final management.   (she prefers to receive her results by [facility])  2. Hypertension - she continues on her current regimen.   I have reviewed the relationship between hypertension as it relates to sleep-disordered breathing. 3. Type II diabetes - she continues on her current regimen. I have reviewed the relationship between sleep disordered breathing as it relates to diabetes. The treatment plan was reviewed with the patient in detail and reviewed with the patient . she understands that the lead technologist will be calling her  with the results and assisting with the next step in the treatment plan as outlined today during the consultation with me. All of her questions were addressed. Thank you for allowing us to participate in your patient's medical care. We'll keep you updated on these investigations. Pursuant to the emergency declaration under the [facility], [facility] waiver authority and the [employer] Act, this Virtual  Visit was conducted, with patient's consent, to reduce the patient's risk of exposure to COVID-19 and provide continuity of care for an established patient. Services were provided through a video synchronous discussion virtually to substitute for in-person clinic visit.     Akosua Hunter MD    Electronically signed by    Rossy Chris MD  Diplomate in Sleep Medicine  [facility]

## 2020-09-16 NOTE — PATIENT INSTRUCTIONS
----- Message from Cristel Nolan sent at 2/9/2017  1:42 PM EST -----  [phone]  PT WAS SEEN HERE YESTERDAY AND THOUGHT WE WERE CALLING IN FLEXORIL TO [facility] [place]    [address] [facility] [address] [place]. [address] [facility], [address] Tel.  [phone] Fax. 100 [facility] 60 [place], 200 [facility] Tel.  [phone] Fax. [phone] [address] [place] [place] Tel.  [phone] Fax. [phone] Sleep Apnea: After Your Visit Your Care Instructions Sleep apnea occurs when you frequently stop breathing for 10 seconds or longer during sleep. It can be mild to severe, based on the number of times per hour that you stop breathing or have slowed breathing. Blocked or narrowed airways in your nose, mouth, or throat can cause sleep apnea. Your airway can become blocked when your throat muscles and tongue relax during sleep. Sleep apnea is common, occurring in 1 out of 20 individuals. Individuals having any of the following characteristics should be evaluated and treated right away due to high risk and detrimental consequences from untreated sleep apnea: 
1. Obesity 2. Congestive Heart failure 3. Atrial Fibrillation 4. Uncontrolled Hypertension 5. Type II Diabetes 6. Night-time Arrhythmias 7. Stroke 8. Pulmonary Hypertension 9. High-risk Driving Populations (pilots, truck drivers, etc.) 10. Patients Considering Weight-loss Surgery How do you know you have sleep apnea? You probably have sleep apnea if you answer 'yes' to 3 or more of the following questions: S - Have you been told that you Snore? T - Are you often Tired during the day? O - Has anyone Observed you stop breathing while sleeping? P- Do you have (or are being treated for) high blood Pressure? B - Are you obese (Body Mass Index > 35)? A - Is your Age 48years old or older? N - Is your Neck size greater than 16 inches? G - Are you male Gender? A sleep physician can prescribe a breathing device that prevents tissues in the throat from blocking your airway.  Or your doctor may recommend using a dental device (oral breathing device) to help keep your airway open. In some cases, surgery may be needed to remove enlarged tissues in the throat. Follow-up care is a key part of your treatment and safety. Be sure to make and go to all appointments, and call your doctor if you are having problems. It's also a good idea to know your test results and keep a list of the medicines you take. How can you care for yourself at home? · Lose weight, if needed. It may reduce the number of times you stop breathing or have slowed breathing. · Go to bed at the same time every night. · Sleep on your side. It may stop mild apnea. If you tend to roll onto your back, sew a pocket in the back of your pajama top. Put a tennis ball into the pocket, and stitch the pocket shut. This will help keep you from sleeping on your back. · Avoid alcohol and medicines such as sleeping pills and sedatives before bed. · Do not smoke. Smoking can make sleep apnea worse. If you need help quitting, talk to your doctor about stop-smoking programs and medicines. These can increase your chances of quitting for good. · Prop up the head of your bed 4 to 6 inches by putting bricks under the legs of the bed. · Treat breathing problems, such as a stuffy nose, caused by a cold or allergies. · Use a continuous positive airway pressure (CPAP) breathing machine if lifestyle changes do not help your apnea and your doctor recommends it. The machine keeps your airway from closing when you sleep. · If CPAP does not help you, ask your doctor whether you should try other breathing machines. A bilevel positive airway pressure machine has two types of air pressureâone for breathing in and one for breathing out. Another device raises or lowers air pressure as needed while you breathe. · If your nose feels dry or bleeds when using one of these machines, talk with your doctor about increasing moisture in the air. A humidifier may help.  
· If your nose is runny or stuffy from using a breathing machine, talk with your doctor about using decongestants or a corticosteroid nasal spray. When should you call for help? Watch closely for changes in your health, and be sure to contact your doctor if: 
· You still have sleep apnea even though you have made lifestyle changes. · You are thinking of trying a device such as CPAP. · You are having problems using a CPAP or similar machine. Where can you learn more? Go to Zones. Enter X103 in the search box to learn more about \"Sleep Apnea: After Your Visit. \"  
© 6474-4113 Healthwise, Incorporated. Care instructions adapted under license by [facility] [employer] (which disclaims liability or warranty for this information). This care instruction is for use with your licensed healthcare professional. If you have questions about a medical condition or this instruction, always ask your healthcare professional. [facility] any warranty or liability for your use of this information. PROPER SLEEP HYGIENE What to avoid · Do not have drinks with caffeine, such as coffee or black tea, for 8 hours before bed. · Do not smoke or use other types of tobacco near bedtime. Nicotine is a stimulant and can keep you awake. · Avoid drinking alcohol late in the evening, because it can cause you to wake in the middle of the night. · Do not eat a big meal close to bedtime. If you are hungry, eat a light snack. · Do not drink a lot of water close to bedtime, because the need to urinate may wake you up during the night. · Do not read or watch TV in bed. Use the bed only for sleeping and sexual activity. What to try · Go to bed at the same time every night, and wake up at the same time every morning. Do not take naps during the day. · Keep your bedroom quiet, dark, and cool. · Get regular exercise, but not within 3 to 4 hours of your bedtime. Dewayne Emmanuel · Sleep on a comfortable pillow and mattress. · If watching the clock makes you anxious, turn it facing away from you so you cannot see the time. · If you worry when you lie down, start a worry book. Well before bedtime, write down your worries, and then set the book and your concerns aside. · Try meditation or other relaxation techniques before you go to bed. · If you cannot fall asleep, get up and go to another room until you feel sleepy. Do something relaxing. Repeat your bedtime routine before you go to bed again. · Make your house quiet and calm about an hour before bedtime. Turn down the lights, turn off the TV, log off the computer, and turn down the volume on music. This can help you relax after a busy day. Drowsy Driving The [employer] cites drowsiness as a causing factor in more than 918,504 police reported crashes annually, resulting in 76,000 injuries and 1,500 deaths. Other surveys suggest 55% of people polled have driven while drowsy in the past year, 23% had fallen asleep but not crashed, 3% crashed, and 2% had and accident due to drowsy driving. Who is at risk? Young Drivers: One study of drowsy driving accidents states that 55% of the drivers were under 25 years. Of those, 75% were male. Shift Workers and Travelers: People who work overnight or travel across time zones frequently are at higher risk of experiencing Circadian Rhythm Disorders. They are trying to work and function when their body is programed to sleep. Sleep Deprived: Lack of sleep has a serious impact on your ability to pay attention or focus on a task. Consistently getting less than the average of 8 hours your body needs creates partial or cumulative sleep deprivation. Untreated Sleep Disorders: Sleep Apnea, Narcolepsy, R.L.S., and other sleep disorders (untreated) prevent a person from getting enough restful sleep.  This leads to excessive daytime sleepiness and increases the risk for drowsy driving accidents by up to 7 times. Medications / Alcohol: Even over the counter medications can cause drowsiness. Medications that impair a drivers attention should have a warning label. Alcohol naturally makes you sleepy and on its own can cause accidents. Combined with excessive drowsiness its effects are amplified. Signs of Drowsy Driving: * You don't remember driving the last few miles * You may drift out of your antonina * You are unable to focus and your thoughts wander * You may yawn more often than normal 
 * You have difficulty keeping your eyes open / nodding off * Missing traffic signs, speeding, or tailgating Prevention-  
Good sleep hygiene, lifestyle and behavioral choices have the most impact on drowsy driving. There is no substitute for sleep and the average person requires 8 hours nightly. If you find yourself driving drowsy, stop and sleep. Consider the sleep hygiene tips provided during your visit as well. Medication Refill Policy: Refills for all medications require 1 week advance notice. Please have your pharmacy fax a refill request. We are unable to fax, or call in \"controled substance\" medications and you will need to pick these prescriptions up from our office. [employer] Activation Thank you for requesting access to [employer]. Please follow the instructions below to securely access and download your online medical record. [employer] allows you to send messages to your doctor, view your test results, renew your prescriptions, schedule appointments, and more. How Do I Sign Up? 1. In your internet browser, go to [email or web address]. 2. Click on the First Time User? Click Here link in the Sign In box. You will see the New Member Sign Up page. 3. Enter your [employer] Access Code exactly as it appears below. You will not need to use this code after youve completed the sign-up process.  If you do not sign up before the expiration date, you must request a new code. [email or web address] Access Code: Activation code not generated Current [email or web address] Status: Active (This is the date your [email or web address] access code will ) 4. Enter the last four digits of your Social Security Number (xxxx) and Date of Birth (mm/dd/yyyy) as indicated and click Submit. You will be taken to the next sign-up page. 5. Create a [employer] ID. This will be your [email or web address] login ID and cannot be changed, so think of one that is secure and easy to remember. 6. Create a [email or web address] password. You can change your password at any time. 7. Enter your Password Reset Question and Answer. This can be used at a later time if you forget your password. 8. Enter your e-mail address. You will receive e-mail notification when new information is available in [address] 9. Click Sign Up. You can now view and download portions of your medical record. 10. Click the Download Summary menu link to download a portable copy of your medical information. Additional Information If you have questions, please call [phone]. Remember, [email or web address] is NOT to be used for urgent needs. For medical emergencies, dial 911.

## 2020-09-16 NOTE — Clinical Note
Thank you for the referral.  I will keep you informed of her progress.   [address],  Matthew Murdock

## 2020-09-17 NOTE — PROCEDURES
[address]   PULMONARY FUNCTION TEST    Name:  Sal Dominguez  MR#:  [id number]  :  1950  ACCOUNT #:  [de-identified]  DATE OF SERVICE:  2020      REQUESTING PHYSICIAN:  Kyree Schafer MD    DIAGNOSIS:  Shortness of breath. ATS criteria for reproducibility and acceptability was met. SPIROMETRY:  FEV1/FVC ratio is 88, which is increased. FEV1 is 1.45 L, which is 74% predicted, which is reduced. FVC is 1.65 L, which is 60% predicted, which is reduced. After administration of bronchodilator, there was 17% increase in FVC consistent with positive bronchodilator response. LUNG VOLUME:  Total lung capacity is 3.19 L which is 69% predicted, which is reduced. RV/TLC ratio is 42, which is normal.    DIFFUSION CAPACITY:  DLCO uncorrected for hemoglobin is 18.41 mL/mmHg/minute, which is 104% predicted, which is normal.    Flow volume loop is consistent with obstruction. INTERPRETATION:  Restrictive spirometry with positive bronchodilator response suggestive of mild obstructive airway disease based on FEV1 of 74% predicted. There is concomitant moderate restrictive lung disease with normal diffusion capacity. Clinical correlation advised.         Mile Young MD [email or web address]  D:  2020 13:11  T:  2020 14:34  JOB #:  [id number]  CC:  Kyree Schafer MD

## 2020-10-15 RX ORDER — INSULIN ASPART 100 [IU]/ML
INJECTION, SOLUTION INTRAVENOUS; SUBCUTANEOUS
Qty: 15 ADJUSTABLE DOSE PRE-FILLED PEN SYRINGE | Refills: 3 | Status: SHIPPED | OUTPATIENT
Start: 2020-10-15 | End: 2021-08-05

## 2020-10-16 ENCOUNTER — HOSPITAL ENCOUNTER (OUTPATIENT)
Dept: SLEEP MEDICINE | Age: 70
Discharge: HOME | End: 2020-10-16
Payer: MEDICARE

## 2020-10-16 DIAGNOSIS — G47.33 OBSTRUCTIVE SLEEP APNEA (ADULT) (PEDIATRIC): ICD-10-CM

## 2020-10-16 PROCEDURE — 95810 POLYSOM 6/> YRS 4/> PARAM: CPT | Performed by: INTERNAL MEDICINE

## 2020-10-17 VITALS
WEIGHT: 244 LBS | TEMPERATURE: 97.6 F | DIASTOLIC BLOOD PRESSURE: 64 MMHG | BODY MASS INDEX: 43.23 KG/M2 | OXYGEN SATURATION: 97 % | SYSTOLIC BLOOD PRESSURE: 124 MMHG | HEIGHT: 63 IN | HEART RATE: 63 BPM

## 2020-10-23 ENCOUNTER — DOCUMENTATION ONLY (OUTPATIENT)
Dept: SLEEP MEDICINE | Age: 70
End: 2020-10-23

## 2020-10-23 ENCOUNTER — TELEPHONE (OUTPATIENT)
Dept: SLEEP MEDICINE | Age: 70
End: 2020-10-23

## 2020-10-23 DIAGNOSIS — G47.39 MIXED SLEEP APNEA: ICD-10-CM

## 2020-10-23 DIAGNOSIS — G47.39 MIXED SLEEP APNEA: Primary | ICD-10-CM

## 2020-10-23 RX ORDER — CLONIDINE HYDROCHLORIDE 0.2 MG/1
TABLET ORAL
Qty: 180 TAB | Refills: 3 | Status: SHIPPED | OUTPATIENT
Start: 2020-10-23 | End: 2021-08-12

## 2020-10-23 NOTE — TELEPHONE ENCOUNTER
Reviewed sleep study results with patient. She expressed understanding and is willing to proceed with a CPAP Titration.

## 2020-10-23 NOTE — TELEPHONE ENCOUNTER
Results of sleep study in [employer] to convey results to patient  Attended polysomnogram showed an AHI of 17/hour and lowest oxygen saturation was 86%. This is consistent with moderate mixed type of  sleep apnea. Based on these results, PAP therapy would be beneficial. I recommend she return to the sleep center for a PAP titration where we will carefully find the setting/airflow for her. Mask fitting and PAP education will be done at that time. Once I know the settings, I will order a device for her and see her in follow-up after she has been using it nightly for 1-2 months. Order attached.  Staff to schedule

## 2020-10-26 ENCOUNTER — DOCUMENTATION ONLY (OUTPATIENT)
Dept: SLEEP MEDICINE | Age: 70
End: 2020-10-26

## 2020-11-07 RX ORDER — METOPROLOL TARTRATE 25 MG/1
TABLET, FILM COATED ORAL
Qty: 90 TAB | Refills: 3 | Status: SHIPPED | OUTPATIENT
Start: 2020-11-07 | End: 2021-08-12

## 2020-11-19 ENCOUNTER — OFFICE VISIT (OUTPATIENT)
Dept: INTERNAL MEDICINE CLINIC | Age: 70
End: 2020-11-19
Payer: MEDICARE

## 2020-11-19 VITALS
OXYGEN SATURATION: 98 % | SYSTOLIC BLOOD PRESSURE: 123 MMHG | WEIGHT: 243.7 LBS | HEIGHT: 63 IN | TEMPERATURE: 97.9 F | HEART RATE: 59 BPM | BODY MASS INDEX: 43.18 KG/M2 | RESPIRATION RATE: 16 BRPM | DIASTOLIC BLOOD PRESSURE: 69 MMHG

## 2020-11-19 DIAGNOSIS — N18.31 STAGE 3A CHRONIC KIDNEY DISEASE (HCC): ICD-10-CM

## 2020-11-19 DIAGNOSIS — E11.21 TYPE 2 DIABETES WITH NEPHROPATHY (HCC): ICD-10-CM

## 2020-11-19 DIAGNOSIS — Z99.89 OSA ON CPAP: ICD-10-CM

## 2020-11-19 DIAGNOSIS — G47.33 OSA ON CPAP: ICD-10-CM

## 2020-11-19 DIAGNOSIS — I10 ESSENTIAL HYPERTENSION: ICD-10-CM

## 2020-11-19 DIAGNOSIS — M48.061 SPINAL STENOSIS OF LUMBAR REGION WITHOUT NEUROGENIC CLAUDICATION: ICD-10-CM

## 2020-11-19 DIAGNOSIS — E78.00 HYPERCHOLESTEROLEMIA: ICD-10-CM

## 2020-11-19 DIAGNOSIS — E66.01 SEVERE OBESITY (BMI 35.0-39.9) WITH COMORBIDITY (HCC): ICD-10-CM

## 2020-11-19 DIAGNOSIS — Z13.31 SCREENING FOR DEPRESSION: ICD-10-CM

## 2020-11-19 DIAGNOSIS — Z00.00 MEDICARE ANNUAL WELLNESS VISIT, SUBSEQUENT: Primary | ICD-10-CM

## 2020-11-19 LAB
ALBUMIN SERPL-MCNC: 3.8 G/DL (ref 3.5–5)
ANION GAP SERPL CALC-SCNC: 6 MMOL/L (ref 5–15)
BUN SERPL-MCNC: 14 MG/DL (ref 6–20)
BUN/CREAT SERPL: 14 (ref 12–20)
CALCIUM SERPL-MCNC: 9.1 MG/DL (ref 8.5–10.1)
CHLORIDE SERPL-SCNC: 112 MMOL/L (ref 97–108)
CO2 SERPL-SCNC: 25 MMOL/L (ref 21–32)
CREAT SERPL-MCNC: 1.01 MG/DL (ref 0.55–1.02)
EST. AVERAGE GLUCOSE BLD GHB EST-MCNC: 148 MG/DL
GLUCOSE SERPL-MCNC: 131 MG/DL (ref 65–100)
HBA1C MFR BLD: 6.8 % (ref 4–5.6)
PHOSPHATE SERPL-MCNC: 3.7 MG/DL (ref 2.6–4.7)
POTASSIUM SERPL-SCNC: 4.6 MMOL/L (ref 3.5–5.1)
SARS-COV-2, NAA: NOT DETECTED
SODIUM SERPL-SCNC: 143 MMOL/L (ref 136–145)

## 2020-11-19 PROCEDURE — 1090F PRES/ABSN URINE INCON ASSESS: CPT | Performed by: INTERNAL MEDICINE

## 2020-11-19 PROCEDURE — 3051F HG A1C>EQUAL 7.0%<8.0%: CPT | Performed by: INTERNAL MEDICINE

## 2020-11-19 PROCEDURE — 36415 COLL VENOUS BLD VENIPUNCTURE: CPT | Performed by: INTERNAL MEDICINE

## 2020-11-19 PROCEDURE — 1101F PT FALLS ASSESS-DOCD LE1/YR: CPT | Performed by: INTERNAL MEDICINE

## 2020-11-19 PROCEDURE — G9899 SCRN MAM PERF RSLTS DOC: HCPCS | Performed by: INTERNAL MEDICINE

## 2020-11-19 PROCEDURE — G8399 PT W/DXA RESULTS DOCUMENT: HCPCS | Performed by: INTERNAL MEDICINE

## 2020-11-19 PROCEDURE — G8752 SYS BP LESS 140: HCPCS | Performed by: INTERNAL MEDICINE

## 2020-11-19 PROCEDURE — G8754 DIAS BP LESS 90: HCPCS | Performed by: INTERNAL MEDICINE

## 2020-11-19 PROCEDURE — G8536 NO DOC ELDER MAL SCRN: HCPCS | Performed by: INTERNAL MEDICINE

## 2020-11-19 PROCEDURE — G0439 PPPS, SUBSEQ VISIT: HCPCS | Performed by: INTERNAL MEDICINE

## 2020-11-19 PROCEDURE — 2022F DILAT RTA XM EVC RTNOPTHY: CPT | Performed by: INTERNAL MEDICINE

## 2020-11-19 PROCEDURE — G8417 CALC BMI ABV UP PARAM F/U: HCPCS | Performed by: INTERNAL MEDICINE

## 2020-11-19 PROCEDURE — 3017F COLORECTAL CA SCREEN DOC REV: CPT | Performed by: INTERNAL MEDICINE

## 2020-11-19 PROCEDURE — 99213 OFFICE O/P EST LOW 20 MIN: CPT | Performed by: INTERNAL MEDICINE

## 2020-11-19 PROCEDURE — G8432 DEP SCR NOT DOC, RNG: HCPCS | Performed by: INTERNAL MEDICINE

## 2020-11-19 PROCEDURE — G8427 DOCREV CUR MEDS BY ELIG CLIN: HCPCS | Performed by: INTERNAL MEDICINE

## 2020-11-19 NOTE — PROGRESS NOTES
1. Have you been to the ER, urgent care clinic since your last visit? Hospitalized since your last visit? No    2. Have you seen or consulted any other health care providers outside of the [address] [place] [address] since your last visit? Include any pap smears or colon screening. No  This is the Subsequent Medicare Annual Wellness Exam, performed 12 months or more after the Initial AWV or the last Subsequent AWV    I have reviewed the patient's medical history in detail and updated the computerized patient record. Depression Risk Factor Screening:     3 most recent PHQ Screens 11/19/2020   PHQ Not Done -   Little interest or pleasure in doing things Not at all   Feeling down, depressed, irritable, or hopeless Not at all   Total Score PHQ 2 0       Alcohol Risk Screen   Do you average more than 1 drink per night or more than 7 drinks a week:  No    On any one occasion in the past three months have you have had more than 3 drinks containing alcohol:  No        Functional Ability and Level of Safety:   Hearing: Hearing is good. Activities of Daily Living: The home contains: no safety equipment. Patient does total self care     Ambulation: with no difficulty     Fall Risk:  Fall Risk Assessment, last 12 mths 11/19/2020   Able to walk? Yes   Fall in past 12 months? No   Fall with injury? -   Number of falls in past 12 months -   Fall Risk Score -     Abuse Screen:  Patient is not abused       Cognitive Screening   Has your family/caregiver stated any concerns about your memory: no     Cognitive Screening: not necessary    Assessment/Plan   Education and counseling provided:  Are appropriate based on today's review and evaluation    Diagnoses and all orders for this visit:    1. Type 2 diabetes with nephropathy [facility]  -     RENAL FUNCTION PANEL; Future  -     COLLECTION VENOUS BLOOD,VENIPUNCTURE  -     HEMOGLOBIN A1C WITH EAG; Future    2. Severe obesity (BMI 35.0-39. 9) with comorbidity ([address]    3. Stage 3a chronic kidney disease    4. Essential hypertension    5. Spinal stenosis of lumbar region without neurogenic claudication    6. Hypercholesterolemia        Health Maintenance Due     Health Maintenance Due   Topic Date Due    Medicare Yearly Exam  11/06/2020       Patient Care Team   Patient Care Team:  Bernard Begum MD as PCP - General (Internal Medicine)  Bernard Begum MD as PCP - [facility]  Aysha Willis MD as Surgeon (Surgery)  Kalyani Alston MD as Physician (Nephrology)    History     Patient Active Problem List   Diagnosis Code    Hypercholesterolemia E78.00    Hypertension I10    Lumbar stenosis M48.061    Low back pain M54.5    DJD (degenerative joint disease) of knee M17.10    Cellulitis of right abdominal wall L03.311    Gastroenteritis K52.9    Acute renal failure (ARF) ([address] N17.9    Type 2 diabetes with nephropathy ([address] E11.21    Severe obesity (BMI 35.0-39. 9) with comorbidity ([address] E66.01    Shoulder pain, right M25.511    Knee pain M25.569    Rotator cuff arthropathy of right shoulder M12.811    Cervical spinal stenosis M48.02    Fall at home, sequela W19. Kiki Ryan, Y92.009    CKD (chronic kidney disease), stage III N18.30    WOODY (dyspnea on exertion) R06.00    Other chest pain R07.89    History of nuclear stress test Z92.89     Past Medical History:   Diagnosis Date    Cellulitis of right abdominal wall 12/18/2017    letty whatley md    CKD (chronic kidney disease), stage III     Diabetes ([address] 1995    DJD (degenerative joint disease) of knee     Fall at home, sequela 07/05/2019    Gastroenteritis     History of nuclear stress test 08/03/2020    No evidence is of ischemia or infarction. Left ventricular ejection fraction measures 71%.     Hypercholesterolemia     Hypertension     Kidney lesion, native, left     Knee pain     LBP (low back pain)     chiorpactor    Lumbar stenosis     Obesity     Rotator cuff arthropathy of right shoulder 06/22/2018    S/P colonoscopy 12-18-12      Past Surgical History:   Procedure Laterality Date    HX BREAST BIOPSY Right 1990    negative    HX COLONOSCOPY      HX HYSTERECTOMY      HX OOPHORECTOMY N/A     HX OTHER SURGICAL  12/23/2017    I&D infected abscess      Current Outpatient Medications   Medication Sig Dispense Refill    metoprolol tartrate (LOPRESSOR) 25 mg tablet TAKE 1/2 TABS BY MOUTH TWO (2) TIMES A DAY. 90 Tab 3    cloNIDine HCL (CATAPRES) 0.2 mg tablet TAKE 1 TABLET BY MOUTH TWICE A  Tab 3    NovoLOG Flexpen U-100 Insulin 100 unit/mL (3 mL) inpn 12 UNITS BY SUBCUTANEOUS BEFORE BREAKFAST, LUNCH, AND DINNER. ADMINISTER 12 UNITS THREE TIMES DAILY. 15 Adjustable Dose Pre-filled Pen Syringe 3    insulin glargine (Basaglar KwikPen U-100 Insulin) 100 unit/mL (3 mL) inpn 40 Units by SubCUTAneous route nightly. 12 Adjustable Dose Pre-filled Pen Syringe 3    simvastatin (ZOCOR) 40 mg tablet TAKE 1 TABLET BY MOUTH EVERY DAY AT NIGHT 90 Tab 3    Insulin Needles, Disposable, (CHRISS PEN NEEDLE) 32 gauge x 5/32\" ndle Use to inject insulin four times a day. Dx.e11.9 200 Pen Needle 11    amLODIPine (NORVASC) 10 mg tablet TAKE 1 TABLET BY MOUTH EVERY DAY 90 Tab 3    ONETOUCH ULTRA BLUE TEST STRIP strip USE TO TEST BLOOD SUGAR THREE TIMES A DAY. DX. E11.9 300 Strip 3    pantoprazole (PROTONIX) 40 mg tablet TAKE 1 TAB BY MOUTH DAILY (BEFORE BREAKFAST). 90 Tab 3    lancets (ONE TOUCH DELICA) 33 gauge misc USE TO TEST BLOOD SUGAR THREE TIMES A DAY. DX.E11.9 100 Lancet 11    calcitRIOL (ROCALTROL) 0.25 mcg capsule Take 1 Cap by mouth daily. 90 Cap 3    Insulin Needles, Disposable, 29 gauge x 5/16\" ndle Use to inject insulin three times daily. dx.e119 300 Each 3    albuterol (PROVENTIL HFA, VENTOLIN HFA, PROAIR HFA) 90 mcg/actuation inhaler Take 2 Puffs by inhalation every four (4) hours as needed for Wheezing.  1 Inhaler 0     No Known Allergies    Family History   Problem Relation Age of Onset    Cancer Mother     No Known Problems Father      Social History     Tobacco Use    Smoking status: Never Smoker    Smokeless tobacco: Never Used   Substance Use Topics    Alcohol use: No     Alcohol/week: 0.0 standard drinks

## 2020-11-19 NOTE — PATIENT INSTRUCTIONS
Medicare Wellness Visit, Female     The best way to live healthy is to have a lifestyle where you eat a well-balanced diet, exercise regularly, limit alcohol use, and quit all forms of tobacco/nicotine, if applicable. Regular preventive services are another way to keep healthy. Preventive services (vaccines, screening tests, monitoring & exams) can help personalize your care plan, which helps you manage your own care. Screening tests can find health problems at the earliest stages, when they are easiest to treat. [address] follows the current, evidence-based guidelines published by the [facility] Cliff Karoline ([facility]) when recommending preventive services for our patients. Because we follow these guidelines, sometimes recommendations change over time as research supports it. (For example, mammograms used to be recommended annually. Even though Medicare will still pay for an annual mammogram, the newer guidelines recommend a mammogram every two years for women of average risk.)  Of course, you and your doctor may decide to screen more often for some diseases, based on your risk and your health status. Preventive services for you include:  - Medicare offers their members a free annual wellness visit, which is time for you and your primary care provider to discuss and plan for your preventive service needs. Take advantage of this benefit every year!  -All adults over the age of 72 should receive the recommended pneumonia vaccines. Current USPSTF guidelines recommend a series of two vaccines for the best pneumonia protection.   -All adults should have a flu vaccine yearly and a tetanus vaccine every 10 years. All adults age 48 and older should receive a shingles vaccine once in their lifetime.    -A bone mass density test is recommended when a woman turns 65 to screen for osteoporosis. This test is only recommended one time, as a screening.  Some providers will use this same test as a disease monitoring tool if you already have osteoporosis. -All adults age 38-68 who are overweight should have a diabetes screening test once every three years.   -Other screening tests and preventive services for persons with diabetes include: an eye exam to screen for diabetic retinopathy, a kidney function test, a foot exam, and stricter control over your cholesterol.   -Cardiovascular screening for adults with routine risk involves an electrocardiogram (ECG) at intervals determined by your doctor.   -Colorectal cancer screenings should be done for adults age 54-65 with no increased risk factors for colorectal cancer. There are a number of acceptable methods of screening for this type of cancer. Each test has its own benefits and drawbacks. Discuss with your doctor what is most appropriate for you during your annual wellness visit. The different tests include: colonoscopy (considered the best screening method), a fecal occult blood test, a fecal DNA test, and sigmoidoscopy. -Breast cancer screenings are recommended every other year for women of normal risk, age 54-69.  -Cervical cancer screenings for women over age 72 are only recommended with certain risk factors.   -All adults born between [facility] should be screened once for Hepatitis C. Here is a list of your current Health Maintenance items (your personalized list of preventive services) with a due date:  Health Maintenance Due   Topic Date Due    Annual Well Visit  11/06/2020         Medicare Wellness Visit, Female     The best way to live healthy is to have a lifestyle where you eat a well-balanced diet, exercise regularly, limit alcohol use, and quit all forms of tobacco/nicotine, if applicable. Regular preventive services are another way to keep healthy. Preventive services (vaccines, screening tests, monitoring & exams) can help personalize your care plan, which helps you manage your own care.  Screening tests can find health problems at the earliest stages, when they are easiest to treat. [facility] follows the current, evidence-based guidelines published by the [facility] Cliff Karoline ([facility]) when recommending preventive services for our patients. Because we follow these guidelines, sometimes recommendations change over time as research supports it. (For example, mammograms used to be recommended annually. Even though Medicare will still pay for an annual mammogram, the newer guidelines recommend a mammogram every two years for women of average risk). Of course, you and your doctor may decide to screen more often for some diseases, based on your risk and your co-morbidities (chronic disease you are already diagnosed with). Preventive services for you include:  - Medicare offers their members a free annual wellness visit, which is time for you and your primary care provider to discuss and plan for your preventive service needs. Take advantage of this benefit every year!  -All adults over the age of 72 should receive the recommended pneumonia vaccines. Current USPSTF guidelines recommend a series of two vaccines for the best pneumonia protection.   -All adults should have a flu vaccine yearly and a tetanus vaccine every 10 years.   -All adults age 48 and older should receive the shingles vaccines (series of two vaccines).       -All adults age 38-68 who are overweight should have a diabetes screening test once every three years.   -All adults born between [birth year] and 1964 should be screened once for Hepatitis C.  -Other screening tests and preventive services for persons with diabetes include: an eye exam to screen for diabetic retinopathy, a kidney function test, a foot exam, and stricter control over your cholesterol.   -Cardiovascular screening for adults with routine risk involves an electrocardiogram (ECG) at intervals determined by your doctor.   -Colorectal cancer screenings should be done for adults age 54-65 with no increased risk factors for colorectal cancer. There are a number of acceptable methods of screening for this type of cancer. Each test has its own benefits and drawbacks. Discuss with your doctor what is most appropriate for you during your annual wellness visit. The different tests include: colonoscopy (considered the best screening method), a fecal occult blood test, a fecal DNA test, and sigmoidoscopy.    -A bone mass density test is recommended when a woman turns 65 to screen for osteoporosis. This test is only recommended one time, as a screening. Some providers will use this same test as a disease monitoring tool if you already have osteoporosis. -Breast cancer screenings are recommended every other year for women of normal risk, age 54-69.  -Cervical cancer screenings for women over age 72 are only recommended with certain risk factors.      Here is a list of your current Health Maintenance items (your personalized list of preventive services) with a due date:  Health Maintenance Due   Topic Date Due    Annual Well Visit  11/06/2020

## 2020-11-19 NOTE — PROGRESS NOTES
SPORTS MEDICINE AND PRIMARY CARE  Keon Cota MD, [address] [address]  Phone:  [phone]  Fax: [phone]      Chief Complaint   Patient presents with    Annual Wellness Visit         SUBECTIVE:    Brandin Hansen is a 77 y.o. female Patient returns today after having a sleep study that confirmed the diagnosis of obstructive sleep apnea. Tomorrow she is going to have a CPAP titration. She has a known history of obesity, type 2 diabetes with nephropathy, CKD stage 3, although the last time we checked her renal status she was no longer stage 3, primary hypertension, lumbar stenosis, dyslipidemia, and is seen for evaluation. She voices no new complaints except she is disgusted with her weight. Current Outpatient Medications   Medication Sig Dispense Refill    metoprolol tartrate (LOPRESSOR) 25 mg tablet TAKE 1/2 TABS BY MOUTH TWO (2) TIMES A DAY. 90 Tab 3    cloNIDine HCL (CATAPRES) 0.2 mg tablet TAKE 1 TABLET BY MOUTH TWICE A  Tab 3    NovoLOG Flexpen U-100 Insulin 100 unit/mL (3 mL) inpn 12 UNITS BY SUBCUTANEOUS BEFORE BREAKFAST, LUNCH, AND DINNER. ADMINISTER 12 UNITS THREE TIMES DAILY. 15 Adjustable Dose Pre-filled Pen Syringe 3    insulin glargine (Basaglar KwikPen U-100 Insulin) 100 unit/mL (3 mL) inpn 40 Units by SubCUTAneous route nightly. 12 Adjustable Dose Pre-filled Pen Syringe 3    simvastatin (ZOCOR) 40 mg tablet TAKE 1 TABLET BY MOUTH EVERY DAY AT NIGHT 90 Tab 3    Insulin Needles, Disposable, (CHRISS PEN NEEDLE) 32 gauge x 5/32\" ndle Use to inject insulin four times a day. Dx.e11.9 200 Pen Needle 11    amLODIPine (NORVASC) 10 mg tablet TAKE 1 TABLET BY MOUTH EVERY DAY 90 Tab 3    ONETOUCH ULTRA BLUE TEST STRIP strip USE TO TEST BLOOD SUGAR THREE TIMES A DAY. DX. E11.9 300 Strip 3    pantoprazole (PROTONIX) 40 mg tablet TAKE 1 TAB BY MOUTH DAILY (BEFORE BREAKFAST).  90 Tab 3    lancets (ONE TOUCH DELICA) 33 gauge misc USE TO TEST BLOOD SUGAR THREE TIMES A DAY. DX.E11.9 100 Lancet 11    calcitRIOL (ROCALTROL) 0.25 mcg capsule Take 1 Cap by mouth daily. 90 Cap 3    Insulin Needles, Disposable, 29 gauge x 5/16\" ndle Use to inject insulin three times daily. dx.e119 300 Each 3    albuterol (PROVENTIL HFA, VENTOLIN HFA, PROAIR HFA) 90 mcg/actuation inhaler Take 2 Puffs by inhalation every four (4) hours as needed for Wheezing. 1 Inhaler 0     Past Medical History:   Diagnosis Date    Cellulitis of right abdominal wall 12/18/2017    letty whatley md    CKD (chronic kidney disease), stage III     Diabetes ([facility] 1995    DJD (degenerative joint disease) of knee     Fall at home, sequela 07/05/2019    Gastroenteritis     History of nuclear stress test 08/03/2020    No evidence is of ischemia or infarction. Left ventricular ejection fraction measures 71%.  Hypercholesterolemia     Hypertension     Kidney lesion, native, left     Knee pain     LBP (low back pain)     chiorpactor    Lumbar stenosis     Obesity     Rotator cuff arthropathy of right shoulder 06/22/2018    S/P colonoscopy 12-18-12     Past Surgical History:   Procedure Laterality Date    HX BREAST BIOPSY Right 1990    negative    HX COLONOSCOPY      HX HYSTERECTOMY      HX OOPHORECTOMY N/A     HX OTHER SURGICAL  12/23/2017    I&D infected abscess      No Known Allergies    REVIEW OF SYSTEMS:   Patient completed her shingles shot series. Social History     Socioeconomic History    Marital status: SINGLE     Spouse name: Not on file    Number of children: Not on file    Years of education: Not on file    Highest education level: Not on file   Social Needs    Financial resource strain: Not hard at all    Food insecurity     Worry: Never true     Inability: Never true   [employer] needs     Medical: No     Non-medical: No   Tobacco Use    Smoking status: Never Smoker    Smokeless tobacco: Never Used   Substance and Sexual Activity    Alcohol use:  No Alcohol/week: 0.0 standard drinks    Drug use: No    Sexual activity: Not Currently   Lifestyle    Physical activity     Days per week: 0 days     Minutes per session: 0 min    Stress: Not at all   Relationships    Social connections     Talks on phone: More than three times a week     Gets together: More than three times a week     Attends [employer] service: More than 4 times per year     Active member of club or organization: Yes     Attends meetings of clubs or organizations: More than 4 times per year     Relationship status: Not on file   Other Topics Concern     Service No    Blood Transfusions No    Caffeine Concern No    Occupational Exposure No    Hobby Hazards No    Sleep Concern Yes    Stress Concern No    Weight Concern Yes    Special Diet Yes     Comment: Renal Diet    Back Care Yes    Exercise No    Bike Helmet No    Seat Belt Yes    Self-Exams No   Social History Narrative    Medical History: Diverticulosis 2002DM 1995primary HTN 1995Obesity Dyslipidemia January 2012 moderate central stenosis L3-L4,    mild to moderate central stenosis at L4-L5 MRI    Gyn History: Last mammogram date 2013. Last menstrual perioddate hysterectomy  FOR FIBROIDS, PT W ITH ONE OVARY. Last    papdate . Menopausal hot flashes. Sexually active not currently sexually active. History of abnormal pap smears none. History of STDs    none. Vaginal discharge or odor none. HysterectomyDate NONE.    OB History: Total pregnancies 1. Pre term deliveries (>20-36.6 w eeks) 1. Surgical History: [facility] right breast bx , kidney stones colonoscopy     Hospitalization/Major Diagnostic Procedure: [facility] right breast bx , kidney stones     Family History: Mother:  52 yrs, skin d/oFather: deceasedAunt: alive, lung cancerAdopted: alive    Social History: Alcohol Use Patient does not use alcohol. Smoking Status Patient is a never smoker.  Marital Status: Single. Lives w ith:    alone. Education/School: has masters degree-[employer].   r  Family History   Problem Relation Age of Onset    Cancer Mother     No Known Problems Father        OBJECTIVE:  Visit Vitals  /69   Pulse (!) 59   Temp 97.9 °F (36.6 °C) (Oral)   Resp 16   Ht 5' 3\" (1.6 m)   Wt 243 lb 11.2 oz (110.5 kg)   LMP  (LMP Unknown)   SpO2 98%   BMI 43.17 kg/m²     ENT: perrla,  eom intact  NECK: supple. Thyroid normal  CHEST: clear to ascultation and percussion   HEART: regular rate and rhythm  ABD: soft, bowel sounds active  EXTREMITIES: no edema, pulse 1+     Hospital Outpatient Visit on 08/24/2020   Component Date Value Ref Range Status    SARS-CoV-2 08/24/2020 Not Detected  Not Detected   Final    Comment: (NOTE)  This test was developed and its performance characteristics  determined by [employer]. This test has not been FDA  cleared or approved. This test has been authorized by FDA under an  Emergency Use Authorization (EUA). This test is only authorized for  the duration of time the declaration that circumstances exist  justifying the authorization of the emergency use of in vitro  diagnostic tests for detection of SARS-CoV-2 virus and/or diagnosis  of COVID-19 infection under section 564(b)(1) of the Act, 21 U. S.C.  844JLE-1(D)(3), unless the authorization is terminated or revoked  sooner. When diagnostic testing is negative, the possibility of a  false negative result should be considered in the context of a  patient's recent exposures and the presence of clinical signs and  symptoms consistent with COVID-19. An individual without symptoms of  COVID-19 and who is not shedding SARS-CoV-2 virus would expect to  have a negative (not detected) result in this assay. Performed                            At: [address] [id number]  Mayito Navas MD JL:[id number]            ASSESSMENT:  1. Type 2 diabetes with nephropathy ([facility]    2.  Severe obesity (BMI 35.0-39. 9) with comorbidity ([address]    3. Stage 3a chronic kidney disease    4. Essential hypertension    5. Spinal stenosis of lumbar region without neurogenic claudication    6. Hypercholesterolemia    7. Medicare annual wellness visit, subsequent    8. Screening for depression      Patient states that her sugars are running good, and we expect the hemoglobin A1c to be better. She has cut back on red meat, carbohydrates, has had fried chicken maybe twice since we last saw her, she claims, so she is actively trying to control her diabetes. Her obesity she is also claiming to actively control and she did lose 1 pound since we last saw her. We encouraged her to do a little better than that. Her last renal studies showed that she was no longer a 3A CKD. We will confirm that today. We encouraged fluid intake. Blood pressure control is at goal.    Her dyslipidemia is also at goal on statin. She will be back to see us in three months, sooner if needed. I have discussed the diagnosis with the patient and the intended plan as seen in the  orders above. The patient understands and agees with the plan. The patient has   received an after visit summary and questions were answered concerning  future plans  Patient labs and/or xrays were reviewed  Past records were reviewed. PLAN:  .  Orders Placed This Encounter    Depression Screen Annual    RENAL FUNCTION PANEL    HEMOGLOBIN A1C WITH EAG                     ATTENTION:   This medical record was transcribed using an electronic medical records system. Although proofread, it may and can contain electronic and spelling errors. Other human spelling and other errors may be present. Corrections may be executed at a later time. Please feel free to contact us for any clarifications as needed.

## 2020-11-20 ENCOUNTER — HOSPITAL ENCOUNTER (OUTPATIENT)
Dept: SLEEP MEDICINE | Age: 70
Discharge: HOME | End: 2020-11-20
Payer: MEDICARE

## 2020-11-20 PROCEDURE — 95811 POLYSOM 6/>YRS CPAP 4/> PARM: CPT | Performed by: INTERNAL MEDICINE

## 2020-11-21 VITALS — TEMPERATURE: 96.4 F | BODY MASS INDEX: 43.05 KG/M2 | WEIGHT: 243 LBS | HEIGHT: 63 IN

## 2020-11-28 ENCOUNTER — TELEPHONE (OUTPATIENT)
Dept: SLEEP MEDICINE | Age: 70
End: 2020-11-28

## 2020-11-28 DIAGNOSIS — G47.39 MIXED SLEEP APNEA: Primary | ICD-10-CM

## 2020-11-28 NOTE — TELEPHONE ENCOUNTER
Results of sleep study in [facility]  Lead tech to convey results to patient  Initial Apnea/Hypopnea index was 17.  she elected to proceed with a positive airway pressure trial (CPAP) and a titration study was ordered. Most of the respiratory events were resolved on CPAP 9 cmH2O. I am ordering a PAP device for her home use. I will see her in about 2 months after starting the PAP device at home. Order PAP and call patient and let them know which [employer] company they should be hearing from. Schedule for first adherence visit in 6 weeks.

## 2020-12-01 ENCOUNTER — DOCUMENTATION ONLY (OUTPATIENT)
Dept: SLEEP MEDICINE | Age: 70
End: 2020-12-01

## 2020-12-08 ENCOUNTER — TELEPHONE (OUTPATIENT)
Dept: SLEEP MEDICINE | Age: 70
End: 2020-12-08

## 2020-12-08 NOTE — TELEPHONE ENCOUNTER
Patient called into the office on 12/07/2020 to review sleep study results. please call her back at [phone]

## 2020-12-27 RX ORDER — SIMVASTATIN 40 MG/1
TABLET, FILM COATED ORAL
Qty: 90 TAB | Refills: 3 | Status: SHIPPED | OUTPATIENT
Start: 2020-12-27 | End: 2021-12-23

## 2020-12-27 RX ORDER — PANTOPRAZOLE SODIUM 40 MG/1
TABLET, DELAYED RELEASE ORAL
Qty: 90 TAB | Refills: 3 | Status: SHIPPED | OUTPATIENT
Start: 2020-12-27 | End: 2021-09-20

## 2021-01-22 RX ORDER — LANCETS 33 GAUGE
EACH MISCELLANEOUS
Qty: 100 LANCET | Refills: 11 | Status: SHIPPED | OUTPATIENT
Start: 2021-01-22 | End: 2022-02-23

## 2021-01-25 ENCOUNTER — VIRTUAL VISIT (OUTPATIENT)
Dept: INTERNAL MEDICINE CLINIC | Age: 71
End: 2021-01-25
Payer: MEDICARE

## 2021-01-25 DIAGNOSIS — M48.061 SPINAL STENOSIS OF LUMBAR REGION WITHOUT NEUROGENIC CLAUDICATION: ICD-10-CM

## 2021-01-25 DIAGNOSIS — G47.33 OSA ON CPAP: ICD-10-CM

## 2021-01-25 DIAGNOSIS — M17.11 PRIMARY OSTEOARTHRITIS OF RIGHT KNEE: ICD-10-CM

## 2021-01-25 DIAGNOSIS — N18.31 STAGE 3A CHRONIC KIDNEY DISEASE (HCC): ICD-10-CM

## 2021-01-25 DIAGNOSIS — E11.21 TYPE 2 DIABETES WITH NEPHROPATHY (HCC): Primary | ICD-10-CM

## 2021-01-25 DIAGNOSIS — M25.562 CHRONIC PAIN OF BOTH KNEES: ICD-10-CM

## 2021-01-25 DIAGNOSIS — E66.01 SEVERE OBESITY (BMI 35.0-39.9) WITH COMORBIDITY (HCC): ICD-10-CM

## 2021-01-25 DIAGNOSIS — I10 ESSENTIAL HYPERTENSION: ICD-10-CM

## 2021-01-25 DIAGNOSIS — M25.561 CHRONIC PAIN OF BOTH KNEES: ICD-10-CM

## 2021-01-25 DIAGNOSIS — Z99.89 OSA ON CPAP: ICD-10-CM

## 2021-01-25 DIAGNOSIS — G89.29 CHRONIC PAIN OF BOTH KNEES: ICD-10-CM

## 2021-01-25 PROCEDURE — 99214 OFFICE O/P EST MOD 30 MIN: CPT | Performed by: INTERNAL MEDICINE

## 2021-01-25 PROCEDURE — G8756 NO BP MEASURE DOC: HCPCS | Performed by: INTERNAL MEDICINE

## 2021-01-25 PROCEDURE — 3017F COLORECTAL CA SCREEN DOC REV: CPT | Performed by: INTERNAL MEDICINE

## 2021-01-25 PROCEDURE — 2022F DILAT RTA XM EVC RTNOPTHY: CPT | Performed by: INTERNAL MEDICINE

## 2021-01-25 PROCEDURE — 1090F PRES/ABSN URINE INCON ASSESS: CPT | Performed by: INTERNAL MEDICINE

## 2021-01-25 PROCEDURE — G8428 CUR MEDS NOT DOCUMENT: HCPCS | Performed by: INTERNAL MEDICINE

## 2021-01-25 PROCEDURE — G9899 SCRN MAM PERF RSLTS DOC: HCPCS | Performed by: INTERNAL MEDICINE

## 2021-01-25 PROCEDURE — G8399 PT W/DXA RESULTS DOCUMENT: HCPCS | Performed by: INTERNAL MEDICINE

## 2021-01-25 PROCEDURE — 1101F PT FALLS ASSESS-DOCD LE1/YR: CPT | Performed by: INTERNAL MEDICINE

## 2021-01-25 PROCEDURE — G8432 DEP SCR NOT DOC, RNG: HCPCS | Performed by: INTERNAL MEDICINE

## 2021-01-25 PROCEDURE — 3046F HEMOGLOBIN A1C LEVEL >9.0%: CPT | Performed by: INTERNAL MEDICINE

## 2021-01-25 NOTE — PROGRESS NOTES
Annie Valladares is a 72 y.o. female who was seen by synchronous (real-time) audio-video technology on 1/25/2021 for Knee Pain (right)        Assessment & Plan:   Diagnoses and all orders for this visit:    1. Type 2 diabetes with nephropathy (HCC) pressure control has been excellent with hemoglobin A1c of 6.8 on last visit. 2. Stage 3a chronic kidney disease there is a history of chronic kidney disease for which we have been reluctant to use NSAIDs for concerns of exacerbating the disease. 3. MICHELLE on CPAP she uses CPAP on a regular basis. 4. Severe obesity (BMI 35.0-39. 9) with comorbidity ([address] unfortunate this is one of the primary reason she continues to have discomfort with her knees. 5. Essential hypertension blood pressure control has been good    6. Chronic pain of both knees she has a history of knee discomfort I remember a recent x-ray. 7. Primary osteoarthritis of right knee she is having a flare of DJD of the right knee she prefers not to have a steroid injection which we will be willing to do this week and therefore prefers to see an orthopedic doctor for his opinion    8. Spinal stenosis of lumbar region without neurogenic claudication although there is a history of spinal stenosis and I think this is referred pain I think this is primary knee pain. Subjective:   Patient seen today at her home complaint of knee pain particular on the right. She tried various remedies including cold compresses exercise without relief. She cannot take NSAIDs because of her history of chronic kidney disease is seen for evaluation. She has a known history of diabetes mellitus type 2 degenerative joint disease of the knees primary hypertension morbid obesity and is seen for evaluation. Prior to Admission medications    Medication Sig Start Date End Date Taking? Authorizing Provider   lancets (One Touch Delica) 33 gauge misc USE TO TEST BLOOD SUGAR THREE TIMES A DAY.  DX.E11.9 1/22/21  Yes Patt Rod MD   pantoprazole (PROTONIX) 40 mg tablet TAKE 1 TABLET BY MOUTH EVERY DAY BEFORE BREAKFAST 12/27/20  Yes Patt Rod MD   simvastatin (ZOCOR) 40 mg tablet TAKE 1 TABLET BY MOUTH EVERY DAY AT NIGHT 12/27/20  Yes Patt Rod MD   metoprolol tartrate (LOPRESSOR) 25 mg tablet TAKE 1/2 TABS BY MOUTH TWO (2) TIMES A DAY. 11/7/20  Yes Patt Rod MD   cloNIDine HCL (CATAPRES) 0.2 mg tablet TAKE 1 TABLET BY MOUTH TWICE A DAY 10/23/20  Yes Patt Rod MD   NovoLOG Flexpen U-100 Insulin 100 unit/mL (3 mL) inpn 12 UNITS BY SUBCUTANEOUS BEFORE BREAKFAST, LUNCH, AND DINNER. ADMINISTER 12 UNITS THREE TIMES DAILY. 10/15/20  Yes Patt Rod MD   insulin glargine (Basaglar KwikPen U-100 Insulin) 100 unit/mL (3 mL) inpn 40 Units by SubCUTAneous route nightly. 7/24/20  Yes Patt Rod MD   Insulin Needles, Disposable, (CHRISS PEN NEEDLE) 32 gauge x 5/32\" ndle Use to inject insulin four times a day. Dx.e11.9 2/28/20  Yes Patt Rod MD   amLODIPine (NORVASC) 10 mg tablet TAKE 1 TABLET BY MOUTH EVERY DAY 1/28/20  Yes Eulalia Rice MD   ONETOUCH ULTRA BLUE TEST STRIP strip USE TO TEST BLOOD SUGAR THREE TIMES A DAY. DX. E11.9 1/27/20  Yes Patt Rod MD   Insulin Needles, Disposable, 29 gauge x 5/16\" ndle Use to inject insulin three times daily. dx.e119 8/29/18  Yes Ric Barron MD   albuterol (PROVENTIL HFA, VENTOLIN HFA, PROAIR HFA) 90 mcg/actuation inhaler Take 2 Puffs by inhalation every four (4) hours as needed for Wheezing. 3/2/17  Yes Shayy Esposito MD   calcitRIOL (ROCALTROL) 0.25 mcg capsule Take 1 Cap by mouth daily.  9/25/18   Patt Rod MD     Patient Active Problem List   Diagnosis Code    Hypercholesterolemia E78.00    Hypertension I10    Lumbar stenosis M48.061    Low back pain M54.5    DJD (degenerative joint disease) of knee M17.10    Cellulitis of right abdominal wall L03.311    Gastroenteritis K52.9    Acute renal failure (ARF) (HCC) N17.9    Type 2 diabetes with nephropathy ([facility]) E11.21    Severe obesity (BMI 35.0-39. 9) with comorbidity ([facility] E66.01    Shoulder pain, right M25.511    Knee pain M25.569    Rotator cuff arthropathy of right shoulder M12.811    Cervical spinal stenosis M48.02    Fall at home, sequela W19. XXXS, Y92.009    CKD (chronic kidney disease), stage III N18.30    WOODY (dyspnea on exertion) R06.00    Other chest pain R07.89    History of nuclear stress test Z92.89    MICHELLE on CPAP G47.33, Z99.89     Patient Active Problem List    Diagnosis Date Noted    MICHELLE on CPAP     History of nuclear stress test 08/03/2020    WOODY (dyspnea on exertion) 07/24/2020    Other chest pain 07/24/2020    CKD (chronic kidney disease), stage III     Cervical spinal stenosis 07/05/2019    Fall at home, sequela 07/05/2019    Rotator cuff arthropathy of right shoulder     Shoulder pain, right     Knee pain     Severe obesity (BMI 35.0-39. 9) with comorbidity ([address] 03/27/2018    Type 2 diabetes with nephropathy ([facility] 03/13/2018    Acute renal failure (ARF) ([facility] 03/07/2018    Gastroenteritis     Cellulitis of right abdominal wall 12/18/2017    DJD (degenerative joint disease) of knee     Low back pain     Lumbar stenosis     Hypercholesterolemia     Hypertension      Current Outpatient Medications   Medication Sig Dispense Refill    lancets (One Touch Delica) 33 gauge misc USE TO TEST BLOOD SUGAR THREE TIMES A DAY. DX.E11.9 100 Lancet 11    pantoprazole (PROTONIX) 40 mg tablet TAKE 1 TABLET BY MOUTH EVERY DAY BEFORE BREAKFAST 90 Tab 3    simvastatin (ZOCOR) 40 mg tablet TAKE 1 TABLET BY MOUTH EVERY DAY AT NIGHT 90 Tab 3    metoprolol tartrate (LOPRESSOR) 25 mg tablet TAKE 1/2 TABS BY MOUTH TWO (2) TIMES A DAY.  90 Tab 3    cloNIDine HCL (CATAPRES) 0.2 mg tablet TAKE 1 TABLET BY MOUTH TWICE A  Tab 3    NovoLOG Flexpen U-100 Insulin 100 unit/mL (3 mL) inpn 12 UNITS BY SUBCUTANEOUS BEFORE BREAKFAST, LUNCH, AND DINNER. ADMINISTER 12 UNITS THREE TIMES DAILY. 15 Adjustable Dose Pre-filled Pen Syringe 3    insulin glargine (Basaglar KwikPen U-100 Insulin) 100 unit/mL (3 mL) inpn 40 Units by SubCUTAneous route nightly. 12 Adjustable Dose Pre-filled Pen Syringe 3    Insulin Needles, Disposable, (CHRISS PEN NEEDLE) 32 gauge x 5/32\" ndle Use to inject insulin four times a day. Dx.e11.9 200 Pen Needle 11    amLODIPine (NORVASC) 10 mg tablet TAKE 1 TABLET BY MOUTH EVERY DAY 90 Tab 3    ONETOUCH ULTRA BLUE TEST STRIP strip USE TO TEST BLOOD SUGAR THREE TIMES A DAY. DX. E11.9 300 Strip 3    Insulin Needles, Disposable, 29 gauge x 5/16\" ndle Use to inject insulin three times daily. dx.e119 300 Each 3    albuterol (PROVENTIL HFA, VENTOLIN HFA, PROAIR HFA) 90 mcg/actuation inhaler Take 2 Puffs by inhalation every four (4) hours as needed for Wheezing. 1 Inhaler 0    calcitRIOL (ROCALTROL) 0.25 mcg capsule Take 1 Cap by mouth daily. 90 Cap 3     No Known Allergies  Past Medical History:   Diagnosis Date    Cellulitis of right abdominal wall 12/18/2017    letty whatley md    CKD (chronic kidney disease), stage III     Diabetes ([facility] 1995    DJD (degenerative joint disease) of knee     Fall at home, sequela 07/05/2019    Gastroenteritis     History of nuclear stress test 08/03/2020    No evidence is of ischemia or infarction. Left ventricular ejection fraction measures 71%.     Hypercholesterolemia     Hypertension     Kidney lesion, native, left     Knee pain     LBP (low back pain)     chiorpactor    Lumbar stenosis     Obesity     MICHELLE on CPAP     Rotator cuff arthropathy of right shoulder 06/22/2018    S/P colonoscopy 12-18-12     Past Surgical History:   Procedure Laterality Date    HX BREAST BIOPSY Right 1990    negative    HX COLONOSCOPY      HX HYSTERECTOMY      HX OOPHORECTOMY N/A     HX OTHER SURGICAL  12/23/2017    I&D infected abscess      Family History Problem Relation Age of Onset    Cancer Mother     No Known Problems Father      Social History     Tobacco Use    Smoking status: Never Smoker    Smokeless tobacco: Never Used   Substance Use Topics    Alcohol use: No     Alcohol/week: 0.0 standard drinks       ROS    Objective:     Patient-Reported Vitals 9/16/2020   Patient-Reported Weight 215 lbs        [INSTRUCTIONS:  \"[x]\" Indicates a positive item  \"[]\" Indicates a negative item  -- DELETE ALL ITEMS NOT EXAMINED]    Constitutional: [x] Appears well-developed and well-nourished [x] No apparent distress      [] Abnormal -     Mental status: [x] Alert and awake  [x] Oriented to person/place/time [x] Able to follow commands    [] Abnormal -     Eyes:   EOM    [x]  Normal    [] Abnormal -   Sclera  [x]  Normal    [] Abnormal -          Discharge [x]  None visible   [] Abnormal -     HENT: [x] Normocephalic, atraumatic  [] Abnormal -   [x] Mouth/Throat: Mucous membranes are moist    External Ears [x] Normal  [] Abnormal -    Neck: [x] No visualized mass [] Abnormal -     Pulmonary/Chest: [x] Respiratory effort normal   [x] No visualized signs of difficulty breathing or respiratory distress        [] Abnormal -      Musculoskeletal:   [x] Normal gait with no signs of ataxia         [x] Normal range of motion of neck        [] Abnormal -     Neurological:        [x] No Facial Asymmetry (Cranial nerve 7 motor function) (limited exam due to video visit)          [x] No gaze palsy        [] Abnormal -          Skin:        [x] No significant exanthematous lesions or discoloration noted on facial skin         [] Abnormal -            Psychiatric:       [x] Normal Affect [] Abnormal -        [x] No Hallucinations    Other pertinent observable physical exam findings:-        We discussed the expected course, resolution and complications of the diagnosis(es) in detail. Medication risks, benefits, costs, interactions, and alternatives were discussed as indicated.   I advised her to contact the office if her condition worsens, changes or fails to improve as anticipated. She expressed understanding with the diagnosis(es) and plan. Stephanie Tavera, who was evaluated through a patient-initiated, synchronous (real-time) audio-video encounter, and/or her healthcare decision maker, is aware that it is a billable service, with coverage as determined by her insurance carrier. She provided verbal consent to proceed: Yes, and patient identification was verified. It was conducted pursuant to the emergency declaration under the [address] [place] [address], [address] [place] [address] authority and the [employer] and [employer] Act. A caregiver was present when appropriate. Ability to conduct physical exam was limited. I was at home. The patient was at home.       Kostas Sykes MD

## 2021-02-16 ENCOUNTER — VIRTUAL VISIT (OUTPATIENT)
Dept: SLEEP MEDICINE | Age: 71
End: 2021-02-16
Payer: MEDICARE

## 2021-02-16 DIAGNOSIS — I10 ESSENTIAL HYPERTENSION: ICD-10-CM

## 2021-02-16 DIAGNOSIS — G47.39 MIXED SLEEP APNEA: Primary | ICD-10-CM

## 2021-02-16 DIAGNOSIS — E11.21 TYPE 2 DIABETES WITH NEPHROPATHY (HCC): ICD-10-CM

## 2021-02-16 PROCEDURE — 99213 OFFICE O/P EST LOW 20 MIN: CPT | Performed by: INTERNAL MEDICINE

## 2021-02-16 PROCEDURE — G8756 NO BP MEASURE DOC: HCPCS | Performed by: INTERNAL MEDICINE

## 2021-02-16 PROCEDURE — 1101F PT FALLS ASSESS-DOCD LE1/YR: CPT | Performed by: INTERNAL MEDICINE

## 2021-02-16 PROCEDURE — G8427 DOCREV CUR MEDS BY ELIG CLIN: HCPCS | Performed by: INTERNAL MEDICINE

## 2021-02-16 PROCEDURE — 2022F DILAT RTA XM EVC RTNOPTHY: CPT | Performed by: INTERNAL MEDICINE

## 2021-02-16 PROCEDURE — 3046F HEMOGLOBIN A1C LEVEL >9.0%: CPT | Performed by: INTERNAL MEDICINE

## 2021-02-16 PROCEDURE — G8432 DEP SCR NOT DOC, RNG: HCPCS | Performed by: INTERNAL MEDICINE

## 2021-02-16 PROCEDURE — G8399 PT W/DXA RESULTS DOCUMENT: HCPCS | Performed by: INTERNAL MEDICINE

## 2021-02-16 PROCEDURE — G9899 SCRN MAM PERF RSLTS DOC: HCPCS | Performed by: INTERNAL MEDICINE

## 2021-02-16 PROCEDURE — 1090F PRES/ABSN URINE INCON ASSESS: CPT | Performed by: INTERNAL MEDICINE

## 2021-02-16 PROCEDURE — 3017F COLORECTAL CA SCREEN DOC REV: CPT | Performed by: INTERNAL MEDICINE

## 2021-02-16 NOTE — PATIENT INSTRUCTIONS
[address] [place]. [address], [address] Tel.  [phone] Fax. 100 [facility] 60 [place], [address] [facility] Tel.  [phone] Fax. [phone] [address] [facility] [address] Tel.  [phone] Fax. [phone] PROPER SLEEP HYGIENE What to avoid · Do not have drinks with caffeine, such as coffee or black tea, for 8 hours before bed. · Do not smoke or use other types of tobacco near bedtime. Nicotine is a stimulant and can keep you awake. · Avoid drinking alcohol late in the evening, because it can cause you to wake in the middle of the night. · Do not eat a big meal close to bedtime. If you are hungry, eat a light snack. · Do not drink a lot of water close to bedtime, because the need to urinate may wake you up during the night. · Do not read or watch TV in bed. Use the bed only for sleeping and sexual activity. What to try · Go to bed at the same time every night, and wake up at the same time every morning. Do not take naps during the day. · Keep your bedroom quiet, dark, and cool. · Get regular exercise, but not within 3 to 4 hours of your bedtime. Jeannine Titus · Sleep on a comfortable pillow and mattress. · If watching the clock makes you anxious, turn it facing away from you so you cannot see the time. · If you worry when you lie down, start a worry book. Well before bedtime, write down your worries, and then set the book and your concerns aside. · Try meditation or other relaxation techniques before you go to bed. · If you cannot fall asleep, get up and go to another room until you feel sleepy. Do something relaxing. Repeat your bedtime routine before you go to bed again. · Make your house quiet and calm about an hour before bedtime. Turn down the lights, turn off the TV, log off the computer, and turn down the volume on music. This can help you relax after a busy day. Drowsy Driving [facility] 54 cites drowsiness as a causing factor in more than 790,188 police reported crashes annually, resulting in 76,000 injuries and 1,500 deaths. Other surveys suggest 55% of people polled have driven while drowsy in the past year, 23% had fallen asleep but not crashed, 3% crashed, and 2% had and accident due to drowsy driving. Who is at risk? Young Drivers: One study of drowsy driving accidents states that 55% of the drivers were under 25 years. Of those, 75% were male. Shift Workers and Travelers: People who work overnight or travel across time zones frequently are at higher risk of experiencing Circadian Rhythm Disorders. They are trying to work and function when their body is programed to sleep. Sleep Deprived: Lack of sleep has a serious impact on your ability to pay attention or focus on a task. Consistently getting less than the average of 8 hours your body needs creates partial or cumulative sleep deprivation. Untreated Sleep Disorders: Sleep Apnea, Narcolepsy, R.L.S., and other sleep disorders (untreated) prevent a person from getting enough restful sleep. This leads to excessive daytime sleepiness and increases the risk for drowsy driving accidents by up to 7 times. Medications / Alcohol: Even over the counter medications can cause drowsiness. Medications that impair a drivers attention should have a warning label. Alcohol naturally makes you sleepy and on its own can cause accidents. Combined with excessive drowsiness its effects are amplified. Signs of Drowsy Driving: * You don't remember driving the last few miles * You may drift out of your antonina * You are unable to focus and your thoughts wander * You may yawn more often than normal 
 * You have difficulty keeping your eyes open / nodding off * Missing traffic signs, speeding, or tailgating Prevention-  
 Good sleep hygiene, lifestyle and behavioral choices have the most impact on drowsy driving. There is no substitute for sleep and the average person requires 8 hours nightly. If you find yourself driving drowsy, stop and sleep. Consider the sleep hygiene tips provided during your visit as well. Medication Refill Policy: Refills for all medications require 1 week advance notice. Please have your pharmacy fax a refill request. We are unable to fax, or call in \"controled substance\" medications and you will need to pick these prescriptions up from our office. [employer] Activation Thank you for requesting access to [employer]. Please follow the instructions below to securely access and download your online medical record. [employer] allows you to send messages to your doctor, view your test results, renew your prescriptions, schedule appointments, and more. How Do I Sign Up? 1. In your internet browser, go to [email or web address]. 2. Click on the First Time User? Click Here link in the Sign In box. You will see the New Member Sign Up page. 3. Enter your [employer] Access Code exactly as it appears below. You will not need to use this code after youve completed the sign-up process. If you do not sign up before the expiration date, you must request a new code. [employer] Access Code: Activation code not generated Current [employer] Status: Active (This is the date your [employer] access code will ) 4. Enter the last four digits of your Social Security Number (xxxx) and Date of Birth (mm/dd/yyyy) as indicated and click Submit. You will be taken to the next sign-up page. 5. Create a Trulyt ID. This will be your [employer] login ID and cannot be changed, so think of one that is secure and easy to remember. 6. Create a [employer] password. You can change your password at any time. 7. Enter your Password Reset Question and Answer. This can be used at a later time if you forget your password. 8. Enter your e-mail address. You will receive e-mail notification when new information is available in [address] 9. Click Sign Up. You can now view and download portions of your medical record. 10. Click the Download Summary menu link to download a portable copy of your medical information. Additional Information If you have questions, please call [phone]. Remember, YOUnite is NOT to be used for urgent needs. For medical emergencies, dial 911.

## 2021-02-16 NOTE — PROGRESS NOTES
[address] [facility] [place]. [place], [address]  Tel.  [phone]  Fax. [address] [facility] 60  [place], [address] [facility]  Tel.  [phone]  Fax. [phone] [address] [facility] [address]   Tel.  [phone]  Fax. [phone]       Telemedicine visit performed with verbal consent of the patient. Patient called and identity confirmed with 2 patient identifers    Patient was seen at home  Tatianna Welch is a 72 y.o. female who was seen by synchronous (real-time) audio-video technology on 2/16/2021. Consent:  She and/or her healthcare decision maker is aware that this patient-initiated Telehealth encounter is the equivalent to a face to face encounter in the sleep disorder center and has provided verbal consent to proceed: Yes    I was at home while conducting this encounter. S>Marilu Albarado is a 72 y.o. female seen at this telemedicine visit for a positive airway pressure follow-up. She reports no problems using the device. She is 100% compliant over the past 30 days. The following problems are identified:    Drowsiness no Problems exhaling no   Snoring no Forget to put on no   Mask Comfortable yes  Can't fall asleep no   Dry Mouth At times Mask falls off no   Air Leaking no Frequent awakenings no       Download reviewed. She admits that her sleep has improved on PAP therapy using nasal mask and heated tubing. No Known Allergies    She has a current medication list which includes the following prescription(s): lancets, pantoprazole, simvastatin, metoprolol tartrate, clonidine hcl, novolog flexpen u-100 insulin, basaglar kwikpen u-100 insulin, insulin needles (disposable), amlodipine, onetouch ultra blue test strip, calcitriol, insulin needles (disposable), and albuterol. .      She  has a past medical history of Cellulitis of right abdominal wall (12/18/2017), CKD (chronic kidney disease), stage III, Diabetes ([facility] [address] (1995), DJD (degenerative joint disease) of knee, Fall at home, sequela (07/05/2019), Gastroenteritis, History of nuclear stress test (08/03/2020), Hypercholesterolemia, Hypertension, Kidney lesion, native, left, Knee pain, LBP (low back pain), Lumbar stenosis, Obesity, MICHELLE on CPAP, Rotator cuff arthropathy of right shoulder (06/22/2018), and S/P colonoscopy (12-18-12). [place] Sleepiness Score: 5   and Modified F.O.S.Q. Score Total / 2: 18.5   which reflect improved sleep quality over therapy time. O>      Visit Vitals  LMP  (LMP Unknown)     weight 249 lb    Vital Signs: (As obtained by patient/caregiver at home)        Constitutional: [x] Appears well-developed and well-nourished [x] No apparent distress      [] Abnormal -     Mental status: [x] Alert and awake  [x] Oriented to person/place/time [x] Able to follow commands    [] Abnormal -     Eyes:   EOM    [x]  Normal    [] Abnormal -   Sclera  [x]  Normal    [] Abnormal -          Discharge [x]  None visible   [] Abnormal -     HENT: [x] Normocephalic, atraumatic  [] Abnormal -     External Ears [x] Normal  [] Abnormal -    Neck: [x] No visualized mass [] Abnormal -     Pulmonary/Chest: [x] Respiratory effort normal   [x] No visualized signs of difficulty breathing or respiratory distress        [] Abnormal -       Neurological:        [x] No Facial Asymmetry (Cranial nerve 7 motor function) (limited exam due to video visit)          [x] No gaze palsy        [] Abnormal -          Skin:        [x] No significant exanthematous lesions or discoloration noted on facial skin         [] Abnormal -            Psychiatric:       [x] Normal Affect [] Abnormal -        Other pertinent observable physical exam findings:-            A>    ICD-10-CM ICD-9-CM    1. Mixed sleep apnea  G47.39 327.29    2. Essential hypertension  I10 401.9    3. Type 2 diabetes with nephropathy (HCC)  E11.21 250.40      583.81      AHI = 17 (11-20). On CPAP, Respironics :  5-9 cmH2O.     Compliant: yes    Therapeutic Response:  Positive    P>    she is compliant with PAP therapy and PAP continues to benefit patient and remains necessary for control of her sleep apnea. CPAP setting - she will continue on her current pressure settings. Humidity settings discussed. * We have recommended a dedicated weight loss through appropriate diet and an exercise regimen as significant weight reduction has been shown to reduce severity of obstructive sleep apnea. I have reviewed medicare requirements regarding PAP usage    * She was asked to contact our office for any problems regarding PAP therapy. * Counseling was provided regarding the importance of regular PAP use and on proper sleep hygiene and safe driving. * Re-enforced proper and regular cleaning for the device. 2.Hypertension - she continues on her current regimen. I have reviewed the relationship between hypertension as it relates to sleep-disordered breathing. 3. Type II diabetes - she continues on her current regimen. I have reviewed the relationship between sleep disordered breathing as it relates to diabetes. All of her questions were addressed. Pursuant to the emergency declaration under the [facility], [facility] waiver authority and the [employer] Act, this Virtual  Visit was conducted, with patient's consent, to reduce the patient's risk of exposure to COVID-19 and provide continuity of care for an established patient. Services were provided through a video synchronous discussion virtually to substitute for in-person clinic visit.     Liyah Katz MD    Electronically signed by    Yanet Fleming MD  Diplomate in Sleep Medicine  [facility]

## 2021-02-26 ENCOUNTER — IMMUNIZATION (OUTPATIENT)
Dept: FAMILY MEDICINE CLINIC | Age: 71
End: 2021-02-26

## 2021-02-26 DIAGNOSIS — Z23 ENCOUNTER FOR IMMUNIZATION: Primary | ICD-10-CM

## 2021-02-26 PROCEDURE — 91301 COVID-19, MRNA, LNP-S, PF, 100MCG/0.5ML DOSE(MODERNA): CPT | Performed by: FAMILY MEDICINE

## 2021-02-26 PROCEDURE — 0011A COVID-19, MRNA, LNP-S, PF, 100MCG/0.5ML DOSE(MODERNA): CPT | Performed by: FAMILY MEDICINE

## 2021-03-23 ENCOUNTER — OFFICE VISIT (OUTPATIENT)
Dept: INTERNAL MEDICINE CLINIC | Age: 71
End: 2021-03-23
Payer: MEDICARE

## 2021-03-23 VITALS
WEIGHT: 242.9 LBS | SYSTOLIC BLOOD PRESSURE: 113 MMHG | HEART RATE: 69 BPM | RESPIRATION RATE: 18 BRPM | HEIGHT: 63 IN | TEMPERATURE: 97.8 F | DIASTOLIC BLOOD PRESSURE: 71 MMHG | BODY MASS INDEX: 43.04 KG/M2 | OXYGEN SATURATION: 97 %

## 2021-03-23 DIAGNOSIS — E78.00 HYPERCHOLESTEROLEMIA: ICD-10-CM

## 2021-03-23 DIAGNOSIS — M48.02 CERVICAL SPINAL STENOSIS: ICD-10-CM

## 2021-03-23 DIAGNOSIS — M48.061 SPINAL STENOSIS OF LUMBAR REGION WITHOUT NEUROGENIC CLAUDICATION: ICD-10-CM

## 2021-03-23 DIAGNOSIS — Z99.89 OSA ON CPAP: ICD-10-CM

## 2021-03-23 DIAGNOSIS — E11.21 TYPE 2 DIABETES WITH NEPHROPATHY (HCC): Primary | ICD-10-CM

## 2021-03-23 DIAGNOSIS — N18.31 STAGE 3A CHRONIC KIDNEY DISEASE (HCC): ICD-10-CM

## 2021-03-23 DIAGNOSIS — G47.33 OSA ON CPAP: ICD-10-CM

## 2021-03-23 DIAGNOSIS — I10 ESSENTIAL HYPERTENSION: ICD-10-CM

## 2021-03-23 DIAGNOSIS — E66.01 MORBID OBESITY WITH BMI OF 40.0-44.9, ADULT (HCC): ICD-10-CM

## 2021-03-23 PROCEDURE — 2022F DILAT RTA XM EVC RTNOPTHY: CPT | Performed by: INTERNAL MEDICINE

## 2021-03-23 PROCEDURE — G8536 NO DOC ELDER MAL SCRN: HCPCS | Performed by: INTERNAL MEDICINE

## 2021-03-23 PROCEDURE — G8417 CALC BMI ABV UP PARAM F/U: HCPCS | Performed by: INTERNAL MEDICINE

## 2021-03-23 PROCEDURE — G8510 SCR DEP NEG, NO PLAN REQD: HCPCS | Performed by: INTERNAL MEDICINE

## 2021-03-23 PROCEDURE — 99214 OFFICE O/P EST MOD 30 MIN: CPT | Performed by: INTERNAL MEDICINE

## 2021-03-23 PROCEDURE — 3017F COLORECTAL CA SCREEN DOC REV: CPT | Performed by: INTERNAL MEDICINE

## 2021-03-23 PROCEDURE — G8399 PT W/DXA RESULTS DOCUMENT: HCPCS | Performed by: INTERNAL MEDICINE

## 2021-03-23 PROCEDURE — G8754 DIAS BP LESS 90: HCPCS | Performed by: INTERNAL MEDICINE

## 2021-03-23 PROCEDURE — G9899 SCRN MAM PERF RSLTS DOC: HCPCS | Performed by: INTERNAL MEDICINE

## 2021-03-23 PROCEDURE — G8752 SYS BP LESS 140: HCPCS | Performed by: INTERNAL MEDICINE

## 2021-03-23 PROCEDURE — 3046F HEMOGLOBIN A1C LEVEL >9.0%: CPT | Performed by: INTERNAL MEDICINE

## 2021-03-23 PROCEDURE — G8427 DOCREV CUR MEDS BY ELIG CLIN: HCPCS | Performed by: INTERNAL MEDICINE

## 2021-03-23 PROCEDURE — 1101F PT FALLS ASSESS-DOCD LE1/YR: CPT | Performed by: INTERNAL MEDICINE

## 2021-03-23 PROCEDURE — 1090F PRES/ABSN URINE INCON ASSESS: CPT | Performed by: INTERNAL MEDICINE

## 2021-03-23 NOTE — PROGRESS NOTES
1. Have you been to the ER, urgent care clinic since your last visit? Hospitalized since your last visit? No    2. Have you seen or consulted any other health care providers outside of the [address] [place] [address] since your last visit? Include any pap smears or colon screening.  No

## 2021-03-23 NOTE — PROGRESS NOTES
SPORTS MEDICINE AND PRIMARY CARE  Romina York MD, [address] [address]  Phone:  [phone]  Fax: [phone]       Chief Complaint   Patient presents with    Diabetes   . SUBJECTIVE:    Wade Silvestre is a 72 y.o. female Patient returns today noting that she had a joint injection for osteoarthritis of the right knee by GUSTAVO Stovall MD on 02/03/21 and the recommendation for weight reduction if she wanted to continue with a knee replacement. She was seen by Dr. Alondra Dobbins in the sleep center for mixed sleep apnea and was noted to be compliant with PAP therapy, which continues to benefit the patient with the current setting. She had a Moderna COVID vaccine at [facility] on 25/08 and comes in today with a history of diabetes, CKD stage 3, cervical spinal stenosis, primary hypertension, dyslipidemia, lumbar stenosis, and is seen for evaluation. She takes her second vaccine on Friday. Patient returns today without new complaints and states her sugars are doing good and is seen for evaluation. Current Outpatient Medications   Medication Sig Dispense Refill    lancets (One Touch Delica) 33 gauge misc USE TO TEST BLOOD SUGAR THREE TIMES A DAY. DX.E11.9 100 Lancet 11    pantoprazole (PROTONIX) 40 mg tablet TAKE 1 TABLET BY MOUTH EVERY DAY BEFORE BREAKFAST 90 Tab 3    simvastatin (ZOCOR) 40 mg tablet TAKE 1 TABLET BY MOUTH EVERY DAY AT NIGHT 90 Tab 3    metoprolol tartrate (LOPRESSOR) 25 mg tablet TAKE 1/2 TABS BY MOUTH TWO (2) TIMES A DAY. 90 Tab 3    cloNIDine HCL (CATAPRES) 0.2 mg tablet TAKE 1 TABLET BY MOUTH TWICE A  Tab 3    NovoLOG Flexpen U-100 Insulin 100 unit/mL (3 mL) inpn 12 UNITS BY SUBCUTANEOUS BEFORE BREAKFAST, LUNCH, AND DINNER. ADMINISTER 12 UNITS THREE TIMES DAILY.  15 Adjustable Dose Pre-filled Pen Syringe 3    insulin glargine (Basaglar KwikPen U-100 Insulin) 100 unit/mL (3 mL) inpn 40 Units by SubCUTAneous route nightly. 12 Adjustable Dose Pre-filled Pen Syringe 3    amLODIPine (NORVASC) 10 mg tablet TAKE 1 TABLET BY MOUTH EVERY DAY 90 Tab 3    ONETOUCH ULTRA BLUE TEST STRIP strip USE TO TEST BLOOD SUGAR THREE TIMES A DAY. DX. E11.9 300 Strip 3    calcitRIOL (ROCALTROL) 0.25 mcg capsule Take 1 Cap by mouth daily. 90 Cap 3    Insulin Needles, Disposable, (CHRISS PEN NEEDLE) 32 gauge x 5/32\" ndle Use to inject insulin four times a day. Dx.e11.9 200 Pen Needle 11    Insulin Needles, Disposable, 29 gauge x 5/16\" ndle Use to inject insulin three times daily. dx.e119 300 Each 3    albuterol (PROVENTIL HFA, VENTOLIN HFA, PROAIR HFA) 90 mcg/actuation inhaler Take 2 Puffs by inhalation every four (4) hours as needed for Wheezing. 1 Inhaler 0     Past Medical History:   Diagnosis Date    Cellulitis of right abdominal wall 12/18/2017    letty whatley md    CKD (chronic kidney disease), stage III     Diabetes ([facility] [address] 1995    DJD (degenerative joint disease) of knee     G. Gale Jacks at home, sequela 07/05/2019    Gastroenteritis     History of nuclear stress test 08/03/2020    No evidence is of ischemia or infarction. Left ventricular ejection fraction measures 71%.     Hypercholesterolemia     Hypertension     Kidney lesion, native, left     Knee pain     LBP (low back pain)     chiorpactor    Lumbar stenosis     Obesity     MICHELLE on CPAP     Rotator cuff arthropathy of right shoulder 06/22/2018    S/P colonoscopy 12-18-12     Past Surgical History:   Procedure Laterality Date    HX BREAST BIOPSY Right 1990    negative    HX COLONOSCOPY      HX HYSTERECTOMY      HX OOPHORECTOMY N/A     HX OTHER SURGICAL  12/23/2017    I&D infected abscess      No Known Allergies      REVIEW OF SYSTEMS:  General: negative for - chills or fever  ENT: negative for - headaches, nasal congestion or tinnitus  Respiratory: negative for - cough, hemoptysis, shortness of breath or wheezing  Cardiovascular : negative for - chest pain, edema, palpitations or shortness of breath  Gastrointestinal: negative for - abdominal pain, blood in stools, heartburn or nausea/vomiting  Genito-Urinary: no dysuria, trouble voiding, or hematuria  Musculoskeletal: negative for - gait disturbance, joint pain, joint stiffness or joint swelling  Neurological: no TIA or stroke symptoms  Hematologic: no bruises, no bleeding, no swollen glands  Integument: no lumps, mole changes, nail changes or rash  Endocrine: no malaise/lethargy or unexpected weight changes      Social History     Socioeconomic History    Marital status: SINGLE     Spouse name: Not on file    Number of children: Not on file    Years of education: Not on file    Highest education level: Not on file   Social Needs    Financial resource strain: Not hard at all   Nubimetrics insecurity     Worry: Never true     Inability: Never true   [employer] needs     Medical: No     Non-medical: No   Tobacco Use    Smoking status: Never Smoker    Smokeless tobacco: Never Used   Substance and Sexual Activity    Alcohol use: No     Alcohol/week: 0.0 standard drinks    Drug use: No    Sexual activity: Not Currently   Lifestyle    Physical activity     Days per week: 0 days     Minutes per session: 0 min    Stress: Not at all   Relationships    Social connections     Talks on phone: More than three times a week     Gets together: More than three times a week     Attends Sabianist service: More than 4 times per year     Active member of club or organization: Yes     Attends meetings of clubs or organizations: More than 4 times per year     Relationship status: Not on file   Other Topics Concern     Service No    Blood Transfusions No    Caffeine Concern No    Occupational Exposure No    Hobby Hazards No    Sleep Concern Yes    Stress Concern No    Weight Concern Yes    Special Diet Yes     Comment: Renal Diet    Back Care Yes    Exercise No    Bike Helmet No    Seat Belt Yes  Self-Exams No   Social History Narrative    Medical History: Diverticulosis 2002DM 1995primary HTN 1995Obesity 1995Dyslipidemia January 2012 moderate central stenosis L3-L4,    mild to moderate central stenosis at L4-L5 MRI    Gyn History: Last mammogram date 2013. Last menstrual perioddate hysterectomy  FOR FIBROIDS, PT W ITH ONE OVARY. Last    papdate . Menopausal hot flashes. Sexually active not currently sexually active. History of abnormal pap smears none. History of STDs    none. Vaginal discharge or odor none. HysterectomyDate NONE.    OB History: Total pregnancies 1. Pre term deliveries (>20-36.6 w eeks) 1. Surgical History: [facility] right breast bx , kidney stones colonoscopy     Hospitalization/Major Diagnostic Procedure: [facility] rig breast bx , kidney stones     Family History: Mother:  52 yrs, skin d/oFather: deceasedAunt: alive, lung cancerAdopted: alive    Social History: Alcohol Use Patient does not use alcohol. Smoking Status Patient is a never smoker. Marital Status: Single. Lives w ith:    alone. Education/School: has masters degree-[employer]. Family History   Problem Relation Age of Onset    Cancer Mother     No Known Problems Father        OBJECTIVE:    Visit Vitals  /71   Pulse 69   Temp 97.8 °F (36.6 °C) (Oral)   Resp 18   Ht 5' 3\" (1.6 m)   Wt 242 lb 14.4 oz (110.2 kg)   LMP  (LMP Unknown)   SpO2 97%   BMI 43.03 kg/m²     CONSTITUTIONAL: well , well nourished, appears age appropriate  EYES: perrla, eom intact  ENMT:moist mucous membranes, pharynx clear  NECK: supple. Thyroid normal  RESPIRATORY: Chest: clear bilaterally   CARDIOVASCULAR: Heart: regular rate and rhythm  GASTROINTESTINAL: Abdomen: soft, bowel sounds active  HEMATOLOGIC: no pathological lymph nodes palpated  MUSCULOSKELETAL: Extremities: no edema, pulse 1+   INTEGUMENT: No unusual rashes or suspicious skin lesions noted.  Nails appear normal.  NEUROLOGIC: non-focal exam   MENTAL STATUS: alert and oriented, appropriate affect           ASSESSMENT:  1. Type 2 diabetes with nephropathy ([facility])    2. Stage 3a chronic kidney disease    3. MICHELLE on CPAP    4. Cervical spinal stenosis    5. Essential hypertension    6. Hypercholesterolemia    7. Spinal stenosis of lumbar region without neurogenic claudication    8. Morbid obesity with BMI of 40.0-44.9, adult ([facility])      We will assess blood sugar control with a hemoglobin A1c and send her the results in the mail. There is a history of chronic kidney disease stage 3A, for which we follow periodically. Her last renal function was normal.    She feels she sleeps better with the CPAP machine and has better days. She has lumbar and spinal stenosis without symptoms currently. Blood pressure control is at goal.    Her cholesterol is approaching goal with an LDL of 91. Obesity remains a concern and since we last saw her she has lost a pound. We encourage a heart healthy, weight reducing diet, physical activity for 30 minutes five days a week. She should probably avoid walking and either do a stationary bicycle or water aerobics. She will see us in three months. I have discussed the diagnosis with the patient and the intended plan as seen in the  Orders. The patient understands and agees with the plan. The patient has   received an after visit summary and questions were answered concerning  future plans  Patient labs and/or xrays were reviewed  Past records were reviewed. PLAN:  .  Orders Placed This Encounter    HEMOGLOBIN A1C WITH EAG       Follow-up and Dispositions    · Return in about 3 months (around 6/23/2021). ATTENTION:   This medical record was transcribed using an electronic medical records system. Although proofread, it may and can contain electronic and spelling errors. Other human spelling and other errors may be present. Corrections may be executed at a later time.   Please feel free to contact us for any clarifications as needed.

## 2021-03-24 LAB
EST. AVERAGE GLUCOSE BLD GHB EST-MCNC: 146 MG/DL
HBA1C MFR BLD: 6.7 % (ref 4–5.6)

## 2021-03-26 ENCOUNTER — IMMUNIZATION (OUTPATIENT)
Dept: FAMILY MEDICINE CLINIC | Age: 71
End: 2021-03-26

## 2021-03-26 DIAGNOSIS — Z23 ENCOUNTER FOR IMMUNIZATION: Primary | ICD-10-CM

## 2021-03-26 PROCEDURE — 0012A COVID-19, MRNA, LNP-S, PF, 100MCG/0.5ML DOSE(MODERNA): CPT

## 2021-03-26 PROCEDURE — 91301 COVID-19, MRNA, LNP-S, PF, 100MCG/0.5ML DOSE(MODERNA): CPT

## 2021-04-18 RX ORDER — AMLODIPINE BESYLATE 10 MG/1
TABLET ORAL
Qty: 90 TAB | Refills: 4 | Status: SHIPPED | OUTPATIENT
Start: 2021-04-18 | End: 2022-07-09

## 2021-05-09 RX ORDER — PEN NEEDLE, DIABETIC 32GX 5/32"
NEEDLE, DISPOSABLE MISCELLANEOUS
Qty: 200 PEN NEEDLE | Refills: 11 | Status: SHIPPED | OUTPATIENT
Start: 2021-05-09 | End: 2022-08-01

## 2021-06-08 RX ORDER — BLOOD-GLUCOSE METER
EACH MISCELLANEOUS
Qty: 1 EACH | Refills: 0 | Status: SHIPPED | OUTPATIENT
Start: 2021-06-08

## 2021-06-14 ENCOUNTER — HOSPITAL ENCOUNTER (OUTPATIENT)
Dept: MAMMOGRAPHY | Age: 71
Discharge: HOME | End: 2021-06-14
Attending: INTERNAL MEDICINE
Payer: MEDICARE

## 2021-06-14 DIAGNOSIS — Z12.31 ENCOUNTER FOR SCREENING MAMMOGRAM FOR MALIGNANT NEOPLASM OF BREAST: ICD-10-CM

## 2021-06-14 DIAGNOSIS — E11.21 TYPE 2 DIABETES WITH NEPHROPATHY (HCC): ICD-10-CM

## 2021-06-14 PROCEDURE — 77067 SCR MAMMO BI INCL CAD: CPT

## 2021-07-19 ENCOUNTER — OFFICE VISIT (OUTPATIENT)
Dept: INTERNAL MEDICINE CLINIC | Age: 71
End: 2021-07-19
Payer: MEDICARE

## 2021-07-19 VITALS
SYSTOLIC BLOOD PRESSURE: 121 MMHG | TEMPERATURE: 97.8 F | BODY MASS INDEX: 42.74 KG/M2 | RESPIRATION RATE: 16 BRPM | DIASTOLIC BLOOD PRESSURE: 70 MMHG | WEIGHT: 241.2 LBS | OXYGEN SATURATION: 99 % | HEART RATE: 63 BPM | HEIGHT: 63 IN

## 2021-07-19 DIAGNOSIS — N18.31 STAGE 3A CHRONIC KIDNEY DISEASE (HCC): ICD-10-CM

## 2021-07-19 DIAGNOSIS — M48.02 CERVICAL SPINAL STENOSIS: ICD-10-CM

## 2021-07-19 DIAGNOSIS — E78.00 HYPERCHOLESTEROLEMIA: ICD-10-CM

## 2021-07-19 DIAGNOSIS — Z99.89 OSA ON CPAP: ICD-10-CM

## 2021-07-19 DIAGNOSIS — G47.33 OSA ON CPAP: ICD-10-CM

## 2021-07-19 DIAGNOSIS — I10 ESSENTIAL HYPERTENSION: ICD-10-CM

## 2021-07-19 DIAGNOSIS — E66.01 OBESITY, CLASS III, BMI 40-49.9 (MORBID OBESITY) (HCC): ICD-10-CM

## 2021-07-19 DIAGNOSIS — E66.01 MORBID OBESITY WITH BMI OF 40.0-44.9, ADULT (HCC): ICD-10-CM

## 2021-07-19 DIAGNOSIS — M48.061 SPINAL STENOSIS OF LUMBAR REGION WITHOUT NEUROGENIC CLAUDICATION: ICD-10-CM

## 2021-07-19 DIAGNOSIS — E11.21 TYPE 2 DIABETES WITH NEPHROPATHY (HCC): Primary | ICD-10-CM

## 2021-07-19 PROCEDURE — G8752 SYS BP LESS 140: HCPCS | Performed by: INTERNAL MEDICINE

## 2021-07-19 PROCEDURE — 1090F PRES/ABSN URINE INCON ASSESS: CPT | Performed by: INTERNAL MEDICINE

## 2021-07-19 PROCEDURE — G8754 DIAS BP LESS 90: HCPCS | Performed by: INTERNAL MEDICINE

## 2021-07-19 PROCEDURE — 3044F HG A1C LEVEL LT 7.0%: CPT | Performed by: INTERNAL MEDICINE

## 2021-07-19 PROCEDURE — G8510 SCR DEP NEG, NO PLAN REQD: HCPCS | Performed by: INTERNAL MEDICINE

## 2021-07-19 PROCEDURE — 1101F PT FALLS ASSESS-DOCD LE1/YR: CPT | Performed by: INTERNAL MEDICINE

## 2021-07-19 PROCEDURE — G9899 SCRN MAM PERF RSLTS DOC: HCPCS | Performed by: INTERNAL MEDICINE

## 2021-07-19 PROCEDURE — 99214 OFFICE O/P EST MOD 30 MIN: CPT | Performed by: INTERNAL MEDICINE

## 2021-07-19 PROCEDURE — 2022F DILAT RTA XM EVC RTNOPTHY: CPT | Performed by: INTERNAL MEDICINE

## 2021-07-19 PROCEDURE — G8427 DOCREV CUR MEDS BY ELIG CLIN: HCPCS | Performed by: INTERNAL MEDICINE

## 2021-07-19 PROCEDURE — G8417 CALC BMI ABV UP PARAM F/U: HCPCS | Performed by: INTERNAL MEDICINE

## 2021-07-19 PROCEDURE — G8536 NO DOC ELDER MAL SCRN: HCPCS | Performed by: INTERNAL MEDICINE

## 2021-07-19 PROCEDURE — 3017F COLORECTAL CA SCREEN DOC REV: CPT | Performed by: INTERNAL MEDICINE

## 2021-07-19 PROCEDURE — G8399 PT W/DXA RESULTS DOCUMENT: HCPCS | Performed by: INTERNAL MEDICINE

## 2021-07-19 NOTE — PROGRESS NOTES
1. Have you been to the ER, urgent care clinic since your last visit? Hospitalized since your last visit? No    2. Have you seen or consulted any other health care providers outside of the [address] [place] [address] since your last visit? Include any pap smears or colon screening.  No

## 2021-07-19 NOTE — PROGRESS NOTES
SPORTS MEDICINE AND PRIMARY CARE  Janny Lamb MD, [address] [address]  Phone:  [phone]  Fax: [phone]       Chief Complaint   Patient presents with    Diabetes   . SUBJECTIVE:    Socrates Philippe is a 72 y.o. female Patient returns today voicing no new complaints. She has a history of dyslipidemia, lumbar spinal stenosis, primary hypertension, cervical spinal stenosis, CKD stage 3, MICHELLE, on CPAP, type 2 diabetes with nephropathy and morbid obesity. Patient is seen for evaluation. Current Outpatient Medications   Medication Sig Dispense Refill    Blood-Glucose Meter (OneTouch Ultra2 Meter) misc Use to test blood sugar three times a day. Dx.e11.9 1 Each 0    BD Jody 2nd Gen Pen Needle 32 gauge x 5/32\" ndle USE TO INJECT INSULIN FOUR TIMES A DAY. DX.E11.9 200 Pen Needle 11    OneTouch Ultra Blue Test Strip strip USE TO TEST BLOOD SUGAR THREE TIMES A DAY. DX. E11.9 300 Strip 3    amLODIPine (NORVASC) 10 mg tablet TAKE 1 TABLET BY MOUTH DAILY 90 Tab 4    lancets (One Touch Delica) 33 gauge misc USE TO TEST BLOOD SUGAR THREE TIMES A DAY. DX.E11.9 100 Lancet 11    pantoprazole (PROTONIX) 40 mg tablet TAKE 1 TABLET BY MOUTH EVERY DAY BEFORE BREAKFAST 90 Tab 3    simvastatin (ZOCOR) 40 mg tablet TAKE 1 TABLET BY MOUTH EVERY DAY AT NIGHT 90 Tab 3    metoprolol tartrate (LOPRESSOR) 25 mg tablet TAKE 1/2 TABS BY MOUTH TWO (2) TIMES A DAY. 90 Tab 3    cloNIDine HCL (CATAPRES) 0.2 mg tablet TAKE 1 TABLET BY MOUTH TWICE A  Tab 3    NovoLOG Flexpen U-100 Insulin 100 unit/mL (3 mL) inpn 12 UNITS BY SUBCUTANEOUS BEFORE BREAKFAST, LUNCH, AND DINNER. ADMINISTER 12 UNITS THREE TIMES DAILY. 15 Adjustable Dose Pre-filled Pen Syringe 3    insulin glargine (Basaglar KwikPen U-100 Insulin) 100 unit/mL (3 mL) inpn 40 Units by SubCUTAneous route nightly. 12 Adjustable Dose Pre-filled Pen Syringe 3    calcitRIOL (ROCALTROL) 0.25 mcg capsule Take 1 Cap by mouth daily. 90 Cap 3    Insulin Needles, Disposable, 29 gauge x 5/16\" ndle Use to inject insulin three times daily. dx.e119 300 Each 3    albuterol (PROVENTIL HFA, VENTOLIN HFA, PROAIR HFA) 90 mcg/actuation inhaler Take 2 Puffs by inhalation every four (4) hours as needed for Wheezing. 1 Inhaler 0     Past Medical History:   Diagnosis Date    Cellulitis of right abdominal wall 12/18/2017    letty whatley md    CKD (chronic kidney disease), stage III (HCC)     Diabetes ([facility] [address] 1995    DJD (degenerative joint disease) of knee     GUSTAVO Short at home, sequela 07/05/2019    Gastroenteritis     History of nuclear stress test 08/03/2020    No evidence is of ischemia or infarction. Left ventricular ejection fraction measures 71%.     Hypercholesterolemia     Hypertension     Kidney lesion, native, left     Knee pain     LBP (low back pain)     chiorpactor    Lumbar stenosis     Obesity     MICHELLE on CPAP     Rotator cuff arthropathy of right shoulder 06/22/2018    S/P colonoscopy 12-18-12     Past Surgical History:   Procedure Laterality Date    HX BREAST BIOPSY Right 1990    negative    HX COLONOSCOPY      HX HYSTERECTOMY      HX OOPHORECTOMY N/A     HX OTHER SURGICAL  12/23/2017    I&D infected abscess      No Known Allergies      REVIEW OF SYSTEMS:  General: negative for - chills or fever  ENT: negative for - headaches, nasal congestion or tinnitus  Respiratory: negative for - cough, hemoptysis, shortness of breath or wheezing  Cardiovascular : negative for - chest pain, edema, palpitations or shortness of breath  Gastrointestinal: negative for - abdominal pain, blood in stools, heartburn or nausea/vomiting  Genito-Urinary: no dysuria, trouble voiding, or hematuria  Musculoskeletal: negative for - gait disturbance, joint pain, joint stiffness or joint swelling  Neurological: no TIA or stroke symptoms  Hematologic: no bruises, no bleeding, no swollen glands  Integument: no lumps, mole changes, nail changes or rash  Endocrine: no malaise/lethargy or unexpected weight changes      Social History     Socioeconomic History    Marital status: SINGLE     Spouse name: Not on file    Number of children: Not on file    Years of education: Not on file    Highest education level: Not on file   Tobacco Use    Smoking status: Never Smoker    Smokeless tobacco: Never Used   Vaping Use    Vaping Use: Never used   Substance and Sexual Activity    Alcohol use: No     Alcohol/week: 0.0 standard drinks    Drug use: No    Sexual activity: Not Currently   Other Topics Concern     Service No    Blood Transfusions No    Caffeine Concern No    Occupational Exposure No    Hobby Hazards No    Sleep Concern Yes    Stress Concern No    Weight Concern Yes    Special Diet Yes     Comment: Renal Diet    Back Care Yes    Exercise No    Bike Helmet No    Seat Belt Yes    Self-Exams No   Social History Narrative    Medical History: Diverticulosis 2002DM 1995primary HTN Obesity Dyslipidemia January 2012 moderate central stenosis L3-L4,    mild to moderate central stenosis at L4-L5 MRI    Gyn History: Last mammogram date 2013. Last menstrual perioddate hysterectomy  FOR FIBROIDS, PT W ITH ONE OVARY. Last    papdate . Menopausal hot flashes. Sexually active not currently sexually active. History of abnormal pap smears none. History of STDs    none. Vaginal discharge or odor none. HysterectomyDate NONE.    OB History: Total pregnancies 1. Pre term deliveries (>20-36.6 w eeks) 1. Surgical History: [facility] right breast bx , kidney stones colonoscopy     Hospitalization/Major Diagnostic Procedure: [facility] right breast bx , kidney stones     Family History: Mother:  52 yrs, skin d/oFather: deceasedAunt: alive, lung cancerAdopted: alive    Social History: Alcohol Use Patient does not use alcohol. Smoking Status Patient is a never smoker.  Marital Status: Single. Lives w ith:    alone. Education/School: has masters degree-[employer]. Social Determinants of Health     Financial Resource Strain:     Difficulty of Paying Living Expenses:    Food Insecurity:     Worried About Running Out of Food in the Last Year:     [address] in the Last Year:    Transportation Needs:     Lack of Transportation (Medical):  Lack of Transportation (Non-Medical):    Physical Activity:     Days of Exercise per Week:     Minutes of Exercise per Session:    Stress:     Feeling of Stress :    Social Connections:     Frequency of Communication with Friends and Family:     Frequency of Social Gatherings with Friends and Family:     Attends Orthodoxy Services:     Active Member of Clubs or Organizations:     Attends Club or Organization Meetings:     Marital Status:      Family History   Problem Relation Age of Onset    Cancer Mother     No Known Problems Father        OBJECTIVE:    Visit Vitals  /70   Pulse 63   Temp 97.8 °F (36.6 °C) (Oral)   Resp 16   Ht 5' 3\" (1.6 m)   Wt 241 lb 3.2 oz (109.4 kg)   LMP  (LMP Unknown)   SpO2 99%   BMI 42.73 kg/m²     CONSTITUTIONAL: well , well nourished, appears age appropriate  EYES: perrla, eom intact  ENMT:moist mucous membranes, pharynx clear  NECK: supple. Thyroid normal  RESPIRATORY: Chest: clear bilaterally   CARDIOVASCULAR: Heart: regular rate and rhythm  GASTROINTESTINAL: Abdomen: soft, bowel sounds active  HEMATOLOGIC: no pathological lymph nodes palpated  MUSCULOSKELETAL: Extremities: no edema, pulse 1+ Foot exam reveals a bunion, right greater than left. Sensation intact to fine filament. Pulses are diminished. INTEGUMENT: No unusual rashes or suspicious skin lesions noted. Nails appear normal.  NEUROLOGIC: non-focal exam   MENTAL STATUS: alert and oriented, appropriate affect           ASSESSMENT:  1. Type 2 diabetes with nephropathy ([address]    2. Morbid obesity with BMI of 40.0-44.9, adult ([address]    3.  MICHELLE on CPAP    4. Stage 3a chronic kidney disease (HCC)    5. Cervical spinal stenosis    6. Hypercholesterolemia    7. Essential hypertension    8. Spinal stenosis of lumbar region without neurogenic claudication    9. Obesity, Class III, BMI 40-49.9 (morbid obesity) (HCC)      We will assess blood sugar control with a hemoglobin A1c. She claims she is not doing as well as she was and of course uses various excuses that do not hold any water with me. Her last hemoglobin A1c was excellent at 6.7, so certainly we do not expect this one to be any worse than that. She has a known history of morbid obesity and since we last saw her she made a decision to lose 1 pound over the past four months. She claims to be going to water aerobics and \"not eating that much\". She has obstructive sleep apnea, on CPAP, and we encourage compliance. There is a history of CKD stage 3A, which we will check today. She has cervical lumbar spinal stenosis, which is apparently not symptomatic. Known history of dyslipidemia, for which she is on a statin, Simvastatin. Blood pressure control is at goal.    We encourage physical activity 30 minutes five days a week, a heart healthy, weight reducing diet. She will be back to see me in four months, sooner if she has any problems. I have discussed the diagnosis with the patient and the intended plan as seen in the  Orders. The patient understands and agees with the plan. The patient has   received an after visit summary and questions were answered concerning  future plans  Patient labs and/or xrays were reviewed  Past records were reviewed. PLAN:  .  Orders Placed This Encounter    URINALYSIS W/ RFLX MICROSCOPIC    CBC WITH AUTOMATED DIFF    METABOLIC PANEL, COMPREHENSIVE    LIPID PANEL    TSH 3RD GENERATION    HEMOGLOBIN A1C WITH EAG       Follow-up and Dispositions    · Return in about 4 months (around 11/19/2021).                 ATTENTION:   This medical record was transcribed using an electronic medical records system. Although proofread, it may and can contain electronic and spelling errors. Other human spelling and other errors may be present. Corrections may be executed at a later time. Please feel free to contact us for any clarifications as needed.

## 2021-07-20 LAB
ALBUMIN SERPL-MCNC: 3.9 G/DL (ref 3.5–5)
ALBUMIN/GLOB SERPL: 1.1 {RATIO} (ref 1.1–2.2)
ALP SERPL-CCNC: 74 U/L (ref 45–117)
ALT SERPL-CCNC: 17 U/L (ref 12–78)
ANION GAP SERPL CALC-SCNC: 3 MMOL/L (ref 5–15)
APPEARANCE UR: ABNORMAL
AST SERPL-CCNC: 9 U/L (ref 15–37)
BACTERIA URNS QL MICRO: NEGATIVE /HPF
BASOPHILS # BLD: 0 K/UL (ref 0–0.1)
BASOPHILS NFR BLD: 0 % (ref 0–1)
BILIRUB SERPL-MCNC: 0.4 MG/DL (ref 0.2–1)
BILIRUB UR QL: NEGATIVE
BUN SERPL-MCNC: 18 MG/DL (ref 6–20)
BUN/CREAT SERPL: 19 (ref 12–20)
CALCIUM SERPL-MCNC: 9.4 MG/DL (ref 8.5–10.1)
CHLORIDE SERPL-SCNC: 111 MMOL/L (ref 97–108)
CHOLEST SERPL-MCNC: 148 MG/DL
CO2 SERPL-SCNC: 29 MMOL/L (ref 21–32)
COLOR UR: ABNORMAL
CREAT SERPL-MCNC: 0.97 MG/DL (ref 0.55–1.02)
DIFFERENTIAL METHOD BLD: ABNORMAL
EOSINOPHIL # BLD: 0.1 K/UL (ref 0–0.4)
EOSINOPHIL NFR BLD: 2 % (ref 0–7)
EPITH CASTS URNS QL MICRO: ABNORMAL /LPF
ERYTHROCYTE [DISTWIDTH] IN BLOOD BY AUTOMATED COUNT: 14.9 % (ref 11.5–14.5)
EST. AVERAGE GLUCOSE BLD GHB EST-MCNC: 146 MG/DL
GLOBULIN SER CALC-MCNC: 3.4 G/DL (ref 2–4)
GLUCOSE SERPL-MCNC: 93 MG/DL (ref 65–100)
GLUCOSE UR STRIP.AUTO-MCNC: NEGATIVE MG/DL
HBA1C MFR BLD: 6.7 % (ref 4–5.6)
HCT VFR BLD AUTO: 45.9 % (ref 35–47)
HDLC SERPL-MCNC: 52 MG/DL
HDLC SERPL: 2.8 {RATIO} (ref 0–5)
HGB BLD-MCNC: 14.3 G/DL (ref 11.5–16)
HGB UR QL STRIP: NEGATIVE
HYALINE CASTS URNS QL MICRO: ABNORMAL /LPF (ref 0–5)
IMM GRANULOCYTES # BLD AUTO: 0 K/UL (ref 0–0.04)
IMM GRANULOCYTES NFR BLD AUTO: 0 % (ref 0–0.5)
KETONES UR QL STRIP.AUTO: NEGATIVE MG/DL
LDLC SERPL CALC-MCNC: 78.8 MG/DL (ref 0–100)
LEUKOCYTE ESTERASE UR QL STRIP.AUTO: NEGATIVE
LYMPHOCYTES # BLD: 1.6 K/UL (ref 0.8–3.5)
LYMPHOCYTES NFR BLD: 21 % (ref 12–49)
MCH RBC QN AUTO: 29.1 PG (ref 26–34)
MCHC RBC AUTO-ENTMCNC: 31.2 G/DL (ref 30–36.5)
MCV RBC AUTO: 93.3 FL (ref 80–99)
MONOCYTES # BLD: 0.7 K/UL (ref 0–1)
MONOCYTES NFR BLD: 9 % (ref 5–13)
NEUTS SEG # BLD: 5.2 K/UL (ref 1.8–8)
NEUTS SEG NFR BLD: 68 % (ref 32–75)
NITRITE UR QL STRIP.AUTO: NEGATIVE
NRBC # BLD: 0 K/UL (ref 0–0.01)
NRBC BLD-RTO: 0 PER 100 WBC
PH UR STRIP: 5.5 [PH] (ref 5–8)
PLATELET # BLD AUTO: 230 K/UL (ref 150–400)
PMV BLD AUTO: 12.2 FL (ref 8.9–12.9)
POTASSIUM SERPL-SCNC: 4.6 MMOL/L (ref 3.5–5.1)
PROT SERPL-MCNC: 7.3 G/DL (ref 6.4–8.2)
PROT UR STRIP-MCNC: NEGATIVE MG/DL
RBC # BLD AUTO: 4.92 M/UL (ref 3.8–5.2)
RBC #/AREA URNS HPF: ABNORMAL /HPF (ref 0–5)
SODIUM SERPL-SCNC: 143 MMOL/L (ref 136–145)
SP GR UR REFRACTOMETRY: 1.02 (ref 1–1.03)
TRIGL SERPL-MCNC: 86 MG/DL (ref ?–150)
TSH SERPL DL<=0.05 MIU/L-ACNC: 0.94 UIU/ML (ref 0.36–3.74)
UROBILINOGEN UR QL STRIP.AUTO: 0.2 EU/DL (ref 0.2–1)
VLDLC SERPL CALC-MCNC: 17.2 MG/DL
WBC # BLD AUTO: 7.7 K/UL (ref 3.6–11)
WBC URNS QL MICRO: ABNORMAL /HPF (ref 0–4)

## 2021-08-12 RX ORDER — INSULIN GLARGINE 100 [IU]/ML
INJECTION, SOLUTION SUBCUTANEOUS
Qty: 45 ADJUSTABLE DOSE PRE-FILLED PEN SYRINGE | Refills: 3 | Status: SHIPPED | OUTPATIENT
Start: 2021-08-12 | End: 2022-10-02

## 2021-08-12 RX ORDER — CLONIDINE HYDROCHLORIDE 0.2 MG/1
TABLET ORAL
Qty: 180 TABLET | Refills: 3 | Status: SHIPPED | OUTPATIENT
Start: 2021-08-12 | End: 2022-08-17

## 2021-08-12 RX ORDER — METOPROLOL TARTRATE 25 MG/1
TABLET, FILM COATED ORAL
Qty: 90 TABLET | Refills: 3 | Status: SHIPPED | OUTPATIENT
Start: 2021-08-12 | End: 2022-08-01

## 2021-09-20 RX ORDER — PANTOPRAZOLE SODIUM 40 MG/1
TABLET, DELAYED RELEASE ORAL
Qty: 90 TABLET | Refills: 3 | Status: SHIPPED | OUTPATIENT
Start: 2021-09-20 | End: 2022-10-02

## 2021-11-17 ENCOUNTER — OFFICE VISIT (OUTPATIENT)
Dept: INTERNAL MEDICINE CLINIC | Age: 71
End: 2021-11-17
Payer: MEDICARE

## 2021-11-17 VITALS
WEIGHT: 238.7 LBS | HEIGHT: 63 IN | DIASTOLIC BLOOD PRESSURE: 71 MMHG | BODY MASS INDEX: 42.29 KG/M2 | SYSTOLIC BLOOD PRESSURE: 125 MMHG | OXYGEN SATURATION: 97 % | HEART RATE: 60 BPM | RESPIRATION RATE: 16 BRPM | TEMPERATURE: 97.8 F

## 2021-11-17 DIAGNOSIS — I10 PRIMARY HYPERTENSION: ICD-10-CM

## 2021-11-17 DIAGNOSIS — M48.061 SPINAL STENOSIS OF LUMBAR REGION WITHOUT NEUROGENIC CLAUDICATION: ICD-10-CM

## 2021-11-17 DIAGNOSIS — E66.01 MORBID OBESITY WITH BMI OF 40.0-44.9, ADULT (HCC): ICD-10-CM

## 2021-11-17 DIAGNOSIS — M48.02 CERVICAL SPINAL STENOSIS: ICD-10-CM

## 2021-11-17 DIAGNOSIS — E78.00 HYPERCHOLESTEROLEMIA: ICD-10-CM

## 2021-11-17 DIAGNOSIS — G47.33 OSA ON CPAP: ICD-10-CM

## 2021-11-17 DIAGNOSIS — E11.21 TYPE 2 DIABETES WITH NEPHROPATHY (HCC): Primary | ICD-10-CM

## 2021-11-17 DIAGNOSIS — N18.31 STAGE 3A CHRONIC KIDNEY DISEASE (HCC): ICD-10-CM

## 2021-11-17 DIAGNOSIS — Z99.89 OSA ON CPAP: ICD-10-CM

## 2021-11-17 LAB
COMMENT, HOLDF: NORMAL
CREAT UR-MCNC: 241 MG/DL
EST. AVERAGE GLUCOSE BLD GHB EST-MCNC: 160 MG/DL
HBA1C MFR BLD: 7.2 % (ref 4–5.6)
MICROALBUMIN UR-MCNC: 4.02 MG/DL
MICROALBUMIN/CREAT UR-RTO: 17 MG/G (ref 0–30)
SAMPLES BEING HELD,HOLD: NORMAL

## 2021-11-17 PROCEDURE — 3017F COLORECTAL CA SCREEN DOC REV: CPT | Performed by: INTERNAL MEDICINE

## 2021-11-17 PROCEDURE — G9899 SCRN MAM PERF RSLTS DOC: HCPCS | Performed by: INTERNAL MEDICINE

## 2021-11-17 PROCEDURE — G8399 PT W/DXA RESULTS DOCUMENT: HCPCS | Performed by: INTERNAL MEDICINE

## 2021-11-17 PROCEDURE — G8754 DIAS BP LESS 90: HCPCS | Performed by: INTERNAL MEDICINE

## 2021-11-17 PROCEDURE — G8432 DEP SCR NOT DOC, RNG: HCPCS | Performed by: INTERNAL MEDICINE

## 2021-11-17 PROCEDURE — G8427 DOCREV CUR MEDS BY ELIG CLIN: HCPCS | Performed by: INTERNAL MEDICINE

## 2021-11-17 PROCEDURE — 1090F PRES/ABSN URINE INCON ASSESS: CPT | Performed by: INTERNAL MEDICINE

## 2021-11-17 PROCEDURE — G8417 CALC BMI ABV UP PARAM F/U: HCPCS | Performed by: INTERNAL MEDICINE

## 2021-11-17 PROCEDURE — 1101F PT FALLS ASSESS-DOCD LE1/YR: CPT | Performed by: INTERNAL MEDICINE

## 2021-11-17 PROCEDURE — 99214 OFFICE O/P EST MOD 30 MIN: CPT | Performed by: INTERNAL MEDICINE

## 2021-11-17 PROCEDURE — G8536 NO DOC ELDER MAL SCRN: HCPCS | Performed by: INTERNAL MEDICINE

## 2021-11-17 PROCEDURE — 2022F DILAT RTA XM EVC RTNOPTHY: CPT | Performed by: INTERNAL MEDICINE

## 2021-11-17 PROCEDURE — G8752 SYS BP LESS 140: HCPCS | Performed by: INTERNAL MEDICINE

## 2021-11-17 PROCEDURE — 3044F HG A1C LEVEL LT 7.0%: CPT | Performed by: INTERNAL MEDICINE

## 2021-11-17 NOTE — PROGRESS NOTES
1. Have you been to the ER, urgent care clinic since your last visit? Hospitalized since your last visit? No    2. Have you seen or consulted any other health care providers outside of the [address] [place] [address] since your last visit? Include any pap smears or colon screening.  No     Wants to discuss CPAP issues

## 2021-11-17 NOTE — PROGRESS NOTES
SPORTS MEDICINE AND PRIMARY CARE  Anshul Enriquez MD, [address] [address]  Phone:  [phone]  Fax: [phone]       Chief Complaint   Patient presents with    Hypertension   . SUBJECTIVE:    Aria Moore is a 72 y.o. female Patient returns today with a history of diabetes mellitus type 2, chronic kidney disease stage 3, obesity, obstructive sleep apnea, on CPAP, cervical spinal stenosis, lumbar spinal stenosis, primary hypertension and dyslipidemia and is seen for evaluation. Patient returns today saying she does not like the CPAP machine and she will speak with Dr. Christiano Marion at her appointment in February. Other new complaints denied and patient is seen for evaluation. Current Outpatient Medications   Medication Sig Dispense Refill    pantoprazole (PROTONIX) 40 mg tablet TAKE 1 TABLET BY MOUTH EVERY DAY BEFORE BREAKFAST 90 Tablet 3    cloNIDine HCL (CATAPRES) 0.2 mg tablet TAKE 1 TABLET BY MOUTH TWICE A  Tablet 3    metoprolol tartrate (LOPRESSOR) 25 mg tablet TAKE 1/2 TABS BY MOUTH TWO (2) TIMES A DAY. 90 Tablet 3    Basaglar KwikPen U-100 Insulin 100 unit/mL (3 mL) inpn INJECT 40 UNITS SUBCUTANEOUSLY UNDER THE SKIN EVERY NIGHT 45 Adjustable Dose Pre-filled Pen Syringe 3    NovoLOG Flexpen U-100 Insulin 100 unit/mL (3 mL) inpn INJECT 12 UNITS SUBCUTANOEUSLY BEFORE BREAKFAST, LUNCH AND DINNER 45 Adjustable Dose Pre-filled Pen Syringe 3    Blood-Glucose Meter (OneTouch Ultra2 Meter) misc Use to test blood sugar three times a day. Dx.e11.9 1 Each 0    BD Jody 2nd Gen Pen Needle 32 gauge x 5/32\" ndle USE TO INJECT INSULIN FOUR TIMES A DAY. DX.E11.9 200 Pen Needle 11    OneTouch Ultra Blue Test Strip strip USE TO TEST BLOOD SUGAR THREE TIMES A DAY. DX. E11.9 300 Strip 3    amLODIPine (NORVASC) 10 mg tablet TAKE 1 TABLET BY MOUTH DAILY 90 Tab 4    lancets (One Touch Delica) 33 gauge misc USE TO TEST BLOOD SUGAR THREE TIMES A DAY.  DX.E11.9 100 Lancet 11    simvastatin (ZOCOR) 40 mg tablet TAKE 1 TABLET BY MOUTH EVERY DAY AT NIGHT 90 Tab 3    calcitRIOL (ROCALTROL) 0.25 mcg capsule Take 1 Cap by mouth daily. 90 Cap 3    Insulin Needles, Disposable, 29 gauge x 5/16\" ndle Use to inject insulin three times daily. dx.e119 300 Each 3    albuterol (PROVENTIL HFA, VENTOLIN HFA, PROAIR HFA) 90 mcg/actuation inhaler Take 2 Puffs by inhalation every four (4) hours as needed for Wheezing. 1 Inhaler 0     Past Medical History:   Diagnosis Date    Cellulitis of right abdominal wall 12/18/2017    letty whatley md    CKD (chronic kidney disease), stage III (HCC)     Diabetes ([facility] [address] 1995    DJD (degenerative joint disease) of knee     GUSTAVO Ohara at home, sequela 07/05/2019    Gastroenteritis     History of nuclear stress test 08/03/2020    No evidence is of ischemia or infarction. Left ventricular ejection fraction measures 71%.     Hypercholesterolemia     Hypertension     Kidney lesion, native, left     Knee pain     LBP (low back pain)     chiorpactor    Lumbar stenosis     Obesity     MICHELLE on CPAP     Rotator cuff arthropathy of right shoulder 06/22/2018    S/P colonoscopy 12-18-12     Past Surgical History:   Procedure Laterality Date    HX BREAST BIOPSY Right 1990    negative    HX COLONOSCOPY      HX HYSTERECTOMY      HX OOPHORECTOMY N/A     HX OTHER SURGICAL  12/23/2017    I&D infected abscess      No Known Allergies      REVIEW OF SYSTEMS:  General: negative for - chills or fever  ENT: negative for - headaches, nasal congestion or tinnitus  Respiratory: negative for - cough, hemoptysis, shortness of breath or wheezing  Cardiovascular : negative for - chest pain, edema, palpitations or shortness of breath  Gastrointestinal: negative for - abdominal pain, blood in stools, heartburn or nausea/vomiting  Genito-Urinary: no dysuria, trouble voiding, or hematuria  Musculoskeletal: negative for - gait disturbance, joint pain, joint stiffness or joint swelling  Neurological: no TIA or stroke symptoms  Hematologic: no bruises, no bleeding, no swollen glands  Integument: no lumps, mole changes, nail changes or rash  Endocrine: no malaise/lethargy or unexpected weight changes      Social History     Socioeconomic History    Marital status: SINGLE   Tobacco Use    Smoking status: Never Smoker    Smokeless tobacco: Never Used   Vaping Use    Vaping Use: Never used   Substance and Sexual Activity    Alcohol use: No     Alcohol/week: 0.0 standard drinks    Drug use: No    Sexual activity: Not Currently   Other Topics Concern     Service No    Blood Transfusions No    Caffeine Concern No    Occupational Exposure No    Hobby Hazards No    Sleep Concern Yes    Stress Concern No    Weight Concern Yes    Special Diet Yes     Comment: Renal Diet    Back Care Yes    Exercise No    Bike Helmet No    Seat Belt Yes    Self-Exams No   Social History Narrative    Medical History: Diverticulosis 2002DM primary HTN Obesity Dyslipidemia January 2012 moderate central stenosis L3-L4,    mild to moderate central stenosis at L4-L5 MRI    Gyn History: Last mammogram date 2013. Last menstrual perioddate hysterectomy  FOR FIBROIDS, PT W ITH ONE OVARY. Last    papdate . Menopausal hot flashes. Sexually active not currently sexually active. History of abnormal pap smears none. History of STDs    none. Vaginal discharge or odor none. HysterectomyDate NONE.    OB History: Total pregnancies 1. Pre term deliveries (>20-36.6 w eeks) 1. Surgical History: [facility] right breast bx , kidney stones colonoscopy     Hospitalization/Major Diagnostic Procedure: [facility] right breast bx , kidney stones         Family History: Mother:  52 yrs, skin d/oFather: deceasedAunt: alive, lung cancerAdopted: alive    Social History: Alcohol Use Patient does not use alcohol.  Smoking Status Patient is a never smoker. Marital Status: Single. Lives w ith: alone. Education/School: has masters degree-[employer]. Family History   Problem Relation Age of Onset    Cancer Mother     No Known Problems Father        OBJECTIVE:    Visit Vitals  /71   Pulse 60   Temp 97.8 °F (36.6 °C) (Oral)   Resp 16   Ht 5' 3\" (1.6 m)   Wt 238 lb 11.2 oz (108.3 kg)   LMP  (LMP Unknown)   SpO2 97%   BMI 42.28 kg/m²     CONSTITUTIONAL: well , well nourished, appears age appropriate  EYES: perrla, eom intact  ENMT:moist mucous membranes, pharynx clear  NECK: supple. Thyroid normal  RESPIRATORY: Chest: clear bilaterally   CARDIOVASCULAR: Heart: regular rate and rhythm  GASTROINTESTINAL: Abdomen: soft, bowel sounds active  HEMATOLOGIC: no pathological lymph nodes palpated  MUSCULOSKELETAL: Extremities: no edema, pulse 1+ Foot exam reveals no lesions. Sensation is intact to fine filament. Pulses are diminished. INTEGUMENT: No unusual rashes or suspicious skin lesions noted. Nails appear normal.  NEUROLOGIC: non-focal exam   MENTAL STATUS: alert and oriented, appropriate affect           ASSESSMENT:  1. Type 2 diabetes with nephropathy ([address]    2. Morbid obesity with BMI of 40.0-44.9, adult ([address]    3. MICHELLE on CPAP    4. Stage 3a chronic kidney disease (HCC)    5. Cervical spinal stenosis    6. Primary hypertension    7. Hypercholesterolemia    8. Spinal stenosis of lumbar region without neurogenic claudication      Type 2 diabetes will be assessed with a hemoglobin A1c. We will report to her the results. Morbid obesity remains an issue and we remind her that if she would lose 50 pounds, she would get off the CPAP machine, which she desires to do because she does not like it, however I advised her there is increased incidence of dementia in people that need to use a CPAP machine and do not. She has CKD stage 3, for which we continue to follow.      She has a history of cervical and lumbar spinal stenosis, both of which are currently asymptomatic. Blood pressure control is excellent, no titration is needed, and her cholesterol is at goal.    She will be back to see me in four months, sooner if she has any problems. I have discussed the diagnosis with the patient and the intended plan as seen in the  Orders. The patient understands and agees with the plan. The patient has   received an after visit summary and questions were answered concerning  future plans  Patient labs and/or xrays were reviewed  Past records were reviewed. PLAN:  .  Orders Placed This Encounter    MICROALBUMIN, UR, RAND W/ MICROALB/CREAT RATIO    HEMOGLOBIN A1C WITH EAG       Follow-up and Dispositions    · Return in about 4 months (around 3/17/2022). ATTENTION:   This medical record was transcribed using an electronic medical records system. Although proofread, it may and can contain electronic and spelling errors. Other human spelling and other errors may be present. Corrections may be executed at a later time. Please feel free to contact us for any clarifications as needed.

## 2021-11-18 NOTE — PROGRESS NOTES
I think we can do better with your blood sugar control. I will make any changes I want to make the changes in your diet.

## 2021-12-09 NOTE — ASSESSMENT & PLAN NOTE
asymptomatic, continue current medications [Completely disabled. Cannot carry on any self care. Totally confined to bed or chair] : Status 4- Completely disabled. Cannot carry on any self care. Totally confined to bed or chair [Normal] : normoactive bowel sounds, soft and nontender, no hepatosplenomegaly or masses appreciated [Ulcers] : no ulcers [Mucositis] : no mucositis [de-identified] : trace left ankle/foot edema [de-identified] : wheelchair bound [de-identified] : alert [de-identified] : non-verbal

## 2021-12-23 RX ORDER — SIMVASTATIN 40 MG/1
TABLET, FILM COATED ORAL
Qty: 90 TABLET | Refills: 3 | Status: SHIPPED | OUTPATIENT
Start: 2021-12-23

## 2022-02-14 ENCOUNTER — DOCUMENTATION ONLY (OUTPATIENT)
Dept: SLEEP MEDICINE | Age: 72
End: 2022-02-14

## 2022-02-14 ENCOUNTER — VIRTUAL VISIT (OUTPATIENT)
Dept: SLEEP MEDICINE | Age: 72
End: 2022-02-14
Payer: MEDICARE

## 2022-02-14 DIAGNOSIS — I10 ESSENTIAL HYPERTENSION: ICD-10-CM

## 2022-02-14 DIAGNOSIS — E11.21 TYPE 2 DIABETES WITH NEPHROPATHY (HCC): ICD-10-CM

## 2022-02-14 DIAGNOSIS — G47.33 OBSTRUCTIVE SLEEP APNEA (ADULT) (PEDIATRIC): Primary | ICD-10-CM

## 2022-02-14 PROCEDURE — 3046F HEMOGLOBIN A1C LEVEL >9.0%: CPT | Performed by: NURSE PRACTITIONER

## 2022-02-14 PROCEDURE — G8536 NO DOC ELDER MAL SCRN: HCPCS | Performed by: NURSE PRACTITIONER

## 2022-02-14 PROCEDURE — 3017F COLORECTAL CA SCREEN DOC REV: CPT | Performed by: NURSE PRACTITIONER

## 2022-02-14 PROCEDURE — G8417 CALC BMI ABV UP PARAM F/U: HCPCS | Performed by: NURSE PRACTITIONER

## 2022-02-14 PROCEDURE — G8399 PT W/DXA RESULTS DOCUMENT: HCPCS | Performed by: NURSE PRACTITIONER

## 2022-02-14 PROCEDURE — 99213 OFFICE O/P EST LOW 20 MIN: CPT | Performed by: NURSE PRACTITIONER

## 2022-02-14 PROCEDURE — G8756 NO BP MEASURE DOC: HCPCS | Performed by: NURSE PRACTITIONER

## 2022-02-14 PROCEDURE — G8432 DEP SCR NOT DOC, RNG: HCPCS | Performed by: NURSE PRACTITIONER

## 2022-02-14 PROCEDURE — G9899 SCRN MAM PERF RSLTS DOC: HCPCS | Performed by: NURSE PRACTITIONER

## 2022-02-14 PROCEDURE — 1090F PRES/ABSN URINE INCON ASSESS: CPT | Performed by: NURSE PRACTITIONER

## 2022-02-14 PROCEDURE — 2022F DILAT RTA XM EVC RTNOPTHY: CPT | Performed by: NURSE PRACTITIONER

## 2022-02-14 PROCEDURE — G8427 DOCREV CUR MEDS BY ELIG CLIN: HCPCS | Performed by: NURSE PRACTITIONER

## 2022-02-14 PROCEDURE — 1101F PT FALLS ASSESS-DOCD LE1/YR: CPT | Performed by: NURSE PRACTITIONER

## 2022-02-14 NOTE — PATIENT INSTRUCTIONS
[address] [place], [address]  Tel.  [phone]  Fax. [address] [facility] [address]  [place], [address]  Tel.  [phone]  Fax. [phone] [address]  Tel.  [phone]  Fax. [phone]     Learning About CPAP for Sleep Apnea  What is CPAP? CPAP is a small machine that you use at home every night while you sleep. It increases air pressure in your throat to keep your airway open. When you have sleep apnea, this can help you sleep better so you feel much better. CPAP stands for \"continuous positive airway pressure. \"  The CPAP machine will have one of the following:  · A mask that covers your nose and mouth  · Prongs that fit into your nose  · A mask that covers your nose only, the most common type. This type is called NCPAP. The N stands for \"nasal.\"  Why is it done? CPAP is usually the best treatment for obstructive sleep apnea. It is the first treatment choice and the most widely used. Your doctor may suggest CPAP if you have:  · Moderate to severe sleep apnea. · Sleep apnea and coronary artery disease (CAD) or heart failure. How does it help? · CPAP can help you have more normal sleep, so you feel less sleepy and more alert during the daytime. · CPAP may help keep heart failure or other heart problems from getting worse. · NCPAP may help lower your blood pressure. · If you use CPAP, your bed partner may also sleep better because you are not snoring or restless. What are the side effects? Some people who use CPAP have:  · A dry or stuffy nose and a sore throat. · Irritated skin on the face. · Sore eyes. · Bloating. If you have any of these problems, work with your doctor to fix them. Here are some things you can try:  · Be sure the mask or nasal prongs fit well. · See if your doctor can adjust the pressure of your CPAP. · If your nose is dry, try a humidifier.   · If your nose is runny or stuffy, try decongestant medicine or a steroid nasal spray. If these things do not help, you might try a different type of machine. Some machines have air pressure that adjusts on its own. Others have air pressures that are different when you breathe in than when you breathe out. This may reduce discomfort caused by too much pressure in your nose. Where can you learn more? Go to Trusera.be  Enter Samir Leavitt in the search box to learn more about \"Learning About CPAP for Sleep Apnea. \"   © 8950-3166 Healthwise, Incorporated. Care instructions adapted under license by [facility] [employer] (which disclaims liability or warranty for this information). This care instruction is for use with your licensed healthcare professional. If you have questions about a medical condition or this instruction, always ask your healthcare professional. [address] any warranty or liability for your use of this information. Content Version: 2.6.66610; Last Revised: January 11, 2010  PROPER SLEEP HYGIENE    What to avoid  · Do not have drinks with caffeine, such as coffee or black tea, for 8 hours before bed. · Do not smoke or use other types of tobacco near bedtime. Nicotine is a stimulant and can keep you awake. · Avoid drinking alcohol late in the evening, because it can cause you to wake in the middle of the night. · Do not eat a big meal close to bedtime. If you are hungry, eat a light snack. · Do not drink a lot of water close to bedtime, because the need to urinate may wake you up during the night. · Do not read or watch TV in bed. Use the bed only for sleeping and sexual activity. What to try  · Go to bed at the same time every night, and wake up at the same time every morning. Do not take naps during the day. · Keep your bedroom quiet, dark, and cool. · Get regular exercise, but not within 3 to 4 hours of your bedtime. .  · Sleep on a comfortable pillow and mattress.   · If watching the clock makes you anxious, turn it facing away from you so you cannot see the time. · If you worry when you lie down, start a worry book. Well before bedtime, write down your worries, and then set the book and your concerns aside. · Try meditation or other relaxation techniques before you go to bed. · If you cannot fall asleep, get up and go to another room until you feel sleepy. Do something relaxing. Repeat your bedtime routine before you go to bed again. · Make your house quiet and calm about an hour before bedtime. Turn down the lights, turn off the TV, log off the computer, and turn down the volume on music. This can help you relax after a busy day. Drowsy Driving: The [employer] cites drowsiness as a causing factor in more than 222,725 police reported crashes annually, resulting in 76,000 injuries and 1,500 deaths. Other surveys suggest 55% of people polled have driven while drowsy in the past year, 23% had fallen asleep but not crashed, 3% crashed, and 2% had and accident due to drowsy driving. Who is at risk? Young Drivers: One study of drowsy driving accidents states that 55% of the drivers were under 25 years. Of those, 75% were male. Shift Workers and Travelers: People who work overnight or travel across time zones frequently are at higher risk of experiencing Circadian Rhythm Disorders. They are trying to work and function when their body is programed to sleep. Sleep Deprived: Lack of sleep has a serious impact on your ability to pay attention or focus on a task. Consistently getting less than the average of 8 hours your body needs creates partial or cumulative sleep deprivation. Untreated Sleep Disorders: Sleep Apnea, Narcolepsy, R.L.S., and other sleep disorders (untreated) prevent a person from getting enough restful sleep. This leads to excessive daytime sleepiness and increases the risk for drowsy driving accidents by up to 7 times.   Medications / Alcohol: Even over the counter medications can cause drowsiness. Medications that impair a drivers attention should have a warning label. Alcohol naturally makes you sleepy and on its own can cause accidents. Combined with excessive drowsiness its effects are amplified. Signs of Drowsy Driving:   * You don't remember driving the last few miles   * You may drift out of your antonina   * You are unable to focus and your thoughts wander   * You may yawn more often than normal   * You have difficulty keeping your eyes open / nodding off   * Missing traffic signs, speeding, or tailgating  Prevention-   Good sleep hygiene, lifestyle and behavioral choices have the most impact on drowsy driving. There is no substitute for sleep and the average person requires 8 hours nightly. If you find yourself driving drowsy, stop and sleep. Consider the sleep hygiene tips provided during your visit as well. Medication Refill Policy: Refills for all medications require 1 week advance notice. Please have your pharmacy fax a refill request. We are unable to fax, or call in \"controled substance\" medications and you will need to pick these prescriptions up from our office. [employer] Activation    Thank you for requesting access to [employer]. Please follow the instructions below to securely access and download your online medical record. [employer] allows you to send messages to your doctor, view your test results, renew your prescriptions, schedule appointments, and more. How Do I Sign Up? 1. In your internet browser, go to [email or web address]. 2. Click on the First Time User? Click Here link in the Sign In box. You will see the New Member Sign Up page. 3. Enter your [employer] Access Code exactly as it appears below. You will not need to use this code after youve completed the sign-up process. If you do not sign up before the expiration date, you must request a new code. [employer] Access Code:  Activation code not generated  Current [email or web address] Status: Active (This is the date your [email or web address] access code will )    4. Enter the last four digits of your Social Security Number (xxxx) and Date of Birth (mm/dd/yyyy) as indicated and click Submit. You will be taken to the next sign-up page. 5. Create a An Estuary[employer] ID. This will be your [employer] login ID and cannot be changed, so think of one that is secure and easy to remember. 6. Create a [email or web address] password. You can change your password at any time. 7. Enter your Password Reset Question and Answer. This can be used at a later time if you forget your password. 8. Enter your e-mail address. You will receive e-mail notification when new information is available in [address] 9. Click Sign Up. You can now view and download portions of your medical record. 10. Click the Download Summary menu link to download a portable copy of your medical information. Additional Information    If you have questions, please call [phone]. Remember, [email or web address] is NOT to be used for urgent needs. For medical emergencies, dial 911.

## 2022-02-14 NOTE — PROGRESS NOTES
Heather Bailey (: 1950) is a 80 y.o. female, established patient, seen for positive airway pressure follow-up, she was last seen by Dr. Saintclair Benedict on 2021, prior notes reviewed in detail. In lab sleep test 10/2020 showed AHI of 16.5/hr with a lowest SpO2 of 86%, duration of SpO2 < 88% 0.1 min. ASSESSMENT/PLAN:    ICD-10-CM ICD-9-CM    1. Obstructive sleep apnea (adult) (pediatric)  G47.33 327.23 AMB SUPPLY ORDER   2. Essential hypertension  I10 401.9    3. Type 2 diabetes with nephropathy (HCC)  E11.21 250.40      583.81    4. Adult BMI 40.0-44.9 kg/sq m (HCC)  Z68.41 V85.41        AHI = 16.5(10/2020). On Respironics APAP :  5-9 cmH2O. Set up 2020. New DS 2 set up 2021. She is adherent with PAP therapy and PAP continues to benefit patient and remains necessary for control of her sleep apnea. Her device is affected by the [place] recall, review of  Care  shows new device set up and she is using. Follow-up and Dispositions    · Return in about 1 year (around 2023) for annual follow up . 1. Sleep Apnea - Continue on current pressure    * Supplies ordered - nasal pillows mask and heated tubing    Orders Placed This Encounter    AMB SUPPLY ORDER     Diagnosis: (G47.33) MICHELLE (obstructive sleep apnea)  (primary encounter diagnosis)     Replacement Supplies for Positive Airway Pressure Therapy Device:   Duration of need: 99 months.  Nasal Pillows (Replace) 2 per month.  Nasal Interface Mask 1 every 3 months.  Pos Airway pressure chin strap   Headgear 1 every 6 months.  Tubing with heating element 1 every 3 months.  Filter(s) Disposable 2 per month.  Filter(s) Non-Disposable 1 every 6 months. .   [address] [facility] for Humidifier (Replace) 1 every 6 months. SHAYNA Rios; NPI: [id number]    Electronically signed.  Date:- 22       *  Counseling was provided regarding the importance of regular PAP use with emphasis on ensuring sufficient total sleep time, proper sleep hygiene, and safe driving. * Re-enforced proper and regular cleaning for the device. We discussed the risk associated with use of cleaning devices and she will continue with use of dish soap and water as the appropriate cleaning method. * She was asked to contact our office for any problems regarding PAP therapy. 2. Hypertension -  continue on her current regimen, she will continue to monitor her BP and follow up with her PMD for reevaluation/adjustment of medications if warranted. I have reviewed the relationship between hypertension as it relates to sleep-disordered breathing. 3. Type II diabetes -  she continues on her current regimen. I have reviewed the relationship between sleep disordered breathing as it relates to diabetes. 4. Encouraged continued weight management program through appropriate diet and exercise regimen as significant weight reduction has been shown to reduce severity of obstructive sleep apnea. She is planning to start     SUBJECTIVE/OBJECTIVE:    She  is seen today for follow up on PAP device and reports no problems using the device. The following concerns reviewed:    Drowsiness no Problems exhaling no   Snoring no Forget to put on no   Mask Comfortable yes Can't fall asleep no   Dry Mouth no Mask falls off no   Air Leaking no Frequent awakenings no       She admits that her sleep has improved on PAP therapy using nasal pillows mask and heated tubing. She is doing well with the new dreamstation 2 device. We have discussed the benefits of PAP therapy and the related insurance requirements for use of a minimum of 4 hours at least 70% of the days in a 30 day period.       Review of device download indicated:  Auto pressure: 5-9 cmH2O; Peak Avg Pressure: 8.9 cmH2O;  Avg. Device Pressure <= 90 %: 8.3 cmH2O    Average % Night in Large Leak:  0.0  % Used Days >= 4 hours: 97.  Avg hours used: 8:06.    Therapy Apnea Index averaged over PAP use: 3.9 /hr which reflects improved sleep breathing condition. [place] Sleepiness Score: 7 and Modified F.O.S.Q. Score Total / 2: 18.5 which reflects improved sleep quality over therapy time. Sleep Review of Systems: notable for Negative difficulty falling asleep; Positive awakenings at night; Negative early morning headaches; Negative memory problems; Negative concentration issues; Negative chest pain; Negative shortness of breath; Negative significant joint pain at night; Negative rashes or itching; Negative heartburn; Negative significant mood issues; 0-1 afternoon naps per week    Vitals reported by patient   Patient-Reported Vitals 2/14/2022   Patient-Reported Weight 238   Patient-Reported Pulse 60   Patient-Reported Temperature 97.8   Patient-Reported Systolic  763   Patient-Reported Diastolic 71      Calculated BMI 42    Physical Exam completed by visual and auditory observation of patient with verbal input from patient. General:   Alert, oriented, not in acute distress   Eyes:  Anicteric Sclerae; no obvious strabismus   Nose:  No obvious nasal septum deviation    Neck:   Midline trachea, no visible mass   Chest/Lungs:  Respiratory effort normal, no visualized signs of difficulty breathing or respiratory distress   CVS:  No JVD   Extremities:  No obvious rashes noted on face, neck, or hands   Neuro:  No facial asymmetry, no focal deficits; no obvious tremor    Psych:  Normal affect,  normal countenance       Jean-Clauderandy Lei, who was evaluated through a synchronous (real-time) audio-video encounter, and/or her healthcare decision maker, is aware that it is a billable service, which includes applicable co-pays, with coverage as determined by her insurance carrier. She provided verbal consent to proceed: Yes, and patient identification was verified.  This visit was conducted pursuant to the emergency declaration under the [address] Emergencies Act, [facility] waiver authority and the Coronavirus Preparedness and Response Supplemental Appropriations Act. A caregiver was present when appropriate. Ability to conduct physical exam was limited. The patient was located in a state where the provider was licensed to provide care. An electronic signature was used to authenticate this note.     -- Jumana Huggins NP, [facility]  02/14/22

## 2022-02-23 RX ORDER — LANCETS 33 GAUGE
EACH MISCELLANEOUS
Qty: 100 EACH | Refills: 11 | Status: SHIPPED | OUTPATIENT
Start: 2022-02-23

## 2022-02-23 RX ORDER — BLOOD SUGAR DIAGNOSTIC
STRIP MISCELLANEOUS
Qty: 300 STRIP | Refills: 3 | Status: SHIPPED | OUTPATIENT
Start: 2022-02-23

## 2022-03-18 PROBLEM — R06.09 DOE (DYSPNEA ON EXERTION): Status: ACTIVE | Noted: 2020-07-24

## 2022-03-18 PROBLEM — E11.21 TYPE 2 DIABETES WITH NEPHROPATHY (HCC): Status: ACTIVE | Noted: 2018-03-13

## 2022-03-18 PROBLEM — L03.311 CELLULITIS OF RIGHT ABDOMINAL WALL: Status: ACTIVE | Noted: 2017-12-18

## 2022-03-19 PROBLEM — M48.02 CERVICAL SPINAL STENOSIS: Status: ACTIVE | Noted: 2019-07-05
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
## 2022-03-19 PROBLEM — R07.89 OTHER CHEST PAIN: Status: ACTIVE | Noted: 2020-07-24

## 2022-03-19 PROBLEM — Z92.89 HISTORY OF NUCLEAR STRESS TEST: Status: ACTIVE | Noted: 2020-08-03

## 2022-03-20 PROBLEM — E66.01 MORBID OBESITY WITH BMI OF 40.0-44.9, ADULT (HCC): Status: ACTIVE | Noted: 2018-03-27

## 2022-03-24 ENCOUNTER — OFFICE VISIT (OUTPATIENT)
Dept: INTERNAL MEDICINE CLINIC | Age: 72
End: 2022-03-24
Payer: MEDICARE

## 2022-03-24 VITALS
BODY MASS INDEX: 42.38 KG/M2 | DIASTOLIC BLOOD PRESSURE: 78 MMHG | SYSTOLIC BLOOD PRESSURE: 118 MMHG | HEART RATE: 71 BPM | WEIGHT: 239.2 LBS | RESPIRATION RATE: 18 BRPM | TEMPERATURE: 98.2 F | HEIGHT: 63 IN | OXYGEN SATURATION: 99 %

## 2022-03-24 DIAGNOSIS — Z13.31 SCREENING FOR DEPRESSION: ICD-10-CM

## 2022-03-24 DIAGNOSIS — M17.11 PRIMARY OSTEOARTHRITIS OF RIGHT KNEE: ICD-10-CM

## 2022-03-24 DIAGNOSIS — G47.33 OSA ON CPAP: ICD-10-CM

## 2022-03-24 DIAGNOSIS — M48.061 SPINAL STENOSIS OF LUMBAR REGION WITHOUT NEUROGENIC CLAUDICATION: ICD-10-CM

## 2022-03-24 DIAGNOSIS — Z00.00 MEDICARE ANNUAL WELLNESS VISIT, SUBSEQUENT: Primary | ICD-10-CM

## 2022-03-24 DIAGNOSIS — E11.21 TYPE 2 DIABETES WITH NEPHROPATHY (HCC): ICD-10-CM

## 2022-03-24 DIAGNOSIS — M48.02 CERVICAL SPINAL STENOSIS: ICD-10-CM

## 2022-03-24 DIAGNOSIS — E78.00 HYPERCHOLESTEROLEMIA: ICD-10-CM

## 2022-03-24 DIAGNOSIS — Z99.89 OSA ON CPAP: ICD-10-CM

## 2022-03-24 DIAGNOSIS — N18.31 STAGE 3A CHRONIC KIDNEY DISEASE (HCC): ICD-10-CM

## 2022-03-24 DIAGNOSIS — E66.01 MORBID OBESITY WITH BMI OF 40.0-44.9, ADULT (HCC): ICD-10-CM

## 2022-03-24 DIAGNOSIS — M65.332 TRIGGER FINGER, LEFT MIDDLE FINGER: ICD-10-CM

## 2022-03-24 DIAGNOSIS — I10 PRIMARY HYPERTENSION: ICD-10-CM

## 2022-03-24 PROCEDURE — G9899 SCRN MAM PERF RSLTS DOC: HCPCS | Performed by: INTERNAL MEDICINE

## 2022-03-24 PROCEDURE — 1101F PT FALLS ASSESS-DOCD LE1/YR: CPT | Performed by: INTERNAL MEDICINE

## 2022-03-24 PROCEDURE — G8399 PT W/DXA RESULTS DOCUMENT: HCPCS | Performed by: INTERNAL MEDICINE

## 2022-03-24 PROCEDURE — 99213 OFFICE O/P EST LOW 20 MIN: CPT | Performed by: INTERNAL MEDICINE

## 2022-03-24 PROCEDURE — G8752 SYS BP LESS 140: HCPCS | Performed by: INTERNAL MEDICINE

## 2022-03-24 PROCEDURE — G8417 CALC BMI ABV UP PARAM F/U: HCPCS | Performed by: INTERNAL MEDICINE

## 2022-03-24 PROCEDURE — G8754 DIAS BP LESS 90: HCPCS | Performed by: INTERNAL MEDICINE

## 2022-03-24 PROCEDURE — 2022F DILAT RTA XM EVC RTNOPTHY: CPT | Performed by: INTERNAL MEDICINE

## 2022-03-24 PROCEDURE — G8536 NO DOC ELDER MAL SCRN: HCPCS | Performed by: INTERNAL MEDICINE

## 2022-03-24 PROCEDURE — 1090F PRES/ABSN URINE INCON ASSESS: CPT | Performed by: INTERNAL MEDICINE

## 2022-03-24 PROCEDURE — G8510 SCR DEP NEG, NO PLAN REQD: HCPCS | Performed by: INTERNAL MEDICINE

## 2022-03-24 PROCEDURE — G8427 DOCREV CUR MEDS BY ELIG CLIN: HCPCS | Performed by: INTERNAL MEDICINE

## 2022-03-24 PROCEDURE — G0439 PPPS, SUBSEQ VISIT: HCPCS | Performed by: INTERNAL MEDICINE

## 2022-03-24 PROCEDURE — 3017F COLORECTAL CA SCREEN DOC REV: CPT | Performed by: INTERNAL MEDICINE

## 2022-03-24 PROCEDURE — 3046F HEMOGLOBIN A1C LEVEL >9.0%: CPT | Performed by: INTERNAL MEDICINE

## 2022-03-24 NOTE — PATIENT INSTRUCTIONS

## 2022-03-24 NOTE — PROGRESS NOTES
SPORTS MEDICINE AND PRIMARY CARE  Brent Eason MD, [address] [address]  Phone:  [phone]  Fax: [phone]      Chief Complaint   Patient presents with    Annual Wellness Visit         SUBECTIVE:    Linda Monroy is a 80 y.o. female Patient returns today with a known history of diabetes, obesity, dyslipidemia, primary hypertension, CKD stage 3 and is seen for evaluation. Patient returns today complaining of triggering of the middle finger of her left hand. Evangelina Walls performed a procedure to stop the triggering of her right hand back in 2015. She would like to see him again. Patient is seen for evaluation. Current Outpatient Medications   Medication Sig Dispense Refill    lancets (One Touch Delica) 33 gauge misc USE TO TEST BLOOD SUGAR 3 TIMES A  Each 11    OneTouch Ultra Test strip USE TO TEST BLOOD SUGAR THREE TIMES A DAY. DX. E11.9 300 Strip 3    simvastatin (ZOCOR) 40 mg tablet TAKE 1 TABLET BY MOUTH EVERY DAY AT NIGHT 90 Tablet 3    pantoprazole (PROTONIX) 40 mg tablet TAKE 1 TABLET BY MOUTH EVERY DAY BEFORE BREAKFAST 90 Tablet 3    cloNIDine HCL (CATAPRES) 0.2 mg tablet TAKE 1 TABLET BY MOUTH TWICE A  Tablet 3    metoprolol tartrate (LOPRESSOR) 25 mg tablet TAKE 1/2 TABS BY MOUTH TWO (2) TIMES A DAY. 90 Tablet 3    Basaglar KwikPen U-100 Insulin 100 unit/mL (3 mL) inpn INJECT 40 UNITS SUBCUTANEOUSLY UNDER THE SKIN EVERY NIGHT 45 Adjustable Dose Pre-filled Pen Syringe 3    NovoLOG Flexpen U-100 Insulin 100 unit/mL (3 mL) inpn INJECT 12 UNITS SUBCUTANOEUSLY BEFORE BREAKFAST, LUNCH AND DINNER 45 Adjustable Dose Pre-filled Pen Syringe 3    Blood-Glucose Meter (OneTouch Ultra2 Meter) misc Use to test blood sugar three times a day. Dx.e11.9 1 Each 0    BD Jody 2nd Gen Pen Needle 32 gauge x 5/32\" ndle USE TO INJECT INSULIN FOUR TIMES A DAY.  DX.E11.9 200 Pen Needle 11    amLODIPine (NORVASC) 10 mg tablet TAKE 1 TABLET BY MOUTH DAILY 90 Tab 4    Insulin Needles, Disposable, 29 gauge x 5/16\" ndle Use to inject insulin three times daily. dx.e119 300 Each 3    albuterol (PROVENTIL HFA, VENTOLIN HFA, PROAIR HFA) 90 mcg/actuation inhaler Take 2 Puffs by inhalation every four (4) hours as needed for Wheezing. 1 Inhaler 0     Past Medical History:   Diagnosis Date    Cellulitis of right abdominal wall 12/18/2017    letty whatley md    CKD (chronic kidney disease), stage III (HCC)     Diabetes ([facility] [address] 1995    DJD (degenerative joint disease) of knee     GUSTAVO [place] at home, sequela 07/05/2019    Gastroenteritis     History of nuclear stress test 08/03/2020    No evidence is of ischemia or infarction. Left ventricular ejection fraction measures 71%.  Hypercholesterolemia     Hypertension     Kidney lesion, native, left     Knee pain     LBP (low back pain)     chiorpactor    Lumbar stenosis     Obesity     MICHELLE on CPAP     Rotator cuff arthropathy of right shoulder 06/22/2018    S/P colonoscopy 12-18-12    Trigger finger, left middle finger 03/24/2022     Past Surgical History:   Procedure Laterality Date    HX BREAST BIOPSY Right 1990    negative    HX COLONOSCOPY      HX HYSTERECTOMY      HX OOPHORECTOMY N/A     HX OTHER SURGICAL  12/23/2017    I&D infected abscess      No Known Allergies    REVIEW OF SYSTEMS:   No chest pain, no shortness of breath.         Social History     Socioeconomic History    Marital status: SINGLE   Tobacco Use    Smoking status: Never Smoker    Smokeless tobacco: Never Used   Vaping Use    Vaping Use: Never used   Substance and Sexual Activity    Alcohol use: No     Alcohol/week: 0.0 standard drinks    Drug use: No    Sexual activity: Not Currently   Other Topics Concern     Service No    Blood Transfusions No    Caffeine Concern No    Occupational Exposure No    Hobby Hazards No    Sleep Concern Yes    Stress Concern No    Weight Concern Yes    Special Diet Yes Comment: Renal Diet    Back Care Yes    Exercise No    Bike Helmet No    Seat Belt Yes    Self-Exams No   Social History Narrative    Medical History: Diverticulosis 2002DM 1995primary HTN 1995Obesity 1995Dyslipidemia 1995January 2012 moderate central stenosis L3-L4,    mild to moderate central stenosis at L4-L5 MRI    Gyn History: Last mammogram date 2013. Last menstrual perioddate hysterectomy  FOR FIBROIDS, PT W ITH ONE OVARY. Last    papdate . Menopausal hot flashes. Sexually active not currently sexually active. History of abnormal pap smears none. History of STDs    none. Vaginal discharge or odor none. HysterectomyDate NONE.    OB History: Total pregnancies 1. Pre term deliveries (>20-36.6 w eeks) 1. Surgical History: [facility] right breast bx , kidney stones colonoscopy     Hospitalization/Major Diagnostic Procedure: [facility] rig breast bx , nicole paulino         Family History: Mother:  52 yrs, skin d/oFather: deceasedAunt: alive, lung cancerAdopted: alive    Social History: Alcohol Use Patient does not use alcohol. Smoking Status Patient is a never smoker. Marital Status: Single. Lives w ith: alone. Education/School: has masters degree-[employer].   r  Family History   Problem Relation Age of Onset    Cancer Mother     No Known Problems Father        OBJECTIVE:  Visit Vitals  BP (!) 160/68   Pulse 71   Temp 98.2 °F (36.8 °C) (Oral)   Resp 18   Ht 5' 3\" (1.6 m)   Wt 239 lb 3.2 oz (108.5 kg)   LMP  (LMP Unknown)   SpO2 99%   BMI 42.37 kg/m²     ENT: perrla,  eom intact  NECK: supple. Thyroid normal  CHEST: clear to ascultation and percussion   HEART: regular rate and rhythm  ABD: soft, bowel sounds active  EXTREMITIES: no edema, pulse 1+     No visits with results within 3 Month(s) from this visit.    Latest known visit with results is:   Office Visit on 2021   Component Date Value Ref Range Status    Hemoglobin A1c 2021 7.2* 4.0 - 5.6 % Final    Comment: NEW METHOD PLEASE NOTE NEW REFERENCE RANGE  (NOTE)  HbA1C Interpretive Ranges  <5.7              Normal  5.7 - 6.4         Consider Prediabetes  >6.5              Consider Diabetes      Est. average glucose 11/17/2021 160  mg/dL Final    Microalbumin,urine random 11/17/2021 4.02  MG/DL Final    No reference range has been established.  Creatinine, urine 11/17/2021 241.00  mg/dL Final    No reference range has been established.  Microalbumin/Creat ratio (mg/g cre* 11/17/2021 17  0 - 30 mg/g Final    SAMPLES BEING HELD 11/17/2021 CUP URINE   Final    COMMENT 11/17/2021 Add-on orders for these samples will be processed based on acceptable specimen integrity and analyte stability, which may vary by analyte. Final          ASSESSMENT:  1. Medicare annual wellness visit, subsequent    2. Screening for depression    3. Type 2 diabetes with nephropathy ([facility] [address]    4. Primary osteoarthritis of right knee    5. Primary hypertension    6. Spinal stenosis of lumbar region without neurogenic claudication    7. Hypercholesterolemia    8. Morbid obesity with BMI of 40.0-44.9, adult ([facility] [address]    9. Cervical spinal stenosis    10. Stage 3a chronic kidney disease ([address]    11. MICHELLE on CPAP    12. Trigger finger, left middle finger      We will assess blood sugar control with a hemoglobin A1c. She tells me sugars have been good. Osteoarthritis of the knee is not symptomatic. Blood pressure repeat is just above, but is much better than previously noted. Lumbar spinal stenosis without major claudication, without symptoms today. Her cholesterol is at goal, she is on a statin. CKD stage 3A will be evaluated today. She has obstructive sleep apnea and uses a CPAP machine. She is referred back to orthopedics for triggering of the finger. She will be back to see me in three to four months, sooner if needed.     Encouraged physical activity 30 minutes five days a week and a heart healthy, weight reducing diet. I have discussed the diagnosis with the patient and the intended plan as seen in the  orders above. The patient understands and agees with the plan. The patient has   received an after visit summary and questions were answered concerning  future plans  Patient labs and/or xrays were reviewed  Past records were reviewed. PLAN:  .  Orders Placed This Encounter    ANNUAL DEPRESSION SCREEN 8-15 MIN    HEMOGLOBIN A1C WITH EAG    REFERRAL TO ORTHOPEDIC SURGERY       Follow-up and Dispositions    · Return in about 4 months (around 7/24/2022). ATTENTION:   This medical record was transcribed using an electronic medical records system. Although proofread, it may and can contain electronic and spelling errors. Other human spelling and other errors may be present. Corrections may be executed at a later time. Please feel free to contact us for any clarifications as needed.

## 2022-03-24 NOTE — PROGRESS NOTES
1. Have you been to the ER, urgent care clinic since your last visit? Hospitalized since your last visit? No    2. Have you seen or consulted any other health care providers outside of the [address] [place] [address] since your last visit? Include any pap smears or colon screening. No       Wants to discuss trigger finger issue  Discuss skin issue  This is the Subsequent Medicare Annual Wellness Exam, performed 12 months or more after the Initial AWV or the last Subsequent AWV    I have reviewed the patient's medical history in detail and updated the computerized patient record. Assessment/Plan   Education and counseling provided:  Are appropriate based on today's review and evaluation         Depression Risk Factor Screening     3 most recent PHQ Screens 3/24/2022   PHQ Not Done -   Little interest or pleasure in doing things Not at all   Feeling down, depressed, irritable, or hopeless Not at all   Total Score PHQ 2 0       Alcohol & Drug Abuse Risk Screen    Do you average more than 1 drink per night or more than 7 drinks a week:  No    On any one occasion in the past three months have you have had more than 3 drinks containing alcohol:  No          Functional Ability and Level of Safety    Hearing: Hearing is good. Activities of Daily Living: The home contains: no safety equipment. Patient does total self care      Ambulation: with no difficulty     Fall Risk:  Fall Risk Assessment, last 12 mths 3/24/2022   Able to walk? Yes   Fall in past 12 months? 0   Do you feel unsteady? 0   Are you worried about falling 0   Number of falls in past 12 months -   Fall with injury?  -      Abuse Screen:  Patient is not abused       Cognitive Screening    Has your family/caregiver stated any concerns about your memory: no     Cognitive Screening: not necessary    Health Maintenance Due     Health Maintenance Due   Topic Date Due    Medicare Yearly Exam  11/20/2021    Eye Exam Retinal or Dilated  11/20/2021 Patient Care Team   Patient Care Team:  Wilbert Faust MD as PCP - General (Internal Medicine)  Wilbert Faust MD as PCP - [facility] Provider  Crystal Mattson MD as Surgeon (Surgery)  Perez Campuzano MD as Physician (Nephrology)    History     Patient Active Problem List   Diagnosis Code    Hypercholesterolemia E78.00    Hypertension I10    Lumbar stenosis M48.061    Low back pain M54.50    DJD (degenerative joint disease) of knee M17.10    Cellulitis of right abdominal wall L03.311    Gastroenteritis K52.9    Type 2 diabetes with nephropathy ([facility] [address] E11.21    Morbid obesity with BMI of 40.0-44.9, adult ([facility] [address] E66.01, Z68.41    Shoulder pain, right M25.511    Knee pain M25.569    Rotator cuff arthropathy of right shoulder M12.811    Cervical spinal stenosis M48.02    Fall at home, sequela W19. XXXS, Y92.009    CKD (chronic kidney disease), stage III (HCC) N18.30    WOODY (dyspnea on exertion) R06.00    Other chest pain R07.89    History of nuclear stress test Z92.89    MICHELLE on CPAP G47.33, Z99.89     Past Medical History:   Diagnosis Date    Cellulitis of right abdominal wall 12/18/2017    letty whatley md    CKD (chronic kidney disease), stage III (HCC)     Diabetes ([facility] [address] 1995    DJD (degenerative joint disease) of knee     GUSTAVO [place] at home, sequela 07/05/2019    Gastroenteritis     History of nuclear stress test 08/03/2020    No evidence is of ischemia or infarction. Left ventricular ejection fraction measures 71%.     Hypercholesterolemia     Hypertension     Kidney lesion, native, left     Knee pain     LBP (low back pain)     chiorpactor    Lumbar stenosis     Obesity     MICHELLE on CPAP     Rotator cuff arthropathy of right shoulder 06/22/2018    S/P colonoscopy 12-18-12      Past Surgical History:   Procedure Laterality Date    HX BREAST BIOPSY Right 1990    negative    HX COLONOSCOPY      HX HYSTERECTOMY      HX OOPHORECTOMY N/A  HX OTHER SURGICAL  12/23/2017    I&D infected abscess      Current Outpatient Medications   Medication Sig Dispense Refill    lancets (One Touch Delica) 33 gauge misc USE TO TEST BLOOD SUGAR 3 TIMES A  Each 11    OneTouch Ultra Test strip USE TO TEST BLOOD SUGAR THREE TIMES A DAY. DX. E11.9 300 Strip 3    simvastatin (ZOCOR) 40 mg tablet TAKE 1 TABLET BY MOUTH EVERY DAY AT NIGHT 90 Tablet 3    pantoprazole (PROTONIX) 40 mg tablet TAKE 1 TABLET BY MOUTH EVERY DAY BEFORE BREAKFAST 90 Tablet 3    cloNIDine HCL (CATAPRES) 0.2 mg tablet TAKE 1 TABLET BY MOUTH TWICE A  Tablet 3    metoprolol tartrate (LOPRESSOR) 25 mg tablet TAKE 1/2 TABS BY MOUTH TWO (2) TIMES A DAY. 90 Tablet 3    Basaglar KwikPen U-100 Insulin 100 unit/mL (3 mL) inpn INJECT 40 UNITS SUBCUTANEOUSLY UNDER THE SKIN EVERY NIGHT 45 Adjustable Dose Pre-filled Pen Syringe 3    NovoLOG Flexpen U-100 Insulin 100 unit/mL (3 mL) inpn INJECT 12 UNITS SUBCUTANOEUSLY BEFORE BREAKFAST, LUNCH AND DINNER 45 Adjustable Dose Pre-filled Pen Syringe 3    Blood-Glucose Meter (OneTouch Ultra2 Meter) misc Use to test blood sugar three times a day. Dx.e11.9 1 Each 0    BD Jody 2nd Gen Pen Needle 32 gauge x 5/32\" ndle USE TO INJECT INSULIN FOUR TIMES A DAY. DX.E11.9 200 Pen Needle 11    amLODIPine (NORVASC) 10 mg tablet TAKE 1 TABLET BY MOUTH DAILY 90 Tab 4    calcitRIOL (ROCALTROL) 0.25 mcg capsule Take 1 Cap by mouth daily. 90 Cap 3    Insulin Needles, Disposable, 29 gauge x 5/16\" ndle Use to inject insulin three times daily. dx.e119 300 Each 3    albuterol (PROVENTIL HFA, VENTOLIN HFA, PROAIR HFA) 90 mcg/actuation inhaler Take 2 Puffs by inhalation every four (4) hours as needed for Wheezing.  1 Inhaler 0     No Known Allergies    Family History   Problem Relation Age of Onset    Cancer Mother     No Known Problems Father      Social History     Tobacco Use    Smoking status: Never Smoker    Smokeless tobacco: Never Used   Substance Use Topics    Alcohol use: No     Alcohol/week: 0.0 standard drinks         Kana Larson LPN

## 2022-03-25 LAB
ALBUMIN SERPL-MCNC: 4 G/DL (ref 3.5–5)
ANION GAP SERPL CALC-SCNC: 6 MMOL/L (ref 5–15)
BASOPHILS # BLD: 0 K/UL (ref 0–0.1)
BASOPHILS NFR BLD: 0 % (ref 0–1)
BUN SERPL-MCNC: 13 MG/DL (ref 6–20)
BUN/CREAT SERPL: 14 (ref 12–20)
CALCIUM SERPL-MCNC: 9.5 MG/DL (ref 8.5–10.1)
CHLORIDE SERPL-SCNC: 109 MMOL/L (ref 97–108)
CO2 SERPL-SCNC: 23 MMOL/L (ref 21–32)
CREAT SERPL-MCNC: 0.91 MG/DL (ref 0.55–1.02)
DIFFERENTIAL METHOD BLD: NORMAL
EOSINOPHIL # BLD: 0.1 K/UL (ref 0–0.4)
EOSINOPHIL NFR BLD: 1 % (ref 0–7)
ERYTHROCYTE [DISTWIDTH] IN BLOOD BY AUTOMATED COUNT: 14.4 % (ref 11.5–14.5)
EST. AVERAGE GLUCOSE BLD GHB EST-MCNC: 148 MG/DL
GLUCOSE SERPL-MCNC: 133 MG/DL (ref 65–100)
HBA1C MFR BLD: 6.8 % (ref 4–5.6)
HCT VFR BLD AUTO: 46.9 % (ref 35–47)
HGB BLD-MCNC: 15 G/DL (ref 11.5–16)
IMM GRANULOCYTES # BLD AUTO: 0 K/UL (ref 0–0.04)
IMM GRANULOCYTES NFR BLD AUTO: 0 % (ref 0–0.5)
LYMPHOCYTES # BLD: 1.5 K/UL (ref 0.8–3.5)
LYMPHOCYTES NFR BLD: 24 % (ref 12–49)
MCH RBC QN AUTO: 29.4 PG (ref 26–34)
MCHC RBC AUTO-ENTMCNC: 32 G/DL (ref 30–36.5)
MCV RBC AUTO: 91.8 FL (ref 80–99)
MONOCYTES # BLD: 0.5 K/UL (ref 0–1)
MONOCYTES NFR BLD: 8 % (ref 5–13)
NEUTS SEG # BLD: 4.2 K/UL (ref 1.8–8)
NEUTS SEG NFR BLD: 67 % (ref 32–75)
NRBC # BLD: 0 K/UL (ref 0–0.01)
NRBC BLD-RTO: 0 PER 100 WBC
PHOSPHATE SERPL-MCNC: 3.4 MG/DL (ref 2.6–4.7)
PLATELET # BLD AUTO: 225 K/UL (ref 150–400)
PMV BLD AUTO: 12 FL (ref 8.9–12.9)
POTASSIUM SERPL-SCNC: 4.2 MMOL/L (ref 3.5–5.1)
RBC # BLD AUTO: 5.11 M/UL (ref 3.8–5.2)
SODIUM SERPL-SCNC: 138 MMOL/L (ref 136–145)
WBC # BLD AUTO: 6.3 K/UL (ref 3.6–11)

## 2022-06-13 ENCOUNTER — TRANSCRIBE ORDER (OUTPATIENT)
Dept: SCHEDULING | Age: 72
End: 2022-06-13

## 2022-06-13 DIAGNOSIS — Z12.31 VISIT FOR SCREENING MAMMOGRAM: Primary | ICD-10-CM

## 2022-07-09 RX ORDER — AMLODIPINE BESYLATE 10 MG/1
TABLET ORAL
Qty: 90 TABLET | Refills: 4 | Status: SHIPPED | OUTPATIENT
Start: 2022-07-09

## 2022-07-27 ENCOUNTER — HOSPITAL ENCOUNTER (OUTPATIENT)
Dept: MAMMOGRAPHY | Age: 72
Discharge: HOME | End: 2022-07-27
Attending: INTERNAL MEDICINE
Payer: MEDICARE

## 2022-07-27 DIAGNOSIS — Z12.31 VISIT FOR SCREENING MAMMOGRAM: ICD-10-CM

## 2022-07-27 PROCEDURE — 77063 BREAST TOMOSYNTHESIS BI: CPT

## 2022-08-01 RX ORDER — PEN NEEDLE, DIABETIC 32GX 5/32"
NEEDLE, DISPOSABLE MISCELLANEOUS
Qty: 200 PEN NEEDLE | Refills: 11 | Status: SHIPPED | OUTPATIENT
Start: 2022-08-01

## 2022-08-01 RX ORDER — METOPROLOL TARTRATE 25 MG/1
TABLET, FILM COATED ORAL
Qty: 90 TABLET | Refills: 3 | Status: SHIPPED | OUTPATIENT
Start: 2022-08-01

## 2022-08-03 ENCOUNTER — OFFICE VISIT (OUTPATIENT)
Dept: INTERNAL MEDICINE CLINIC | Age: 72
End: 2022-08-03
Payer: MEDICARE

## 2022-08-03 VITALS
DIASTOLIC BLOOD PRESSURE: 74 MMHG | OXYGEN SATURATION: 95 % | BODY MASS INDEX: 42.42 KG/M2 | WEIGHT: 239.4 LBS | TEMPERATURE: 98 F | HEART RATE: 66 BPM | RESPIRATION RATE: 18 BRPM | SYSTOLIC BLOOD PRESSURE: 144 MMHG | HEIGHT: 63 IN

## 2022-08-03 DIAGNOSIS — R22.31 SUBCUTANEOUS NODULE OF RIGHT FOREARM: ICD-10-CM

## 2022-08-03 DIAGNOSIS — E66.01 MORBID OBESITY WITH BMI OF 40.0-44.9, ADULT (HCC): ICD-10-CM

## 2022-08-03 DIAGNOSIS — M48.02 CERVICAL SPINAL STENOSIS: ICD-10-CM

## 2022-08-03 DIAGNOSIS — I10 PRIMARY HYPERTENSION: ICD-10-CM

## 2022-08-03 DIAGNOSIS — G47.33 OSA ON CPAP: ICD-10-CM

## 2022-08-03 DIAGNOSIS — E11.21 TYPE 2 DIABETES WITH NEPHROPATHY (HCC): ICD-10-CM

## 2022-08-03 DIAGNOSIS — M12.811 ROTATOR CUFF ARTHROPATHY OF RIGHT SHOULDER: ICD-10-CM

## 2022-08-03 DIAGNOSIS — Z99.89 OSA ON CPAP: ICD-10-CM

## 2022-08-03 DIAGNOSIS — M48.061 SPINAL STENOSIS OF LUMBAR REGION WITHOUT NEUROGENIC CLAUDICATION: ICD-10-CM

## 2022-08-03 DIAGNOSIS — N18.31 STAGE 3A CHRONIC KIDNEY DISEASE (HCC): ICD-10-CM

## 2022-08-03 DIAGNOSIS — E78.00 HYPERCHOLESTEROLEMIA: Primary | ICD-10-CM

## 2022-08-03 DIAGNOSIS — M17.11 PRIMARY OSTEOARTHRITIS OF RIGHT KNEE: ICD-10-CM

## 2022-08-03 LAB
ALBUMIN SERPL-MCNC: 3.8 G/DL (ref 3.5–5)
ALBUMIN/GLOB SERPL: 1.1 {RATIO} (ref 1.1–2.2)
ALP SERPL-CCNC: 71 U/L (ref 45–117)
ALT SERPL-CCNC: 21 U/L (ref 12–78)
ANION GAP SERPL CALC-SCNC: 6 MMOL/L (ref 5–15)
APPEARANCE UR: CLEAR
AST SERPL-CCNC: 12 U/L (ref 15–37)
BACTERIA URNS QL MICRO: ABNORMAL /HPF
BASOPHILS # BLD: 0 K/UL (ref 0–0.1)
BASOPHILS NFR BLD: 0 % (ref 0–1)
BILIRUB SERPL-MCNC: 0.4 MG/DL (ref 0.2–1)
BILIRUB UR QL: NEGATIVE
BUN SERPL-MCNC: 15 MG/DL (ref 6–20)
BUN/CREAT SERPL: 16 (ref 12–20)
CALCIUM SERPL-MCNC: 9.5 MG/DL (ref 8.5–10.1)
CHLORIDE SERPL-SCNC: 110 MMOL/L (ref 97–108)
CHOLEST SERPL-MCNC: 164 MG/DL
CO2 SERPL-SCNC: 28 MMOL/L (ref 21–32)
COLOR UR: ABNORMAL
CREAT SERPL-MCNC: 0.93 MG/DL (ref 0.55–1.02)
DIFFERENTIAL METHOD BLD: ABNORMAL
EOSINOPHIL # BLD: 0.1 K/UL (ref 0–0.4)
EOSINOPHIL NFR BLD: 1 % (ref 0–7)
EPITH CASTS URNS QL MICRO: ABNORMAL /LPF
ERYTHROCYTE [DISTWIDTH] IN BLOOD BY AUTOMATED COUNT: 15.1 % (ref 11.5–14.5)
EST. AVERAGE GLUCOSE BLD GHB EST-MCNC: 154 MG/DL
GLOBULIN SER CALC-MCNC: 3.5 G/DL (ref 2–4)
GLUCOSE SERPL-MCNC: 79 MG/DL (ref 65–100)
GLUCOSE UR STRIP.AUTO-MCNC: NEGATIVE MG/DL
HBA1C MFR BLD: 7 % (ref 4–5.6)
HCT VFR BLD AUTO: 47.2 % (ref 35–47)
HDLC SERPL-MCNC: 58 MG/DL
HDLC SERPL: 2.8 {RATIO} (ref 0–5)
HGB BLD-MCNC: 15.3 G/DL (ref 11.5–16)
HGB UR QL STRIP: NEGATIVE
HYALINE CASTS URNS QL MICRO: ABNORMAL /LPF (ref 0–5)
IMM GRANULOCYTES # BLD AUTO: 0 K/UL (ref 0–0.04)
IMM GRANULOCYTES NFR BLD AUTO: 0 % (ref 0–0.5)
KETONES UR QL STRIP.AUTO: NEGATIVE MG/DL
LDLC SERPL CALC-MCNC: 90.4 MG/DL (ref 0–100)
LEUKOCYTE ESTERASE UR QL STRIP.AUTO: ABNORMAL
LYMPHOCYTES # BLD: 1.5 K/UL (ref 0.8–3.5)
LYMPHOCYTES NFR BLD: 23 % (ref 12–49)
MCH RBC QN AUTO: 29.7 PG (ref 26–34)
MCHC RBC AUTO-ENTMCNC: 32.4 G/DL (ref 30–36.5)
MCV RBC AUTO: 91.5 FL (ref 80–99)
MONOCYTES # BLD: 0.6 K/UL (ref 0–1)
MONOCYTES NFR BLD: 9 % (ref 5–13)
NEUTS SEG # BLD: 4.5 K/UL (ref 1.8–8)
NEUTS SEG NFR BLD: 67 % (ref 32–75)
NITRITE UR QL STRIP.AUTO: NEGATIVE
NRBC # BLD: 0 K/UL (ref 0–0.01)
NRBC BLD-RTO: 0 PER 100 WBC
PH UR STRIP: 5.5 [PH] (ref 5–8)
PLATELET # BLD AUTO: 202 K/UL (ref 150–400)
PMV BLD AUTO: 11.6 FL (ref 8.9–12.9)
POTASSIUM SERPL-SCNC: 4.6 MMOL/L (ref 3.5–5.1)
PROT SERPL-MCNC: 7.3 G/DL (ref 6.4–8.2)
PROT UR STRIP-MCNC: ABNORMAL MG/DL
RBC # BLD AUTO: 5.16 M/UL (ref 3.8–5.2)
RBC #/AREA URNS HPF: ABNORMAL /HPF (ref 0–5)
SODIUM SERPL-SCNC: 144 MMOL/L (ref 136–145)
SP GR UR REFRACTOMETRY: 1.02 (ref 1–1.03)
TRIGL SERPL-MCNC: 78 MG/DL (ref ?–150)
UROBILINOGEN UR QL STRIP.AUTO: 0.2 EU/DL (ref 0.2–1)
VLDLC SERPL CALC-MCNC: 15.6 MG/DL
WBC # BLD AUTO: 6.8 K/UL (ref 3.6–11)
WBC URNS QL MICRO: ABNORMAL /HPF (ref 0–4)

## 2022-08-03 PROCEDURE — 3017F COLORECTAL CA SCREEN DOC REV: CPT | Performed by: INTERNAL MEDICINE

## 2022-08-03 PROCEDURE — G8754 DIAS BP LESS 90: HCPCS | Performed by: INTERNAL MEDICINE

## 2022-08-03 PROCEDURE — 99214 OFFICE O/P EST MOD 30 MIN: CPT | Performed by: INTERNAL MEDICINE

## 2022-08-03 PROCEDURE — 2022F DILAT RTA XM EVC RTNOPTHY: CPT | Performed by: INTERNAL MEDICINE

## 2022-08-03 PROCEDURE — 3044F HG A1C LEVEL LT 7.0%: CPT | Performed by: INTERNAL MEDICINE

## 2022-08-03 PROCEDURE — G8753 SYS BP > OR = 140: HCPCS | Performed by: INTERNAL MEDICINE

## 2022-08-03 PROCEDURE — G9899 SCRN MAM PERF RSLTS DOC: HCPCS | Performed by: INTERNAL MEDICINE

## 2022-08-03 PROCEDURE — G8536 NO DOC ELDER MAL SCRN: HCPCS | Performed by: INTERNAL MEDICINE

## 2022-08-03 PROCEDURE — G8417 CALC BMI ABV UP PARAM F/U: HCPCS | Performed by: INTERNAL MEDICINE

## 2022-08-03 PROCEDURE — 1090F PRES/ABSN URINE INCON ASSESS: CPT | Performed by: INTERNAL MEDICINE

## 2022-08-03 PROCEDURE — G8399 PT W/DXA RESULTS DOCUMENT: HCPCS | Performed by: INTERNAL MEDICINE

## 2022-08-03 PROCEDURE — G8432 DEP SCR NOT DOC, RNG: HCPCS | Performed by: INTERNAL MEDICINE

## 2022-08-03 PROCEDURE — 1123F ACP DISCUSS/DSCN MKR DOCD: CPT | Performed by: INTERNAL MEDICINE

## 2022-08-03 PROCEDURE — 1101F PT FALLS ASSESS-DOCD LE1/YR: CPT | Performed by: INTERNAL MEDICINE

## 2022-08-03 PROCEDURE — G8427 DOCREV CUR MEDS BY ELIG CLIN: HCPCS | Performed by: INTERNAL MEDICINE

## 2022-08-03 NOTE — PROGRESS NOTES
SPORTS MEDICINE AND PRIMARY CARE  Manfred Hathaway MD, [address] [address]  Phone:  [phone]  Fax: [phone]       Chief Complaint   Patient presents with    Diabetes    Follow-up     . SUBJECTIVE:    Víctor Holcomb is a 80 y.o. female Patient returns today with a known history of dyslipidemia, primary hypertension, lumbar stenosis, DJD, type 2 diabetes with nephropathy, morbid obesity, rotator cuff arthropathy, cervical spinal stenosis, CKD stage 3A, MICHELLE, on CPAP, and is seen for evaluation. Continuous neck pain from the back of her ear down to her left shoulder and supraspinatus area. It is intermittent, does not happen every day. There is no particular trauma she has had to cause it to happen. Patient has been itching in different parts of her body, for example her arms, legs, back and palms of her hands. Wonders if it is dietary related. She also decreased the warmth of the shower. She is starting to have hot flashes again. She is also concerned about a not on her right forearm that seems to be getting larger. Other new complaints denied and patient is seen for evaluation. Current Outpatient Medications   Medication Sig Dispense Refill    metoprolol tartrate (LOPRESSOR) 25 mg tablet TAKE 1/2 TABS BY MOUTH TWO (2) TIMES A DAY. 90 Tablet 3    BD Jody 2nd Gen Pen Needle 32 gauge x 5/32\" ndle USE TO INJECT INSULIN FOUR TIMES A DAY. DX.E11.9 200 Pen Needle 11    amLODIPine (NORVASC) 10 mg tablet TAKE 1 TABLET BY MOUTH DAILY 90 Tablet 4    lancets (One Touch Delica) 33 gauge misc USE TO TEST BLOOD SUGAR 3 TIMES A  Each 11    OneTouch Ultra Test strip USE TO TEST BLOOD SUGAR THREE TIMES A DAY. DX. E11.9 300 Strip 3    simvastatin (ZOCOR) 40 mg tablet TAKE 1 TABLET BY MOUTH EVERY DAY AT NIGHT 90 Tablet 3    pantoprazole (PROTONIX) 40 mg tablet TAKE 1 TABLET BY MOUTH EVERY DAY BEFORE BREAKFAST 90 Tablet 3    cloNIDine HCL (CATAPRES) 0.2 mg tablet TAKE 1 TABLET BY MOUTH TWICE A  Tablet 3    Basaglar KwikPen U-100 Insulin 100 unit/mL (3 mL) inpn INJECT 40 UNITS SUBCUTANEOUSLY UNDER THE SKIN EVERY NIGHT 45 Adjustable Dose Pre-filled Pen Syringe 3    NovoLOG Flexpen U-100 Insulin 100 unit/mL (3 mL) inpn INJECT 12 UNITS SUBCUTANOEUSLY BEFORE BREAKFAST, LUNCH AND DINNER 45 Adjustable Dose Pre-filled Pen Syringe 3    Blood-Glucose Meter (OneTouch Ultra2 Meter) misc Use to test blood sugar three times a day. Dx.e11.9 1 Each 0    Insulin Needles, Disposable, 29 gauge x 5/16\" ndle Use to inject insulin three times daily. dx.e119 300 Each 3    albuterol (PROVENTIL HFA, VENTOLIN HFA, PROAIR HFA) 90 mcg/actuation inhaler Take 2 Puffs by inhalation every four (4) hours as needed for Wheezing. 1 Inhaler 0     Past Medical History:   Diagnosis Date    Cellulitis of right abdominal wall 12/18/2017    letty whatley md    CKD (chronic kidney disease), stage III (HCC)     Diabetes (HCC) 1995    DJD (degenerative joint disease) of knee     GUSTAVO Gonzalez    Fall at home, sequela 07/05/2019    Gastroenteritis     History of nuclear stress test 08/03/2020    No evidence is of ischemia or infarction. Left ventricular ejection fraction measures 71%.     Hypercholesterolemia     Hypertension     Kidney lesion, native, left     Knee pain     LBP (low back pain)     chiorpactor    Lumbar stenosis     Obesity     MICHELLE on CPAP     Rotator cuff arthropathy of right shoulder 06/22/2018    S/P colonoscopy 12/18/2012    Subcutaneous nodule of right forearm 08/03/2022    Trigger finger, left middle finger 03/24/2022     Past Surgical History:   Procedure Laterality Date    HX BREAST BIOPSY Right 1990    negative    HX COLONOSCOPY      HX HYSTERECTOMY      HX OOPHORECTOMY N/A     HX OTHER SURGICAL  12/23/2017    I&D infected abscess      No Known Allergies      REVIEW OF SYSTEMS:  General: negative for - chills or fever  ENT: negative for - headaches, nasal congestion or tinnitus  Respiratory: negative for - cough, hemoptysis, shortness of breath or wheezing  Cardiovascular : negative for - chest pain, edema, palpitations or shortness of breath  Gastrointestinal: negative for - abdominal pain, blood in stools, heartburn or nausea/vomiting  Genito-Urinary: no dysuria, trouble voiding, or hematuria  Musculoskeletal: negative for - gait disturbance, joint pain, joint stiffness or joint swelling  Neurological: no TIA or stroke symptoms  Hematologic: no bruises, no bleeding, no swollen glands  Integument: no lumps, mole changes, nail changes or rash  Endocrine: no malaise/lethargy or unexpected weight changes      Social History     Socioeconomic History    Marital status: SINGLE   Tobacco Use    Smoking status: Never    Smokeless tobacco: Never   Vaping Use    Vaping Use: Never used   Substance and Sexual Activity    Alcohol use: No     Alcohol/week: 0.0 standard drinks    Drug use: No    Sexual activity: Not Currently   Other Topics Concern     Service No    Blood Transfusions No    Caffeine Concern No    Occupational Exposure No    Hobby Hazards No    Sleep Concern Yes    Stress Concern No    Weight Concern Yes    Special Diet Yes     Comment: Renal Diet    Back Care Yes    Exercise No    Bike Helmet No    Seat Belt Yes    Self-Exams No   Social History Narrative    Medical History: Diverticulosis 2002DM 1995primary HTN 1995Obesity 1995Dyslipidemia 1995January 9, 2012 moderate central stenosis L3-L4,    mild to moderate central stenosis at L4-L5 MRI    Gyn History: Last mammogram date 11/26/2013. Last menstrual perioddate hysterectomy 1992 FOR FIBROIDS, PT W ITH ONE OVARY. Last    papdate 2012. Menopausal hot flashes. Sexually active not currently sexually active. History of abnormal pap smears none. History of STDs    none. Vaginal discharge or odor none. HysterectomyDate NONE.    OB History: Total pregnancies 1. Pre term deliveries (>20-36.6 w eeks) 1.     Surgical History: [facility] right breast bx , kidney stones colonoscopy     Hospitalization/Major Diagnostic Procedure: [facility] right breast bx , kidney stones         Family History: Mother:  52 yrs, skin d/oFather: deceasedAunt: alive, lung cancerAdopted: alive    Social History: Alcohol Use Patient does not use alcohol. Smoking Status Patient is a never smoker. Marital Status: Single. Lives w ith: alone. Education/School: has masters degree-[employer]. Family History   Problem Relation Age of Onset    Cancer Mother     Ovarian Cancer Maternal Aunt     Breast Cancer Maternal Aunt     No Known Problems Father        OBJECTIVE:    Visit Vitals  BP (!) 144/74 (BP 1 Location: Left upper arm, BP Patient Position: Sitting)   Pulse 66   Temp 98 °F (36.7 °C) (Oral)   Resp 18   Ht 5' 3\" (1.6 m)   Wt 239 lb 6.4 oz (108.6 kg)   LMP  (LMP Unknown)   SpO2 95%   BMI 42.41 kg/m²     CONSTITUTIONAL: well , well nourished, appears age appropriate  EYES: perrla, eom intact  ENMT:moist mucous membranes, pharynx clear  NECK: supple. Thyroid normal  RESPIRATORY: Chest: clear bilaterally   CARDIOVASCULAR: Heart: regular rate and rhythm  GASTROINTESTINAL: Abdomen: soft, bowel sounds active  HEMATOLOGIC: no pathological lymph nodes palpated  MUSCULOSKELETAL: Extremities: no edema, pulse 1+   INTEGUMENT: No unusual rashes or suspicious skin lesions noted. Nails appear normal.  NEUROLOGIC: non-focal exam   MENTAL STATUS: alert and oriented, appropriate affect           ASSESSMENT:  1. Hypercholesterolemia    2. Primary hypertension    3. Spinal stenosis of lumbar region without neurogenic claudication    4. Primary osteoarthritis of right knee    5. Type 2 diabetes with nephropathy ([address]    6. Morbid obesity with BMI of 40.0-44.9, adult ([address]    7. Rotator cuff arthropathy of right shoulder    8. Cervical spinal stenosis    9. Stage 3a chronic kidney disease ([address]    10. MICHELLE on CPAP    11.  Subcutaneous nodule of right forearm      We will assess cholesterol values with a lipid panel today. Currently she is taking Simvastatin for the cholesterol. BP control is at goal.  She will continue current medications, including Clonidine, Metoprolol and Amlodipine. She has lumbar spinal stenosis, which currently is asymptomatic. Osteoarthritis of the knees, for which we encouraged continued physical activity. She has type 2 diabetes and we will check hemoglobin A1c for control. Morbidly obese and she remains a concern and this is a very great challenge for her as she has made a decision to gain another pound since we last saw her. She has rotator cuff arthropathy right shoulder. She has bilateral cervical spinal stenosis, which I think is the cause for the neck discomfort. Suggested warm compresses would be helpful. She also has CKD, which we follow on a regular basis. She uses a CPAP machine for MICHELLE. She goes to a chiropractor for her back. I am not sure of the etiology of the itching and will ask for a food allergy profile. Hot flashes are treated symptomatically. For the knot on her forearm we will ultrasound and make a decision if she needs to have it surgically removed. She will be back to see me in three months, sooner if she has any problems. I have discussed the diagnosis with the patient and the intended plan as seen in the  Orders. The patient understands and agees with the plan. The patient has   received an after visit summary and questions were answered concerning  future plans  Patient labs and/or xrays were reviewed  Past records were reviewed. PLAN:  .  Orders Placed This Encounter    US EXT NONVAS RT COMP    URINALYSIS W/ RFLX MICROSCOPIC    CBC WITH AUTOMATED DIFF    METABOLIC PANEL, COMPREHENSIVE    LIPID PANEL    HEMOGLOBIN A1C WITH EAG    ALLERGEN PANEL, FOOD, BASIC         Follow-up and Dispositions    Return in about 3 months (around 11/3/2022). ATTENTION:   This medical record was transcribed using an electronic medical records system. Although proofread, it may and can contain electronic and spelling errors. Other human spelling and other errors may be present. Corrections may be executed at a later time. Please feel free to contact us for any clarifications as needed.

## 2022-08-06 LAB
CLASS DESCRIPTION, 600268: ABNORMAL
CODFISH IGE QN: <0.1 KU/L
COW MILK IGE QN: <0.1 KU/L
EGG WHITE IGE QN: 0.16 KU/L
PEANUT IGE QN: 0.19 KU/L
SOYBEAN IGE QN: <0.1 KU/L
WHEAT IGE QN: <0.1 KU/L

## 2022-08-10 ENCOUNTER — HOSPITAL ENCOUNTER (OUTPATIENT)
Dept: ULTRASOUND IMAGING | Age: 72
Discharge: HOME | End: 2022-08-10
Attending: INTERNAL MEDICINE
Payer: MEDICARE

## 2022-08-10 DIAGNOSIS — R22.31 SUBCUTANEOUS NODULE OF RIGHT FOREARM: ICD-10-CM

## 2022-08-10 PROCEDURE — 76881 US COMPL JOINT R-T W/IMG: CPT

## 2022-08-11 DIAGNOSIS — R22.31 SUBCUTANEOUS NODULE OF RIGHT FOREARM: Primary | ICD-10-CM

## 2022-08-17 RX ORDER — INSULIN ASPART 100 [IU]/ML
INJECTION, SOLUTION INTRAVENOUS; SUBCUTANEOUS
Qty: 30 ADJUSTABLE DOSE PRE-FILLED PEN SYRINGE | Refills: 11 | Status: SHIPPED | OUTPATIENT
Start: 2022-08-17

## 2022-08-17 RX ORDER — CLONIDINE HYDROCHLORIDE 0.2 MG/1
TABLET ORAL
Qty: 180 TABLET | Refills: 3 | Status: SHIPPED | OUTPATIENT
Start: 2022-08-17

## 2022-08-31 ENCOUNTER — OFFICE VISIT (OUTPATIENT)
Dept: SURGERY | Age: 72
End: 2022-08-31
Payer: MEDICARE

## 2022-08-31 VITALS
RESPIRATION RATE: 18 BRPM | BODY MASS INDEX: 42.17 KG/M2 | DIASTOLIC BLOOD PRESSURE: 64 MMHG | OXYGEN SATURATION: 95 % | SYSTOLIC BLOOD PRESSURE: 118 MMHG | HEIGHT: 63 IN | TEMPERATURE: 98.4 F | WEIGHT: 238 LBS | HEART RATE: 65 BPM

## 2022-08-31 DIAGNOSIS — D17.21 LIPOMA OF RIGHT UPPER EXTREMITY: Primary | ICD-10-CM

## 2022-08-31 PROCEDURE — 3017F COLORECTAL CA SCREEN DOC REV: CPT | Performed by: SURGERY

## 2022-08-31 PROCEDURE — G8752 SYS BP LESS 140: HCPCS | Performed by: SURGERY

## 2022-08-31 PROCEDURE — G8754 DIAS BP LESS 90: HCPCS | Performed by: SURGERY

## 2022-08-31 PROCEDURE — G8399 PT W/DXA RESULTS DOCUMENT: HCPCS | Performed by: SURGERY

## 2022-08-31 PROCEDURE — G9899 SCRN MAM PERF RSLTS DOC: HCPCS | Performed by: SURGERY

## 2022-08-31 PROCEDURE — 1123F ACP DISCUSS/DSCN MKR DOCD: CPT | Performed by: SURGERY

## 2022-08-31 PROCEDURE — 99203 OFFICE O/P NEW LOW 30 MIN: CPT | Performed by: SURGERY

## 2022-08-31 PROCEDURE — G8427 DOCREV CUR MEDS BY ELIG CLIN: HCPCS | Performed by: SURGERY

## 2022-08-31 PROCEDURE — 1090F PRES/ABSN URINE INCON ASSESS: CPT | Performed by: SURGERY

## 2022-08-31 PROCEDURE — G8417 CALC BMI ABV UP PARAM F/U: HCPCS | Performed by: SURGERY

## 2022-08-31 PROCEDURE — G8536 NO DOC ELDER MAL SCRN: HCPCS | Performed by: SURGERY

## 2022-08-31 PROCEDURE — 1101F PT FALLS ASSESS-DOCD LE1/YR: CPT | Performed by: SURGERY

## 2022-08-31 PROCEDURE — G8432 DEP SCR NOT DOC, RNG: HCPCS | Performed by: SURGERY

## 2022-08-31 NOTE — LETTER
9/1/2022    Patient: Ruth Lucas   YOB: 1950   Date of Visit: 8/31/2022     Richard Beltre MD  [facility]  [address] [address]  [facility] [address]    Dear Richard Beltre MD,      Thank you for referring Ms. Kenya Monsalve to [facility] for evaluation. My notes for this consultation are attached. If you have questions, please do not hesitate to call me. I look forward to following your patient along with you.       Sincerely,    Pipe Arredondo MD

## 2022-08-31 NOTE — PROGRESS NOTES
[facility] at [facility] Surgery History and Physical    History of Present Illness:      Edita Chou is a 80 y.o. female who has a right forearm lipoma. She has had this lipoma for a number of years. She feels like it has slowly grown over time. It does not cause her any pain or discomfort. In general does not bother her. She was sent to me for evaluation of this lipoma. Past Medical History:   Diagnosis Date    Cellulitis of right abdominal wall 12/18/2017    letty whatley md    CKD (chronic kidney disease), stage III (HCC)     Diabetes (HCC) 1995    DJD (degenerative joint disease) of knee     G. Josh Chiquito    Fall at home, sequela 07/05/2019    Gastroenteritis     History of nuclear stress test 08/03/2020    No evidence is of ischemia or infarction. Left ventricular ejection fraction measures 71%.     Hypercholesterolemia     Hypertension     Kidney lesion, native, left     Knee pain     LBP (low back pain)     chiorpactor    Lumbar stenosis     Obesity     MICHELLE on CPAP     Rotator cuff arthropathy of right shoulder 06/22/2018    S/P colonoscopy 12/18/2012    Subcutaneous nodule of right forearm 08/03/2022    Trigger finger, left middle finger 03/24/2022       Past Surgical History:   Procedure Laterality Date    HX BREAST BIOPSY Right 1990    negative    HX COLONOSCOPY      HX HYSTERECTOMY      HX OOPHORECTOMY N/A     HX OTHER SURGICAL  12/23/2017    I&D infected abscess          Current Outpatient Medications:     NovoLOG Flexpen U-100 Insulin 100 unit/mL (3 mL) inpn, INJECT 12 UNITS SUBCUTANEOUSLY BEFORE BREAKFAST, LUNCH AND DINNER, Disp: 30 Adjustable Dose Pre-filled Pen Syringe, Rfl: 11    cloNIDine HCL (CATAPRES) 0.2 mg tablet, TAKE 1 TABLET BY MOUTH TWICE A DAY, Disp: 180 Tablet, Rfl: 3    metoprolol tartrate (LOPRESSOR) 25 mg tablet, TAKE 1/2 TABS BY MOUTH TWO (2) TIMES A DAY., Disp: 90 Tablet, Rfl: 3    BD Jody 2nd Gen Pen Needle 32 gauge x 5/32\" ndle, USE TO INJECT INSULIN FOUR TIMES A DAY. DX.E11.9, Disp: 200 Pen Needle, Rfl: 11    amLODIPine (NORVASC) 10 mg tablet, TAKE 1 TABLET BY MOUTH DAILY, Disp: 90 Tablet, Rfl: 4    lancets (One Touch Delica) 33 gauge misc, USE TO TEST BLOOD SUGAR 3 TIMES A DAY, Disp: 100 Each, Rfl: 11    OneTouch Ultra Test strip, USE TO TEST BLOOD SUGAR THREE TIMES A DAY. DX. E11.9, Disp: 300 Strip, Rfl: 3    simvastatin (ZOCOR) 40 mg tablet, TAKE 1 TABLET BY MOUTH EVERY DAY AT NIGHT, Disp: 90 Tablet, Rfl: 3    pantoprazole (PROTONIX) 40 mg tablet, TAKE 1 TABLET BY MOUTH EVERY DAY BEFORE BREAKFAST, Disp: 90 Tablet, Rfl: 3    Basaglar KwikPen U-100 Insulin 100 unit/mL (3 mL) inpn, INJECT 40 UNITS SUBCUTANEOUSLY UNDER THE SKIN EVERY NIGHT, Disp: 45 Adjustable Dose Pre-filled Pen Syringe, Rfl: 3    Blood-Glucose Meter (OneTouch Ultra2 Meter) misc, Use to test blood sugar three times a day. Dx.e11.9, Disp: 1 Each, Rfl: 0    Insulin Needles, Disposable, 29 gauge x 5/16\" ndle, Use to inject insulin three times daily. dx.e119, Disp: 300 Each, Rfl: 3    albuterol (PROVENTIL HFA, VENTOLIN HFA, PROAIR HFA) 90 mcg/actuation inhaler, Take 2 Puffs by inhalation every four (4) hours as needed for Wheezing., Disp: 1 Inhaler, Rfl: 0    No Known Allergies    Social History     Socioeconomic History    Marital status: SINGLE     Spouse name: Not on file    Number of children: Not on file    Years of education: Not on file    Highest education level: Not on file   Occupational History    Not on file   Tobacco Use    Smoking status: Never    Smokeless tobacco: Never   Vaping Use    Vaping Use: Never used   Substance and Sexual Activity    Alcohol use: No     Alcohol/week: 0.0 standard drinks    Drug use: No    Sexual activity: Not Currently   Other Topics Concern     Service No    Blood Transfusions No    Caffeine Concern No    Occupational Exposure No    Hobby Hazards No    Sleep Concern Yes    Stress Concern No    Weight Concern Yes    Special Diet Yes Comment: Renal Diet    Back Care Yes    Exercise No    Bike Helmet No    Seat Belt Yes    Self-Exams No   Social History Narrative    Medical History: Diverticulosis 2002DM 1995primary HTN 1995Obesity 1995Dyslipidemia 1995January 2012 moderate central stenosis L3-L4,    mild to moderate central stenosis at L4-L5 MRI    Gyn History: Last mammogram date 2013. Last menstrual perioddate hysterectomy  FOR FIBROIDS, PT W ITH ONE OVARY. Last    papdate . Menopausal hot flashes. Sexually active not currently sexually active. History of abnormal pap smears none. History of STDs    none. Vaginal discharge or odor none. HysterectomyDate NONE.    OB History: Total pregnancies 1. Pre term deliveries (>20-36.6 w eeks) 1. Surgical History: [facility] right breast bx , kidney stones colonoscopy     Hospitalization/Major Diagnostic Procedure: [facility] rig breast bx , nicole paulino         Family History: Mother:  52 yrs, skin d/oFather: deceasedAunt: alive, lung cancerAdopted: alive    Social History: Alcohol Use Patient does not use alcohol. Smoking Status Patient is a never smoker. Marital Status: Single. Lives w ith: alone. Education/School: has masters degree-[employer].      Social Determinants of Health     Financial Resource Strain: Not on file   Food Insecurity: Not on file   Transportation Needs: Not on file   Physical Activity: Not on file   Stress: Not on file   Social Connections: Not on file   Intimate Partner Violence: Not on file   Housing Stability: Not on file       Family History   Problem Relation Age of Onset    Cancer Mother     Ovarian Cancer Maternal Aunt     Breast Cancer Maternal Aunt     No Known Problems Father        ROS   Constitutional: negative  Ears, Nose, Mouth, Throat, and Face: negative  Respiratory: negative  Cardiovascular: negative  Gastrointestinal: negative  Genitourinary:negative  Integument/Breast:  Right forearm lipoma  Hematologic/Lymphatic: negative  Behavioral/Psychiatric: negative  Allergic/Immunologic: negative      Physical Exam:     Visit Vitals  /64 (BP 1 Location: Right arm, BP Patient Position: Sitting, BP Cuff Size: Adult)   Pulse 65   Temp 98.4 °F (36.9 °C) (Oral)   Resp 18   Ht 5' 3\" (1.6 m)   Wt 238 lb (108 kg)   SpO2 95%   BMI 42.16 kg/m²       General - alert and oriented, no apparent distress  HEENT - no jaundice, no hearing imparement  Pulm - CTAB, no C/W/R  CV - RRR, no M/R/G  Abd -soft, nondistended  Ext - pulses intact in UE and LE bilaterally, no edema, right forearm on extensor side with a 2 to 3 cm lipoma under the subcutaneous tissue which is soft and mobile nontender to palpation  Skin - supple, no rashes  Psychiatric - normal affect, good mood    Labs  Lab Results   Component Value Date/Time    Sodium 144 08/03/2022 09:29 AM    Potassium 4.6 08/03/2022 09:29 AM    Chloride 110 (H) 08/03/2022 09:29 AM    CO2 28 08/03/2022 09:29 AM    Anion gap 6 08/03/2022 09:29 AM    Glucose 79 08/03/2022 09:29 AM    BUN 15 08/03/2022 09:29 AM    Creatinine 0.93 08/03/2022 09:29 AM    BUN/Creatinine ratio 16 08/03/2022 09:29 AM    GFR est AA >60 08/03/2022 09:29 AM    GFR est non-AA 60 (L) 08/03/2022 09:29 AM    Calcium 9.5 08/03/2022 09:29 AM    Bilirubin, total 0.4 08/03/2022 09:29 AM    Alk.  phosphatase 71 08/03/2022 09:29 AM    Protein, total 7.3 08/03/2022 09:29 AM    Albumin 3.8 08/03/2022 09:29 AM    Globulin 3.5 08/03/2022 09:29 AM    A-G Ratio 1.1 08/03/2022 09:29 AM    ALT (SGPT) 21 08/03/2022 09:29 AM    AST (SGOT) 12 (L) 08/03/2022 09:29 AM     Lab Results   Component Value Date/Time    WBC 6.8 08/03/2022 09:29 AM    HGB 15.3 08/03/2022 09:29 AM    HCT 47.2 (H) 08/03/2022 09:29 AM    PLATELET 811 46/70/0644 09:29 AM    MCV 91.5 08/03/2022 09:29 AM         Imaging  Right forearm ultrasound-FINDINGS:  An approximately 2.6 cm x 2.2 cm x 1.0 cm well-circumscribed heterogeneously  hypoechoic mass with some scattered internal vascularity in the subcutaneous  soft tissues. IMPRESSION     Indeterminate mass in the subcutaneous soft tissues of the right dorsal distal  forearm. Further characterization with contrast-enhanced MRI and/or tissue  sampling is recommended. I have reviewed and agree with all of the pertinent images    Assessment:     Tatianna Welch is a 80 y.o. female with lipoma of the right forearm    Recommendations:     1. She appears to have a small lipoma on the right forearm 2 to 3 cm in size. It is soft and mobile. Does not appear to have any malignant features. She has had it for many many years. At this point she would like to avoid surgery and just keep an eye on it. She will follow-up with me in 6 months. If it becomes bigger or more painful or has any other changes she will let me know earlier and consider surgical excision. For now she will keep an eye on it. Follow-up with me in 6 months    Keyon Fall MD    Greater than half of the time: 30 minutes was used in counciling the patient about diagnosis and treatment plan    Ms. Sarah Padilla has a reminder for a \"due or due soon\" health maintenance. I have asked that she contact her primary care provider for follow-up on this health maintenance.

## 2022-08-31 NOTE — PROGRESS NOTES
1. Have you been to the ER, urgent care clinic since your last visit? Hospitalized since your last visit? No    2. Have you seen or consulted any other health care providers outside of the [address] [place] [address] since your last visit? Include any pap smears or colon screening.  No

## 2022-10-02 RX ORDER — PANTOPRAZOLE SODIUM 40 MG/1
TABLET, DELAYED RELEASE ORAL
Qty: 90 TABLET | Refills: 3 | Status: SHIPPED | OUTPATIENT
Start: 2022-10-02

## 2022-10-02 RX ORDER — INSULIN GLARGINE 100 [IU]/ML
INJECTION, SOLUTION SUBCUTANEOUS
Qty: 15 ADJUSTABLE DOSE PRE-FILLED PEN SYRINGE | Refills: 11 | Status: SHIPPED | OUTPATIENT
Start: 2022-10-02

## 2022-11-29 ENCOUNTER — OFFICE VISIT (OUTPATIENT)
Dept: INTERNAL MEDICINE CLINIC | Age: 72
End: 2022-11-29
Payer: MEDICARE

## 2022-11-29 VITALS
RESPIRATION RATE: 16 BRPM | HEART RATE: 65 BPM | TEMPERATURE: 97.9 F | BODY MASS INDEX: 42.31 KG/M2 | WEIGHT: 238.8 LBS | SYSTOLIC BLOOD PRESSURE: 134 MMHG | OXYGEN SATURATION: 97 % | HEIGHT: 63 IN | DIASTOLIC BLOOD PRESSURE: 76 MMHG

## 2022-11-29 DIAGNOSIS — E66.01 MORBID OBESITY WITH BMI OF 40.0-44.9, ADULT (HCC): ICD-10-CM

## 2022-11-29 DIAGNOSIS — Z99.89 OSA ON CPAP: ICD-10-CM

## 2022-11-29 DIAGNOSIS — I10 PRIMARY HYPERTENSION: ICD-10-CM

## 2022-11-29 DIAGNOSIS — Z13.820 SCREENING FOR OSTEOPOROSIS: ICD-10-CM

## 2022-11-29 DIAGNOSIS — M48.061 SPINAL STENOSIS OF LUMBAR REGION WITHOUT NEUROGENIC CLAUDICATION: ICD-10-CM

## 2022-11-29 DIAGNOSIS — Z78.0 POST-MENOPAUSAL: ICD-10-CM

## 2022-11-29 DIAGNOSIS — Z79.4 TYPE 2 DIABETES MELLITUS WITHOUT COMPLICATION, WITH LONG-TERM CURRENT USE OF INSULIN (HCC): Primary | ICD-10-CM

## 2022-11-29 DIAGNOSIS — E78.00 HYPERCHOLESTEROLEMIA: ICD-10-CM

## 2022-11-29 DIAGNOSIS — E11.9 TYPE 2 DIABETES MELLITUS WITHOUT COMPLICATION, WITH LONG-TERM CURRENT USE OF INSULIN (HCC): Primary | ICD-10-CM

## 2022-11-29 DIAGNOSIS — Z12.11 SCREEN FOR COLON CANCER: ICD-10-CM

## 2022-11-29 DIAGNOSIS — G47.33 OSA ON CPAP: ICD-10-CM

## 2022-11-29 PROCEDURE — G8417 CALC BMI ABV UP PARAM F/U: HCPCS | Performed by: INTERNAL MEDICINE

## 2022-11-29 PROCEDURE — G8399 PT W/DXA RESULTS DOCUMENT: HCPCS | Performed by: INTERNAL MEDICINE

## 2022-11-29 PROCEDURE — 3078F DIAST BP <80 MM HG: CPT | Performed by: INTERNAL MEDICINE

## 2022-11-29 PROCEDURE — G8510 SCR DEP NEG, NO PLAN REQD: HCPCS | Performed by: INTERNAL MEDICINE

## 2022-11-29 PROCEDURE — G8754 DIAS BP LESS 90: HCPCS | Performed by: INTERNAL MEDICINE

## 2022-11-29 PROCEDURE — 1090F PRES/ABSN URINE INCON ASSESS: CPT | Performed by: INTERNAL MEDICINE

## 2022-11-29 PROCEDURE — 3017F COLORECTAL CA SCREEN DOC REV: CPT | Performed by: INTERNAL MEDICINE

## 2022-11-29 PROCEDURE — G9899 SCRN MAM PERF RSLTS DOC: HCPCS | Performed by: INTERNAL MEDICINE

## 2022-11-29 PROCEDURE — G8753 SYS BP > OR = 140: HCPCS | Performed by: INTERNAL MEDICINE

## 2022-11-29 PROCEDURE — 3074F SYST BP LT 130 MM HG: CPT | Performed by: INTERNAL MEDICINE

## 2022-11-29 PROCEDURE — 99214 OFFICE O/P EST MOD 30 MIN: CPT | Performed by: INTERNAL MEDICINE

## 2022-11-29 PROCEDURE — 2022F DILAT RTA XM EVC RTNOPTHY: CPT | Performed by: INTERNAL MEDICINE

## 2022-11-29 PROCEDURE — 3051F HG A1C>EQUAL 7.0%<8.0%: CPT | Performed by: INTERNAL MEDICINE

## 2022-11-29 PROCEDURE — 1123F ACP DISCUSS/DSCN MKR DOCD: CPT | Performed by: INTERNAL MEDICINE

## 2022-11-29 PROCEDURE — G8536 NO DOC ELDER MAL SCRN: HCPCS | Performed by: INTERNAL MEDICINE

## 2022-11-29 PROCEDURE — 1101F PT FALLS ASSESS-DOCD LE1/YR: CPT | Performed by: INTERNAL MEDICINE

## 2022-11-29 PROCEDURE — G8427 DOCREV CUR MEDS BY ELIG CLIN: HCPCS | Performed by: INTERNAL MEDICINE

## 2022-11-29 NOTE — PROGRESS NOTES
Identified pt with two pt identifiers(name and ). Reviewed record in preparation for visit and have obtained necessary documentation. Chief Complaint   Patient presents with    Follow-up        Health Maintenance Due   Topic    Pneumococcal 65+ years (2 - PCV)    COVID-19 Vaccine (5 - Booster for Moderna series)    Foot Exam Q1     MICROALBUMIN Q1     Colorectal Cancer Screening Combo       Visit Vitals  BP (!) 146/81 (BP 1 Location: Right arm, BP Patient Position: Sitting, BP Cuff Size: Large adult)   Pulse 65   Temp 97.9 °F (36.6 °C) (Oral)   Resp 16   Ht 5' 3\" (1.6 m)   Wt 238 lb 12.8 oz (108.3 kg)   LMP  (LMP Unknown)   SpO2 97%   BMI 42.30 kg/m²     Pain Scale: 3/10    Coordination of Care Questionnaire:  :   1. Have you been to the ER, urgent care clinic since your last visit? Hospitalized since your last visit? No    2. Have you seen or consulted any other health care providers outside of the [address] [place] [address] since your last visit? Include any pap smears or colon screening.  No

## 2022-11-29 NOTE — PROGRESS NOTES
SPORTS MEDICINE AND PRIMARY CARE  Jennyfer Colindres MD, [address] [address]  Phone:  [phone]  Fax: [phone]       Chief Complaint   Patient presents with    Follow-up   . SUBJECTIVE:    Christofer Esparza is a 80 y.o. female Since we last saw her, she was seen by Garrett Morales MD on 09/01/22 for evaluation of lipoma that has grown slowly over the years. He noted no malignancy. He would like to avoid surgery and just keep an eye on it and she will follow up in six months. Patient has a known history of type 2 diabetes, morbid obesity, obstructive sleep apnea, hypertension, lumbar spinal stenosis, dyslipidemia, and is seen for evaluation. Current Outpatient Medications   Medication Sig Dispense Refill    pantoprazole (PROTONIX) 40 mg tablet TAKE 1 TABLET BY MOUTH EVERY DAY BEFORE BREAKFAST 90 Tablet 3    Basaglar KwikPen U-100 Insulin 100 unit/mL (3 mL) inpn INJECT 40 UNITS SUBCUTANEOUSLY UNDER THE SKIN EVERY NIGHT 15 Adjustable Dose Pre-filled Pen Syringe 11    NovoLOG Flexpen U-100 Insulin 100 unit/mL (3 mL) inpn INJECT 12 UNITS SUBCUTANEOUSLY BEFORE BREAKFAST, LUNCH AND DINNER 30 Adjustable Dose Pre-filled Pen Syringe 11    cloNIDine HCL (CATAPRES) 0.2 mg tablet TAKE 1 TABLET BY MOUTH TWICE A  Tablet 3    metoprolol tartrate (LOPRESSOR) 25 mg tablet TAKE 1/2 TABS BY MOUTH TWO (2) TIMES A DAY. 90 Tablet 3    BD Jody 2nd Gen Pen Needle 32 gauge x 5/32\" ndle USE TO INJECT INSULIN FOUR TIMES A DAY. DX.E11.9 200 Pen Needle 11    amLODIPine (NORVASC) 10 mg tablet TAKE 1 TABLET BY MOUTH DAILY 90 Tablet 4    OneTouch Ultra Test strip USE TO TEST BLOOD SUGAR THREE TIMES A DAY. DX. E11.9 300 Strip 3    simvastatin (ZOCOR) 40 mg tablet TAKE 1 TABLET BY MOUTH EVERY DAY AT NIGHT 90 Tablet 3    Blood-Glucose Meter (OneTouch Ultra2 Meter) misc Use to test blood sugar three times a day.  Dx.e11.9 1 Each 0    lancets (One Touch Delica) 33 gauge misc USE TO TEST BLOOD SUGAR 3 TIMES A  Each 11    Insulin Needles, Disposable, 29 gauge x 5/16\" ndle Use to inject insulin three times daily. dx.e119 300 Each 3    albuterol (PROVENTIL HFA, VENTOLIN HFA, PROAIR HFA) 90 mcg/actuation inhaler Take 2 Puffs by inhalation every four (4) hours as needed for Wheezing. 1 Inhaler 0     Past Medical History:   Diagnosis Date    Cellulitis of right abdominal wall 12/18/2017    letty whatley md    Diabetes ([facility] [address] 1995    DJD (degenerative joint disease) of knee     GUSTAVO Kim    Fall at home, sequela 07/05/2019    Gastroenteritis     History of nuclear stress test 08/03/2020    No evidence is of ischemia or infarction. Left ventricular ejection fraction measures 71%.     Hypercholesterolemia     Hypertension     Kidney lesion, native, left     Knee pain     LBP (low back pain)     chiorpactor    Lumbar stenosis     Obesity     MICHELLE on CPAP     Rotator cuff arthropathy of right shoulder 06/22/2018    S/P colonoscopy 12/18/2012    Subcutaneous nodule of right forearm 08/03/2022    Trigger finger, left middle finger 03/24/2022     Past Surgical History:   Procedure Laterality Date    HX BREAST BIOPSY Right 1990    negative    HX COLONOSCOPY      HX HYSTERECTOMY      HX OOPHORECTOMY N/A     HX OTHER SURGICAL  12/23/2017    I&D infected abscess      No Known Allergies      REVIEW OF SYSTEMS:  General: negative for - chills or fever  ENT: negative for - headaches, nasal congestion or tinnitus  Respiratory: negative for - cough, hemoptysis, shortness of breath or wheezing  Cardiovascular : negative for - chest pain, edema, palpitations or shortness of breath  Gastrointestinal: negative for - abdominal pain, blood in stools, heartburn or nausea/vomiting  Genito-Urinary: no dysuria, trouble voiding, or hematuria  Musculoskeletal: negative for - gait disturbance, joint pain, joint stiffness or joint swelling  Neurological: no TIA or stroke symptoms  Hematologic: no bruises, no bleeding, no swollen glands  Integument: no lumps, mole changes, nail changes or rash  Endocrine: no malaise/lethargy or unexpected weight changes      Social History     Socioeconomic History    Marital status: SINGLE   Tobacco Use    Smoking status: Never    Smokeless tobacco: Never   Vaping Use    Vaping Use: Never used   Substance and Sexual Activity    Alcohol use: No     Alcohol/week: 0.0 standard drinks    Drug use: No    Sexual activity: Not Currently   Other Topics Concern     Service No    Blood Transfusions No    Caffeine Concern No    Occupational Exposure No    Hobby Hazards No    Sleep Concern Yes    Stress Concern No    Weight Concern Yes    Special Diet Yes     Comment: Renal Diet    Back Care Yes    Exercise No    Bike Helmet No    Seat Belt Yes    Self-Exams No   Social History Narrative    Medical History: Diverticulosis 2002DM 1995primary HTN 1995Obesity 1995Dyslipidemia January 2012 moderate central stenosis L3-L4,    mild to moderate central stenosis at L4-L5 MRI    Gyn History: Last mammogram date 2013. Last menstrual perioddate hysterectomy  FOR FIBROIDS, PT W ITH ONE OVARY. Last    papdate . Menopausal hot flashes. Sexually active not currently sexually active. History of abnormal pap smears none. History of STDs    none. Vaginal discharge or odor none. HysterectomyDate NONE.    OB History: Total pregnancies 1. Pre term deliveries (>20-36.6 w eeks) 1. Surgical History: [facility] right breast bx , kidney stones colonoscopy     Hospitalization/Major Diagnostic Procedure: [facility] rig breast bx , nicole paulino         Family History: Mother:  52 yrs, skin d/oFather: deceasedAunt: alive, lung cancerAdopted: alive    Social History: Alcohol Use Patient does not use alcohol. Smoking Status Patient is a never smoker. Marital Status: Single. Lives w ith: alone. Education/School: has masters degree-[employer].      Family History   Problem Relation Age of Onset    Cancer Mother     Ovarian Cancer Maternal Aunt     Breast Cancer Maternal Aunt     No Known Problems Father        OBJECTIVE:    Visit Vitals  BP (!) 146/81 (BP 1 Location: Right arm, BP Patient Position: Sitting, BP Cuff Size: Large adult)   Pulse 65   Temp 97.9 °F (36.6 °C) (Oral)   Resp 16   Ht 5' 3\" (1.6 m)   Wt 238 lb 12.8 oz (108.3 kg)   LMP  (LMP Unknown)   SpO2 97%   BMI 42.30 kg/m²     CONSTITUTIONAL: well , well nourished, appears age appropriate  EYES: perrla, eom intact  ENMT:moist mucous membranes, pharynx clear  NECK: supple. Thyroid normal  RESPIRATORY: Chest: clear bilaterally   CARDIOVASCULAR: Heart: regular rate and rhythm  GASTROINTESTINAL: Abdomen: soft, bowel sounds active  HEMATOLOGIC: no pathological lymph nodes palpated  MUSCULOSKELETAL: Extremities: no edema, pulse 1+ Foot exam reveals no lesions, early bunion. Sensation is intact to fine filament. Vibratory sense is intact. Pulses are intact. INTEGUMENT: No unusual rashes or suspicious skin lesions noted. Nails appear normal.  NEUROLOGIC: non-focal exam   MENTAL STATUS: alert and oriented, appropriate affect           ASSESSMENT:  1. Type 2 diabetes mellitus without complication, with long-term current use of insulin ([address]    2. Morbid obesity with BMI of 40.0-44.9, adult ([address]    3. MICHELLE on CPAP    4. Primary hypertension    5. Spinal stenosis of lumbar region without neurogenic claudication    6. Hypercholesterolemia    7. Post-menopausal    8. Screening for osteoporosis    9. Screen for colon cancer      Type 2 diabetes will be assessed with hemoglobin A1c and report to her the results with MyChart. Morbid obesity is changed little since we last saw her. She has obstructive sleep apnea and uses CPAP machine. Repeat blood pressure is 134/72, which is acceptable. Lumbar spinal stenosis without neurogenic claudication is still present.     She has dyslipidemia and her LDL is at goal.    She wants a bone density test, which we request because of a known history of being postmenopausal.    Colonoscopy has been requested. She will be up to date next month with her COVID vaccine when she gets her last shot. She will be back to see me in three to four months, sooner if needed. I have discussed the diagnosis with the patient and the intended plan as seen in the  Orders. The patient understands and agees with the plan. The patient has   received an after visit summary and questions were answered concerning  future plans  Patient labs and/or xrays were reviewed  Past records were reviewed. PLAN:  .  Orders Placed This Encounter    DEXA BONE DENSITY STUDY AXIAL    MICROALBUMIN, UR, RAND W/ MICROALB/CREAT RATIO    HEMOGLOBIN A1C WITH EAG    REFERRAL TO GASTROENTEROLOGY         Follow-up and Dispositions    Return in about 3 months (around 2/28/2023). ATTENTION:   This medical record was transcribed using an electronic medical records system. Although proofread, it may and can contain electronic and spelling errors. Other human spelling and other errors may be present. Corrections may be executed at a later time. Please feel free to contact us for any clarifications as needed.

## 2022-11-30 LAB
CREAT UR-MCNC: 59.3 MG/DL
EST. AVERAGE GLUCOSE BLD GHB EST-MCNC: 194 MG/DL
HBA1C MFR BLD: 8.4 % (ref 4–5.6)
MICROALBUMIN UR-MCNC: 4.85 MG/DL
MICROALBUMIN/CREAT UR-RTO: 82 MG/G (ref 0–30)

## 2022-12-01 NOTE — PROGRESS NOTES
Please call the patient and advise Please send your sugar readings and I will help to get your diabetes under control.   You can start by increasing Basaglar to 44 units at hs,and novolog 14 units before meals

## 2022-12-01 NOTE — PROGRESS NOTES
Please send your sugar readings and I will help to get your diabetes under control.   You can start by increasing Basaglar to 44 units at hs,and novolog 14 units before meals

## 2022-12-10 RX ORDER — SIMVASTATIN 40 MG/1
TABLET, FILM COATED ORAL
Qty: 90 TABLET | Refills: 3 | Status: SHIPPED | OUTPATIENT
Start: 2022-12-10

## 2023-02-17 ENCOUNTER — HOSPITAL ENCOUNTER (OUTPATIENT)
Age: 73
Setting detail: OUTPATIENT SURGERY
Discharge: HOME | End: 2023-02-17
Attending: INTERNAL MEDICINE | Admitting: INTERNAL MEDICINE
Payer: MEDICARE

## 2023-02-17 ENCOUNTER — ANESTHESIA EVENT (OUTPATIENT)
Dept: ENDOSCOPY | Age: 73
End: 2023-02-17
Payer: MEDICARE

## 2023-02-17 ENCOUNTER — ANESTHESIA (OUTPATIENT)
Dept: ENDOSCOPY | Age: 73
End: 2023-02-17
Payer: MEDICARE

## 2023-02-17 VITALS
HEART RATE: 56 BPM | SYSTOLIC BLOOD PRESSURE: 131 MMHG | RESPIRATION RATE: 15 BRPM | TEMPERATURE: 97.7 F | BODY MASS INDEX: 42.45 KG/M2 | OXYGEN SATURATION: 96 % | HEIGHT: 63 IN | WEIGHT: 239.6 LBS | DIASTOLIC BLOOD PRESSURE: 56 MMHG

## 2023-02-17 LAB
GLUCOSE BLD STRIP.AUTO-MCNC: 184 MG/DL (ref 65–117)
SERVICE CMNT-IMP: ABNORMAL

## 2023-02-17 PROCEDURE — 76060000031 HC ANESTHESIA FIRST 0.5 HR: Performed by: INTERNAL MEDICINE

## 2023-02-17 PROCEDURE — 77030013992 HC SNR POLYP ENDOSC BSC -B: Performed by: INTERNAL MEDICINE

## 2023-02-17 PROCEDURE — 76040000019: Performed by: INTERNAL MEDICINE

## 2023-02-17 PROCEDURE — 74011250636 HC RX REV CODE- 250/636: Performed by: NURSE ANESTHETIST, CERTIFIED REGISTERED

## 2023-02-17 PROCEDURE — 74011000250 HC RX REV CODE- 250: Performed by: NURSE ANESTHETIST, CERTIFIED REGISTERED

## 2023-02-17 PROCEDURE — 2709999900 HC NON-CHARGEABLE SUPPLY: Performed by: INTERNAL MEDICINE

## 2023-02-17 PROCEDURE — 88305 TISSUE EXAM BY PATHOLOGIST: CPT

## 2023-02-17 PROCEDURE — 82962 GLUCOSE BLOOD TEST: CPT

## 2023-02-17 RX ORDER — PROPOFOL 10 MG/ML
INJECTION, EMULSION INTRAVENOUS AS NEEDED
Status: DISCONTINUED | OUTPATIENT
Start: 2023-02-17 | End: 2023-02-17 | Stop reason: HOSPADM

## 2023-02-17 RX ORDER — NALOXONE HYDROCHLORIDE 0.4 MG/ML
0.4 INJECTION, SOLUTION INTRAMUSCULAR; INTRAVENOUS; SUBCUTANEOUS
Status: DISCONTINUED | OUTPATIENT
Start: 2023-02-17 | End: 2023-02-17 | Stop reason: HOSPADM

## 2023-02-17 RX ORDER — FLUMAZENIL 0.1 MG/ML
0.2 INJECTION INTRAVENOUS
Status: DISCONTINUED | OUTPATIENT
Start: 2023-02-17 | End: 2023-02-17 | Stop reason: HOSPADM

## 2023-02-17 RX ORDER — LIDOCAINE HYDROCHLORIDE 20 MG/ML
INJECTION, SOLUTION EPIDURAL; INFILTRATION; INTRACAUDAL; PERINEURAL AS NEEDED
Status: DISCONTINUED | OUTPATIENT
Start: 2023-02-17 | End: 2023-02-17 | Stop reason: HOSPADM

## 2023-02-17 RX ORDER — DEXTROSE MONOHYDRATE AND SODIUM CHLORIDE 5; .9 G/100ML; G/100ML
100 INJECTION, SOLUTION INTRAVENOUS CONTINUOUS
Status: DISCONTINUED | OUTPATIENT
Start: 2023-02-17 | End: 2023-02-17 | Stop reason: HOSPADM

## 2023-02-17 RX ORDER — ATROPINE SULFATE 0.1 MG/ML
0.5 INJECTION INTRAVENOUS
Status: DISCONTINUED | OUTPATIENT
Start: 2023-02-17 | End: 2023-02-17 | Stop reason: HOSPADM

## 2023-02-17 RX ORDER — LIDOCAINE HYDROCHLORIDE 20 MG/ML
5 SOLUTION OROPHARYNGEAL AS NEEDED
Status: DISCONTINUED | OUTPATIENT
Start: 2023-02-17 | End: 2023-02-17 | Stop reason: HOSPADM

## 2023-02-17 RX ORDER — SODIUM CHLORIDE 9 MG/ML
100 INJECTION, SOLUTION INTRAVENOUS CONTINUOUS
Status: DISCONTINUED | OUTPATIENT
Start: 2023-02-17 | End: 2023-02-17 | Stop reason: HOSPADM

## 2023-02-17 RX ORDER — MIDAZOLAM HYDROCHLORIDE 1 MG/ML
5 INJECTION, SOLUTION INTRAMUSCULAR; INTRAVENOUS
Status: DISCONTINUED | OUTPATIENT
Start: 2023-02-17 | End: 2023-02-17 | Stop reason: HOSPADM

## 2023-02-17 RX ORDER — EPINEPHRINE 0.1 MG/ML
1 INJECTION INTRACARDIAC; INTRAVENOUS
Status: DISCONTINUED | OUTPATIENT
Start: 2023-02-17 | End: 2023-02-17 | Stop reason: HOSPADM

## 2023-02-17 RX ORDER — DEXTROMETHORPHAN/PSEUDOEPHED 2.5-7.5/.8
1.2 DROPS ORAL
Status: DISCONTINUED | OUTPATIENT
Start: 2023-02-17 | End: 2023-02-17 | Stop reason: HOSPADM

## 2023-02-17 RX ORDER — SODIUM CHLORIDE 0.9 % (FLUSH) 0.9 %
5-40 SYRINGE (ML) INJECTION AS NEEDED
Status: DISCONTINUED | OUTPATIENT
Start: 2023-02-17 | End: 2023-02-17 | Stop reason: HOSPADM

## 2023-02-17 RX ORDER — FENTANYL CITRATE 50 UG/ML
100 INJECTION, SOLUTION INTRAMUSCULAR; INTRAVENOUS ONCE
Status: DISCONTINUED | OUTPATIENT
Start: 2023-02-17 | End: 2023-02-17 | Stop reason: HOSPADM

## 2023-02-17 RX ORDER — SODIUM CHLORIDE 0.9 % (FLUSH) 0.9 %
5-40 SYRINGE (ML) INJECTION EVERY 8 HOURS
Status: DISCONTINUED | OUTPATIENT
Start: 2023-02-17 | End: 2023-02-17 | Stop reason: HOSPADM

## 2023-02-17 RX ADMIN — PROPOFOL 50 MG: 10 INJECTION, EMULSION INTRAVENOUS at 09:34

## 2023-02-17 RX ADMIN — PROPOFOL 50 MG: 10 INJECTION, EMULSION INTRAVENOUS at 09:42

## 2023-02-17 RX ADMIN — PROPOFOL 50 MG: 10 INJECTION, EMULSION INTRAVENOUS at 09:39

## 2023-02-17 RX ADMIN — LIDOCAINE HYDROCHLORIDE 40 MG: 20 INJECTION, SOLUTION EPIDURAL; INFILTRATION; INTRACAUDAL; PERINEURAL at 09:34

## 2023-02-17 NOTE — DISCHARGE INSTRUCTIONS
Endoscopy Discharge Instructions     Dr. Satish Stewart office                                            NAME: Theodora Giraldo [id number]:[id number]    AGE:  71 y.o. YOB: 1950                                                              FINAL Discharge Procedure and Diagnosis:       Procedure(s):  COLONOSCOPY  ENDOSCOPIC POLYPECTOMY       FINDINGS:     Diverticulosis  Colon polyp                                        MEDICATIONS    [x] CONTINUE CURRENT MEDICATIONS     [] NEW MEDICATIONS           1.    2.    3.         Testing   Schedule              Colonoscopy Screening                                   Recommendations       []  Repeat colonoscopy in 6-12 month         2nd to Inadequate  prep    []  Repeat colonoscopy in 3 years    []  Repeat colonoscopy in 5 years    []  Repeat colonoscopy in 10 years         New additional  Tests  Call the office   [phone]) for the appointment time      []      []      []                                     YOUR NEXT APPOINTMENT WITH DR Elayne Danielson:                                                                                                                                [x]   None follow up with pcp   []  1 week       []   2 week    []  1 month    Always keep KEEP  APPOINTMENT WITH  @PCP@ for regular medical follow up                                                                                                                         If you had a colonoscopy the \"C\" indicates specific instructions        x                                           Diet Instructions :   Ordinarily you may resume your previous diet but your initial diet should be       Light your discharge nurse will go over this with you. Large meals can cause  abdominal discomfort after these procedures.                                                                            Specific Diet Recommendations:        [x] High fiber diet. https://www.Graduway/. com/diets/        [] GERD diet: avoid fried and fatty foods, peppermint, chocolate, alcohol,               coffee, citrus fruits and juices, and tomato products. Avoid lying down for            2 to 3  hours after eating. https://www.foster.com/. com/diets/            []  FODMAP DIET  DeathUnit.nl              []  All diets eg high fiber, gastroparesis. , weight loss , gluten free             1. NodeFly              2.  https://www.[employer]ear[employer]. [employer]/diets/           __x__  Manuela Bolivar may feel quite tired and need to rest and recuperate for several hours    following these procedures. __x__  Due to the fact that sedation was administered for this procedure, do not drive,   operate machinery or sign legal documents for the next 24 hours. __x__  Mild abdominal pain may be experienced after your procedure, but is should   disappear after several hours. Notify your physician if you have persistent pain,   tenderness or abdominal distension. __x__  C    Many patients for the first few hours following the exam may experience         belching or passing gas through the rectum. Walking may help to relieve        distention and gas pains. A warm bath or shower will often help with abdominal  cramping.                                                                                            __x__   Manuela Bolivar may return to your normal routine tomorrow, according to how you feel        and depending on your doctors instructions. Be sure to call your doctor to make  an appointment for a post-surgery check-up on the date your doctor has   requested. __x__ C     Rectal bleeding or spotting in small amounts may occur with the first bowel   movement following a colonoscopy or sigmoidoscopy.  If a large amount of blood is noted call immediately     __x__  You may experience a numbness or lack of sensation in throat. If present, do not     eat or drink. Before eating, test your ability to drink with small sips of water. Y     You may try clear liquids or soups. If you tolerate these, you may then eat solid     food which is not greasy or spicy. __x__ C     IF POLYPS REMOVED: Avoid any blood thinning medication such as plavix,   aspirin or coumadin  NSAIDS (like advil or alleve) for 7 days. __x__  Notify your physician if you cough or vomit blood or experience chest pain. Your biopsy or testing result should be available in 7-10 days                                                                                                                      Prescription will be electronically sent to your pharmacy you must     let your nurse know your pharmacy:                                                                                                                                          [address] [place] [address]. TO HELP ENSURE A SMOOTH RECOVERY,       IT IS IMPORTANT TO FOLLOW THEM. _x___Pamphlet /Educational Information provided for diagnostic findings     Additional education information can assessed at the sites below:   Diya   http://www.digestive. niddk.nih.gov/ddiseases/a-z.asp      Web MD patient information                                                                                                Signature of individual given instructions :   Date: 2/17/2023                                                                                                                             Patient Education on Sedation / Analgesia Administered for Procedure      For 24 hours after general anesthesia or intravenous analgesia / sedation:  Have someone responsible help you with your care  Limit your activities  Do not drive and operate hazardous machinery  Do not make important personal, legal or business decisions  Do not drink alcoholic beverages  If you have not urinated within 8 hours after discharge, please contact your physician  Resume your medications unless otherwise instructed    For 24 hours after general anesthesia or intravenous analgesia / sedation  you may experience:  Drowsiness, dizziness, sleepiness, or confusion  Difficulty remembering or delayed reaction times  Difficulty with your balance, especially while walking, move slowly and carefully, do not make sudden position changes  Difficulty focusing or blurred vision    You may not be aware of slight changes in your behavior and/or your reaction time because of the medication used during and after your procedure. Report the following to your physician:  Excessive pain, swelling, redness or odor of or around the surgical area  Temperature over 100.5  Nausea and vomiting lasting longer than 4 hours or if unable to take medications  Any signs of decreased circulation or nerve impairment to extremity: change in color, persistent numbness, tingling, coldness or increase pain  Any questions or concerns    IF YOU REPORT TO AN EMERGENCY ROOM, DOCTOR'S OFFICE OR HOSPITAL WITHIN 24 HOURS AFTER YOUR PROCEDURE, BRING THIS SHEET AND YOUR AFTER VISIT SUMMARY WITH YOU AND GIVE IT TO THE PHYSICIAN OR NURSE ATTENDING YOU. Colon Polyps: Care Instructions  Your Care Instructions     Colon polyps are growths in the colon or the rectum. The cause of most colon polyps is not known, and most people who get them do not have any problems. But a certain kind can turn into cancer. For this reason, regular testing for colon polyps is important for people as they get older. It is also important for anyone who has an increased risk for colon cancer. Polyps are usually found through routine colon cancer screening tests.  Although most colon polyps are not cancerous, they are usually removed and then tested for cancer. Screening for colon cancer saves lives because the cancer can usually be cured if it is caught early. If you have a polyp that is the type that can turn into cancer, you may need more tests to examine your entire colon. The doctor will remove any other polyps that are found, and you will be tested more often. Follow-up care is a key part of your treatment and safety. Be sure to make and go to all appointments, and call your doctor if you are having problems. It's also a good idea to know your test results and keep a list of the medicines you take. How can you care for yourself at home? Regular exams to look for colon polyps are the best way to prevent polyps from turning into colon cancer. These can include stool tests, sigmoidoscopy, colonoscopy, and CT colonography. Talk with your doctor about a testing schedule that is right for you. To prevent polyps  There is no home treatment that can prevent colon polyps. But these steps may help lower your risk for cancer. Stay active. Being active can help you get to and stay at a healthy weight. Try to exercise on most days of the week. Walking is a good choice. Eat well. Choose a variety of vegetables, fruits, legumes (such as peas and beans), fish, poultry, and whole grains. Do not smoke. If you need help quitting, talk to your doctor about stop-smoking programs and medicines. These can increase your chances of quitting for good. If you drink alcohol, limit how much you drink. Limit alcohol to 2 drinks a day for men and 1 drink a day for women. When should you call for help? Call your doctor now or seek immediate medical care if:    You have severe belly pain. Your stools are maroon or very bloody. Watch closely for changes in your health, and be sure to contact your doctor if:    You have a fever. You have nausea or vomiting.      You have a change in bowel habits (new constipation or diarrhea). Your symptoms get worse or are not improving as expected. Where can you learn more? Go to http://www.FREEjit.com/  Enter C571 in the search box to learn more about \"Colon Polyps: Care Instructions. \"  Current as of: June 6, 2022               Content Version: 13.4  © 6813-1742 [employer]. Care instructions adapted under license by [employer] (which disclaims liability or warranty for this information). If you have questions about a medical condition or this instruction, always ask your healthcare professional. [place] [address] any warranty or liability for your use of this information. Learning About Diverticulosis and Diverticulitis  What are diverticulosis and diverticulitis? In diverticulosis and diverticulitis, pouches called diverticula form in the wall of the large intestine, or colon. In diverticulosis, the pouches do not cause any pain or other symptoms. In diverticulitis, the pouches get inflamed or infected and cause symptoms. Doctors aren't sure what causes these pouches in the colon. But they think that a low-fiber diet may play a role. Without fiber to add bulk to the stool, the colon has to work harder than normal to push the stool forward. The pressure from this may cause pouches to form in weak spots along the colon. Some people with diverticulosis get diverticulitis. But experts don't know why this happens. What are the symptoms? In diverticulosis, most people don't have symptoms. But pouches sometimes bleed. In diverticulitis, symptoms may last from a few hours to a week or more. They include:  Belly pain. This is usually in the lower left side. It is sometimes worse when you move. This is the most common symptom. Fever and chills. Bloating and gas. Diarrhea or constipation. Nausea and sometimes vomiting. Not feeling like eating. How can you prevent diverticulitis?   You may be able to lower your chance of getting diverticulitis. You can do this by taking steps to prevent constipation. Eat fruits, vegetables, beans, and whole grains every day. These foods are high in fiber. Drink plenty of fluids. If you have kidney, heart, or liver disease and have to limit fluids, talk with your doctor before you increase the amount of fluids you drink. Get at least 30 minutes of exercise on most days of the week. Walking is a good choice. You also may want to do other activities, such as running, swimming, cycling, or playing tennis or team sports. Take a fiber supplement, such as Citrucel or Metamucil, every day if needed. Read and follow all instructions on the label. Schedule time each day for a bowel movement. Having a daily routine may help. Take your time and do not strain when having a bowel movement. Some people avoid nuts, seeds, berries, and popcorn. They believe that these foods might get trapped in the diverticula and cause pain. But there is no proof that these foods cause diverticulitis or make it worse. How are these problems treated? The best way to treat diverticulosis is to avoid constipation. Treatment for diverticulitis includes antibiotics. It often includes a change in your diet. You may need only liquids at first. Your doctor may suggest pain medicines for pain or belly cramps. In some cases, surgery may be needed. Follow-up care is a key part of your treatment and safety. Be sure to make and go to all appointments, and call your doctor if you are having problems. It's also a good idea to know your test results and keep a list of the medicines you take. Where can you learn more? Go to http://www.gray.com/  Enter Z751 in the search box to learn more about \"Learning About Diverticulosis and Diverticulitis. \"  Current as of: June 6, 2022               Content Version: 13.4  © 8782-0404 [employer]wise[employer].    Care instructions adapted under license by [address] (which disclaims liability or warranty for this information). If you have questions about a medical condition or this instruction, always ask your healthcare professional. [address] any warranty or liability for your use of this information.

## 2023-02-17 NOTE — PROCEDURES
G I Procedure Note            COLONOSCOPY   Dr. Zarco [facility] office   [facility] -  [address]                                   [id number]                                  [id number]   1950                                      71 y.o.                                    female      Procedure Date: 2/17/2023   [x]  Anesthesia MAC                                                                                                Pre Op Diagnosis:    Indications:                   1. SCREENING                                                                                                                                                                          Post Op Diagnosis:                    1.   colon - diverticulosis and removed 4mm polyp                                                                            H&p completed: Yes            Anesthesia Assessment: Performed prior to procedure:      No change  Anesthesia Plan: Performed prior to procedure:                   No change       Medications: See Reviewed List and Reconcilation           Informed consent was obtained     Risk Statement:  Prior to the procedure the risks were explained to the patient and/or to the family including but not limited to perforation, bleeding, adverse drug reaction, aspiration, and even the need for possible surgery. A colonoscopy exam is not 100% accurate which may be related to preparation or blind spots during the exam.The possibility that an abnormality and /or cancer could be missed was also discussed as well as alternative x-ray options.          Instrument:    Olympus adult Videocolonoscope                                   Immediate Procedure Reassessment Completed     With the patient in the left lateral position, a rectal examination was performed and the findings were: negative without mass, lesions or tenderness   The Olympus Video colonoscope was inserted under direct vision into the rectum. The colonoscope was passed from the rectum to the cecum, which was identified by the ileocecal valve. The colon findings demonstrated:  ANUS: Anal exam reveals no masses or external hemorrhoids, sphincter tone is normal.   RECTUM: Rectal exam reveals no masses  . SIGMOID COLON: The sigmoid was unremarkable except as noted below   Findings below   DESCENDING COLON:  The videoscolonoscope was advanced carefully. Findings below  SPLENIC FLEXURE: The splenic flexure is normal.   TRANSVERSE COLON:  The typical triangular pattern was noted. Findings below      HEPATIC FLEXURE: The hepatic flexure is normal.   ASCENDING COLON:  No  bleeding Findings below     CECUM:  The ileocecal valve appears normal.   TERMINAL ILEUM: The terminal ileum was not entered. Finding noted      [x] mucosa normal      [x] Diverticulosis     [] avm     [] Additional findings:        A polyp was identified. #4,   mm in size, located in the ascending colon removed by cold biopsy and sent for pathology The polypectomy site was reviewed and was free of hemorrhage. The colonoscope was slowly withdrawn >6 minute period and the instrument was retroflexed in the rectum. The rectal findings were:Normal  The patient tolerated the entire procedure well. Blood Loss nil  No complications  Anesthesia  MAC  No crystalloids  No Implants  Assistants : per nursing documentation team members     For biopsy  Specimen verification by physician and nurse two sources, name,           social security numbers     Colon preparation was good    Recommendations:     - Repeat colonoscopy in 5 years.       Copies sent to   Mathilda Prader, MD  CC:  Kimberly Gee MD

## 2023-02-17 NOTE — H&P
History and physical has been reviewed. The patient has been interviewed and examined.  There have been no significant clinical changes since the completion of the originally dated History and Physical.    Delay computer update

## 2023-02-17 NOTE — ANESTHESIA PREPROCEDURE EVALUATION
Anesthetic History   No history of anesthetic complications            Review of Systems / Medical History  Patient summary reviewed, nursing notes reviewed and pertinent labs reviewed    Pulmonary        Sleep apnea: CPAP           Neuro/Psych   Within defined limits           Cardiovascular    Hypertension          Hyperlipidemia         GI/Hepatic/Renal  Within defined limits              Endo/Other    Diabetes: poorly controlled, using insulin    Morbid obesity, arthritis and anemia     Other Findings   Comments: K+  3.2           Physical Exam    Airway  Mallampati: II  TM Distance: > 6 cm  Neck ROM: normal range of motion   Mouth opening: Normal     Cardiovascular  Regular rate and rhythm,  S1 and S2 normal,  no murmur, click, rub, or gallop             Dental  No notable dental hx       Pulmonary  Breath sounds clear to auscultation               Abdominal  GI exam deferred       Other Findings            Anesthetic Plan    ASA: 3  Anesthesia type: general and total IV anesthesia          Induction: Intravenous  Anesthetic plan and risks discussed with: Patient      Propofol MAC

## 2023-02-17 NOTE — PROGRESS NOTES
Endoscope was pre-cleaned at the bedside immediately following procedure by Jose Otero ET    Medications       lidocaine (PF) 2% (mg)       Date/Time Rate/Dose/Volume Action Route Admin User Audit       02/17/23  0934 40 mg Given IntraVENous Alex Bur, CRNA                propofol 10 mg/mL (mg)       Date/Time Rate/Dose/Volume Action Route Admin User Audit       02/17/23  0934 50 mg Given IntraVENous Alex Bur, CRNA       8024 50 mg Given IntraVENous Alex Bur, CRNA       9021 50 mg Given IntraVENous Alex Bur, CRNA                      .   Glasses retruend to patient

## 2023-02-17 NOTE — H&P
G I Procedure Note           Endoscopy History and Physical           Dr. Trevor Diaz Office     [facility] - [address] [phone]  [id number]    1950  71 y.o.  female      Date of Procedure:   Preoperative Diagnosis:       Procedure:   2/17/2023      SCREENING                         Procedure(s):  COLONOSCOPY      Gastroenterologist:  Anesthesia:           Freya Lawrence MD                               MAC            History and procedure indication:  Steph Su is a 71 y.o. BLACK/ female who presents with: SCREENING   including the additional history of Screening ,Screening for colon cancer,,        Past Medical History:   Diagnosis Date    Cellulitis of right abdominal wall 12/18/2017    letty whatley md    Diabetes ([address] 1995    DJD (degenerative joint disease) of knee     GUSTAVO Rahman    Fall at home, sequela 07/05/2019    Gastroenteritis     History of nuclear stress test 08/03/2020    No evidence is of ischemia or infarction. Left ventricular ejection fraction measures 71%. Hypercholesterolemia     Hypertension     Kidney lesion, native, left     Knee pain     LBP (low back pain)     chiorpactor    Lumbar stenosis     Obesity     MICHELLE on CPAP     Rotator cuff arthropathy of right shoulder 06/22/2018    S/P colonoscopy 12/18/2012    Subcutaneous nodule of right forearm 08/03/2022    Trigger finger, left middle finger 03/24/2022      Prior to Admission medications    Medication Sig Start Date End Date Taking?  Authorizing Provider   simvastatin (ZOCOR) 40 mg tablet TAKE 1 TABLET BY MOUTH EVERY DAY AT NIGHT 12/10/22  Yes Lilo Mcdonald MD   pantoprazole (PROTONIX) 40 mg tablet TAKE 1 TABLET BY MOUTH EVERY DAY BEFORE BREAKFAST 10/2/22  Yes MD Mynor Wong U-100 Insulin 100 unit/mL (3 mL) inpn INJECT 40 UNITS SUBCUTANEOUSLY UNDER THE SKIN EVERY NIGHT 10/2/22  Yes Delgado Fry MD   NovoLOG Flexpen U-100 Insulin 100 unit/mL (3 mL) inpn INJECT 12 UNITS SUBCUTANEOUSLY BEFORE BREAKFAST, LUNCH AND DINNER 8/17/22  Yes Delgado Fry MD   cloNIDine HCL (CATAPRES) 0.2 mg tablet TAKE 1 TABLET BY MOUTH TWICE A DAY 8/17/22  Yes Delgado Fry MD   metoprolol tartrate (LOPRESSOR) 25 mg tablet TAKE 1/2 TABS BY MOUTH TWO (2) TIMES A DAY. 8/1/22  Yes Delgado Fry MD   BD Jody 2nd Gen Pen Needle 32 gauge x 5/32\" ndle USE TO INJECT INSULIN FOUR TIMES A DAY. DX.E11.9 8/1/22  Yes Delgado Fry MD   amLODIPine (NORVASC) 10 mg tablet TAKE 1 TABLET BY MOUTH DAILY 7/9/22  Yes Delgado Fry MD   lancets (One Touch Delica) 33 gauge misc USE TO TEST BLOOD SUGAR 3 TIMES A DAY 2/23/22  Yes Delgado Fry MD   OneTouch Ultra Test strip USE TO TEST BLOOD SUGAR THREE TIMES A DAY. DX. E11.9 2/23/22  Yes Delgado Fry MD   Blood-Glucose Meter (OneTouch Ultra2 Meter) misc Use to test blood sugar three times a day. Dx.e11.9 6/8/21  Yes Delgado Fry MD   Insulin Needles, Disposable, 29 gauge x 5/16\" ndle Use to inject insulin three times daily. dx.e119 8/29/18  Yes Ashley Sloan MD   albuterol (PROVENTIL HFA, VENTOLIN HFA, PROAIR HFA) 90 mcg/actuation inhaler Take 2 Puffs by inhalation every four (4) hours as needed for Wheezing.  3/2/17  Yes Saundra Jenkins MD     No Known Allergies    Past Surgical History:   Procedure Laterality Date    HX BREAST BIOPSY Right 1990    negative    HX COLONOSCOPY      HX HYSTERECTOMY      HX OOPHORECTOMY N/A     HX OTHER SURGICAL  12/23/2017    I&D infected abscess      Family History   Problem Relation Age of Onset    Cancer Mother     Ovarian Cancer Maternal Aunt     Breast Cancer Maternal Aunt     No Known Problems Father       Social History     Tobacco Use    Smoking status: Never    Smokeless tobacco: Never   Substance Use Topics Alcohol use: No     Alcohol/week: 0.0 standard drinks                                                      PHYSICAL EXAM     Visit Vitals  LMP  (LMP Unknown)       General appearance:  alert,  in no distress  Mental status:  normal mood, behavior, speech, dress, motor activity and thought processes  Nose:      normal and patent, no erythema, discharge    Mouth:- mucous membranes moist, pharynx normal without lesions                  [x]  No Loose teeth      []    Loose teeth  Finger opening:  []1     []1.5    [] 2     [] 2.5     [x] 3      [] 3.5     [] 4   Mallampati:         [] Class 1     [x] Class 2    [] Class 3      [] Class 4      Neck - supple,      [x] Full ROM [] Decreased ROM  [] Short Neck no significant adenopathy    Chest - clear to auscultation, no wheezes, rales or rhonchi, symmetric air entry  Heart: normal rate, regular rhythm, normal S1, S2, no murmurs,   Abdomen: abdomen soft, bowel sounds  [x] normal  [] increased  [] hypoactive                  [x] no tenderness  [] epigastric tenderness  [] LLQ tenderness   [] RLQ tenderness                      No masses, organomegaly or guarding.   Rectal exam: negative without mass, lesions or tenderness  Extremities:  , no pedal edema, no  cyanosis  Neurologic: Alert and oriented to person, place, and time;                          Normal symmetric reflexes  Normal gait:                                      Assessement:                                 Pre op dx:  SCREENING   Additional medical problems list below   Patient Active Problem List   Diagnosis Code    Hypercholesterolemia E78.00    Hypertension I10    Lumbar stenosis M48.061    Low back pain M54.50    DJD (degenerative joint disease) of knee M17.9    Cellulitis of right abdominal wall L03.311    Gastroenteritis K52.9    Diabetes ([facility] [address] E11.9    Morbid obesity with BMI of 40.0-44.9, adult ([facility]) E66.01, Z68.41    Shoulder pain, right M25.511    Knee pain M25.569    Rotator cuff arthropathy of right shoulder M12.811    Cervical spinal stenosis M48.02    Fall at home, sequela W19. Hue Santiago, Y92.009    WOODY (dyspnea on exertion) R06.09    Other chest pain R07.89    History of nuclear stress test Z92.89    MICHELLE on CPAP G47.33, Z99.89    Trigger finger, left middle finger M65.332    Subcutaneous nodule of right forearm R22.31                                                                                           This note documentation was performed prior to this planned procedure       after a history and physical was performed in the office. Date: 2 8 21   Office exam   2/17/2023 Immediate update no changes in H&P                        Pre Procedure Evaluation (per anesthesia or per h&p)                                                Sedation/Assessment:                                                                                               Mallampati Classification                            []Class 1                    []Class 2                    [] Class 3                  [] Class 4                                              ASA classfication         []     Class I: Normally healthy         []     Class II: Patient with mild systemic disease (e.g. hypertension)         []     Class III: Patient with severe systemic disease (e.g. CHF), non-decompensated         []     Class IV: Patient with severe systemic disease, decompensated         []     Class V: Moribund patient, survival unlikely                     Plan:   []    Egd                                [x]  Colonoscopy                                [] with Moderate Sedation /Conscious Sedation                                  [x] MAC          Patient stable for planned procedure. See orders.      Marsha Richmond MD

## 2023-02-17 NOTE — ANESTHESIA POSTPROCEDURE EVALUATION
Procedure(s):  COLONOSCOPY  ENDOSCOPIC POLYPECTOMY. general, total IV anesthesia    Anesthesia Post Evaluation        Patient location during evaluation: PACU  Note status: Adequate. Level of consciousness: responsive to verbal stimuli and sleepy but conscious  Pain management: satisfactory to patient  Airway patency: patent  Anesthetic complications: no  Cardiovascular status: acceptable  Respiratory status: acceptable  Hydration status: acceptable  Comments: +Post-Anesthesia Evaluation and Assessment    Patient: Amber Joseph MRN: [id number]  SSN: [id number]   YOB: 1950  Age: 71 y.o. Sex: female      Cardiovascular Function/Vital Signs    BP (!) 158/57   Pulse 63   Temp 36.7 °C (98 °F)   Resp 19   Ht 5' 3\" (1.6 m)   Wt 108.7 kg (239 lb 9.6 oz)   SpO2 98%   Breastfeeding No   BMI 42.44 kg/m²     Patient is status post Procedure(s):  COLONOSCOPY  ENDOSCOPIC POLYPECTOMY. Nausea/Vomiting: Controlled. Postoperative hydration reviewed and adequate. Pain:  Pain Scale 1: Numeric (0 - 10) (02/17/23 0910)  Pain Intensity 1: 0 (02/17/23 0910)   Managed. Neurological Status: At baseline. Mental Status and Level of Consciousness: Arousable. Pulmonary Status:   O2 Device: None (Room air) (02/17/23 1002)   Adequate oxygenation and airway patent. Complications related to anesthesia: None    Post-anesthesia assessment completed. No concerns. Signed By: Flora Brody MD    2/17/2023  Post anesthesia nausea and vomiting:  controlled      INITIAL Post-op Vital signs: No vitals data found for the desired time range.

## 2023-02-17 NOTE — ROUTINE PROCESS
[facility]  1950  [id number]    Situation:  Verbal report received from: Augusto Pollen  Procedure: Procedure(s):  COLONOSCOPY  ENDOSCOPIC POLYPECTOMY    Background:    Preoperative diagnosis: SCREENING  Postoperative diagnosis: colon - diverticulosis and polyp    :  Dr. Griggs  Assistant(s): Endoscopy Technician-1: Bryce Morrow  Endoscopy RN-1: Montrell Garcia RN    Specimens:   ID Type Source Tests Collected by Time Destination   1 : Colon, Ascending polyp Preservative Colon, Ascending  Akosua Villalobos MD 2/17/2023 3300 Pathology     H. Pylori  no    Anesthesia gave intra-procedure sedation and medications, see anesthesia flow sheet yes    Intravenous fluids: NS@ KVO     Vital signs stable yes    Abdominal assessment: round and soft yes    Recommendation:

## 2023-02-24 ENCOUNTER — DOCUMENTATION ONLY (OUTPATIENT)
Dept: SLEEP MEDICINE | Age: 73
End: 2023-02-24

## 2023-02-24 ENCOUNTER — OFFICE VISIT (OUTPATIENT)
Dept: SLEEP MEDICINE | Age: 73
End: 2023-02-24
Payer: MEDICARE

## 2023-02-24 VITALS
BODY MASS INDEX: 42.44 KG/M2 | OXYGEN SATURATION: 93 % | SYSTOLIC BLOOD PRESSURE: 149 MMHG | DIASTOLIC BLOOD PRESSURE: 60 MMHG | TEMPERATURE: 98.1 F | HEART RATE: 66 BPM | HEIGHT: 63 IN

## 2023-02-24 DIAGNOSIS — G47.33 OBSTRUCTIVE SLEEP APNEA (ADULT) (PEDIATRIC): Primary | ICD-10-CM

## 2023-02-24 PROCEDURE — 3077F SYST BP >= 140 MM HG: CPT | Performed by: NURSE PRACTITIONER

## 2023-02-24 PROCEDURE — G8427 DOCREV CUR MEDS BY ELIG CLIN: HCPCS | Performed by: NURSE PRACTITIONER

## 2023-02-24 PROCEDURE — 99213 OFFICE O/P EST LOW 20 MIN: CPT | Performed by: NURSE PRACTITIONER

## 2023-02-24 PROCEDURE — 1101F PT FALLS ASSESS-DOCD LE1/YR: CPT | Performed by: NURSE PRACTITIONER

## 2023-02-24 PROCEDURE — G8417 CALC BMI ABV UP PARAM F/U: HCPCS | Performed by: NURSE PRACTITIONER

## 2023-02-24 PROCEDURE — G9899 SCRN MAM PERF RSLTS DOC: HCPCS | Performed by: NURSE PRACTITIONER

## 2023-02-24 PROCEDURE — 1090F PRES/ABSN URINE INCON ASSESS: CPT | Performed by: NURSE PRACTITIONER

## 2023-02-24 PROCEDURE — G8536 NO DOC ELDER MAL SCRN: HCPCS | Performed by: NURSE PRACTITIONER

## 2023-02-24 PROCEDURE — 1123F ACP DISCUSS/DSCN MKR DOCD: CPT | Performed by: NURSE PRACTITIONER

## 2023-02-24 PROCEDURE — G8399 PT W/DXA RESULTS DOCUMENT: HCPCS | Performed by: NURSE PRACTITIONER

## 2023-02-24 PROCEDURE — 3017F COLORECTAL CA SCREEN DOC REV: CPT | Performed by: NURSE PRACTITIONER

## 2023-02-24 PROCEDURE — G8432 DEP SCR NOT DOC, RNG: HCPCS | Performed by: NURSE PRACTITIONER

## 2023-02-24 PROCEDURE — 3078F DIAST BP <80 MM HG: CPT | Performed by: NURSE PRACTITIONER

## 2023-02-24 NOTE — PATIENT INSTRUCTIONS
[address] [place]. [place], [address]  Tel.  [phone]  Fax. [address] [facility] [address]  [place], [address] [facility]  Tel.  [phone]  Fax. [phone] [address]  Tel.  [phone]  Fax. [phone]     Learning About CPAP for Sleep Apnea  What is CPAP? CPAP is a small machine that you use at home every night while you sleep. It increases air pressure in your throat to keep your airway open. When you have sleep apnea, this can help you sleep better so you feel much better. CPAP stands for \"continuous positive airway pressure. \"  The CPAP machine will have one of the following:  A mask that covers your nose and mouth  Prongs that fit into your nose  A mask that covers your nose only, the most common type. This type is called NCPAP. The N stands for \"nasal.\"  Why is it done? CPAP is usually the best treatment for obstructive sleep apnea. It is the first treatment choice and the most widely used. Your doctor may suggest CPAP if you have: Moderate to severe sleep apnea. Sleep apnea and coronary artery disease (CAD) or heart failure. How does it help? CPAP can help you have more normal sleep, so you feel less sleepy and more alert during the daytime. CPAP may help keep heart failure or other heart problems from getting worse. NCPAP may help lower your blood pressure. If you use CPAP, your bed partner may also sleep better because you are not snoring or restless. What are the side effects? Some people who use CPAP have:  A dry or stuffy nose and a sore throat. Irritated skin on the face. Sore eyes. Bloating. If you have any of these problems, work with your doctor to fix them. Here are some things you can try:  Be sure the mask or nasal prongs fit well. See if your doctor can adjust the pressure of your CPAP. If your nose is dry, try a humidifier.   If your nose is runny or stuffy, try decongestant medicine or a steroid nasal spray. If these things do not help, you might try a different type of machine. Some machines have air pressure that adjusts on its own. Others have air pressures that are different when you breathe in than when you breathe out. This may reduce discomfort caused by too much pressure in your nose. Where can you learn more? Go to [employer]  Enter Mahsa Bolanos in the search box to learn more about \"Learning About CPAP for Sleep Apnea. \"   © 7919-6862 Healthwise, Incorporated. Care instructions adapted under license by [employer] (which disclaims liability or warranty for this information). This care instruction is for use with your licensed healthcare professional. If you have questions about a medical condition or this instruction, always ask your healthcare professional. [address] any warranty or liability for your use of this information. Content Version: 9.0.94506; Last Revised: January 11, 2010  PROPER SLEEP HYGIENE    What to avoid  Do not have drinks with caffeine, such as coffee or black tea, for 8 hours before bed. Do not smoke or use other types of tobacco near bedtime. Nicotine is a stimulant and can keep you awake. Avoid drinking alcohol late in the evening, because it can cause you to wake in the middle of the night. Do not eat a big meal close to bedtime. If you are hungry, eat a light snack. Do not drink a lot of water close to bedtime, because the need to urinate may wake you up during the night. Do not read or watch TV in bed. Use the bed only for sleeping and sexual activity. What to try  Go to bed at the same time every night, and wake up at the same time every morning. Do not take naps during the day. Keep your bedroom quiet, dark, and cool. Get regular exercise, but not within 3 to 4 hours of your bedtime. .  Sleep on a comfortable pillow and mattress.   If watching the clock makes you anxious, turn it facing away from you so you cannot see the time. If you worry when you lie down, start a worry book. Well before bedtime, write down your worries, and then set the book and your concerns aside. Try meditation or other relaxation techniques before you go to bed. If you cannot fall asleep, get up and go to another room until you feel sleepy. Do something relaxing. Repeat your bedtime routine before you go to bed again. Make your house quiet and calm about an hour before bedtime. Turn down the lights, turn off the TV, log off the computer, and turn down the volume on music. This can help you relax after a busy day. Drowsy Driving: The [employer] cites drowsiness as a causing factor in more than 580,715 police reported crashes annually, resulting in 76,000 injuries and 1,500 deaths. Other surveys suggest 55% of people polled have driven while drowsy in the past year, 23% had fallen asleep but not crashed, 3% crashed, and 2% had and accident due to drowsy driving. Who is at risk? Young Drivers: One study of drowsy driving accidents states that 55% of the drivers were under 25 years. Of those, 75% were male. Shift Workers and Travelers: People who work overnight or travel across time zones frequently are at higher risk of experiencing Circadian Rhythm Disorders. They are trying to work and function when their body is programed to sleep. Sleep Deprived: Lack of sleep has a serious impact on your ability to pay attention or focus on a task. Consistently getting less than the average of 8 hours your body needs creates partial or cumulative sleep deprivation. Untreated Sleep Disorders: Sleep Apnea, Narcolepsy, R.L.S., and other sleep disorders (untreated) prevent a person from getting enough restful sleep. This leads to excessive daytime sleepiness and increases the risk for drowsy driving accidents by up to 7 times.   Medications / Alcohol: Even over the counter medications can cause drowsiness. Medications that impair a drivers attention should have a warning label. Alcohol naturally makes you sleepy and on its own can cause accidents. Combined with excessive drowsiness its effects are amplified. Signs of Drowsy Driving:   * You don't remember driving the last few miles   * You may drift out of your antonina   * You are unable to focus and your thoughts wander   * You may yawn more often than normal   * You have difficulty keeping your eyes open / nodding off   * Missing traffic signs, speeding, or tailgating  Prevention-   Good sleep hygiene, lifestyle and behavioral choices have the most impact on drowsy driving. There is no substitute for sleep and the average person requires 8 hours nightly. If you find yourself driving drowsy, stop and sleep. Consider the sleep hygiene tips provided during your visit as well. Medication Refill Policy: Refills for all medications require 1 week advance notice. Please have your pharmacy fax a refill request. We are unable to fax, or call in \"controled substance\" medications and you will need to pick these prescriptions up from our office. [employer] Activation    Thank you for requesting access to [employer]. Please follow the instructions below to securely access and download your online medical record. [employer] allows you to send messages to your doctor, view your test results, renew your prescriptions, schedule appointments, and more. How Do I Sign Up? In your internet browser, go to [email or web address]. Click on the First Time User? Click Here link in the Sign In box. You will see the New Member Sign Up page. Enter your [employer] Access Code exactly as it appears below. You will not need to use this code after youve completed the sign-up process. If you do not sign up before the expiration date, you must request a new code. [employer] Access Code:  Activation code not generated  Current [employer] Status: Active (This is the date your [employer] access code will )    Enter the last four digits of your Social Security Number (xxxx) and Date of Birth (mm/dd/yyyy) as indicated and click Submit. You will be taken to the next sign-up page. Create a ZENTt ID. This will be your [employer] login ID and cannot be changed, so think of one that is secure and easy to remember. Create a [employer] password. You can change your password at any time. Enter your Password Reset Question and Answer. This can be used at a later time if you forget your password. Enter your e-mail address. You will receive e-mail notification when new information is available in [employer]Nod. Click Sign Up. You can now view and download portions of your medical record. Click the Yodle link to download a portable copy of your medical information. Additional Information    If you have questions, please call [phone]. Remember, [employer] is NOT to be used for urgent needs. For medical emergencies, dial 911.

## 2023-02-24 NOTE — PROGRESS NOTES
[address] [facility] [place]. [place], [address]   Tel.  [phone]   Fax. [address] [facility]   [place], [address] [facility]   Tel.  [phone]   Fax. [phone] [address]   Tel.  [phone]   Fax. 151 Keyshawn Reis (: 1950) is a 71 y.o. female, established patient, seen for positive airway pressure follow-up and evaluation. She was last seen by me on 2022, previously seen by Dr. Sidra Leon on 2021, prior notes reviewed in detail. In lab sleep test 10/2020 showed AHI of 16.5/hr with a lowest SpO2 of 86%, duration of SpO2 < 88% 0.1 min. ASSESSMENT/PLAN:    ICD-10-CM ICD-9-CM    1. Obstructive sleep apnea (adult) (pediatric)  G47.33 327.23 AMB SUPPLY ORDER      2. Adult BMI 40.0-44.9 kg/sq m (HCC)  Z68.41 V85.41           AHI = 16.5(10/2020). On Respironics APAP :  5-9 cmH2O. Set up 2020. New DS 2 set up 2021. She is adherent with PAP therapy and PAP continues to benefit patient and remains necessary for control of her sleep apnea. Follow-up and Dispositions    Return in about 1 year (around 2024) for annual follow up . Sleep Apnea -    Continue on current pressures    * Supplies ordered - nasal pillows mask and heated tubing    Orders Placed This Encounter    AMB SUPPLY ORDER     Diagnosis: (G47.33) MICHELLE (obstructive sleep apnea)  (primary encounter diagnosis)     Replacement Supplies for Positive Airway Pressure Therapy Device:   Duration of need: 99 months.  Nasal Pillows (Replace) 2 per month.  Nasal Interface Mask 1 every 3 months.  Pos Airway pressure chin strap   Headgear 1 every 6 months.  Tubing with heating element 1 every 3 months.  Filter(s) Disposable 2 per month.  Filter(s) Non-Disposable 1 every 6 months. .   [address] [facility] for Humidifier (Replace) 1 every 6 months.       Alvin Lomeli, [email or web address]; NPI: [id number]    Electronically signed. Date:- 02/24/23       * Counseling was provided regarding the importance of regular PAP use with emphasis on ensuring sufficient total sleep time, proper sleep hygiene, and safe driving. * Re-enforced proper and regular cleaning for the device. We discussed the risk associated with use of cleaning devices and she will continue with use of dish soap and water as the appropriate cleaning method. * She was asked to contact our office for any problems regarding PAP therapy. 2. Encouraged continued weight management program through appropriate diet and exercise regimen as significant weight reduction has been shown to reduce severity of obstructive sleep apnea. She goes to the [facility] for water exercise 4 days a week and stays busy during the day. SUBJECTIVE/OBJECTIVE:    She  is seen today for follow up on PAP device and reports no problems using the device. The following concerns identified:    Drowsiness no Problems exhaling no   Snoring no Forget to put on no   Mask Comfortable yes Can't fall asleep no   Dry Mouth no Mask falls off no   Air Leaking no Frequent awakenings no       She admits that her sleep has improved on PAP therapy using nasal pillows mask and heated tubing. She feels that the device is working well and has improved her sleep quality. Review of device download indicated:  Auto pressure: 5-9 cmH2O;   Peak Avg Pressure: 8.5 cmH2O;  Avg. Device Pressure <= 90 %: 8.7 cmH2O   Average % Night in Large Leak:  0  % Used Days >= 4 hours: 97.  Avg hours used:  7:40. Therapy Apnea Index averaged over PAP use: 3.2 /hr which reflects improved sleep breathing condition. Chelsea Sleepiness Score: 6 and Modified F.O.S.Q. Score Total / 2: 19 which reflects improved sleep quality over therapy time.     CO2 28 mmol/L on labs 8/3/2022    Sleep Review of Systems: notable for Negative difficulty falling asleep; positive awakenings at night; Negative early morning headaches; Negative memory problems; Negative concentration issues; Negative chest pain; Negative shortness of breath; Negative significant joint pain at night; Negative significant muscle pain at night; Negative rashes or itching; Negative heartburn; Negative significant mood issues; 0 afternoon naps per week      Visit Vitals  BP (!) 149/60 (BP 1 Location: Right upper arm, BP Patient Position: Sitting, BP Cuff Size: Adult long)   Pulse 66   Temp 98.1 °F (36.7 °C) (Oral)   Ht 5' 3\" (1.6 m)   LMP  (LMP Unknown)   SpO2 93%   BMI 42.44 kg/m²          General:   Alert, oriented, not in acute distress   Eyes:  Anicteric Sclerae; no obvious strabismus   Nose:  No obvious nasal septum deviation    Neck:   Midline trachea   Chest/Lungs:  Symmetrical lung expansion, clear lung fields on auscultation    CVS:  Normal rate, regular rhythm,  no JVD   Extremities:  No obvious rashes, no edema    Neuro:  No focal deficits; No obvious tremor    Psych:  Normal affect,  normal countenance     Patient's phone number [phone] (cell) was reviewed and confirmed for accuracy. She gives permission for messages regarding results and appointments to be left at that number. An electronic signature was used to authenticate this note.     -- Preeti Block NP, [facility]  02/24/23

## 2023-03-01 RX ORDER — BLOOD SUGAR DIAGNOSTIC
STRIP MISCELLANEOUS
Qty: 300 STRIP | Refills: 3 | Status: SHIPPED | OUTPATIENT
Start: 2023-03-01

## 2023-03-07 NOTE — PROGRESS NOTES
1. Have you been to the ER, urgent care clinic since your last visit? Hospitalized since your last visit? No    2. Have you seen or consulted any other health care providers outside of the [address] [place] [address] since your last visit? Include any pap smears or colon screening. No    Pain in abdominal area from insulin injection.  Wants to discuss BS numbers,  loss of appetite and medication Self breast exam monthly  Regular exercise    Once Quirino arrives in office call first day of menses to schedule Kyleena

## 2023-03-16 ENCOUNTER — HOSPITAL ENCOUNTER (OUTPATIENT)
Dept: MAMMOGRAPHY | Age: 73
Discharge: HOME | End: 2023-03-16
Attending: INTERNAL MEDICINE
Payer: MEDICARE

## 2023-03-16 DIAGNOSIS — Z13.820 SCREENING FOR OSTEOPOROSIS: ICD-10-CM

## 2023-03-16 DIAGNOSIS — Z78.0 POST-MENOPAUSAL: ICD-10-CM

## 2023-03-16 PROCEDURE — 77080 DXA BONE DENSITY AXIAL: CPT

## 2023-03-19 ENCOUNTER — OFFICE VISIT (OUTPATIENT)
Dept: URGENT CARE | Age: 73
End: 2023-03-19

## 2023-03-19 VITALS
HEIGHT: 63 IN | RESPIRATION RATE: 15 BRPM | HEART RATE: 89 BPM | BODY MASS INDEX: 42.52 KG/M2 | OXYGEN SATURATION: 95 % | SYSTOLIC BLOOD PRESSURE: 191 MMHG | DIASTOLIC BLOOD PRESSURE: 73 MMHG | TEMPERATURE: 97.7 F | WEIGHT: 240 LBS

## 2023-03-19 DIAGNOSIS — R30.0 DYSURIA: ICD-10-CM

## 2023-03-19 DIAGNOSIS — N76.0 ACUTE VAGINITIS: Primary | ICD-10-CM

## 2023-03-19 DIAGNOSIS — R80.9 PROTEINURIA, UNSPECIFIED TYPE: ICD-10-CM

## 2023-03-19 LAB
BILIRUB UR QL STRIP: NEGATIVE
GLUCOSE UR-MCNC: NEGATIVE MG/DL
KETONES P FAST UR STRIP-MCNC: NEGATIVE MG/DL
PH UR STRIP: 6 [PH] (ref 4.6–8)
PROT UR QL STRIP: ABNORMAL
SP GR UR STRIP: 1.03 (ref 1–1.03)
UA UROBILINOGEN AMB POC: ABNORMAL (ref 0.2–1)
URINALYSIS CLARITY POC: CLEAR
URINALYSIS COLOR POC: YELLOW
URINE BLOOD POC: ABNORMAL
URINE LEUKOCYTES POC: NEGATIVE
URINE NITRITES POC: NEGATIVE

## 2023-03-19 NOTE — PROGRESS NOTES
Here for burning with urination  Onset 10 days ago  Now also here present at rest; vaginal burning. No discharge or odor. She feels well otherwise. Tried OTC calmoceptine and this didn't help much. Denies: fever, chills, n/v, severe abdominal pain, flank pain, blood in urine, inability to push out urine, vaginal discharge         Past Medical History:   Diagnosis Date    Cellulitis of right abdominal wall 12/18/2017    letty whatley md    Chronic obstructive pulmonary disease ([address] 2020    Diabetes ([address] 1995    DJD (degenerative joint disease) of knee     GUSTAVO Garsia Markus    Fall at home, sequela 07/05/2019    Gastroenteritis     History of nuclear stress test 08/03/2020    No evidence is of ischemia or infarction. Left ventricular ejection fraction measures 71%.     Hypercholesterolemia     Hypertension     Kidney lesion, native, left     Knee pain     LBP (low back pain)     chiorpactor    Lumbar stenosis     Obesity     MICHELLE on CPAP     Rotator cuff arthropathy of right shoulder 06/22/2018    S/P colonoscopy 12/18/2012    Subcutaneous nodule of right forearm 08/03/2022    Trigger finger, left middle finger 03/24/2022        Past Surgical History:   Procedure Laterality Date    COLONOSCOPY N/A 2/17/2023    COLONOSCOPY performed by Didier Ordoñez MD at [facility] ENDOSCOPY    HX BREAST BIOPSY Right 1990    negative    HX COLONOSCOPY      HX HYSTERECTOMY      HX OOPHORECTOMY N/A     HX OTHER SURGICAL  12/23/2017    I&D infected abscess          Family History   Problem Relation Age of Onset    Cancer Mother     Ovarian Cancer Maternal Aunt     Breast Cancer Maternal Aunt     No Known Problems Father     Cancer Maternal Aunt     Hypertension Maternal Aunt     Cancer Maternal Uncle     Lung Disease Maternal Uncle     Lung Disease Maternal Uncle     Lung Disease Maternal Uncle         Mistake        Social History     Socioeconomic History    Marital status: SINGLE     Spouse name: Not on file    Number of children: Not on file    Years of education: Not on file    Highest education level: Not on file   Occupational History    Not on file   Tobacco Use    Smoking status: Never    Smokeless tobacco: Never   Vaping Use    Vaping Use: Never used   Substance and Sexual Activity    Alcohol use: Not Currently    Drug use: Never    Sexual activity: Not Currently     Birth control/protection: None   Other Topics Concern     Service No    Blood Transfusions No    Caffeine Concern No    Occupational Exposure No    Hobby Hazards No    Sleep Concern Yes    Stress Concern No    Weight Concern Yes    Special Diet Yes     Comment: Renal Diet    Back Care Yes    Exercise No    Bike Helmet No    Seat Belt Yes    Self-Exams No   Social History Narrative    Medical History: Diverticulosis 2002DM 1995primary HTN 1995Obesity 1995Dyslipidemia 1995January 2012 moderate central stenosis L3-L4,    mild to moderate central stenosis at L4-L5 MRI    Gyn History: Last mammogram date 2013. Last menstrual perioddate hysterectomy  FOR FIBROIDS, PT W ITH ONE OVARY. Last    papdate . Menopausal hot flashes. Sexually active not currently sexually active. History of abnormal pap smears none. History of STDs    none. Vaginal discharge or odor none. HysterectomyDate NONE.    OB History: Total pregnancies 1. Pre term deliveries (>20-36.6 w eeks) 1. Surgical History: [facility] rig breast bx , kidney stones colonoscopy     Hospitalization/Major Diagnostic Procedure: [facility] rig breast bx , kidney stones         Family History: Mother:  52 yrs, skin d/oFather: deceasedAunt: alive, lung cancerAdopted: alive    Social History: Alcohol Use Patient does not use alcohol. Smoking Status Patient is a never smoker. Marital Status: Single. Lives w ith: alone. Education/School: has masters degree-[employer].      Social Determinants of Health     Financial Resource Strain: Not on file   Food Insecurity: Not on file   Transportation Needs: Not on file   Physical Activity: Not on file   Stress: Not on file   Social Connections: Not on file   Intimate Partner Violence: Not on file   Housing Stability: Not on file                ALLERGIES: Patient has no known allergies. Review of Systems   All other systems reviewed and are negative. Vitals:    03/19/23 1216   BP: (!) 191/73   Pulse: 89   Resp: 15   Temp: 97.7 °F (36.5 °C)   SpO2: 95%   Weight: 240 lb (108.9 kg)   Height: 5' 3\" (1.6 m)       Physical Exam  Vitals reviewed. Constitutional:       General: She is not in acute distress. Appearance: Normal appearance. She is not ill-appearing, toxic-appearing or diaphoretic. HENT:      Head: Normocephalic and atraumatic. Eyes:      Extraocular Movements: Extraocular movements intact. Cardiovascular:      Rate and Rhythm: Normal rate and regular rhythm. Pulses: Normal pulses. Heart sounds: Normal heart sounds. No murmur heard. No friction rub. No gallop. Pulmonary:      Effort: Pulmonary effort is normal. No respiratory distress. Breath sounds: Normal breath sounds. No wheezing or rales. Abdominal:      General: There is no distension. Palpations: Abdomen is soft. There is no mass. Tenderness: There is no abdominal tenderness. There is no right CVA tenderness, left CVA tenderness or guarding. Skin:     Coloration: Skin is not pale. Neurological:      Mental Status: She is alert and oriented to person, place, and time. Psychiatric:         Mood and Affect: Mood normal.         Behavior: Behavior normal.         Thought Content:  Thought content normal.       MDM     Differential Diagnosis; Clinical Impression; Plan:       CLINICAL IMPRESSION:  (N76.0) Acute vaginitis  (primary encounter diagnosis)  (R30.0) Dysuria  (R80.9) Proteinuria, unspecified type    Orders Placed This Encounter      NUSWAB VAGINITIS          Standing Status: Future          Number of Occurrences: 1          Standing Expiration Date: 3/19/2024      AMB POC URINALYSIS DIP STICK AUTO W/O MICRO        Plan:  Suspect vaginitis given burning and vaginal irritation  Will send off vaginal culture  UA today does not suggest UTI  There is trace blood and protein in UA- have advised UA repeat to see if resolved within 3 weeks during PCP visit. Will treat any abnormal vaginal culture results   May try OTC monistat while waiting on vaginal culture results. We have reviewed concerning signs/symptoms, normal vs abnormal progression of medical condition and when to seek immediate medical attention. Schedule with PCP or Urgent Care immediately for worsening or new symptoms.               Procedures

## 2023-03-19 NOTE — PATIENT INSTRUCTIONS
Please follow up with your PCP within 3 weeks to have urine test repeated to see if protein is still present in urine.

## 2023-03-23 LAB
A VAGINAE DNA VAG QL NAA+PROBE: NORMAL SCORE
BVAB2 DNA VAG QL NAA+PROBE: NORMAL SCORE
C ALBICANS DNA VAG QL NAA+PROBE: NEGATIVE
C GLABRATA DNA VAG QL NAA+PROBE: NEGATIVE
MEGA1 DNA VAG QL NAA+PROBE: NORMAL SCORE
T VAGINALIS DNA VAG QL NAA+PROBE: NEGATIVE

## 2023-03-29 ENCOUNTER — OFFICE VISIT (OUTPATIENT)
Dept: INTERNAL MEDICINE CLINIC | Age: 73
End: 2023-03-29
Payer: MEDICARE

## 2023-03-29 VITALS
DIASTOLIC BLOOD PRESSURE: 74 MMHG | OXYGEN SATURATION: 97 % | RESPIRATION RATE: 20 BRPM | WEIGHT: 239.1 LBS | BODY MASS INDEX: 42.36 KG/M2 | HEIGHT: 63 IN | HEART RATE: 59 BPM | SYSTOLIC BLOOD PRESSURE: 134 MMHG | TEMPERATURE: 97.6 F

## 2023-03-29 DIAGNOSIS — E78.00 HYPERCHOLESTEROLEMIA: ICD-10-CM

## 2023-03-29 DIAGNOSIS — I10 PRIMARY HYPERTENSION: ICD-10-CM

## 2023-03-29 DIAGNOSIS — E66.01 MORBID OBESITY WITH BMI OF 40.0-44.9, ADULT (HCC): ICD-10-CM

## 2023-03-29 DIAGNOSIS — Z99.89 OSA ON CPAP: ICD-10-CM

## 2023-03-29 DIAGNOSIS — Z79.4 TYPE 2 DIABETES MELLITUS WITHOUT COMPLICATION, WITH LONG-TERM CURRENT USE OF INSULIN (HCC): Primary | ICD-10-CM

## 2023-03-29 DIAGNOSIS — G47.33 OSA ON CPAP: ICD-10-CM

## 2023-03-29 DIAGNOSIS — E11.9 TYPE 2 DIABETES MELLITUS WITHOUT COMPLICATION, WITH LONG-TERM CURRENT USE OF INSULIN (HCC): Primary | ICD-10-CM

## 2023-03-29 PROCEDURE — 1090F PRES/ABSN URINE INCON ASSESS: CPT | Performed by: INTERNAL MEDICINE

## 2023-03-29 PROCEDURE — G8399 PT W/DXA RESULTS DOCUMENT: HCPCS | Performed by: INTERNAL MEDICINE

## 2023-03-29 PROCEDURE — 3078F DIAST BP <80 MM HG: CPT | Performed by: INTERNAL MEDICINE

## 2023-03-29 PROCEDURE — G8427 DOCREV CUR MEDS BY ELIG CLIN: HCPCS | Performed by: INTERNAL MEDICINE

## 2023-03-29 PROCEDURE — G8417 CALC BMI ABV UP PARAM F/U: HCPCS | Performed by: INTERNAL MEDICINE

## 2023-03-29 PROCEDURE — 2022F DILAT RTA XM EVC RTNOPTHY: CPT | Performed by: INTERNAL MEDICINE

## 2023-03-29 PROCEDURE — 3017F COLORECTAL CA SCREEN DOC REV: CPT | Performed by: INTERNAL MEDICINE

## 2023-03-29 PROCEDURE — G9899 SCRN MAM PERF RSLTS DOC: HCPCS | Performed by: INTERNAL MEDICINE

## 2023-03-29 PROCEDURE — G8536 NO DOC ELDER MAL SCRN: HCPCS | Performed by: INTERNAL MEDICINE

## 2023-03-29 PROCEDURE — G8510 SCR DEP NEG, NO PLAN REQD: HCPCS | Performed by: INTERNAL MEDICINE

## 2023-03-29 PROCEDURE — 3046F HEMOGLOBIN A1C LEVEL >9.0%: CPT | Performed by: INTERNAL MEDICINE

## 2023-03-29 PROCEDURE — 3075F SYST BP GE 130 - 139MM HG: CPT | Performed by: INTERNAL MEDICINE

## 2023-03-29 PROCEDURE — 1123F ACP DISCUSS/DSCN MKR DOCD: CPT | Performed by: INTERNAL MEDICINE

## 2023-03-29 PROCEDURE — 99214 OFFICE O/P EST MOD 30 MIN: CPT | Performed by: INTERNAL MEDICINE

## 2023-03-29 PROCEDURE — 1101F PT FALLS ASSESS-DOCD LE1/YR: CPT | Performed by: INTERNAL MEDICINE

## 2023-03-29 NOTE — PROGRESS NOTES
SPORTS MEDICINE AND PRIMARY CARE  Ruchi Gay MD, [address] [address]  Phone:  [phone]  Fax: [phone]       Chief Complaint   Patient presents with    Diabetes    Hypertension   . SUBJECTIVE:    Steph Su is a 71 y.o. female Patient returns today following a visit with [address] [place] [address] and was found to have vaginitis and sent off a vaginal culture and suggested OTC Monistat. Other medical problems include hypercholesterolemia, hypertension, type 2 diabetes, obstructive sleep apnea, on CPAP, and is seen for evaluation. Patient says she is \"trying\" to control her diabetes and her diet. Other specific complaints denied. She wonders if she needs to see the gynecologist, but the episode she had has resolved. She had an episode of diarrhea just before that, so I think it was induced by that. Other new complaints denied and patient is seen for evaluation. Current Outpatient Medications   Medication Sig Dispense Refill    OneTouch Ultra Test strip USE TO TEST 3 TIMES A DAY 4/23 300 Strip 3    simvastatin (ZOCOR) 40 mg tablet TAKE 1 TABLET BY MOUTH EVERY DAY AT NIGHT 90 Tablet 3    pantoprazole (PROTONIX) 40 mg tablet TAKE 1 TABLET BY MOUTH EVERY DAY BEFORE BREAKFAST 90 Tablet 3    Basaglar KwikPen U-100 Insulin 100 unit/mL (3 mL) inpn INJECT 40 UNITS SUBCUTANEOUSLY UNDER THE SKIN EVERY NIGHT 15 Adjustable Dose Pre-filled Pen Syringe 11    NovoLOG Flexpen U-100 Insulin 100 unit/mL (3 mL) inpn INJECT 12 UNITS SUBCUTANEOUSLY BEFORE BREAKFAST, LUNCH AND DINNER 30 Adjustable Dose Pre-filled Pen Syringe 11    cloNIDine HCL (CATAPRES) 0.2 mg tablet TAKE 1 TABLET BY MOUTH TWICE A  Tablet 3    metoprolol tartrate (LOPRESSOR) 25 mg tablet TAKE 1/2 TABS BY MOUTH TWO (2) TIMES A DAY. 90 Tablet 3    BD Jody 2nd Gen Pen Needle 32 gauge x 5/32\" ndle USE TO INJECT INSULIN FOUR TIMES A DAY.  DX.E11.9 200 Pen Needle 11    amLODIPine (NORVASC) 10 mg tablet TAKE 1 TABLET BY MOUTH DAILY 90 Tablet 4    lancets (One Touch Delica) 33 gauge misc USE TO TEST BLOOD SUGAR 3 TIMES A  Each 11    Blood-Glucose Meter (OneTouch Ultra2 Meter) misc Use to test blood sugar three times a day. Dx.e11.9 1 Each 0     Past Medical History:   Diagnosis Date    Cellulitis of right abdominal wall 12/18/2017    letty whatley md    Chronic obstructive pulmonary disease ([address] 2020    Diabetes ([address] 1995    DJD (degenerative joint disease) of knee     GUSTAVO Cortes    Fall at home, sequela 07/05/2019    Gastroenteritis     History of nuclear stress test 08/03/2020    No evidence is of ischemia or infarction. Left ventricular ejection fraction measures 71%.     Hypercholesterolemia     Hypertension     Kidney lesion, native, left     Knee pain     LBP (low back pain)     chiorpactor    Lumbar stenosis     Obesity     MICHELLE on CPAP     Rotator cuff arthropathy of right shoulder 06/22/2018    S/P colonoscopy 12/18/2012    Subcutaneous nodule of right forearm 08/03/2022    Trigger finger, left middle finger 03/24/2022     Past Surgical History:   Procedure Laterality Date    COLONOSCOPY N/A 2/17/2023    COLONOSCOPY performed by Zohaib Mathis MD at [facility] ENDOSCOPY    HX BREAST BIOPSY Right 1990    negative    HX COLONOSCOPY      HX HYSTERECTOMY      HX OOPHORECTOMY N/A     HX OTHER SURGICAL  12/23/2017    I&D infected abscess      No Known Allergies      REVIEW OF SYSTEMS:  General: negative for - chills or fever  ENT: negative for - headaches, nasal congestion or tinnitus  Respiratory: negative for - cough, hemoptysis, shortness of breath or wheezing  Cardiovascular : negative for - chest pain, edema, palpitations or shortness of breath  Gastrointestinal: negative for - abdominal pain, blood in stools, heartburn or nausea/vomiting  Genito-Urinary: no dysuria, trouble voiding, or hematuria  Musculoskeletal: negative for - gait disturbance, joint pain, joint stiffness or joint swelling  Neurological: no TIA or stroke symptoms  Hematologic: no bruises, no bleeding, no swollen glands  Integument: no lumps, mole changes, nail changes or rash  Endocrine: no malaise/lethargy or unexpected weight changes      Social History     Socioeconomic History    Marital status: SINGLE   Tobacco Use    Smoking status: Never    Smokeless tobacco: Never   Vaping Use    Vaping Use: Never used   Substance and Sexual Activity    Alcohol use: Not Currently    Drug use: Never    Sexual activity: Not Currently     Birth control/protection: None   Other Topics Concern     Service No    Blood Transfusions No    Caffeine Concern No    Occupational Exposure No    Hobby Hazards No    Sleep Concern Yes    Stress Concern No    Weight Concern Yes    Special Diet Yes     Comment: Renal Diet    Back Care Yes    Exercise No    Bike Helmet No    Seat Belt Yes    Self-Exams No   Social History Narrative    Medical History: Diverticulosis 2002DM 1995primary HTN 1995Obesity Dyslipidemia January 2012 moderate central stenosis L3-L4,    mild to moderate central stenosis at L4-L5 MRI    Gyn History: Last mammogram date 2013. Last menstrual perioddate hysterectomy  FOR FIBROIDS, PT W ITH ONE OVARY. Last    papdate . Menopausal hot flashes. Sexually active not currently sexually active. History of abnormal pap smears none. History of STDs    none. Vaginal discharge or odor none. HysterectomyDate NONE.    OB History: Total pregnancies 1. Pre term deliveries (>20-36.6 w eeks) 1. Surgical History: [facility] right breast bx , kidney stones colonoscopy     Hospitalization/Major Diagnostic Procedure: [facility] right breast bx , kidney stones         Family History: Mother:  52 yrs, skin d/oFather: deceasedAunt: alive, lung cancerAdopted: alive    Social History: Alcohol Use Patient does not use alcohol. Smoking Status Patient is a never smoker.          Marital Status: Single. Lives w ith: alone. Education/School: has masters degree-[employer]. Family History   Problem Relation Age of Onset    Cancer Mother     Ovarian Cancer Maternal Aunt     Breast Cancer Maternal Aunt     No Known Problems Father     Cancer Maternal Aunt     Hypertension Maternal Aunt     Cancer Maternal Uncle     Lung Disease Maternal Uncle     Lung Disease Maternal Uncle     Lung Disease Maternal Uncle         Mistake       OBJECTIVE:    Visit Vitals  /74 (BP 1 Location: Left upper arm, BP Patient Position: Sitting)   Pulse (!) 59   Temp 97.6 °F (36.4 °C) (Oral)   Resp 20   Ht 5' 3\" (1.6 m)   Wt 239 lb 1.6 oz (108.5 kg)   LMP  (LMP Unknown)   SpO2 97%   BMI 42.35 kg/m²     CONSTITUTIONAL: well , well nourished, appears age appropriate  EYES: perrla, eom intact  ENMT:moist mucous membranes, pharynx clear  NECK: supple. Thyroid normal  RESPIRATORY: Chest: clear bilaterally   CARDIOVASCULAR: Heart: regular rate and rhythm  GASTROINTESTINAL: Abdomen: soft, bowel sounds active  HEMATOLOGIC: no pathological lymph nodes palpated  MUSCULOSKELETAL: Extremities: no edema, pulse 1+   INTEGUMENT: No unusual rashes or suspicious skin lesions noted. Nails appear normal.  NEUROLOGIC: non-focal exam   MENTAL STATUS: alert and oriented, appropriate affect           ASSESSMENT:  1. MICHELLE on CPAP    2. Type 2 diabetes mellitus without complication, with long-term current use of insulin ([address]    3. Morbid obesity with BMI of 40.0-44.9, adult ([address]    4. Primary hypertension    5. Hypercholesterolemia      Obstructive sleep apnea, uses CPAP machine. We remind her that if she loses 50 lbs she will get off the CPAP machine. We have reminded her of that for years and nothing has changed. Blood sugar control remains poor. She claims she is trying to get her sugar under control. It will be interesting to see what her hemoglobin A1c is on this occasion. Morbid obesity remains a concern.  We advise her of the life expectancy with morbid obesity. We encourage a heart healthy, weight reducing diet. We offer surgical referral, as well as weight management referral, both of which are declined. Blood pressure control is at goal.    LDL is also at goal.    She will be back to see me in three months, sooner if she has any problems. I have discussed the diagnosis with the patient and the intended plan as seen in the  Orders. The patient understands and agees with the plan. The patient has   received an after visit summary and questions were answered concerning  future plans  Patient labs and/or xrays were reviewed  Past records were reviewed. PLAN:  . No orders of the defined types were placed in this encounter. Follow-up and Dispositions    Return in about 3 months (around 6/29/2023). ATTENTION:   This medical record was transcribed using an electronic medical records system. Although proofread, it may and can contain electronic and spelling errors. Other human spelling and other errors may be present. Corrections may be executed at a later time. Please feel free to contact us for any clarifications as needed.

## 2023-03-29 NOTE — PROGRESS NOTES
Chief Complaint   Patient presents with    Diabetes    Hypertension     YES Answers must have Comments  1. \"Have you been to the ER, urgent care clinic since your last visit? Hospitalized since your last visit? \"    [x] YES When Last week, Where [facility], and Reason for visit \" Thought had UTI  [] NO       2. Have you seen or consulted any other health care providers outside of [address] [place] [address] since your last visit?     [] YES   [x] NO       3. For patients aged 39-70: Have you had a colorectal cancer screening such as a colonoscopy/FIT/Cologuard? Nurse/CMA to request records if not in chart   [x] YES When 2/2023, Where Dr. Antonio Thomas, and Type? Colonoscopy  [] NO   [] NA, based on age    If the patient is female:      4. For female patients aged 41-77: Arthurine Cockayne you had a mammogram in the last two years?  Nurse/CMA to request records if not in chart   [x] YES Where [address]  [] NO   [] NA, based on age    11. For female patients aged 21-65: Arthurine Cockayne you had a pap smear?   Nurse/CMA to request records if not in chart   [] YES   [] NO  [x] NA, based on age

## 2023-05-05 ENCOUNTER — CLINICAL SUPPORT (OUTPATIENT)
Dept: INTERNAL MEDICINE CLINIC | Age: 73
End: 2023-05-05

## 2023-05-05 DIAGNOSIS — E11.9 TYPE 2 DIABETES MELLITUS WITHOUT COMPLICATION, WITH LONG-TERM CURRENT USE OF INSULIN (HCC): ICD-10-CM

## 2023-05-05 DIAGNOSIS — Z79.4 TYPE 2 DIABETES MELLITUS WITHOUT COMPLICATION, WITH LONG-TERM CURRENT USE OF INSULIN (HCC): ICD-10-CM

## 2023-05-05 DIAGNOSIS — I10 PRIMARY HYPERTENSION: Primary | ICD-10-CM

## 2023-05-05 LAB
ALBUMIN SERPL-MCNC: 3.8 G/DL (ref 3.5–5)
ANION GAP SERPL CALC-SCNC: 2 MMOL/L (ref 5–15)
BUN SERPL-MCNC: 21 MG/DL (ref 6–20)
BUN/CREAT SERPL: 17 (ref 12–20)
CALCIUM SERPL-MCNC: 9.4 MG/DL (ref 8.5–10.1)
CHLORIDE SERPL-SCNC: 111 MMOL/L (ref 97–108)
CO2 SERPL-SCNC: 28 MMOL/L (ref 21–32)
CREAT SERPL-MCNC: 1.22 MG/DL (ref 0.55–1.02)
EST. AVERAGE GLUCOSE BLD GHB EST-MCNC: 160 MG/DL
GLUCOSE SERPL-MCNC: 157 MG/DL (ref 65–100)
HBA1C MFR BLD: 7.2 % (ref 4–5.6)
PHOSPHATE SERPL-MCNC: 4 MG/DL (ref 2.6–4.7)
POTASSIUM SERPL-SCNC: 4.5 MMOL/L (ref 3.5–5.1)
SODIUM SERPL-SCNC: 141 MMOL/L (ref 136–145)

## 2023-05-14 RX ORDER — LANCETS 30 GAUGE
EACH MISCELLANEOUS
Qty: 100 EACH | Refills: 11 | Status: SHIPPED | OUTPATIENT
Start: 2023-05-14

## 2023-06-19 ENCOUNTER — OFFICE VISIT (OUTPATIENT)
Age: 73
End: 2023-06-19

## 2023-06-19 VITALS
SYSTOLIC BLOOD PRESSURE: 137 MMHG | HEART RATE: 64 BPM | DIASTOLIC BLOOD PRESSURE: 84 MMHG | BODY MASS INDEX: 42.69 KG/M2 | RESPIRATION RATE: 18 BRPM | WEIGHT: 241 LBS | TEMPERATURE: 97.1 F | OXYGEN SATURATION: 99 %

## 2023-06-19 DIAGNOSIS — N30.90 BLADDER INFECTION: ICD-10-CM

## 2023-06-19 DIAGNOSIS — N39.0 ACUTE UTI: Primary | ICD-10-CM

## 2023-06-19 LAB
BILIRUBIN, URINE, POC: NEGATIVE
BLOOD URINE, POC: NEGATIVE
GLUCOSE URINE, POC: NEGATIVE
KETONES, URINE, POC: NEGATIVE
LEUKOCYTE ESTERASE, URINE, POC: ABNORMAL
NITRITE, URINE, POC: NEGATIVE
PH, URINE, POC: 5.5 (ref 4.6–8)
PROTEIN,URINE, POC: ABNORMAL
SPECIFIC GRAVITY, URINE, POC: 1.03 (ref 1–1.03)
URINALYSIS CLARITY, POC: ABNORMAL
URINALYSIS COLOR, POC: YELLOW
UROBILINOGEN, POC: ABNORMAL

## 2023-06-19 RX ORDER — CEFDINIR 300 MG/1
300 CAPSULE ORAL 2 TIMES DAILY
Qty: 14 CAPSULE | Refills: 0 | Status: SHIPPED | OUTPATIENT
Start: 2023-06-19 | End: 2023-06-26

## 2023-06-19 NOTE — PROGRESS NOTES
present. Comments: Mild suprapubic TTP. No guarding. No rigidity. Skin:     Capillary Refill: Capillary refill takes less than 2 seconds. Coloration: Skin is not pale. Findings: No rash. Neurological:      Mental Status: She is alert and oriented to person, place, and time. Psychiatric:         Mood and Affect: Mood normal.         Behavior: Behavior normal.         Thought Content: Thought content normal.          Assessment/ Plan:     1. Acute UTI  -     cefdinir (OMNICEF) 300 MG capsule; Take 1 capsule by mouth 2 times daily for 7 days, Disp-14 capsule, R-0Normal  2. Bladder infection  -     AMB POC URINALYSIS DIP STICK AUTO W/O MICRO       UA Results: trace LUCIAN  Based on the results of your Urinalysis and your history of symptoms will treat for uncomplicated urinary tract infection. Please take antibiotic as prescribed until completion. Maintain adequate fluid intake. Test Results:  Recent Results (from the past 6 hour(s))   AMB POC URINALYSIS DIP STICK AUTO W/O MICRO    Collection Time: 06/19/23  4:04 PM   Result Value Ref Range    Color, Urine, POC Yellow     Clarity, Urine, POC Cloudy     Glucose, Urine, POC Negative Negative    Bilirubin, Urine, POC Negative Negative    Ketones, Urine, POC Negative Negative    Specific Gravity, Urine, POC 1.030 1.001 - 1.035    Blood, Urine, POC Negative Negative    pH, Urine, POC 5.5 4.6 - 8.0    Protein, Urine, POC 1+ Negative    Urobilinogen, POC 0.2 mg/dL     Nitrite, Urine, POC Negative Negative    Leukocyte Esterase, Urine, POC Trace Negative       Follow up: Follow up immediately for any new, worsening or changes or if symptoms are not improving over the next 4 days.          AILIN Monk - NP

## 2023-06-21 RX ORDER — LANCETS 30 GAUGE
EACH MISCELLANEOUS
Qty: 100 EACH | Refills: 11 | Status: SHIPPED | OUTPATIENT
Start: 2023-06-21

## 2023-07-01 DIAGNOSIS — E11.9 TYPE 2 DIABETES MELLITUS WITHOUT COMPLICATION, WITH LONG-TERM CURRENT USE OF INSULIN (HCC): Primary | ICD-10-CM

## 2023-07-01 DIAGNOSIS — Z79.4 TYPE 2 DIABETES MELLITUS WITHOUT COMPLICATION, WITH LONG-TERM CURRENT USE OF INSULIN (HCC): Primary | ICD-10-CM

## 2023-07-01 RX ORDER — LANCETS 30 GAUGE
EACH MISCELLANEOUS
Qty: 100 EACH | Refills: 11 | Status: SHIPPED | OUTPATIENT
Start: 2023-07-01

## 2023-07-19 ENCOUNTER — OFFICE VISIT (OUTPATIENT)
Facility: CLINIC | Age: 73
End: 2023-07-19
Payer: MEDICARE

## 2023-07-19 VITALS
HEART RATE: 64 BPM | OXYGEN SATURATION: 97 % | SYSTOLIC BLOOD PRESSURE: 136 MMHG | RESPIRATION RATE: 20 BRPM | TEMPERATURE: 97.8 F | DIASTOLIC BLOOD PRESSURE: 84 MMHG | HEIGHT: 63 IN | BODY MASS INDEX: 42.84 KG/M2 | WEIGHT: 241.8 LBS

## 2023-07-19 DIAGNOSIS — E78.00 HYPERCHOLESTEROLEMIA: ICD-10-CM

## 2023-07-19 DIAGNOSIS — G47.33 OSA ON CPAP: ICD-10-CM

## 2023-07-19 DIAGNOSIS — Z99.89 OSA ON CPAP: ICD-10-CM

## 2023-07-19 DIAGNOSIS — M17.0 PRIMARY OSTEOARTHRITIS OF BOTH KNEES: ICD-10-CM

## 2023-07-19 DIAGNOSIS — E66.01 MORBID OBESITY WITH BMI OF 40.0-44.9, ADULT (HCC): ICD-10-CM

## 2023-07-19 DIAGNOSIS — I10 PRIMARY HYPERTENSION: ICD-10-CM

## 2023-07-19 DIAGNOSIS — Z00.00 MEDICARE ANNUAL WELLNESS VISIT, SUBSEQUENT: Primary | ICD-10-CM

## 2023-07-19 DIAGNOSIS — E11.22 TYPE 2 DIABETES MELLITUS WITH STAGE 3A CHRONIC KIDNEY DISEASE, WITHOUT LONG-TERM CURRENT USE OF INSULIN (HCC): ICD-10-CM

## 2023-07-19 DIAGNOSIS — N18.31 TYPE 2 DIABETES MELLITUS WITH STAGE 3A CHRONIC KIDNEY DISEASE, WITHOUT LONG-TERM CURRENT USE OF INSULIN (HCC): ICD-10-CM

## 2023-07-19 PROCEDURE — G8399 PT W/DXA RESULTS DOCUMENT: HCPCS | Performed by: INTERNAL MEDICINE

## 2023-07-19 PROCEDURE — 36415 COLL VENOUS BLD VENIPUNCTURE: CPT | Performed by: INTERNAL MEDICINE

## 2023-07-19 PROCEDURE — 3078F DIAST BP <80 MM HG: CPT | Performed by: INTERNAL MEDICINE

## 2023-07-19 PROCEDURE — 99213 OFFICE O/P EST LOW 20 MIN: CPT | Performed by: INTERNAL MEDICINE

## 2023-07-19 PROCEDURE — 3075F SYST BP GE 130 - 139MM HG: CPT | Performed by: INTERNAL MEDICINE

## 2023-07-19 PROCEDURE — G0439 PPPS, SUBSEQ VISIT: HCPCS | Performed by: INTERNAL MEDICINE

## 2023-07-19 PROCEDURE — 2022F DILAT RTA XM EVC RTNOPTHY: CPT | Performed by: INTERNAL MEDICINE

## 2023-07-19 PROCEDURE — 3051F HG A1C>EQUAL 7.0%<8.0%: CPT | Performed by: INTERNAL MEDICINE

## 2023-07-19 PROCEDURE — 1123F ACP DISCUSS/DSCN MKR DOCD: CPT | Performed by: INTERNAL MEDICINE

## 2023-07-19 PROCEDURE — 1090F PRES/ABSN URINE INCON ASSESS: CPT | Performed by: INTERNAL MEDICINE

## 2023-07-19 PROCEDURE — G8427 DOCREV CUR MEDS BY ELIG CLIN: HCPCS | Performed by: INTERNAL MEDICINE

## 2023-07-19 PROCEDURE — G8417 CALC BMI ABV UP PARAM F/U: HCPCS | Performed by: INTERNAL MEDICINE

## 2023-07-19 PROCEDURE — 1036F TOBACCO NON-USER: CPT | Performed by: INTERNAL MEDICINE

## 2023-07-19 PROCEDURE — 3017F COLORECTAL CA SCREEN DOC REV: CPT | Performed by: INTERNAL MEDICINE

## 2023-07-19 ASSESSMENT — PATIENT HEALTH QUESTIONNAIRE - PHQ9
SUM OF ALL RESPONSES TO PHQ9 QUESTIONS 1 & 2: 0
SUM OF ALL RESPONSES TO PHQ QUESTIONS 1-9: 0
SUM OF ALL RESPONSES TO PHQ QUESTIONS 1-9: 0
1. LITTLE INTEREST OR PLEASURE IN DOING THINGS: 0
SUM OF ALL RESPONSES TO PHQ9 QUESTIONS 1 & 2: 0
SUM OF ALL RESPONSES TO PHQ QUESTIONS 1-9: 0
SUM OF ALL RESPONSES TO PHQ QUESTIONS 1-9: 0
1. LITTLE INTEREST OR PLEASURE IN DOING THINGS: 0
SUM OF ALL RESPONSES TO PHQ QUESTIONS 1-9: 0
SUM OF ALL RESPONSES TO PHQ9 QUESTIONS 1 & 2: 0
SUM OF ALL RESPONSES TO PHQ QUESTIONS 1-9: 0
SUM OF ALL RESPONSES TO PHQ QUESTIONS 1-9: 0
1. LITTLE INTEREST OR PLEASURE IN DOING THINGS: 0
SUM OF ALL RESPONSES TO PHQ QUESTIONS 1-9: 0
SUM OF ALL RESPONSES TO PHQ QUESTIONS 1-9: 0
2. FEELING DOWN, DEPRESSED OR HOPELESS: 0
SUM OF ALL RESPONSES TO PHQ QUESTIONS 1-9: 0
2. FEELING DOWN, DEPRESSED OR HOPELESS: 0
2. FEELING DOWN, DEPRESSED OR HOPELESS: 0

## 2023-07-19 ASSESSMENT — LIFESTYLE VARIABLES
HOW MANY STANDARD DRINKS CONTAINING ALCOHOL DO YOU HAVE ON A TYPICAL DAY: PATIENT DOES NOT DRINK
HOW OFTEN DO YOU HAVE A DRINK CONTAINING ALCOHOL: NEVER

## 2023-07-19 NOTE — PROGRESS NOTES
SPORTS MEDICINE AND PRIMARY CARE  Bridgette Swanson MD, [place], [address] [place] [address] [place] [address]  Phone:  [phone]  Fax: [phone]      Chief Complaint   Patient presents with    Medicare AWV         SUBECTIVE:    Curry Timmons is a 71 y.o. female Patient returns today with known history of diabetes, obesity, dyslipidemia, osteoarthritis, obstructive sleep apnea and is seen for evaluation. Current Outpatient Medications   Medication Sig Dispense Refill    Lancets (ONETOUCH DELICA PLUS [id number]) MISC USE TO TEST BLOOD SUGAR 3 TIMES A DAY. DX.E11.9 100 each 11    amLODIPine (NORVASC) 10 MG tablet Take 1 tablet by mouth daily      cloNIDine (CATAPRES) 0.2 MG tablet Take 1 tablet by mouth 2 times daily      insulin aspart (NOVOLOG FLEXPEN) 100 UNIT/ML injection pen INJECT 12 UNITS SUBCUTANEOUSLY BEFORE BREAKFAST, LUNCH AND DINNER      insulin glargine (BASAGLAR KWIKPEN) 100 UNIT/ML injection pen INJECT 40 UNITS SUBCUTANEOUSLY UNDER THE SKIN EVERY NIGHT      metoprolol tartrate (LOPRESSOR) 25 MG tablet TAKE 1/2 TABS BY MOUTH TWO (2) TIMES A DAY. pantoprazole (PROTONIX) 40 MG tablet Take 1 tablet by mouth every morning (before breakfast)      simvastatin (ZOCOR) 40 MG tablet Take 1 tablet by mouth nightly       No current facility-administered medications for this visit. Past Medical History:   Diagnosis Date    Cellulitis of right abdominal wall 12/18/2017    dre anderson md    Chronic obstructive pulmonary disease ([address]) 2020    Diabetes (HCC) 1995    DJD (degenerative joint disease) of knee     [facility]. [facility]    Fall at home, sequela 07/05/2019    Gastroenteritis     History of nuclear stress test 08/03/2020    No evidence is of ischemia or infarction. Left ventricular ejection fraction measures 71%.     Hypercholesterolemia     Hypertension     Kidney lesion, native, left     Knee pain     LBP (low back pain)     chiorpactor    Lumbar stenosis     Obesity     CHAVEZ on CPAP

## 2023-07-20 NOTE — PROGRESS NOTES
__________ (clipped audio). We encouraged her to continue a heart healthy, diabetic, low cholesterol diet. Morbid obesity remains a concern. She claims she has lost inches. Osteoarthritis of knees remains symptomatic. She has dyslipidemia. I want the LDL less than 70. It was 90 on Simvastatin. She is willing to change to Rosuvastatin depending on the cholesterol result today. Repeat blood pressure is acceptable at 136/84. She uses a CPAP machine for obstructive sleep apnea. She will be back to see me in three months, sooner if she has any problems.

## 2023-07-24 ENCOUNTER — TELEPHONE (OUTPATIENT)
Facility: CLINIC | Age: 73
End: 2023-07-24

## 2023-07-24 DIAGNOSIS — E11.22 TYPE 2 DIABETES MELLITUS WITH STAGE 3A CHRONIC KIDNEY DISEASE, WITHOUT LONG-TERM CURRENT USE OF INSULIN (HCC): ICD-10-CM

## 2023-07-24 DIAGNOSIS — N18.31 TYPE 2 DIABETES MELLITUS WITH STAGE 3A CHRONIC KIDNEY DISEASE, WITHOUT LONG-TERM CURRENT USE OF INSULIN (HCC): ICD-10-CM

## 2023-07-24 LAB
ANION GAP SERPL CALC-SCNC: 5 MMOL/L (ref 5–15)
BUN SERPL-MCNC: 22 MG/DL (ref 6–20)
BUN/CREAT SERPL: 22 (ref 12–20)
CALCIUM SERPL-MCNC: 9.3 MG/DL (ref 8.5–10.1)
CHLORIDE SERPL-SCNC: 111 MMOL/L (ref 97–108)
CHOLEST SERPL-MCNC: 127 MG/DL
CO2 SERPL-SCNC: 27 MMOL/L (ref 21–32)
CREAT SERPL-MCNC: 1.02 MG/DL (ref 0.55–1.02)
GLUCOSE SERPL-MCNC: 96 MG/DL (ref 65–100)
HCV AB SERPL QL IA: NONREACTIVE
HDLC SERPL-MCNC: 46 MG/DL
HDLC SERPL: 2.8 (ref 0–5)
LDLC SERPL CALC-MCNC: 67.2 MG/DL (ref 0–100)
POTASSIUM SERPL-SCNC: 4.3 MMOL/L (ref 3.5–5.1)
SODIUM SERPL-SCNC: 143 MMOL/L (ref 136–145)
TRIGL SERPL-MCNC: 69 MG/DL
VLDLC SERPL CALC-MCNC: 13.8 MG/DL

## 2023-07-24 NOTE — TELEPHONE ENCOUNTER
Ms. Raji Garrett called about some cholesterol meds after the most recent encounter.  She Would  like start taking the new medication her  pharmacy  is [facility]  [phone]

## 2023-07-26 ENCOUNTER — TRANSCRIBE ORDERS (OUTPATIENT)
Facility: HOSPITAL | Age: 73
End: 2023-07-26

## 2023-07-26 DIAGNOSIS — Z12.31 VISIT FOR SCREENING MAMMOGRAM: Primary | ICD-10-CM

## 2023-07-31 ENCOUNTER — HOSPITAL ENCOUNTER (OUTPATIENT)
Facility: HOSPITAL | Age: 73
Discharge: HOME | End: 2023-08-03
Attending: INTERNAL MEDICINE
Payer: MEDICARE

## 2023-07-31 DIAGNOSIS — Z12.31 VISIT FOR SCREENING MAMMOGRAM: ICD-10-CM

## 2023-07-31 PROCEDURE — 77067 SCR MAMMO BI INCL CAD: CPT

## 2023-09-07 RX ORDER — INSULIN ASPART 100 [IU]/ML
INJECTION, SOLUTION INTRAVENOUS; SUBCUTANEOUS
Qty: 30 ADJUSTABLE DOSE PRE-FILLED PEN SYRINGE | Refills: 11 | Status: SHIPPED | OUTPATIENT
Start: 2023-09-07

## 2023-10-10 RX ORDER — INSULIN GLARGINE 100 [IU]/ML
INJECTION, SOLUTION SUBCUTANEOUS
Qty: 5 ADJUSTABLE DOSE PRE-FILLED PEN SYRINGE | Refills: 11 | Status: SHIPPED | OUTPATIENT
Start: 2023-10-10

## 2023-10-10 RX ORDER — CLONIDINE HYDROCHLORIDE 0.2 MG/1
0.2 TABLET ORAL 2 TIMES DAILY
Qty: 180 TABLET | Refills: 3 | Status: SHIPPED | OUTPATIENT
Start: 2023-10-10

## 2023-10-10 RX ORDER — PANTOPRAZOLE SODIUM 40 MG/1
40 TABLET, DELAYED RELEASE ORAL
Qty: 90 TABLET | Refills: 3 | Status: SHIPPED | OUTPATIENT
Start: 2023-10-10

## 2023-10-25 ENCOUNTER — TELEPHONE (OUTPATIENT)
Age: 73
End: 2023-10-25

## 2023-10-25 ENCOUNTER — OFFICE VISIT (OUTPATIENT)
Facility: CLINIC | Age: 73
End: 2023-10-25
Payer: MEDICARE

## 2023-10-25 VITALS
RESPIRATION RATE: 20 BRPM | TEMPERATURE: 97.9 F | BODY MASS INDEX: 42.88 KG/M2 | SYSTOLIC BLOOD PRESSURE: 105 MMHG | DIASTOLIC BLOOD PRESSURE: 67 MMHG | HEART RATE: 61 BPM | HEIGHT: 63 IN | WEIGHT: 242 LBS | OXYGEN SATURATION: 100 %

## 2023-10-25 DIAGNOSIS — F32.A DEPRESSION, UNSPECIFIED DEPRESSION TYPE: ICD-10-CM

## 2023-10-25 DIAGNOSIS — M48.061 SPINAL STENOSIS OF LUMBAR REGION WITHOUT NEUROGENIC CLAUDICATION: ICD-10-CM

## 2023-10-25 DIAGNOSIS — Z11.59 ENCOUNTER FOR SCREENING FOR OTHER VIRAL DISEASES: ICD-10-CM

## 2023-10-25 DIAGNOSIS — E66.01 MORBID OBESITY WITH BMI OF 40.0-44.9, ADULT (HCC): ICD-10-CM

## 2023-10-25 DIAGNOSIS — Z98.890 H/O COLONOSCOPY: ICD-10-CM

## 2023-10-25 DIAGNOSIS — N18.31 TYPE 2 DIABETES MELLITUS WITH STAGE 3A CHRONIC KIDNEY DISEASE, WITHOUT LONG-TERM CURRENT USE OF INSULIN (HCC): Primary | ICD-10-CM

## 2023-10-25 DIAGNOSIS — E11.22 TYPE 2 DIABETES MELLITUS WITH STAGE 3A CHRONIC KIDNEY DISEASE, WITHOUT LONG-TERM CURRENT USE OF INSULIN (HCC): Primary | ICD-10-CM

## 2023-10-25 DIAGNOSIS — G47.33 OSA ON CPAP: ICD-10-CM

## 2023-10-25 DIAGNOSIS — E78.00 HYPERCHOLESTEROLEMIA: ICD-10-CM

## 2023-10-25 DIAGNOSIS — I10 PRIMARY HYPERTENSION: ICD-10-CM

## 2023-10-25 LAB
ALBUMIN SERPL-MCNC: 3.8 G/DL (ref 3.5–5)
ALBUMIN/GLOB SERPL: 1.1 (ref 1.1–2.2)
ALP SERPL-CCNC: 62 U/L (ref 45–117)
ALT SERPL-CCNC: 22 U/L (ref 12–78)
ANION GAP SERPL CALC-SCNC: 2 MMOL/L (ref 5–15)
AST SERPL-CCNC: 11 U/L (ref 15–37)
BILIRUB SERPL-MCNC: 0.3 MG/DL (ref 0.2–1)
BUN SERPL-MCNC: 20 MG/DL (ref 6–20)
BUN/CREAT SERPL: 18 (ref 12–20)
CALCIUM SERPL-MCNC: 9.7 MG/DL (ref 8.5–10.1)
CHLORIDE SERPL-SCNC: 113 MMOL/L (ref 97–108)
CHOLEST SERPL-MCNC: 146 MG/DL
CO2 SERPL-SCNC: 29 MMOL/L (ref 21–32)
CREAT SERPL-MCNC: 1.11 MG/DL (ref 0.55–1.02)
GLOBULIN SER CALC-MCNC: 3.4 G/DL (ref 2–4)
GLUCOSE SERPL-MCNC: 81 MG/DL (ref 65–100)
HDLC SERPL-MCNC: 49 MG/DL
HDLC SERPL: 3 (ref 0–5)
LDLC SERPL CALC-MCNC: 78.2 MG/DL (ref 0–100)
POTASSIUM SERPL-SCNC: 4.5 MMOL/L (ref 3.5–5.1)
PROT SERPL-MCNC: 7.2 G/DL (ref 6.4–8.2)
SODIUM SERPL-SCNC: 144 MMOL/L (ref 136–145)
TRIGL SERPL-MCNC: 94 MG/DL
VLDLC SERPL CALC-MCNC: 18.8 MG/DL

## 2023-10-25 PROCEDURE — 1090F PRES/ABSN URINE INCON ASSESS: CPT | Performed by: INTERNAL MEDICINE

## 2023-10-25 PROCEDURE — 1123F ACP DISCUSS/DSCN MKR DOCD: CPT | Performed by: INTERNAL MEDICINE

## 2023-10-25 PROCEDURE — 3051F HG A1C>EQUAL 7.0%<8.0%: CPT | Performed by: INTERNAL MEDICINE

## 2023-10-25 PROCEDURE — G8399 PT W/DXA RESULTS DOCUMENT: HCPCS | Performed by: INTERNAL MEDICINE

## 2023-10-25 PROCEDURE — 2022F DILAT RTA XM EVC RTNOPTHY: CPT | Performed by: INTERNAL MEDICINE

## 2023-10-25 PROCEDURE — 36415 COLL VENOUS BLD VENIPUNCTURE: CPT | Performed by: INTERNAL MEDICINE

## 2023-10-25 PROCEDURE — 3078F DIAST BP <80 MM HG: CPT | Performed by: INTERNAL MEDICINE

## 2023-10-25 PROCEDURE — G8417 CALC BMI ABV UP PARAM F/U: HCPCS | Performed by: INTERNAL MEDICINE

## 2023-10-25 PROCEDURE — G8484 FLU IMMUNIZE NO ADMIN: HCPCS | Performed by: INTERNAL MEDICINE

## 2023-10-25 PROCEDURE — 1036F TOBACCO NON-USER: CPT | Performed by: INTERNAL MEDICINE

## 2023-10-25 PROCEDURE — 99214 OFFICE O/P EST MOD 30 MIN: CPT | Performed by: INTERNAL MEDICINE

## 2023-10-25 PROCEDURE — G8427 DOCREV CUR MEDS BY ELIG CLIN: HCPCS | Performed by: INTERNAL MEDICINE

## 2023-10-25 PROCEDURE — 3017F COLORECTAL CA SCREEN DOC REV: CPT | Performed by: INTERNAL MEDICINE

## 2023-10-25 PROCEDURE — 3074F SYST BP LT 130 MM HG: CPT | Performed by: INTERNAL MEDICINE

## 2023-10-25 SDOH — ECONOMIC STABILITY: INCOME INSECURITY: HOW HARD IS IT FOR YOU TO PAY FOR THE VERY BASICS LIKE FOOD, HOUSING, MEDICAL CARE, AND HEATING?: NOT HARD AT ALL

## 2023-10-25 SDOH — ECONOMIC STABILITY: FOOD INSECURITY: WITHIN THE PAST 12 MONTHS, YOU WORRIED THAT YOUR FOOD WOULD RUN OUT BEFORE YOU GOT MONEY TO BUY MORE.: NEVER TRUE

## 2023-10-25 SDOH — ECONOMIC STABILITY: FOOD INSECURITY: WITHIN THE PAST 12 MONTHS, THE FOOD YOU BOUGHT JUST DIDN'T LAST AND YOU DIDN'T HAVE MONEY TO GET MORE.: NEVER TRUE

## 2023-10-25 SDOH — ECONOMIC STABILITY: HOUSING INSECURITY
IN THE LAST 12 MONTHS, WAS THERE A TIME WHEN YOU DID NOT HAVE A STEADY PLACE TO SLEEP OR SLEPT IN A SHELTER (INCLUDING NOW)?: NO

## 2023-10-25 ASSESSMENT — PATIENT HEALTH QUESTIONNAIRE - PHQ9
1. LITTLE INTEREST OR PLEASURE IN DOING THINGS: 0
SUM OF ALL RESPONSES TO PHQ QUESTIONS 1-9: 0
SUM OF ALL RESPONSES TO PHQ9 QUESTIONS 1 & 2: 0
2. FEELING DOWN, DEPRESSED OR HOPELESS: 0

## 2023-10-25 ASSESSMENT — ANXIETY QUESTIONNAIRES
5. BEING SO RESTLESS THAT IT IS HARD TO SIT STILL: 0
GAD7 TOTAL SCORE: 0
IF YOU CHECKED OFF ANY PROBLEMS ON THIS QUESTIONNAIRE, HOW DIFFICULT HAVE THESE PROBLEMS MADE IT FOR YOU TO DO YOUR WORK, TAKE CARE OF THINGS AT HOME, OR GET ALONG WITH OTHER PEOPLE: NOT DIFFICULT AT ALL
1. FEELING NERVOUS, ANXIOUS, OR ON EDGE: 0
7. FEELING AFRAID AS IF SOMETHING AWFUL MIGHT HAPPEN: 0
6. BECOMING EASILY ANNOYED OR IRRITABLE: 0
2. NOT BEING ABLE TO STOP OR CONTROL WORRYING: 0
4. TROUBLE RELAXING: 0
3. WORRYING TOO MUCH ABOUT DIFFERENT THINGS: 0

## 2023-10-25 NOTE — TELEPHONE ENCOUNTER
Contacted patient regarding referral. Sent the link for Medical Weight Loss Orientation via e-mail.  Patient is required to register, view the recording, call insurance to verify benefits, if interested then send an email to the

## 2023-10-25 NOTE — PROGRESS NOTES
Rell Palacios is a 71 y.o. female    Chief Complaint   Patient presents with    3 Month Follow-Up     1. Have you been to the ER, urgent care clinic since your last visit? Hospitalized since your last visit? No    2. Have you seen or consulted any other health care providers outside of the [address] [place] [address] Avenue since your last visit? Include any pap smears or colon screening.  No
sequela 07/05/2019    Gastroenteritis     H/O colonoscopy     md Larry - 5 yrs    History of nuclear stress test 08/03/2020    No evidence is of ischemia or infarction. Left ventricular ejection fraction measures 71%.     Hypercholesterolemia     Hypertension     Kidney lesion, native, left     Knee pain     LBP (low back pain)     chiorpactor    Lumbar stenosis     Obesity     CHAVEZ on CPAP     Rotator cuff arthropathy of right shoulder 06/22/2018    S/P colonoscopy 12/18/2012    Subcutaneous nodule of right forearm 08/03/2022    Trigger finger, left middle finger 03/24/2022     Past Surgical History:   Procedure Laterality Date    BREAST BIOPSY Right 1990    negative    COLONOSCOPY N/A 2/17/2023    COLONOSCOPY performed by Estelle Sinclair MD at [facility] [facility] ([facility])      OTHER SURGICAL HISTORY  12/23/2017    I&D infected abscess     OVARY REMOVAL N/A      No Known Allergies      REVIEW OF SYSTEMS:  General: negative for - chills or fever  ENT: negative for - headaches, nasal congestion or tinnitus  Respiratory: negative for - cough, hemoptysis, shortness of breath or wheezing  Cardiovascular : negative for - chest pain, edema, palpitations or shortness of breath  Gastrointestinal: negative for - abdominal pain, blood in stools, heartburn or nausea/vomiting  Genito-Urinary: no dysuria, trouble voiding, or hematuria  Musculoskeletal: negative for - gait disturbance, joint pain, joint stiffness or joint swelling  Neurological: no TIA or stroke symptoms  Hematologic: no bruises, no bleeding, no swollen glands  Integument: no lumps, mole changes, nail changes or rash  Endocrine: no malaise/lethargy or unexpected weight changes      Social History     Socioeconomic History    Marital status: Single     Spouse name: None    Number of children: None    Years of education: None    Highest education level: None   Tobacco Use    Smoking status: Never    Smokeless tobacco:

## 2023-10-26 ENCOUNTER — TELEPHONE (OUTPATIENT)
Age: 73
End: 2023-10-26

## 2023-10-26 LAB
APPEARANCE UR: CLEAR
BACTERIA URNS QL MICRO: NEGATIVE /HPF
BASOPHILS # BLD: 0 K/UL (ref 0–0.1)
BASOPHILS NFR BLD: 0 % (ref 0–1)
BILIRUB UR QL: NEGATIVE
COLOR UR: NORMAL
CREAT UR-MCNC: 160 MG/DL
DIFFERENTIAL METHOD BLD: ABNORMAL
EOSINOPHIL # BLD: 0 K/UL (ref 0–0.4)
EOSINOPHIL NFR BLD: 0 % (ref 0–7)
EPITH CASTS URNS QL MICRO: NORMAL /LPF
ERYTHROCYTE [DISTWIDTH] IN BLOOD BY AUTOMATED COUNT: 14.2 % (ref 11.5–14.5)
EST. AVERAGE GLUCOSE BLD GHB EST-MCNC: 166 MG/DL
GLUCOSE UR STRIP.AUTO-MCNC: NEGATIVE MG/DL
HBA1C MFR BLD: 7.4 % (ref 4–5.6)
HBV SURFACE AB SER QL: NONREACTIVE
HBV SURFACE AB SER-ACNC: 3.23 MIU/ML
HBV SURFACE AG SER QL: <0.1 INDEX
HBV SURFACE AG SER QL: NEGATIVE
HCT VFR BLD AUTO: 48.4 % (ref 35–47)
HGB BLD-MCNC: 15.4 G/DL (ref 11.5–16)
HGB UR QL STRIP: NEGATIVE
IMM GRANULOCYTES # BLD AUTO: 0 K/UL (ref 0–0.04)
IMM GRANULOCYTES NFR BLD AUTO: 0 % (ref 0–0.5)
KETONES UR QL STRIP.AUTO: NEGATIVE MG/DL
LEUKOCYTE ESTERASE UR QL STRIP.AUTO: NEGATIVE
LYMPHOCYTES # BLD: 1.8 K/UL (ref 0.8–3.5)
LYMPHOCYTES NFR BLD: 26 % (ref 12–49)
MCH RBC QN AUTO: 29 PG (ref 26–34)
MCHC RBC AUTO-ENTMCNC: 31.8 G/DL (ref 30–36.5)
MCV RBC AUTO: 91.1 FL (ref 80–99)
MICROALBUMIN UR-MCNC: 2.95 MG/DL
MICROALBUMIN/CREAT UR-RTO: 18 MG/G (ref 0–30)
MONOCYTES # BLD: 0.6 K/UL (ref 0–1)
MONOCYTES NFR BLD: 9 % (ref 5–13)
NEUTS SEG # BLD: 4.5 K/UL (ref 1.8–8)
NEUTS SEG NFR BLD: 65 % (ref 32–75)
NITRITE UR QL STRIP.AUTO: NEGATIVE
NRBC # BLD: 0 K/UL (ref 0–0.01)
NRBC BLD-RTO: 0 PER 100 WBC
PH UR STRIP: 5.5 (ref 5–8)
PLATELET # BLD AUTO: 224 K/UL (ref 150–400)
PMV BLD AUTO: 11.9 FL (ref 8.9–12.9)
PROT UR STRIP-MCNC: NEGATIVE MG/DL
RBC # BLD AUTO: 5.31 M/UL (ref 3.8–5.2)
RBC #/AREA URNS HPF: NORMAL /HPF (ref 0–5)
SP GR UR REFRACTOMETRY: 1.02 (ref 1–1.03)
TSH SERPL DL<=0.05 MIU/L-ACNC: 1.14 UIU/ML (ref 0.36–3.74)
UROBILINOGEN UR QL STRIP.AUTO: 0.2 EU/DL (ref 0.2–1)
WBC # BLD AUTO: 7 K/UL (ref 3.6–11)
WBC URNS QL MICRO: NORMAL /HPF (ref 0–4)

## 2023-10-26 NOTE — TELEPHONE ENCOUNTER
Patient called regarding referral. Patient will determine if program will fit into schedule.  Advised we follow up in 2 weeks to allow patient time to make a decision

## 2023-10-27 LAB — HBV CORE AB SERPL QL IA: NEGATIVE

## 2023-11-03 DIAGNOSIS — E66.01 MORBID OBESITY WITH BMI OF 40.0-44.9, ADULT (HCC): Primary | ICD-10-CM

## 2023-11-03 DIAGNOSIS — E11.22 TYPE 2 DIABETES MELLITUS WITH STAGE 3A CHRONIC KIDNEY DISEASE, WITHOUT LONG-TERM CURRENT USE OF INSULIN (HCC): ICD-10-CM

## 2023-11-03 DIAGNOSIS — N18.31 TYPE 2 DIABETES MELLITUS WITH STAGE 3A CHRONIC KIDNEY DISEASE, WITHOUT LONG-TERM CURRENT USE OF INSULIN (HCC): ICD-10-CM

## 2023-11-16 RX ORDER — INSULIN ASPART INJECTION 100 [IU]/ML
12 INJECTION, SOLUTION SUBCUTANEOUS
Qty: 12 ADJUSTABLE DOSE PRE-FILLED PEN SYRINGE | Refills: 11 | Status: SHIPPED | OUTPATIENT
Start: 2023-11-16

## 2023-11-16 RX ORDER — INSULIN PEN,REUSABLE,BT LISPRO
12 INSULIN PEN (EA) SUBCUTANEOUS
Qty: 10 EACH | Refills: 11 | Status: SHIPPED | OUTPATIENT
Start: 2023-11-16

## 2023-11-16 RX ORDER — PEN NEEDLE, DIABETIC 31 G X1/4"
1 NEEDLE, DISPOSABLE MISCELLANEOUS DAILY
Qty: 100 EACH | Refills: 11 | Status: SHIPPED | OUTPATIENT
Start: 2023-11-16

## 2023-11-18 RX ORDER — SIMVASTATIN 40 MG
40 TABLET ORAL
Qty: 90 TABLET | Refills: 3 | Status: SHIPPED | OUTPATIENT
Start: 2023-11-18

## 2023-12-06 ENCOUNTER — HOSPITAL ENCOUNTER (OUTPATIENT)
Facility: HOSPITAL | Age: 73
Setting detail: RECURRING SERIES
Discharge: HOME | End: 2023-12-09
Payer: MEDICARE

## 2023-12-06 PROCEDURE — 97802 MEDICAL NUTRITION INDIV IN: CPT | Performed by: DIETITIAN, REGISTERED

## 2023-12-06 NOTE — PROGRESS NOTES
[address]     Nutrition Assessment - Medical Nutrition Therapy   Outpatient Initial Evaluation         Patient Name: Lorri Arguelles : 1950   Treatment Diagnosis: E66.01, Z68.41 (ICD-10-CM) - Morbid obesity with BMI of 40.0-44.9, adult ([address])   E11.22, N18.31 (ICD-10-CM) - Type 2 diabetes mellitus with stage 3a chronic kidney disease, without long-term current use of insulin [facility])      Referral Source: Winnie Monte, * Start of Care ([facility]): 2023     In time:   11:12pm             Out time:   12:15pm   Total Treatment Time (min):   61     Gender: female Age: 71 y.o.    Ht: 62 in Wt: 240 (pt report)  lb  kg   BMI: 43.9 (obesity III)  BMR   Male  BMR Female 1557     Past Medical History:  Patient Active Problem List   Diagnosis    Low back pain    Cellulitis of right abdominal wall    Rotator cuff arthropathy of right shoulder    GUTIERRES (dyspnea on exertion)    Gastroenteritis    Other chest pain    Lumbar stenosis    Cervical spinal stenosis    Fall at home, sequela    CHAVEZ on CPAP    DJD (degenerative joint disease) of knee    Knee pain    Shoulder pain, right    Hypercholesterolemia    History of nuclear stress test    Hypertension    Morbid obesity with BMI of 40.0-44.9, adult (HCC)    Trigger finger, left middle finger    Subcutaneous nodule of right forearm    Diabetes ([address])    H/O colonoscopy        Pertinent Medications:     Current Outpatient Medications:     simvastatin (ZOCOR) 40 MG tablet, TAKE 1 TABLET BY MOUTH EVERY DAY AT NIGHT, Disp: 90 tablet, Rfl: 3    Injection Device for Insulin (INPEN 100-PINK-NOVOLOG-FIASP) RICK, 12 Units by Does not apply route 3 times daily (before meals), Disp: 10 each, Rfl: 11    Insulin Aspart, w/Niacinamide, (FIASP FLEXTOUCH) 100 UNIT/ML SOPN, Inject 12 Units into the skin 3 times daily (before meals), Disp: 12 Adjustable Dose Pre-filled Pen Syringe, Rfl: 11    Insulin Pen Needle ([employer] PEN NEEDLES) 31G X 6 MM MISC,

## 2024-01-17 RX ORDER — BLOOD SUGAR DIAGNOSTIC
STRIP MISCELLANEOUS
Qty: 300 EACH | Refills: 3 | Status: SHIPPED | OUTPATIENT
Start: 2024-01-17 | End: 2024-01-20 | Stop reason: SDUPTHER

## 2024-01-20 RX ORDER — BLOOD SUGAR DIAGNOSTIC
STRIP MISCELLANEOUS
Qty: 300 EACH | Refills: 3 | Status: SHIPPED | OUTPATIENT
Start: 2024-01-20

## 2024-02-01 ENCOUNTER — TELEPHONE (OUTPATIENT)
Facility: HOSPITAL | Age: 74
End: 2024-02-01

## 2024-02-01 NOTE — TELEPHONE ENCOUNTER
Pt called about referral to [facility]. She has been trying to get scheduled since our last visit in December but has not been able to be scheduled. She is wondering if there is another Dietitian or group to see for a sooner apt. I reached out to the [facility] coordinator via email to see what the best course was.   Pt is welcome to follow up with our OP Dietitians but [facility]     Ernestine Izaguirre, MS, RD, CSSD

## 2024-03-01 ENCOUNTER — OFFICE VISIT (OUTPATIENT)
Age: 74
End: 2024-03-01

## 2024-03-01 DIAGNOSIS — E11.22 TYPE 2 DIABETES MELLITUS WITH DIABETIC CHRONIC KIDNEY DISEASE, UNSPECIFIED CKD STAGE, UNSPECIFIED WHETHER LONG TERM INSULIN USE (HCC): Primary | ICD-10-CM

## 2024-03-01 NOTE — PROGRESS NOTES
[facility]  Diabetes Self-Management Education & Support Program    Reason for Referral: DM 2  Referral Source: Tim Jesus, *  Services requested: DSMES       ASSESSMENT    From my perspective, the participant would benefit from DSMES specifically related to reducing risks, healthy eating, monitoring, taking medications, physical activity, healthy coping, and problem solving. Will adapt DSMES program to build on participant's skills score, confidence score, and preparedness score as noted in the Diabetes Skills, Confidence, and Preparedness Index.    During the program, we will focus on providing DSMES that specifically addresses participant's interest in reducing risks, healthy eating, monitoring, taking medications, physical activity, healthy coping, and problem solving, as shown by their reported readiness to change.    The participant would be best served by attending weekly individual sessions.    Diabetes Self-Management Education Follow-up Visit: 3/29/24       Clinical Presentation  Tiana Lipscomb is a 73 y.o.  female referred for diabetes self-management education. Participant has Type 2 DM on insulin for 11-20 years. Family history negative for diabetes. Patient reports not receiving DSMES services in the past. Most recent A1c value:   Lab Results   Component Value Date/Time    LABA1C 7.4 10/25/2023 10:30 AM       Diabetes-related medical history:    Neurological complications  peripheral neuropathy    Microvascular disease  nephropathy      Diabetes-related medications:  Current dosing:   Basaglar KwikPen Sopn - 100 UNIT/ML- 44 units daily  Fiasp insulin - 14 units before meals     Blood Pressure Management  This patient does not have an active medication from one of the medication groupers.      Lipid Management  simvastatin - 40 MG      Clot Prevention  This patient does not have an active medication from one of the medication

## 2024-03-05 ASSESSMENT — SLEEP AND FATIGUE QUESTIONNAIRES
HOW LIKELY ARE YOU TO NOD OFF OR FALL ASLEEP WHILE LYING DOWN TO REST IN THE AFTERNOON WHEN CIRCUMSTANCES PERMIT: SLIGHT CHANCE OF DOZING
DO YOU HAVE DIFFICULTY BEING AS ACTIVE AS YOU WANT TO BE IN THE MORNING BECAUSE YOU ARE SLEEPY OR TIRED: NO
DO YOU HAVE DIFFICULTY OPERATING A MOTOR VEHICLE FOR SHORT DISTANCES (LESS THAN 100 MILES) BECAUSE YOU BECOME SLEEPY: NO
HOW LIKELY ARE YOU TO NOD OFF OR FALL ASLEEP WHILE WATCHING TV: 2
HAS YOUR MOOD BEEN AFFECTED BECAUSE YOU ARE SLEEPY OR TIRED: NO
DO YOU HAVE DIFFICULTY WATCHING A MOVIE OR VIDEO BECAUSE YOU BECOME SLEEPY OR TIRED: YES, A LITTLE
HOW LIKELY ARE YOU TO NOD OFF OR FALL ASLEEP WHILE SITTING INACTIVE IN A PUBLIC PLACE: SLIGHT CHANCE OF DOZING
HOW LIKELY ARE YOU TO NOD OFF OR FALL ASLEEP WHILE SITTING QUIETLY AFTER LUNCH WITHOUT ALCOHOL: SLIGHT CHANCE OF DOZING
FOSQ SCORE: 18.5
HOW LIKELY ARE YOU TO NOD OFF OR FALL ASLEEP WHEN YOU ARE A PASSENGER IN A CAR FOR AN HOUR WITHOUT A BREAK: 2
HOW LIKELY ARE YOU TO NOD OFF OR FALL ASLEEP WHILE SITTING AND READING: 2
DO YOU GENERALLY HAVE DIFFICULTY REMEMBERING THINGS BECAUSE YOU ARE SLEEPY OR TIRED: NO
HOW LIKELY ARE YOU TO NOD OFF OR FALL ASLEEP WHILE SITTING AND TALKING TO SOMEONE: 1
DO YOU HAVE DIFFICULTY VISITING YOUR FAMILY OR FRIENDS IN THEIR HOME BECAUSE YOU BECOME SLEEPY OR TIRED: YES, A LITTLE
DO YOU HAVE DIFFICULTY BEING AS ACTIVE AS YOU WANT TO BE IN THE EVENING BECAUSE YOU ARE SLEEPY OR TIRED: NO
HOW LIKELY ARE YOU TO NOD OFF OR FALL ASLEEP WHILE WATCHING TV: MODERATE CHANCE OF DOZING
DO YOU HAVE DIFFICULTY CONCENTRATING ON THE THINGS YOU DO BECAUSE YOU ARE SLEEPY OR TIRED: YES, A LITTLE
ESS TOTAL SCORE: 10
HOW LIKELY ARE YOU TO NOD OFF OR FALL ASLEEP WHILE SITTING AND TALKING TO SOMEONE: SLIGHT CHANCE OF DOZING
HAS YOUR RELATIONSHIP WITH FAMILY, FRIENDS OR WORK COLLEAGUES BEEN AFFECTED BECAUSE YOU ARE SLEEPY OR TIRED: NO
HOW LIKELY ARE YOU TO NOD OFF OR FALL ASLEEP WHILE SITTING AND READING: MODERATE CHANCE OF DOZING
HOW LIKELY ARE YOU TO NOD OFF OR FALL ASLEEP WHILE SITTING QUIETLY AFTER LUNCH WITHOUT ALCOHOL: 1
HOW LIKELY ARE YOU TO NOD OFF OR FALL ASLEEP IN A CAR, WHILE STOPPED FOR A FEW MINUTES IN TRAFFIC: WOULD NEVER DOZE
HOW LIKELY ARE YOU TO NOD OFF OR FALL ASLEEP WHEN YOU ARE A PASSENGER IN A CAR FOR AN HOUR WITHOUT A BREAK: MODERATE CHANCE OF DOZING
HOW LIKELY ARE YOU TO NOD OFF OR FALL ASLEEP WHILE SITTING INACTIVE IN A PUBLIC PLACE: 1
HOW LIKELY ARE YOU TO NOD OFF OR FALL ASLEEP WHILE LYING DOWN TO REST IN THE AFTERNOON WHEN CIRCUMSTANCES PERMIT: 1
DO YOU HAVE DIFFICULTY OPERATING A MOTOR VEHICLE FOR LONG DISTANCES (GREATER THAN 100 MILES) BECAUSE YOU BECOME SLEEPY: NO
HOW LIKELY ARE YOU TO NOD OFF OR FALL ASLEEP IN A CAR, WHILE STOPPED FOR A FEW MINUTES IN TRAFFIC: 0

## 2024-03-06 ENCOUNTER — OFFICE VISIT (OUTPATIENT)
Facility: CLINIC | Age: 74
End: 2024-03-06
Payer: MEDICARE

## 2024-03-06 VITALS
OXYGEN SATURATION: 99 % | WEIGHT: 240.1 LBS | HEIGHT: 63 IN | RESPIRATION RATE: 20 BRPM | SYSTOLIC BLOOD PRESSURE: 128 MMHG | TEMPERATURE: 97.7 F | BODY MASS INDEX: 42.54 KG/M2 | DIASTOLIC BLOOD PRESSURE: 82 MMHG | HEART RATE: 63 BPM

## 2024-03-06 DIAGNOSIS — G47.33 OSA ON CPAP: ICD-10-CM

## 2024-03-06 DIAGNOSIS — N18.31 TYPE 2 DIABETES MELLITUS WITH STAGE 3A CHRONIC KIDNEY DISEASE, WITHOUT LONG-TERM CURRENT USE OF INSULIN (HCC): Primary | ICD-10-CM

## 2024-03-06 DIAGNOSIS — E78.00 HYPERCHOLESTEROLEMIA: ICD-10-CM

## 2024-03-06 DIAGNOSIS — E11.22 TYPE 2 DIABETES MELLITUS WITH STAGE 3A CHRONIC KIDNEY DISEASE, WITHOUT LONG-TERM CURRENT USE OF INSULIN (HCC): Primary | ICD-10-CM

## 2024-03-06 DIAGNOSIS — E66.01 MORBID OBESITY WITH BMI OF 40.0-44.9, ADULT (HCC): ICD-10-CM

## 2024-03-06 DIAGNOSIS — I10 PRIMARY HYPERTENSION: ICD-10-CM

## 2024-03-06 PROBLEM — L03.311 CELLULITIS OF RIGHT ABDOMINAL WALL: Status: RESOLVED | Noted: 2017-12-18 | Resolved: 2024-03-06

## 2024-03-06 PROBLEM — Z98.890 H/O COLONOSCOPY: Status: RESOLVED | Noted: 2023-10-25 | Resolved: 2024-03-06

## 2024-03-06 PROBLEM — R06.09 DOE (DYSPNEA ON EXERTION): Status: RESOLVED | Noted: 2020-07-24 | Resolved: 2024-03-06
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
## 2024-03-06 PROBLEM — R07.89 OTHER CHEST PAIN: Status: RESOLVED | Noted: 2020-07-24 | Resolved: 2024-03-06
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
## 2024-03-06 LAB
ANION GAP SERPL CALC-SCNC: 4 MMOL/L (ref 5–15)
BUN SERPL-MCNC: 14 MG/DL (ref 6–20)
BUN/CREAT SERPL: 13 (ref 12–20)
CALCIUM SERPL-MCNC: 9.3 MG/DL (ref 8.5–10.1)
CHLORIDE SERPL-SCNC: 111 MMOL/L (ref 97–108)
CO2 SERPL-SCNC: 28 MMOL/L (ref 21–32)
CREAT SERPL-MCNC: 1.04 MG/DL (ref 0.55–1.02)
GLUCOSE SERPL-MCNC: 144 MG/DL (ref 65–100)
HBA1C MFR BLD: 7 % (ref 4–5.6)
POTASSIUM SERPL-SCNC: 4.4 MMOL/L (ref 3.5–5.1)

## 2024-03-06 PROCEDURE — 3077F SYST BP >= 140 MM HG: CPT | Performed by: INTERNAL MEDICINE

## 2024-03-06 PROCEDURE — 1036F TOBACCO NON-USER: CPT | Performed by: INTERNAL MEDICINE

## 2024-03-06 PROCEDURE — 36415 COLL VENOUS BLD VENIPUNCTURE: CPT | Performed by: INTERNAL MEDICINE

## 2024-03-06 PROCEDURE — 3017F COLORECTAL CA SCREEN DOC REV: CPT | Performed by: INTERNAL MEDICINE

## 2024-03-06 PROCEDURE — G8399 PT W/DXA RESULTS DOCUMENT: HCPCS | Performed by: INTERNAL MEDICINE

## 2024-03-06 PROCEDURE — 99214 OFFICE O/P EST MOD 30 MIN: CPT | Performed by: INTERNAL MEDICINE

## 2024-03-06 PROCEDURE — 2022F DILAT RTA XM EVC RTNOPTHY: CPT | Performed by: INTERNAL MEDICINE

## 2024-03-06 PROCEDURE — G8417 CALC BMI ABV UP PARAM F/U: HCPCS | Performed by: INTERNAL MEDICINE

## 2024-03-06 PROCEDURE — 3046F HEMOGLOBIN A1C LEVEL >9.0%: CPT | Performed by: INTERNAL MEDICINE

## 2024-03-06 PROCEDURE — G8427 DOCREV CUR MEDS BY ELIG CLIN: HCPCS | Performed by: INTERNAL MEDICINE

## 2024-03-06 PROCEDURE — 1123F ACP DISCUSS/DSCN MKR DOCD: CPT | Performed by: INTERNAL MEDICINE

## 2024-03-06 PROCEDURE — 1090F PRES/ABSN URINE INCON ASSESS: CPT | Performed by: INTERNAL MEDICINE

## 2024-03-06 PROCEDURE — G8484 FLU IMMUNIZE NO ADMIN: HCPCS | Performed by: INTERNAL MEDICINE

## 2024-03-06 PROCEDURE — 3078F DIAST BP <80 MM HG: CPT | Performed by: INTERNAL MEDICINE

## 2024-03-06 RX ORDER — ACYCLOVIR 400 MG/1
TABLET ORAL
Qty: 2 EACH | Refills: 11 | Status: SHIPPED | OUTPATIENT
Start: 2024-03-06

## 2024-03-06 RX ORDER — ACYCLOVIR 400 MG/1
TABLET ORAL
Qty: 1 EACH | Refills: 11 | Status: SHIPPED | OUTPATIENT
Start: 2024-03-06

## 2024-03-06 RX ORDER — INSULIN GLARGINE 100 [IU]/ML
44 INJECTION, SOLUTION SUBCUTANEOUS NIGHTLY
Qty: 5 ADJUSTABLE DOSE PRE-FILLED PEN SYRINGE | Refills: 11 | Status: SHIPPED | OUTPATIENT
Start: 2024-03-06

## 2024-03-06 RX ORDER — INSULIN ASPART INJECTION 100 [IU]/ML
14 INJECTION, SOLUTION SUBCUTANEOUS
Qty: 12 ADJUSTABLE DOSE PRE-FILLED PEN SYRINGE | Refills: 11 | Status: SHIPPED | OUTPATIENT
Start: 2024-03-06

## 2024-03-06 ASSESSMENT — PATIENT HEALTH QUESTIONNAIRE - PHQ9
2. FEELING DOWN, DEPRESSED OR HOPELESS: 0
SUM OF ALL RESPONSES TO PHQ9 QUESTIONS 1 & 2: 0
1. LITTLE INTEREST OR PLEASURE IN DOING THINGS: 0
SUM OF ALL RESPONSES TO PHQ QUESTIONS 1-9: 0

## 2024-03-06 NOTE — PROGRESS NOTES
SPORTS MEDICINE AND PRIMARY CARE  Tim Jesus MD, FACP, CMD  [address] [facility] [address]  Phone:  [phone]  Fax: [phone]       Chief Complaint   Patient presents with    Diabetes    Cholesterol Problem    Hypertension   .      SUBJECTIVE:    Tiana Lipscomb is a 73 y.o. female Patient returns today with a known history of morbid obesity, diabetes, trigger finger, subcutaneous nodule right forearm, rotator cuff arthropathy of right shoulder, obstructive sleep apnea, on CPAP, lumbar stenosis, primary hypertension, dyslipidemia, negative nuclear stress test, DJD of the knee, cervical spinal stenosis and is seen for evaluation.  It did not work out with the weight management clinic because the dates and times just did not coincide with her schedule.  She is going to start seeing this month a diabetic dietitian to try and help her with her weight management and diet. Patient is seen for evaluation. She is interested in taking a GLP1 agonist.             Current Outpatient Medications   Medication Sig Dispense Refill    blood glucose test strips (ONETOUCH ULTRA) strip Use to test blood sugar three times a day. Dx.e11.9 300 each 3    amLODIPine (NORVASC) 10 MG tablet TAKE 1 TABLET BY MOUTH EVERY DAY 90 tablet 2    simvastatin (ZOCOR) 40 MG tablet TAKE 1 TABLET BY MOUTH EVERY DAY AT NIGHT 90 tablet 3    Insulin Aspart, w/Niacinamide, (FIASP FLEXTOUCH) 100 UNIT/ML SOPN Inject 12 Units into the skin 3 times daily (before meals) 12 Adjustable Dose Pre-filled Pen Syringe 11    Insulin Pen Needle ([employer] PEN NEEDLES) 31G X 6 MM MISC 1 each by Does not apply route daily 100 each 11    pantoprazole (PROTONIX) 40 MG tablet TAKE 1 TABLET BY MOUTH EVERY DAY BEFORE BREAKFAST 90 tablet 3    cloNIDine (CATAPRES) 0.2 MG tablet TAKE 1 TABLET BY MOUTH TWICE A  tablet 3    BASAGLAR KWIKPEN 100 UNIT/ML injection pen INJECT 40 UNITS SUBCUTANEOUSLY UNDER THE SKIN EVERY NIGHT 5 Adjustable Dose Pre-filled Pen

## 2024-03-06 NOTE — PROGRESS NOTES
Chief Complaint   Patient presents with    Diabetes    Cholesterol Problem    Hypertension     \"Have you been to the ER, urgent care clinic since your last visit?  Hospitalized since your last visit?\"    NO    “Have you seen or consulted any other health care providers outside of [facility] since your last visit?”    NO

## 2024-03-08 ENCOUNTER — CLINICAL DOCUMENTATION (OUTPATIENT)
Age: 74
End: 2024-03-08

## 2024-03-08 ENCOUNTER — OFFICE VISIT (OUTPATIENT)
Age: 74
End: 2024-03-08
Payer: MEDICARE

## 2024-03-08 VITALS
OXYGEN SATURATION: 96 % | TEMPERATURE: 97 F | BODY MASS INDEX: 42.47 KG/M2 | WEIGHT: 239.7 LBS | DIASTOLIC BLOOD PRESSURE: 59 MMHG | SYSTOLIC BLOOD PRESSURE: 130 MMHG | HEART RATE: 63 BPM | HEIGHT: 63 IN

## 2024-03-08 DIAGNOSIS — I10 PRIMARY HYPERTENSION: ICD-10-CM

## 2024-03-08 DIAGNOSIS — G47.33 OBSTRUCTIVE SLEEP APNEA (ADULT) (PEDIATRIC): Primary | ICD-10-CM

## 2024-03-08 PROCEDURE — 99213 OFFICE O/P EST LOW 20 MIN: CPT | Performed by: NURSE PRACTITIONER

## 2024-03-08 PROCEDURE — 3078F DIAST BP <80 MM HG: CPT | Performed by: NURSE PRACTITIONER

## 2024-03-08 PROCEDURE — 3017F COLORECTAL CA SCREEN DOC REV: CPT | Performed by: NURSE PRACTITIONER

## 2024-03-08 PROCEDURE — G8417 CALC BMI ABV UP PARAM F/U: HCPCS | Performed by: NURSE PRACTITIONER

## 2024-03-08 PROCEDURE — 1036F TOBACCO NON-USER: CPT | Performed by: NURSE PRACTITIONER

## 2024-03-08 PROCEDURE — G8484 FLU IMMUNIZE NO ADMIN: HCPCS | Performed by: NURSE PRACTITIONER

## 2024-03-08 PROCEDURE — 3075F SYST BP GE 130 - 139MM HG: CPT | Performed by: NURSE PRACTITIONER

## 2024-03-08 PROCEDURE — 1123F ACP DISCUSS/DSCN MKR DOCD: CPT | Performed by: NURSE PRACTITIONER

## 2024-03-08 PROCEDURE — G8427 DOCREV CUR MEDS BY ELIG CLIN: HCPCS | Performed by: NURSE PRACTITIONER

## 2024-03-08 PROCEDURE — 1090F PRES/ABSN URINE INCON ASSESS: CPT | Performed by: NURSE PRACTITIONER

## 2024-03-08 PROCEDURE — G8399 PT W/DXA RESULTS DOCUMENT: HCPCS | Performed by: NURSE PRACTITIONER

## 2024-03-08 NOTE — PROGRESS NOTES
Airway pressure chin strap   Headgear 1 every 6 months.     Tubing with heating element 1 every 3 months.     Filter(s) Disposable 2 per month.   Filter(s) Non-Disposable 1 every 6 months.   .    Water Chamber for Humidifier (Replace) 1 every 6 months.      VALARIE Enriquez; NPI: [id number]    Electronically signed. Date:- 03/08/24    DME Order for (Specify) as OP     Diagnosis: (G47.33) CHAVEZ (obstructive sleep apnea)  (primary encounter diagnosis)     Pressure change to APAP 5-10, EPR 3 cmH2O.    Changes made in sleep lab.      VALARIE Enriquez; NPI: [id number]    Electronically signed. Date:- 03/08/24       * Counseling was provided regarding the importance of regular PAP use with emphasis on ensuring sufficient total sleep time, proper sleep hygiene, and safe driving.    * Re-enforced proper and regular cleaning for the device.    * She was asked to contact our office for any problems regarding PAP therapy.      2. Hypertension -  continue on her current regimen, she will continue to monitor her BP and follow up with her PMD for reevaluation/adjustment of medications if warranted.  I have reviewed the relationship between hypertension as it relates to sleep-disordered breathing. She reports BP well managed with current medications amlodipine 10 mg, clonidine 0.2 mg. She does not typically check her pressure at home.      3. Encouraged continued weight management program through appropriate diet and exercise regimen as significant weight reduction has been shown to reduce severity of obstructive sleep apnea.     SUBJECTIVE/OBJECTIVE:    She  is seen today for follow up on PAP device and reports no problems using the device.   The following concerns identified:    Drowsiness no Problems exhaling no   Snoring no Forget to put on no   Mask Comfortable yes Can't fall asleep no   Dry Mouth no Mask falls off no   Air Leaking no Frequent awakenings no       She reports

## 2024-03-08 NOTE — PATIENT INSTRUCTIONS
[address]  [place] [address]  Tel.  [phone]  Fax. [phone] [address]  [place] [address]  Tel.  [phone]  Fax. [phone] [address] [place] [address]  Tel.  [phone]  Fax. [phone]     Learning About CPAP for Sleep Apnea  What is CPAP?              CPAP is a small machine that you use at home every night while you sleep. It increases air pressure in your throat to keep your airway open. When you have sleep apnea, this can help you sleep better so you feel much better. CPAP stands for \"continuous positive airway pressure.\"  The CPAP machine will have one of the following:  A mask that covers your nose and mouth  Prongs that fit into your nose  A mask that covers your nose only, the most common type. This type is called NCPAP. The N stands for \"nasal.\"  Why is it done?  CPAP is usually the best treatment for obstructive sleep apnea. It is the first treatment choice and the most widely used. Your doctor may suggest CPAP if you have:  Moderate to severe sleep apnea.  Sleep apnea and coronary artery disease (CAD) or heart failure.  How does it help?  CPAP can help you have more normal sleep, so you feel less sleepy and more alert during the daytime.  CPAP may help keep heart failure or other heart problems from getting worse.  NCPAP may help lower your blood pressure.  If you use CPAP, your bed partner may also sleep better because you are not snoring or restless.  What are the side effects?  Some people who use CPAP have:  A dry or stuffy nose and a sore throat.  Irritated skin on the face.  Sore eyes.  Bloating.  If you have any of these problems, work with your doctor to fix them. Here are some things you can try:  Be sure the mask or nasal prongs fit well.  See if your doctor can adjust the pressure of your CPAP.  If your nose is dry, try a humidifier.  If your nose is runny or stuffy, try decongestant medicine or a steroid

## 2024-03-20 ENCOUNTER — PATIENT MESSAGE (OUTPATIENT)
Facility: CLINIC | Age: 74
End: 2024-03-20

## 2024-03-20 DIAGNOSIS — Z79.4 TYPE 2 DIABETES MELLITUS WITHOUT COMPLICATION, WITH LONG-TERM CURRENT USE OF INSULIN (HCC): ICD-10-CM

## 2024-03-20 DIAGNOSIS — E11.9 TYPE 2 DIABETES MELLITUS WITHOUT COMPLICATION, WITH LONG-TERM CURRENT USE OF INSULIN (HCC): ICD-10-CM

## 2024-03-20 RX ORDER — INSULIN ASPART INJECTION 100 [IU]/ML
14 INJECTION, SOLUTION SUBCUTANEOUS
Qty: 12 ADJUSTABLE DOSE PRE-FILLED PEN SYRINGE | Refills: 11 | Status: SHIPPED | OUTPATIENT
Start: 2024-03-20

## 2024-03-20 RX ORDER — PEN NEEDLE, DIABETIC 31 G X1/4"
1 NEEDLE, DISPOSABLE MISCELLANEOUS DAILY
Qty: 100 EACH | Refills: 11 | Status: SHIPPED | OUTPATIENT
Start: 2024-03-20

## 2024-03-20 RX ORDER — ACYCLOVIR 400 MG/1
TABLET ORAL
Qty: 2 EACH | Refills: 11 | Status: SHIPPED | OUTPATIENT
Start: 2024-03-20 | End: 2024-03-21 | Stop reason: SDUPTHER

## 2024-03-20 RX ORDER — BLOOD SUGAR DIAGNOSTIC
STRIP MISCELLANEOUS
Qty: 300 EACH | Refills: 3 | Status: SHIPPED | OUTPATIENT
Start: 2024-03-20

## 2024-03-20 RX ORDER — INSULIN GLARGINE 100 [IU]/ML
44 INJECTION, SOLUTION SUBCUTANEOUS NIGHTLY
Qty: 5 ADJUSTABLE DOSE PRE-FILLED PEN SYRINGE | Refills: 11 | Status: SHIPPED | OUTPATIENT
Start: 2024-03-20

## 2024-03-20 RX ORDER — LANCETS 30 GAUGE
EACH MISCELLANEOUS
Qty: 100 EACH | Refills: 11 | Status: SHIPPED | OUTPATIENT
Start: 2024-03-20

## 2024-03-20 NOTE — TELEPHONE ENCOUNTER
From: Tiana Lipscomb  To: Dr. Tim Jesus  Sent: 3/20/2024 12:42 PM EDT  Subject: New prescriptions need due to change of pharmacies    Dr. Jesus,   I have changed my [facility] pharmacy to [facility], [address] [place] [address] [phone].   I was informed that Medicare will not allow transfer of any diabetes related supplies or medications, inc. insulin. Medicare requires new prescriptions for all diabetes related supplies/medications, inc. insulin.   Would you please have new prescriptions sent to the [address] [place] [address] pharmacy.  Thank you very much.

## 2024-03-21 DIAGNOSIS — Z79.4 TYPE 2 DIABETES MELLITUS WITH HYPERGLYCEMIA, WITH LONG-TERM CURRENT USE OF INSULIN (HCC): Primary | ICD-10-CM

## 2024-03-21 DIAGNOSIS — E11.65 TYPE 2 DIABETES MELLITUS WITH HYPERGLYCEMIA, WITH LONG-TERM CURRENT USE OF INSULIN (HCC): Primary | ICD-10-CM

## 2024-03-21 RX ORDER — ACYCLOVIR 400 MG/1
TABLET ORAL
Qty: 1 EACH | Refills: 11 | Status: SHIPPED | OUTPATIENT
Start: 2024-03-21 | End: 2024-03-22 | Stop reason: SDUPTHER

## 2024-03-21 RX ORDER — ACYCLOVIR 400 MG/1
TABLET ORAL
Qty: 2 EACH | Refills: 11 | Status: SHIPPED | OUTPATIENT
Start: 2024-03-21 | End: 2024-03-22 | Stop reason: SDUPTHER

## 2024-03-21 RX ORDER — ACYCLOVIR 400 MG/1
TABLET ORAL
Qty: 2 EACH | Refills: 11 | Status: SHIPPED | OUTPATIENT
Start: 2024-03-21 | End: 2024-03-21 | Stop reason: SDUPTHER

## 2024-03-22 DIAGNOSIS — E11.65 TYPE 2 DIABETES MELLITUS WITH HYPERGLYCEMIA, WITH LONG-TERM CURRENT USE OF INSULIN (HCC): ICD-10-CM

## 2024-03-22 DIAGNOSIS — Z79.4 TYPE 2 DIABETES MELLITUS WITH HYPERGLYCEMIA, WITH LONG-TERM CURRENT USE OF INSULIN (HCC): ICD-10-CM

## 2024-03-22 RX ORDER — ACYCLOVIR 400 MG/1
TABLET ORAL
Qty: 2 EACH | Refills: 11 | Status: SHIPPED | OUTPATIENT
Start: 2024-03-22

## 2024-03-22 RX ORDER — ACYCLOVIR 400 MG/1
TABLET ORAL
Qty: 1 EACH | Refills: 11 | Status: SHIPPED | OUTPATIENT
Start: 2024-03-22

## 2024-03-29 ENCOUNTER — OFFICE VISIT (OUTPATIENT)
Age: 74
End: 2024-03-29
Payer: MEDICARE

## 2024-03-29 DIAGNOSIS — E11.22 TYPE 2 DIABETES MELLITUS WITH DIABETIC CHRONIC KIDNEY DISEASE, UNSPECIFIED CKD STAGE, UNSPECIFIED WHETHER LONG TERM INSULIN USE (HCC): Primary | ICD-10-CM

## 2024-03-29 PROCEDURE — G0108 DIAB MANAGE TRN  PER INDIV: HCPCS

## 2024-03-29 NOTE — PROGRESS NOTES
and education recommendations. The time spent in this effort is included in the total time.  Total minutes: 60

## 2024-04-05 ENCOUNTER — OFFICE VISIT (OUTPATIENT)
Age: 74
End: 2024-04-05
Payer: MEDICARE

## 2024-04-05 DIAGNOSIS — E11.65 TYPE 2 DIABETES MELLITUS WITH HYPERGLYCEMIA, WITH LONG-TERM CURRENT USE OF INSULIN (HCC): ICD-10-CM

## 2024-04-05 DIAGNOSIS — Z79.4 TYPE 2 DIABETES MELLITUS WITH HYPERGLYCEMIA, WITH LONG-TERM CURRENT USE OF INSULIN (HCC): ICD-10-CM

## 2024-04-05 DIAGNOSIS — E11.22 TYPE 2 DIABETES MELLITUS WITH DIABETIC CHRONIC KIDNEY DISEASE, UNSPECIFIED CKD STAGE, UNSPECIFIED WHETHER LONG TERM INSULIN USE (HCC): Primary | ICD-10-CM

## 2024-04-05 PROCEDURE — G0108 DIAB MANAGE TRN  PER INDIV: HCPCS

## 2024-04-05 RX ORDER — ACYCLOVIR 400 MG/1
TABLET ORAL
Qty: 2 EACH | Refills: 11 | Status: SHIPPED | OUTPATIENT
Start: 2024-04-05

## 2024-04-05 RX ORDER — ACYCLOVIR 400 MG/1
TABLET ORAL
Qty: 1 EACH | Refills: 11 | Status: SHIPPED | OUTPATIENT
Start: 2024-04-05

## 2024-04-05 NOTE — PROGRESS NOTES
[facility]  Diabetes Self-Management Education & Support Program  Encounter Note      SUMMARY  Diabetes self-care management training was completed related to healthy eating. The participant will return on April 19 to continue DSMES related to reducing risks and healthy eating. The participant did identify SMART Goal(s) and will practice knowledge and skills related to healthy eating to improve diabetes self-management.        EVALUATION:  Ms Lipscomb expressed understanding of using the healthy plate, counting CHOs to build balanced, CHO controlled meals and reading nutrition facts labels to make more informed food choices. She was provided ADA handouts Best Foods for People with Diabetes & Best snacks.    [place] is checking FBG 89 mg/dL - 185 mg/dL. We reviewed rule of 15 to treat hypoglycemia.    RECOMMENDATIONS:  Try to eat 3 balanced, CHO controlled meals spaced 4-5 hours apart.  Continue DSMES.     TOPICS DISCUSSED TODAY:  WHAT CAN I EAT? 60      Next provider visit is scheduled for 6/2024       SMART GOAL(S)   Eat a small breakfast 3 times a week  ACHIEVEMENT OF GOAL(S) : %    2.    Call pharmacy & insurance company re: obtaining prescribed CGM  ( goal set 4/5/24)  ACHIEVEMENT OF GOAL(S) :  0-24%       DATE DSMES TOPIC EVALUATION     4/5/2024 WHAT CAN I EAT?   General principles   Determining a healthy weight   Nutritional terms & tools   Healthy Plate method   Carbohydrate Counting   Reading food labels   Free apps   Pregnancy recommendations      The participant   Uses Healthy Plate principles in constructing meals: Yes  Reads food labels in choosing acceptable foods: Yes    The participant needs to address not skipping meals.       CHARLETTE DE SOUZA RN on 4/5/2024 at 12:51 PM    I have personally reviewed the health record, including provider notes, laboratory data and current medications before making these care and education recommendations. The time spent in this effort is

## 2024-04-17 ENCOUNTER — PATIENT MESSAGE (OUTPATIENT)
Facility: CLINIC | Age: 74
End: 2024-04-17

## 2024-04-18 NOTE — TELEPHONE ENCOUNTER
Spoke with pharmacy. Pharmacy states that the insurance did not cover all of the syringes due to the quantity. States that the pens comes 5 in a box and the quantity is 12. States the insurance would likely cover more if she get 15 pens. Dr. Jesus and patient notified. Pharmacy given verbal order to change quantity to 15 per Dr. Jesus.

## 2024-04-18 NOTE — TELEPHONE ENCOUNTER
From: Tiana Lipscomb  To: Dr. Tim Jesus  Sent: 4/17/2024 11:33 AM EDT  Subject: Pharmacy charges for my insulin    The pharmacy is charging me an extra month for 1 -4 days over a month based on what they say my prescription says how many units I take daily. They are saying they cannot break up the boxes of insulin needles. They do not give me the month for which they are charging me extra $35 each time I purchase the insulin.  I would like to discuss the possibility of adjusting my dosages of insulin I take daily. Would you prefer that I make an appointment to come in to see you to discuss this issue.

## 2024-05-10 ENCOUNTER — OFFICE VISIT (OUTPATIENT)
Age: 74
End: 2024-05-10
Payer: MEDICARE

## 2024-05-10 DIAGNOSIS — E11.22 TYPE 2 DIABETES MELLITUS WITH DIABETIC CHRONIC KIDNEY DISEASE, UNSPECIFIED CKD STAGE, UNSPECIFIED WHETHER LONG TERM INSULIN USE (HCC): Primary | ICD-10-CM

## 2024-05-10 PROCEDURE — G0108 DIAB MANAGE TRN  PER INDIV: HCPCS

## 2024-05-14 NOTE — PROGRESS NOTES
[facility]  Diabetes Self-Management Education & Support Program  Encounter Note      SUMMARY  Diabetes self-care management training was completed related to monitoring. he participant will return on June 04 to continue DSMES related to reducing risks. The participant did not identify SMART Goal(s) and will practice knowledge and skills related to healthy eating and monitoring to improve diabetes self-management.        EVALUATION:  Ms Lipscomb expressed understanding of the importance of BG monitoring in managing diabetes including impact of food type & amount on BG and the impact of physical activity & stress.  Jonathan was able to obtain CGM & brought to today's session. We reviewed the following: purpose of CGM, use of reader, site selection, skin preparation, application and removal of sensor, CGM warm up phase, scanning tips, understanding sensor readings, data, alarms. We watched Dexcom G7 tutorials during session & discussed.     RECOMMENDATIONS:  Continue DSMES.     TOPICS DISCUSSED TODAY:  HOW CAN BLOOD GLUCOSE MONITORING HELP ME? 60      Next provider visit is scheduled for 6/12/24       SMART GOAL(S)   Eat a small breakfast in the morning 3 times a week  ACHIEVEMENT OF GOAL(S) : %    2.    Call insurance about  obtaining CGM  ( goal met 5/10/24)  ACHIEVEMENT OF GOAL(S) :  %       DATE DSMES TOPIC EVALUATION     5/14/2024 HOW CAN BLOOD GLUCOSE MONITORING HELP ME?   Value of blood glucose monitoring   Realistic expectations   Blood glucose monitoring targets   Target adjustments   Setting a1c & blood glucose targets with provider   Meter selection    Technique for obtaining blood droplet   Blood glucose testing sites   Determining best times to test   Pregnancy recommendations   Data sharing with provider        The participant   Can demonstrate their glucometer procedure: Yes  Logs their BG readings:  Yes    The participant needs to address becoming more familiar with

## 2024-06-04 ENCOUNTER — OFFICE VISIT (OUTPATIENT)
Age: 74
End: 2024-06-04
Payer: MEDICARE

## 2024-06-04 DIAGNOSIS — E11.22 TYPE 2 DIABETES MELLITUS WITH DIABETIC CHRONIC KIDNEY DISEASE, UNSPECIFIED CKD STAGE, UNSPECIFIED WHETHER LONG TERM INSULIN USE (HCC): Primary | ICD-10-CM

## 2024-06-04 PROCEDURE — G0108 DIAB MANAGE TRN  PER INDIV: HCPCS

## 2024-06-05 NOTE — PROGRESS NOTES
[facility]  Diabetes Self-Management Education & Support Program  Encounter Note      SUMMARY  Diabetes self-care management training was completed related to healthy eating and physical activity. The participant will return on June 25 to continue DSMES related to reducing risks and taking medications. The participant did identify SMART Goal(s) and will practice knowledge and skills related to healthy eating and monitoring and being active to improve diabetes self-management.        EVALUATION:  Ms Lipscomb is checking BG via CGM but did not bring reader to today's session.  She shared FBG has been elevated recently & thinks it is due to snacking before bed. We discussed non CHO snacks & low CHO options as well. She was provided the ADA handout Best snacks for people with Diabetes. She plans to shop after the session and make changes to snacking pattern.    Jonathan expressed understanding of the benefits of exercise including lowering BG, LDL cholesterol, maintaining weight loss & reducing stress. She goes to the gym twice a week & does water aerobics twice a week. She plans to start exercising for 5-10 minutes after dinner meal.    RECOMMENDATIONS:  Exercise 5-10 minutes after meals.  Bring CGM reader to education sessions.  Continue DSMES.     TOPICS DISCUSSED TODAY:  WHAT CAN I EAT? 30  HOW DOES PHYSICAL ACTIVITY AFFECT MY DIABETES? 30      Next provider visit is scheduled for 6/12/24       SMART GOAL(S)   Eat a small breakfast 3 times a week   ACHIEVEMENT OF GOAL(S) : %    2.    Exercise 5-10 minutes after dinner 3 times a week  ( goal set 6/4/24)  ACHIEVEMENT OF GOAL(S) :  0-24%         DATE DSMES TOPIC EVALUATION     6/5/2024 WHAT CAN I EAT?   General principles   Determining a healthy weight   Nutritional terms & tools   Healthy Plate method   Carbohydrate Counting   Reading food labels   Free apps   Pregnancy recommendations      The participant   Uses Healthy Plate principles in

## 2024-06-24 ENCOUNTER — OFFICE VISIT (OUTPATIENT)
Facility: CLINIC | Age: 74
End: 2024-06-24
Payer: MEDICARE

## 2024-06-24 VITALS
DIASTOLIC BLOOD PRESSURE: 73 MMHG | HEIGHT: 63 IN | WEIGHT: 237.9 LBS | BODY MASS INDEX: 42.15 KG/M2 | TEMPERATURE: 98 F | OXYGEN SATURATION: 98 % | SYSTOLIC BLOOD PRESSURE: 136 MMHG | RESPIRATION RATE: 16 BRPM | HEART RATE: 64 BPM

## 2024-06-24 DIAGNOSIS — E66.01 MORBID OBESITY WITH BMI OF 40.0-44.9, ADULT (HCC): Primary | ICD-10-CM

## 2024-06-24 DIAGNOSIS — E11.22 TYPE 2 DIABETES MELLITUS WITH STAGE 3A CHRONIC KIDNEY DISEASE, WITHOUT LONG-TERM CURRENT USE OF INSULIN (HCC): ICD-10-CM

## 2024-06-24 DIAGNOSIS — N18.31 TYPE 2 DIABETES MELLITUS WITH STAGE 3A CHRONIC KIDNEY DISEASE, WITHOUT LONG-TERM CURRENT USE OF INSULIN (HCC): ICD-10-CM

## 2024-06-24 DIAGNOSIS — G47.33 OSA ON CPAP: ICD-10-CM

## 2024-06-24 DIAGNOSIS — I10 PRIMARY HYPERTENSION: ICD-10-CM

## 2024-06-24 LAB
ANION GAP SERPL CALC-SCNC: 4 MMOL/L (ref 5–15)
BUN SERPL-MCNC: 21 MG/DL (ref 6–20)
BUN/CREAT SERPL: 20 (ref 12–20)
CALCIUM SERPL-MCNC: 10.1 MG/DL (ref 8.5–10.1)
CHLORIDE SERPL-SCNC: 111 MMOL/L (ref 97–108)
CO2 SERPL-SCNC: 27 MMOL/L (ref 21–32)
CREAT SERPL-MCNC: 1.05 MG/DL (ref 0.55–1.02)
ERYTHROCYTE [SEDIMENTATION RATE] IN BLOOD: 4 MM/HR (ref 0–30)
EST. AVERAGE GLUCOSE BLD GHB EST-MCNC: 154 MG/DL
GLUCOSE SERPL-MCNC: 155 MG/DL (ref 65–100)
HBA1C MFR BLD: 7 % (ref 4–5.6)
POTASSIUM SERPL-SCNC: 4.4 MMOL/L (ref 3.5–5.1)
SODIUM SERPL-SCNC: 142 MMOL/L (ref 136–145)

## 2024-06-24 PROCEDURE — 3075F SYST BP GE 130 - 139MM HG: CPT | Performed by: INTERNAL MEDICINE

## 2024-06-24 PROCEDURE — G8417 CALC BMI ABV UP PARAM F/U: HCPCS | Performed by: INTERNAL MEDICINE

## 2024-06-24 PROCEDURE — 1090F PRES/ABSN URINE INCON ASSESS: CPT | Performed by: INTERNAL MEDICINE

## 2024-06-24 PROCEDURE — 2022F DILAT RTA XM EVC RTNOPTHY: CPT | Performed by: INTERNAL MEDICINE

## 2024-06-24 PROCEDURE — 3051F HG A1C>EQUAL 7.0%<8.0%: CPT | Performed by: INTERNAL MEDICINE

## 2024-06-24 PROCEDURE — 1123F ACP DISCUSS/DSCN MKR DOCD: CPT | Performed by: INTERNAL MEDICINE

## 2024-06-24 PROCEDURE — 36415 COLL VENOUS BLD VENIPUNCTURE: CPT | Performed by: INTERNAL MEDICINE

## 2024-06-24 PROCEDURE — 99214 OFFICE O/P EST MOD 30 MIN: CPT | Performed by: INTERNAL MEDICINE

## 2024-06-24 PROCEDURE — G8399 PT W/DXA RESULTS DOCUMENT: HCPCS | Performed by: INTERNAL MEDICINE

## 2024-06-24 PROCEDURE — 3017F COLORECTAL CA SCREEN DOC REV: CPT | Performed by: INTERNAL MEDICINE

## 2024-06-24 PROCEDURE — G8427 DOCREV CUR MEDS BY ELIG CLIN: HCPCS | Performed by: INTERNAL MEDICINE

## 2024-06-24 PROCEDURE — 3078F DIAST BP <80 MM HG: CPT | Performed by: INTERNAL MEDICINE

## 2024-06-24 PROCEDURE — 1036F TOBACCO NON-USER: CPT | Performed by: INTERNAL MEDICINE

## 2024-06-24 SDOH — ECONOMIC STABILITY: FOOD INSECURITY: WITHIN THE PAST 12 MONTHS, YOU WORRIED THAT YOUR FOOD WOULD RUN OUT BEFORE YOU GOT MONEY TO BUY MORE.: NEVER TRUE

## 2024-06-24 SDOH — ECONOMIC STABILITY: FOOD INSECURITY: WITHIN THE PAST 12 MONTHS, THE FOOD YOU BOUGHT JUST DIDN'T LAST AND YOU DIDN'T HAVE MONEY TO GET MORE.: NEVER TRUE

## 2024-06-24 SDOH — ECONOMIC STABILITY: INCOME INSECURITY: HOW HARD IS IT FOR YOU TO PAY FOR THE VERY BASICS LIKE FOOD, HOUSING, MEDICAL CARE, AND HEATING?: NOT HARD AT ALL

## 2024-06-24 ASSESSMENT — PATIENT HEALTH QUESTIONNAIRE - PHQ9
8. MOVING OR SPEAKING SO SLOWLY THAT OTHER PEOPLE COULD HAVE NOTICED. OR THE OPPOSITE, BEING SO FIGETY OR RESTLESS THAT YOU HAVE BEEN MOVING AROUND A LOT MORE THAN USUAL: NOT AT ALL
4. FEELING TIRED OR HAVING LITTLE ENERGY: NOT AT ALL
1. LITTLE INTEREST OR PLEASURE IN DOING THINGS: NOT AT ALL
6. FEELING BAD ABOUT YOURSELF - OR THAT YOU ARE A FAILURE OR HAVE LET YOURSELF OR YOUR FAMILY DOWN: NOT AT ALL
10. IF YOU CHECKED OFF ANY PROBLEMS, HOW DIFFICULT HAVE THESE PROBLEMS MADE IT FOR YOU TO DO YOUR WORK, TAKE CARE OF THINGS AT HOME, OR GET ALONG WITH OTHER PEOPLE: NOT DIFFICULT AT ALL
2. FEELING DOWN, DEPRESSED OR HOPELESS: NOT AT ALL
SUM OF ALL RESPONSES TO PHQ QUESTIONS 1-9: 0
9. THOUGHTS THAT YOU WOULD BE BETTER OFF DEAD, OR OF HURTING YOURSELF: NOT AT ALL
SUM OF ALL RESPONSES TO PHQ QUESTIONS 1-9: 0
SUM OF ALL RESPONSES TO PHQ QUESTIONS 1-9: 0
5. POOR APPETITE OR OVEREATING: NOT AT ALL
3. TROUBLE FALLING OR STAYING ASLEEP: NOT AT ALL
SUM OF ALL RESPONSES TO PHQ QUESTIONS 1-9: 0
SUM OF ALL RESPONSES TO PHQ9 QUESTIONS 1 & 2: 0
7. TROUBLE CONCENTRATING ON THINGS, SUCH AS READING THE NEWSPAPER OR WATCHING TELEVISION: NOT AT ALL

## 2024-06-24 ASSESSMENT — ANXIETY QUESTIONNAIRES

## 2024-06-24 NOTE — PROGRESS NOTES
Chief Complaint   Patient presents with    Diabetes     \"Have you been to the ER, urgent care clinic since your last visit?  Hospitalized since your last visit?\"    NO    “Have you seen or consulted any other health care providers outside of [facility] since your last visit?”    NO            Click Here for Release of Records Request  
bruises, no bleeding, no swollen glands  Integument: no lumps, mole changes, nail changes or rash  Endocrine: no malaise/lethargy or unexpected weight changes      Social History     Socioeconomic History    Marital status: Single     Spouse name: None    Number of children: None    Years of education: None    Highest education level: None   Tobacco Use    Smoking status: Never    Smokeless tobacco: Never   Vaping Use    Vaping Use: Never used   Substance and Sexual Activity    Alcohol use: Not Currently    Drug use: Never    Sexual activity: Not Currently     Partners: Male     Birth control/protection: None     Comment: Have not been sexually active for over 20+ years   Social History Narrative    ves w ith: alone. Education/School: has masters degree-[employer].    [employer]     Family History: Mother:  49 yrs, skin d/oFather: deceasedAunt: alive, lung cancerAdopted: alive  Social History: Alcohol Use Patient does not use alcohol. Smoking Status Patient is a never smoker.     Marital Status: Single. Li     NONE.  OB History: Total pregnancies 1. Pre term deliveries (>20-36.6 w eeks) 1.  Surgical History: [facility] right breast bx , kidnthi king colonoscopy   Hospitalization/Major Diagnostic Procedure: [facility] right breast bx , kid    Medical History: Diverticulosis 2002DM 1995primary HTN 1995Obesity 1995Dyslipidemia 1995January 2012 moderate central stenosis L3-L4,  mild to moderate central stenosis at L4-L5 MRI  Gyn History: Last mammogram date 2013. Last menstrual periodda    te hysterectomy  FOR FIBROIDS, PT W ITH ONE OVARY. Last  papdate . Menopausal hot flashes. Sexually active not currently sexually active. History of abnormal pap smears none. History of STDs  none. Vaginal discharge or odor none. HysterectomyDate     Social Determinants of Health     Financial Resource Strain: Low Risk  (2024)    Overall Financial Resource Strain (CARDIA)     Difficulty

## 2024-06-25 ENCOUNTER — OFFICE VISIT (OUTPATIENT)
Age: 74
End: 2024-06-25
Payer: MEDICARE

## 2024-06-25 DIAGNOSIS — E11.22 TYPE 2 DIABETES MELLITUS WITH DIABETIC CHRONIC KIDNEY DISEASE, UNSPECIFIED CKD STAGE, UNSPECIFIED WHETHER LONG TERM INSULIN USE (HCC): Primary | ICD-10-CM

## 2024-06-25 PROCEDURE — G0108 DIAB MANAGE TRN  PER INDIV: HCPCS

## 2024-06-25 NOTE — PROGRESS NOTES
[facility]  Diabetes Self-Management Education & Support Program  Encounter Note      SUMMARY  Diabetes self-care management training was completed related to reducing risks. The participant will return on July 22 to continue DSMES related to taking medications. The participant did not identify SMART Goal(s) and will practice knowledge and skills related to reducing risks, healthy eating, monitoring, and being active to improve diabetes self-management.        EVALUATION:  Ms Lipscomb expressed understanding of the relationship between diabetes and heart, kidney, eye, sleep & nerve health. We reviewed her 6/24 lab work including GFR of 56 and A1c 7%. She started a personal diabetes care record today to stay on top of diabetes health.    Jonathan is trying to stay well hydrated, eat a small breakfast and take her Novlog insulin before meals. Her time in target over last 14 days per CGM was 58%.     RECOMMENDATIONS:  Bring time in target to 70%.  Increase physical activity.   Stay well hydrated.  Continue DSMES.    TOPICS DISCUSSED TODAY:  HOW DO I STAY HEALTHY? 60      Next provider visit is scheduled for Oct 2024       SMART GOAL(S)   Eat a small breakfast in the morning 3 times a week  ACHIEVEMENT OF GOAL(S) : %    2.    Exercise 5-10 minutes after dinner 3 times a week  ACHIEVEMENT OF GOAL(S) :  %       DATE DSMES TOPIC EVALUATION     6/25/2024 HOW DO I STAY HEALTHY?   Prevention   Vaccinations   Preconception care (if applicable)  Examinations   Eye    Foot   Diabetic complications' prevention   Dental health   Heart health   Kidney Health   Nerve health   Sleep health      The participant has a personal diabetes care record to keep abreast of diabetes health Yes              CHARLETTE DE SOUZA RN on 6/25/2024 at 11:02 AM    I have personally reviewed the health record, including provider notes, laboratory data and current medications before making these care and education

## 2024-07-22 ENCOUNTER — OFFICE VISIT (OUTPATIENT)
Age: 74
End: 2024-07-22
Payer: MEDICARE

## 2024-07-22 DIAGNOSIS — E11.22 TYPE 2 DIABETES MELLITUS WITH DIABETIC CHRONIC KIDNEY DISEASE, UNSPECIFIED CKD STAGE, UNSPECIFIED WHETHER LONG TERM INSULIN USE (HCC): Primary | ICD-10-CM

## 2024-07-22 PROCEDURE — G0108 DIAB MANAGE TRN  PER INDIV: HCPCS

## 2024-07-22 NOTE — PROGRESS NOTES
[facility]  Diabetes Self-Management Education & Support Program  Encounter Note      SUMMARY  Diabetes self-care management training was completed related to taking medications. The participant will return on August 12 to continue DSMES related to healthy coping and problem solving. The participant did not identify SMART Goal(s) and will practice knowledge and skills related to reducing risks, healthy eating and monitoring, and being active and medications to improve diabetes self-management.        EVALUATION:  Ms Lipscomb expressed understanding of the role medications play in diabetes management. She expressed understanding of the expected action of her prescribed DM medications,  Basaglar & Fiasp insulins. We reviewed onset, peak & duration of insulins, priming of pens, injection technique, rotation of sites & safe disposal of sharps. Jonathan expressed understanding of rule of 15 to treat hypoglycemia. She has obtained glucose tablets. Per CGM, time in range over last 14 days was 63%. She rarely experiences BG < 70 mg/dL.     RECOMMENDATIONS:  Obtain medic alert.  Increase physical activity.  Avoid snacking on CHOs.  Continue DSMES.     TOPICS DISCUSSED TODAY:  HOW DO MY DIABETES MEDICATIONS WORK? 60      Next provider visit is scheduled for 10/29/24       SMART GOAL(S)   Eat a small breakfast in the morning 3 times a week  ACHIEVEMENT OF GOAL(S) : %    2.    Exercise 5-10 minutes after dinner   ACHIEVEMENT OF GOAL(S) :  50-74%       DATE DSMES TOPIC EVALUATION     7/22/2024 HOW DO MY DIABETES MEDICATIONS WORK?   Type 1 medications & methods   Insulin injections   Injection sites   Type 2 medications   Oral agents   GLP-1 agonists   Hypoglycemia symptoms & treatment   Glucagon emergency kits   General guidance regarding insulin use whether Type 1, 2 or gestational diabetes   Storage of insulin   Disposal    Traveling with medications   Barriers to medication adherence      The

## 2024-08-02 ENCOUNTER — HOSPITAL ENCOUNTER (OUTPATIENT)
Facility: HOSPITAL | Age: 74
End: 2024-08-02
Payer: MEDICARE

## 2024-08-02 VITALS — WEIGHT: 237 LBS | HEIGHT: 63 IN | BODY MASS INDEX: 41.99 KG/M2

## 2024-08-02 DIAGNOSIS — Z12.31 SCREENING MAMMOGRAM FOR BREAST CANCER: ICD-10-CM

## 2024-08-02 PROCEDURE — 77067 SCR MAMMO BI INCL CAD: CPT

## 2024-08-12 ENCOUNTER — OFFICE VISIT (OUTPATIENT)
Age: 74
End: 2024-08-12
Payer: MEDICARE

## 2024-08-12 DIAGNOSIS — E11.22 TYPE 2 DIABETES MELLITUS WITH DIABETIC CHRONIC KIDNEY DISEASE, UNSPECIFIED CKD STAGE, UNSPECIFIED WHETHER LONG TERM INSULIN USE (HCC): Primary | ICD-10-CM

## 2024-08-12 PROCEDURE — G0108 DIAB MANAGE TRN  PER INDIV: HCPCS

## 2024-08-12 NOTE — PROGRESS NOTES
attitudes   Motivation: Cost versus benefits analysis   Problem-solving: Chain analysis   Obtaining support: Communicating   Family & friends   Health team   iii. Community   Stress   Symptoms   Managing stress   Fill your tool kit        The participant can identify people that support them in caring for their diabetes health: providers & friends      The participant would like to pursue the following in keeping themselves healthy after completing the program:  [employer], Diabetes [employer]         DATE DSMES TOPIC EVALUATION     8/12/2024 HOW DO I FIGURE OUT WHAT'S INFLUENCING MY BLOOD GLUCOSES?   Problem solving using SOAR   Set goals   Sort options   Arrive at a plan   Reaffirm plan   Common problems in diabetes self-care   Hypoglycemia   Hyperglycemia   Sick days   Pattern management   Disaster preparedness plan        The participant has an action plan for   Hypoglycemia: Yes  Hyperglycemia: Yes  Sick days: Yes  During disasters: Yes         CHARLETTE DE SOUZA RN on 8/12/2024 at 1:58 PM    I have personally reviewed the health record, including provider notes, laboratory data and current medications before making these care and education recommendations. The time spent in this effort is included in the total time.  Total minutes: 60

## 2024-08-27 RX ORDER — PANTOPRAZOLE SODIUM 40 MG/1
40 TABLET, DELAYED RELEASE ORAL
Qty: 90 TABLET | Refills: 3 | Status: SHIPPED | OUTPATIENT
Start: 2024-08-27

## 2024-08-27 RX ORDER — CLONIDINE HYDROCHLORIDE 0.2 MG/1
0.2 TABLET ORAL 2 TIMES DAILY
Qty: 180 TABLET | Refills: 3 | Status: SHIPPED | OUTPATIENT
Start: 2024-08-27

## 2024-08-29 RX ORDER — AMLODIPINE BESYLATE 10 MG/1
10 TABLET ORAL DAILY
Qty: 90 TABLET | Refills: 3 | Status: SHIPPED | OUTPATIENT
Start: 2024-08-29

## 2024-09-27 ENCOUNTER — OFFICE VISIT (OUTPATIENT)
Age: 74
End: 2024-09-27
Payer: MEDICARE

## 2024-09-27 DIAGNOSIS — E11.22 TYPE 2 DIABETES MELLITUS WITH DIABETIC CHRONIC KIDNEY DISEASE, UNSPECIFIED CKD STAGE, UNSPECIFIED WHETHER LONG TERM INSULIN USE (HCC): Primary | ICD-10-CM

## 2024-09-27 PROCEDURE — G0108 DIAB MANAGE TRN  PER INDIV: HCPCS

## 2024-10-14 RX ORDER — SIMVASTATIN 40 MG
40 TABLET ORAL
Qty: 90 TABLET | Refills: 3 | Status: SHIPPED | OUTPATIENT
Start: 2024-10-14

## 2024-10-29 ENCOUNTER — OFFICE VISIT (OUTPATIENT)
Facility: CLINIC | Age: 74
End: 2024-10-29

## 2024-10-29 VITALS
DIASTOLIC BLOOD PRESSURE: 78 MMHG | SYSTOLIC BLOOD PRESSURE: 132 MMHG | HEART RATE: 63 BPM | WEIGHT: 239.3 LBS | HEIGHT: 63 IN | TEMPERATURE: 97.7 F | OXYGEN SATURATION: 97 % | RESPIRATION RATE: 20 BRPM | BODY MASS INDEX: 42.4 KG/M2

## 2024-10-29 DIAGNOSIS — M48.061 SPINAL STENOSIS OF LUMBAR REGION WITHOUT NEUROGENIC CLAUDICATION: ICD-10-CM

## 2024-10-29 DIAGNOSIS — N18.31 TYPE 2 DIABETES MELLITUS WITH STAGE 3A CHRONIC KIDNEY DISEASE, WITHOUT LONG-TERM CURRENT USE OF INSULIN (HCC): ICD-10-CM

## 2024-10-29 DIAGNOSIS — Z00.00 MEDICARE ANNUAL WELLNESS VISIT, SUBSEQUENT: Primary | ICD-10-CM

## 2024-10-29 DIAGNOSIS — G47.33 OSA ON CPAP: ICD-10-CM

## 2024-10-29 DIAGNOSIS — E11.22 TYPE 2 DIABETES MELLITUS WITH STAGE 3A CHRONIC KIDNEY DISEASE, WITHOUT LONG-TERM CURRENT USE OF INSULIN (HCC): ICD-10-CM

## 2024-10-29 DIAGNOSIS — E55.9 VITAMIN D DEFICIENCY: ICD-10-CM

## 2024-10-29 DIAGNOSIS — E78.5 DYSLIPIDEMIA: ICD-10-CM

## 2024-10-29 DIAGNOSIS — E66.01 MORBID OBESITY WITH BMI OF 40.0-44.9, ADULT: ICD-10-CM

## 2024-10-29 DIAGNOSIS — I10 PRIMARY HYPERTENSION: ICD-10-CM

## 2024-10-29 LAB
25(OH)D3 SERPL-MCNC: 15.9 NG/ML (ref 30–100)
ALBUMIN SERPL-MCNC: 3.7 G/DL (ref 3.5–5)
ALBUMIN/GLOB SERPL: 1.2 (ref 1.1–2.2)
ALP SERPL-CCNC: 66 U/L (ref 45–117)
ALT SERPL-CCNC: 21 U/L (ref 12–78)
ANION GAP SERPL CALC-SCNC: 5 MMOL/L (ref 2–12)
AST SERPL-CCNC: 13 U/L (ref 15–37)
BASOPHILS # BLD: 0 K/UL (ref 0–0.1)
BASOPHILS NFR BLD: 0 % (ref 0–1)
BILIRUB SERPL-MCNC: 0.4 MG/DL (ref 0.2–1)
BUN SERPL-MCNC: 19 MG/DL (ref 6–20)
BUN/CREAT SERPL: 18 (ref 12–20)
CALCIUM SERPL-MCNC: 9.1 MG/DL (ref 8.5–10.1)
CHLORIDE SERPL-SCNC: 110 MMOL/L (ref 97–108)
CHOLEST SERPL-MCNC: 152 MG/DL
CO2 SERPL-SCNC: 29 MMOL/L (ref 21–32)
CREAT SERPL-MCNC: 1.07 MG/DL (ref 0.55–1.02)
CREAT UR-MCNC: 287 MG/DL
DIFFERENTIAL METHOD BLD: ABNORMAL
EOSINOPHIL # BLD: 0 K/UL (ref 0–0.4)
EOSINOPHIL NFR BLD: 0 % (ref 0–7)
ERYTHROCYTE [DISTWIDTH] IN BLOOD BY AUTOMATED COUNT: 14.6 % (ref 11.5–14.5)
EST. AVERAGE GLUCOSE BLD GHB EST-MCNC: 148 MG/DL
GLOBULIN SER CALC-MCNC: 3.2 G/DL (ref 2–4)
GLUCOSE SERPL-MCNC: 91 MG/DL (ref 65–100)
HBA1C MFR BLD: 6.8 % (ref 4–5.6)
HCT VFR BLD AUTO: 44.8 % (ref 35–47)
HDLC SERPL-MCNC: 49 MG/DL
HDLC SERPL: 3.1 (ref 0–5)
HGB BLD-MCNC: 14.6 G/DL (ref 11.5–16)
IMM GRANULOCYTES # BLD AUTO: 0 K/UL (ref 0–0.04)
IMM GRANULOCYTES NFR BLD AUTO: 0 % (ref 0–0.5)
LDLC SERPL CALC-MCNC: 80.2 MG/DL (ref 0–100)
LYMPHOCYTES # BLD: 1.7 K/UL (ref 0.8–3.5)
LYMPHOCYTES NFR BLD: 22 % (ref 12–49)
MCH RBC QN AUTO: 29.3 PG (ref 26–34)
MCHC RBC AUTO-ENTMCNC: 32.6 G/DL (ref 30–36.5)
MCV RBC AUTO: 89.8 FL (ref 80–99)
MICROALBUMIN UR-MCNC: 3.81 MG/DL
MICROALBUMIN/CREAT UR-RTO: 13 MG/G (ref 0–30)
MONOCYTES # BLD: 0.8 K/UL (ref 0–1)
MONOCYTES NFR BLD: 10 % (ref 5–13)
NEUTS SEG # BLD: 5.5 K/UL (ref 1.8–8)
NEUTS SEG NFR BLD: 68 % (ref 32–75)
NRBC # BLD: 0 K/UL (ref 0–0.01)
NRBC BLD-RTO: 0 PER 100 WBC
PLATELET # BLD AUTO: 205 K/UL (ref 150–400)
PMV BLD AUTO: 12.2 FL (ref 8.9–12.9)
POTASSIUM SERPL-SCNC: 4.2 MMOL/L (ref 3.5–5.1)
PROT SERPL-MCNC: 6.9 G/DL (ref 6.4–8.2)
RBC # BLD AUTO: 4.99 M/UL (ref 3.8–5.2)
SODIUM SERPL-SCNC: 144 MMOL/L (ref 136–145)
TRIGL SERPL-MCNC: 114 MG/DL
TSH SERPL DL<=0.05 MIU/L-ACNC: 0.95 UIU/ML (ref 0.36–3.74)
VLDLC SERPL CALC-MCNC: 22.8 MG/DL
WBC # BLD AUTO: 8 K/UL (ref 3.6–11)

## 2024-10-29 ASSESSMENT — PATIENT HEALTH QUESTIONNAIRE - PHQ9
SUM OF ALL RESPONSES TO PHQ QUESTIONS 1-9: 0
SUM OF ALL RESPONSES TO PHQ9 QUESTIONS 1 & 2: 0
SUM OF ALL RESPONSES TO PHQ QUESTIONS 1-9: 0
2. FEELING DOWN, DEPRESSED OR HOPELESS: NOT AT ALL
1. LITTLE INTEREST OR PLEASURE IN DOING THINGS: NOT AT ALL

## 2024-10-29 ASSESSMENT — LIFESTYLE VARIABLES
HOW OFTEN DO YOU HAVE A DRINK CONTAINING ALCOHOL: NEVER
HOW MANY STANDARD DRINKS CONTAINING ALCOHOL DO YOU HAVE ON A TYPICAL DAY: PATIENT DOES NOT DRINK

## 2024-10-29 NOTE — PROGRESS NOTES
Identified pt with two pt identifiers(name and ). Reviewed record in preparation for visit and have obtained necessary documentation. All patient medications has been reviewed.  Chief Complaint   Patient presents with    Medicare AWV       Vitals:    10/29/24 0849   BP: 132/78   Pulse: 63   Resp: 20   Temp: 97.7 °F (36.5 °C)   SpO2: 97%                   Coordination of Care Questionnaire:   1) Have you been to an emergency room, urgent care, or hospitalized since your last visit?   No      2. Have seen or consulted any other health care provider since your last visit? No        Patient is accompanied by self I have received verbal consent from Tiana Lipscomb to discuss any/all medical information while they are present in the room.

## 2024-10-29 NOTE — PROGRESS NOTES
Medicare Annual Wellness Visit    Tiana Lipscomb is here for Medicare AWV    Assessment & Plan   Medicare annual wellness visit, subsequent  Type 2 diabetes mellitus with stage 3a chronic kidney disease, without long-term current use of insulin (HCC)  -     TSH; Future  -     Lipid Panel; Future  -     Comprehensive Metabolic Panel  -     CBC with Auto Differential; Future  -     Microalbumin / Creatinine Urine Ratio; Future  -     Hemoglobin A1C; Future  -     Vitamin D 25 Hydroxy; Future  Morbid obesity with BMI of 40.0-44.9, adult  Spinal stenosis of lumbar region without neurogenic claudication  CHAVEZ on CPAP  Primary hypertension  Dyslipidemia  -     TSH; Future  -     Lipid Panel; Future  Vitamin D deficiency  -     Vitamin D 25 Hydroxy; Future    Recommendations for Preventive Services Due: see orders and patient instructions/AVS.  Recommended screening schedule for the next 5-10 years is provided to the patient in written form: see Patient Instructions/AVS.     No follow-ups on file.     Subjective       Patient's complete Health Risk Assessment and screening values have been reviewed and are found in Flowsheets. The following problems were reviewed today and where indicated follow up appointments were made and/or referrals ordered.    Positive Risk Factor Screenings with Interventions:               General HRA Questions:  Select all that apply: (!) Stress  Interventions - Stress:  See A/P for plan and any pertinent orders        Abnormal BMI (obese):  Body mass index is 42.39 kg/m². (!) Abnormal  Interventions:  See A/P for plan and any pertinent orders                           Objective   Vitals:    10/29/24 0849   BP: 132/78   Pulse: 63   Resp: 20   Temp: 97.7 °F (36.5 °C)   SpO2: 97%   Weight: 108.5 kg (239 lb 4.8 oz)   Height: 1.6 m (5' 3\")      Body mass index is 42.39 kg/m².                  No Known Allergies  Prior to Visit Medications    Medication Sig Taking? Authorizing Provider   simvastatin

## 2024-10-29 NOTE — PATIENT INSTRUCTIONS
meet this goal.  When exposed to the sun, use a sunscreen that protects against both UVA and UVB radiation with an SPF of 30 or greater. Reapply every 2 to 3 hours or after sweating, drying off with a towel, or swimming.  Always wear a seat belt when traveling in a car. Always wear a helmet when riding a bicycle or motorcycle.

## 2024-10-29 NOTE — PROGRESS NOTES
SPORTS MEDICINE AND PRIMARY CARE  Tim Jesus MD, FACP, CMD  [address] [facility] [address]  Phone:  [phone]  Fax: [phone]       Chief Complaint   Patient presents with    Medicare AWV   .      SUBJECTIVE:       Tiana Lipscomb is a 73 y.o. female Patient returns today with a known history of morbid obesity, diabetes mellitus type 2, COPD, low back pain, knee pain, shoulder pain, rotator cuff arthropathy on the right, lumbar stenosis, cervical spinal stenosis, obstructive sleep apnea, on CPAP, DJD of the knee, dyslipidemia, primary hypertension, and is seen for evaluation.    Patient returns today without new complaints and is seen for evaluation.             Current Outpatient Medications   Medication Sig Dispense Refill    simvastatin (ZOCOR) 40 MG tablet TAKE 1 TABLET BY MOUTH EVERY DAY AT NIGHT 90 tablet 3    amLODIPine (NORVASC) 10 MG tablet TAKE 1 TABLET BY MOUTH EVERY DAY 90 tablet 3    cloNIDine (CATAPRES) 0.2 MG tablet TAKE 1 TABLET BY MOUTH TWICE A  tablet 3    pantoprazole (PROTONIX) 40 MG tablet TAKE 1 TABLET BY MOUTH EVERY DAY BEFORE BREAKFAST 90 tablet 3    metoprolol tartrate (LOPRESSOR) 25 MG tablet TAKE 1/2 TABS BY MOUTH TWO (2) TIMES A DAY. 90 tablet 3    Continuous Blood Gluc  (DEXCOM G7 ) RICK qid 1 each 11    Continuous Blood Gluc Sensor (DEXCOM G7 SENSOR) MISC Qid. DX.E11.9 2 each 11    Insulin Aspart, w/Niacinamide, (FIASP FLEXTOUCH) 100 UNIT/ML SOPN Inject 14 Units into the skin 3 times daily (before meals) 12 Adjustable Dose Pre-filled Pen Syringe 11    insulin glargine (BASAGLAR KWIKPEN) 100 UNIT/ML injection pen Inject 44 Units into the skin nightly 5 Adjustable Dose Pre-filled Pen Syringe 11    Insulin Pen Needle ([employer] PEN NEEDLES) 31G X 6 MM MISC 1 each by Does not apply route daily 100 each 11    Semaglutide,0.25 or 0.5MG/DOS, 2 MG/1.5ML SOPN Inject 0.25 mg into the skin every 7 days (Patient not taking: Reported on 10/29/2024) 2

## 2024-10-30 RX ORDER — ERGOCALCIFEROL 1.25 MG/1
50000 CAPSULE, LIQUID FILLED ORAL WEEKLY
Qty: 12 CAPSULE | Refills: 1 | Status: SHIPPED | OUTPATIENT
Start: 2024-10-30

## 2024-11-25 ENCOUNTER — OFFICE VISIT (OUTPATIENT)
Age: 74
End: 2024-11-25
Payer: MEDICARE

## 2024-11-25 DIAGNOSIS — E11.22 TYPE 2 DIABETES MELLITUS WITH DIABETIC CHRONIC KIDNEY DISEASE, UNSPECIFIED CKD STAGE, UNSPECIFIED WHETHER LONG TERM INSULIN USE (HCC): Primary | ICD-10-CM

## 2024-11-25 PROCEDURE — G0108 DIAB MANAGE TRN  PER INDIV: HCPCS

## 2024-11-25 NOTE — PROGRESS NOTES
[facility]  Diabetes Self-Management Education & Support Program  Encounter Note      SUMMARY  Diabetes self-care management training was completed related to healthy eating and exercise. The participant returns today for last hour of ten hour diabetes education covered by Medicare.       EVALUATION:  Jonathan wanted to review healthy eating and exercise in today's session. She would like to lose weight. We reviewed the use of healthy plate to build balanced, portion and CHO controlled meals. She was provided additional resources to assist with meal planning.  We reviewed the role exercise plays in lowering BG and the importance of finding exercise activities that she can stick with and do on a regular basis.     RECOMMENDATIONS:  Decrease sedentary time by walking before and after some meals every other day.  Continue water aerobics twice a week.     TOPICS DISCUSSED TODAY:  WHAT CAN I EAT? 30  HOW DO MY DIABETES MEDICATIONS WORK? 30      Next provider visit is scheduled for 3/4/25       DATE DSMES TOPIC EVALUATION     11/25/2024 WHAT CAN I EAT?   General principles   Determining a healthy weight   Nutritional terms & tools   Healthy Plate method   Carbohydrate Counting   Reading food labels   Free apps   Pregnancy recommendations      The participant   Uses Healthy Plate principles in constructing meals: Yes  Reads food labels in choosing acceptable foods: Yes    The participant needs to address choosing leaner proteins for breakfast.     DATE DSMES TOPIC EVALUATION     11/25/2024 HOW DOES PHYSICAL ACTIVITY AFFECT MY DIABETES?   Benefits of physical activity   Beginning a program of physical activity   Walking   Pedometers   Goal setting   Structured physical activity program   Aerobic activity   Resistance   Flexibility   Balance   Physical activity program progression   Safety issues   Barriers to physical activity   Facilitators of physical activity        The participant has

## 2024-12-31 LAB — DIABETIC RETINOPATHY: NEGATIVE

## 2025-03-01 SDOH — ECONOMIC STABILITY: FOOD INSECURITY: WITHIN THE PAST 12 MONTHS, THE FOOD YOU BOUGHT JUST DIDN'T LAST AND YOU DIDN'T HAVE MONEY TO GET MORE.: NEVER TRUE

## 2025-03-01 SDOH — ECONOMIC STABILITY: FOOD INSECURITY: WITHIN THE PAST 12 MONTHS, YOU WORRIED THAT YOUR FOOD WOULD RUN OUT BEFORE YOU GOT MONEY TO BUY MORE.: NEVER TRUE

## 2025-03-01 SDOH — ECONOMIC STABILITY: INCOME INSECURITY: IN THE LAST 12 MONTHS, WAS THERE A TIME WHEN YOU WERE NOT ABLE TO PAY THE MORTGAGE OR RENT ON TIME?: NO

## 2025-03-01 SDOH — ECONOMIC STABILITY: TRANSPORTATION INSECURITY
IN THE PAST 12 MONTHS, HAS THE LACK OF TRANSPORTATION KEPT YOU FROM MEDICAL APPOINTMENTS OR FROM GETTING MEDICATIONS?: NO

## 2025-03-01 SDOH — ECONOMIC STABILITY: TRANSPORTATION INSECURITY
IN THE PAST 12 MONTHS, HAS LACK OF TRANSPORTATION KEPT YOU FROM MEETINGS, WORK, OR FROM GETTING THINGS NEEDED FOR DAILY LIVING?: NO

## 2025-03-04 ENCOUNTER — OFFICE VISIT (OUTPATIENT)
Facility: CLINIC | Age: 75
End: 2025-03-04

## 2025-03-04 VITALS
WEIGHT: 233.6 LBS | RESPIRATION RATE: 18 BRPM | HEIGHT: 63 IN | HEART RATE: 61 BPM | OXYGEN SATURATION: 97 % | DIASTOLIC BLOOD PRESSURE: 79 MMHG | TEMPERATURE: 98 F | BODY MASS INDEX: 41.39 KG/M2 | SYSTOLIC BLOOD PRESSURE: 142 MMHG

## 2025-03-04 DIAGNOSIS — M17.0 PRIMARY OSTEOARTHRITIS OF BOTH KNEES: ICD-10-CM

## 2025-03-04 DIAGNOSIS — E66.01 MORBID OBESITY WITH BMI OF 40.0-44.9, ADULT: ICD-10-CM

## 2025-03-04 DIAGNOSIS — N18.31 TYPE 2 DIABETES MELLITUS WITH STAGE 3A CHRONIC KIDNEY DISEASE, WITHOUT LONG-TERM CURRENT USE OF INSULIN (HCC): Primary | ICD-10-CM

## 2025-03-04 DIAGNOSIS — G47.33 OSA ON CPAP: ICD-10-CM

## 2025-03-04 DIAGNOSIS — E11.22 TYPE 2 DIABETES MELLITUS WITH STAGE 3A CHRONIC KIDNEY DISEASE, WITHOUT LONG-TERM CURRENT USE OF INSULIN (HCC): Primary | ICD-10-CM

## 2025-03-04 DIAGNOSIS — E78.00 HYPERCHOLESTEROLEMIA: ICD-10-CM

## 2025-03-04 DIAGNOSIS — I10 PRIMARY HYPERTENSION: ICD-10-CM

## 2025-03-04 LAB
ALBUMIN SERPL-MCNC: 3.7 G/DL (ref 3.5–5)
ANION GAP SERPL CALC-SCNC: 3 MMOL/L (ref 2–12)
BASOPHILS # BLD: 0.02 K/UL (ref 0–0.1)
BASOPHILS NFR BLD: 0.3 % (ref 0–1)
BUN SERPL-MCNC: 22 MG/DL (ref 6–20)
BUN/CREAT SERPL: 20 (ref 12–20)
CALCIUM SERPL-MCNC: 9.7 MG/DL (ref 8.5–10.1)
CHLORIDE SERPL-SCNC: 109 MMOL/L (ref 97–108)
CO2 SERPL-SCNC: 28 MMOL/L (ref 21–32)
CREAT SERPL-MCNC: 1.11 MG/DL (ref 0.55–1.02)
DIFFERENTIAL METHOD BLD: ABNORMAL
EOSINOPHIL # BLD: 0 K/UL (ref 0–0.4)
EOSINOPHIL NFR BLD: 0 % (ref 0–7)
ERYTHROCYTE [DISTWIDTH] IN BLOOD BY AUTOMATED COUNT: 14.5 % (ref 11.5–14.5)
EST. AVERAGE GLUCOSE BLD GHB EST-MCNC: 151 MG/DL
GLUCOSE SERPL-MCNC: 168 MG/DL (ref 65–100)
HBA1C MFR BLD: 6.9 % (ref 4–5.6)
HCT VFR BLD AUTO: 46.9 % (ref 35–47)
HGB BLD-MCNC: 14.9 G/DL (ref 11.5–16)
IMM GRANULOCYTES # BLD AUTO: 0.02 K/UL (ref 0–0.04)
IMM GRANULOCYTES NFR BLD AUTO: 0.3 % (ref 0–0.5)
LYMPHOCYTES # BLD: 1.73 K/UL (ref 0.8–3.5)
LYMPHOCYTES NFR BLD: 24.4 % (ref 12–49)
MCH RBC QN AUTO: 28.5 PG (ref 26–34)
MCHC RBC AUTO-ENTMCNC: 31.8 G/DL (ref 30–36.5)
MCV RBC AUTO: 89.7 FL (ref 80–99)
MONOCYTES # BLD: 0.63 K/UL (ref 0–1)
MONOCYTES NFR BLD: 8.9 % (ref 5–13)
NEUTS SEG # BLD: 4.69 K/UL (ref 1.8–8)
NEUTS SEG NFR BLD: 66.1 % (ref 32–75)
NRBC # BLD: 0 K/UL (ref 0–0.01)
NRBC BLD-RTO: 0 PER 100 WBC
PHOSPHATE SERPL-MCNC: 4.1 MG/DL (ref 2.6–4.7)
PLATELET # BLD AUTO: 222 K/UL (ref 150–400)
PMV BLD AUTO: 11.7 FL (ref 8.9–12.9)
POTASSIUM SERPL-SCNC: 4.4 MMOL/L (ref 3.5–5.1)
RBC # BLD AUTO: 5.23 M/UL (ref 3.8–5.2)
SODIUM SERPL-SCNC: 140 MMOL/L (ref 136–145)
WBC # BLD AUTO: 7.1 K/UL (ref 3.6–11)

## 2025-03-04 RX ORDER — ATORVASTATIN CALCIUM 40 MG/1
40 TABLET, FILM COATED ORAL DAILY
Qty: 90 TABLET | Refills: 3 | Status: SHIPPED | OUTPATIENT
Start: 2025-03-04

## 2025-03-04 RX ORDER — CLONIDINE HYDROCHLORIDE 0.3 MG/1
0.3 TABLET ORAL 2 TIMES DAILY
Qty: 180 TABLET | Refills: 3 | Status: SHIPPED | OUTPATIENT
Start: 2025-03-04

## 2025-03-04 ASSESSMENT — PATIENT HEALTH QUESTIONNAIRE - PHQ9
4. FEELING TIRED OR HAVING LITTLE ENERGY: NOT AT ALL
SUM OF ALL RESPONSES TO PHQ QUESTIONS 1-9: 0
10. IF YOU CHECKED OFF ANY PROBLEMS, HOW DIFFICULT HAVE THESE PROBLEMS MADE IT FOR YOU TO DO YOUR WORK, TAKE CARE OF THINGS AT HOME, OR GET ALONG WITH OTHER PEOPLE: NOT DIFFICULT AT ALL
SUM OF ALL RESPONSES TO PHQ QUESTIONS 1-9: 0
9. THOUGHTS THAT YOU WOULD BE BETTER OFF DEAD, OR OF HURTING YOURSELF: NOT AT ALL
SUM OF ALL RESPONSES TO PHQ QUESTIONS 1-9: 0
7. TROUBLE CONCENTRATING ON THINGS, SUCH AS READING THE NEWSPAPER OR WATCHING TELEVISION: NOT AT ALL
3. TROUBLE FALLING OR STAYING ASLEEP: NOT AT ALL
SUM OF ALL RESPONSES TO PHQ QUESTIONS 1-9: 0
8. MOVING OR SPEAKING SO SLOWLY THAT OTHER PEOPLE COULD HAVE NOTICED. OR THE OPPOSITE, BEING SO FIGETY OR RESTLESS THAT YOU HAVE BEEN MOVING AROUND A LOT MORE THAN USUAL: NOT AT ALL
5. POOR APPETITE OR OVEREATING: NOT AT ALL
2. FEELING DOWN, DEPRESSED OR HOPELESS: NOT AT ALL
6. FEELING BAD ABOUT YOURSELF - OR THAT YOU ARE A FAILURE OR HAVE LET YOURSELF OR YOUR FAMILY DOWN: NOT AT ALL
1. LITTLE INTEREST OR PLEASURE IN DOING THINGS: NOT AT ALL

## 2025-03-04 ASSESSMENT — ANXIETY QUESTIONNAIRES
6. BECOMING EASILY ANNOYED OR IRRITABLE: NOT AT ALL
IF YOU CHECKED OFF ANY PROBLEMS ON THIS QUESTIONNAIRE, HOW DIFFICULT HAVE THESE PROBLEMS MADE IT FOR YOU TO DO YOUR WORK, TAKE CARE OF THINGS AT HOME, OR GET ALONG WITH OTHER PEOPLE: NOT DIFFICULT AT ALL
2. NOT BEING ABLE TO STOP OR CONTROL WORRYING: NOT AT ALL
5. BEING SO RESTLESS THAT IT IS HARD TO SIT STILL: NOT AT ALL
1. FEELING NERVOUS, ANXIOUS, OR ON EDGE: NOT AT ALL
GAD7 TOTAL SCORE: 0
4. TROUBLE RELAXING: NOT AT ALL
3. WORRYING TOO MUCH ABOUT DIFFERENT THINGS: NOT AT ALL
7. FEELING AFRAID AS IF SOMETHING AWFUL MIGHT HAPPEN: NOT AT ALL

## 2025-03-04 NOTE — PROGRESS NOTES
Chief Complaint   Patient presents with    Follow-up    Diabetes     \"Have you been to the ER, urgent care clinic since your last visit?  Hospitalized since your last visit?\"    NO    “Have you seen or consulted any other health care providers outside our system since your last visit?”    NO      “Have you had a diabetic eye exam?”    YES - Where: [facility] Nurse/CMA to request most recent records if not in the chart     Date of last diabetic eye exam: 11/30/2023            
Spouse name: None    Number of children: None    Years of education: None    Highest education level: None   Tobacco Use    Smoking status: Never    Smokeless tobacco: Never   Vaping Use    Vaping status: Never Used   Substance and Sexual Activity    Alcohol use: Not Currently    Drug use: Never    Sexual activity: Not Currently     Partners: Male     Birth control/protection: None     Comment: Have not been sexually active for over 20+ years   Social History Narrative    ves w ith: alone. Education/School: has masters degree-[employer]     Family History: Mother:  49 yrs, skin d/oFather: deceasedAunt: alive, lung cancerAdopted: alive  Social History: Alcohol Use Patient does not use alcohol. Smoking Status Patient is a never smoker.     Marital Status: Single. Li     NONE.  OB History: Total pregnancies 1. Pre term deliveries (>20-36.6 w eeks) 1.  Surgical History: [facility] right breast bx , [facility] colonoscopy   Hospitalization/Major Diagnostic Procedure: [facility] rig breast bx , kid    Medical History: Diverticulosis 2002DM 1995primary HTN 1995Obesity 1995Dyslipidemia 1995January 2012 moderate central stenosis L3-L4,  mild to moderate central stenosis at L4-L5 MRI  Gyn History: Last mammogram date 2013. Last menstrual periodda    te hysterectomy  FOR FIBROIDS, PT W ITH ONE OVARY. Last  papdate . Menopausal hot flashes. Sexually active not currently sexually active. History of abnormal pap smears none. History of STDs  none. Vaginal discharge or odor none. HysterectomyDate     Social Determinants of Health     Financial Resource Strain: Low Risk  (2024)    Overall Financial Resource Strain (CARDIA)     Difficulty of Paying Living Expenses: Not hard at all   Food Insecurity: No Food Insecurity (3/1/2025)    Hunger Vital Sign     Worried About Running Out of Food in the Last Year: Never true     Ran Out of Food in the Last Year: Never true

## 2025-03-07 DIAGNOSIS — M17.0 PRIMARY OSTEOARTHRITIS OF BOTH KNEES: Primary | ICD-10-CM

## 2025-03-08 RX ORDER — DULOXETIN HYDROCHLORIDE 30 MG/1
30 CAPSULE, DELAYED RELEASE ORAL DAILY
Qty: 30 CAPSULE | Refills: 3 | Status: SHIPPED | OUTPATIENT
Start: 2025-03-08

## 2025-03-19 ENCOUNTER — HOSPITAL ENCOUNTER (EMERGENCY)
Facility: HOSPITAL | Age: 75
Discharge: HOME | End: 2025-03-19
Attending: STUDENT IN AN ORGANIZED HEALTH CARE EDUCATION/TRAINING PROGRAM
Payer: MEDICARE

## 2025-03-19 VITALS
RESPIRATION RATE: 17 BRPM | WEIGHT: 246.91 LBS | OXYGEN SATURATION: 100 % | DIASTOLIC BLOOD PRESSURE: 53 MMHG | SYSTOLIC BLOOD PRESSURE: 151 MMHG | HEART RATE: 86 BPM | TEMPERATURE: 98.1 F | BODY MASS INDEX: 43.74 KG/M2

## 2025-03-19 DIAGNOSIS — E16.2 HYPOGLYCEMIA: Primary | ICD-10-CM

## 2025-03-19 LAB
ALBUMIN SERPL-MCNC: 3.6 G/DL (ref 3.5–5)
ALBUMIN/GLOB SERPL: 1 (ref 1.1–2.2)
ALP SERPL-CCNC: 56 U/L (ref 45–117)
ALT SERPL-CCNC: 22 U/L (ref 12–78)
ANION GAP SERPL CALC-SCNC: 5 MMOL/L (ref 2–12)
AST SERPL-CCNC: 6 U/L (ref 15–37)
BASOPHILS # BLD: 0.02 K/UL (ref 0–0.1)
BASOPHILS NFR BLD: 0.2 % (ref 0–1)
BILIRUB SERPL-MCNC: 0.3 MG/DL (ref 0.2–1)
BUN SERPL-MCNC: 18 MG/DL (ref 6–20)
BUN/CREAT SERPL: 18 (ref 12–20)
CALCIUM SERPL-MCNC: 9.3 MG/DL (ref 8.5–10.1)
CHLORIDE SERPL-SCNC: 109 MMOL/L (ref 97–108)
CO2 SERPL-SCNC: 27 MMOL/L (ref 21–32)
COMMENT:: NORMAL
CREAT SERPL-MCNC: 1.02 MG/DL (ref 0.55–1.02)
DIFFERENTIAL METHOD BLD: ABNORMAL
EOSINOPHIL # BLD: 0 K/UL (ref 0–0.4)
EOSINOPHIL NFR BLD: 0 % (ref 0–7)
ERYTHROCYTE [DISTWIDTH] IN BLOOD BY AUTOMATED COUNT: 14.4 % (ref 11.5–14.5)
GLOBULIN SER CALC-MCNC: 3.5 G/DL (ref 2–4)
GLUCOSE BLD STRIP.AUTO-MCNC: 117 MG/DL (ref 65–117)
GLUCOSE BLD STRIP.AUTO-MCNC: 73 MG/DL (ref 65–117)
GLUCOSE BLD STRIP.AUTO-MCNC: 97 MG/DL (ref 65–117)
GLUCOSE SERPL-MCNC: 65 MG/DL (ref 65–100)
HCT VFR BLD AUTO: 43.5 % (ref 35–47)
HGB BLD-MCNC: 14.3 G/DL (ref 11.5–16)
IMM GRANULOCYTES # BLD AUTO: 0.05 K/UL (ref 0–0.04)
IMM GRANULOCYTES NFR BLD AUTO: 0.5 % (ref 0–0.5)
LYMPHOCYTES # BLD: 1.6 K/UL (ref 0.8–3.5)
LYMPHOCYTES NFR BLD: 14.4 % (ref 12–49)
MCH RBC QN AUTO: 29.1 PG (ref 26–34)
MCHC RBC AUTO-ENTMCNC: 32.9 G/DL (ref 30–36.5)
MCV RBC AUTO: 88.4 FL (ref 80–99)
MONOCYTES # BLD: 0.8 K/UL (ref 0–1)
MONOCYTES NFR BLD: 7.2 % (ref 5–13)
NEUTS SEG # BLD: 8.61 K/UL (ref 1.8–8)
NEUTS SEG NFR BLD: 77.7 % (ref 32–75)
NRBC # BLD: 0 K/UL (ref 0–0.01)
NRBC BLD-RTO: 0 PER 100 WBC
PLATELET # BLD AUTO: 219 K/UL (ref 150–400)
PMV BLD AUTO: 11.2 FL (ref 8.9–12.9)
POTASSIUM SERPL-SCNC: 3.8 MMOL/L (ref 3.5–5.1)
PROT SERPL-MCNC: 7.1 G/DL (ref 6.4–8.2)
RBC # BLD AUTO: 4.92 M/UL (ref 3.8–5.2)
SERVICE CMNT-IMP: NORMAL
SODIUM SERPL-SCNC: 141 MMOL/L (ref 136–145)
SPECIMEN HOLD: NORMAL
WBC # BLD AUTO: 11.1 K/UL (ref 3.6–11)

## 2025-03-19 PROCEDURE — 82962 GLUCOSE BLOOD TEST: CPT

## 2025-03-19 PROCEDURE — 85025 COMPLETE CBC W/AUTO DIFF WBC: CPT

## 2025-03-19 PROCEDURE — 80053 COMPREHEN METABOLIC PANEL: CPT

## 2025-03-19 PROCEDURE — 99283 EMERGENCY DEPT VISIT LOW MDM: CPT

## 2025-03-19 RX ORDER — INSULIN ASPART INJECTION 100 [IU]/ML
10 INJECTION, SOLUTION SUBCUTANEOUS
Qty: 12 ADJUSTABLE DOSE PRE-FILLED PEN SYRINGE | Refills: 11 | Status: SHIPPED | OUTPATIENT
Start: 2025-03-19

## 2025-03-19 RX ORDER — INSULIN GLARGINE 100 [IU]/ML
40 INJECTION, SOLUTION SUBCUTANEOUS NIGHTLY
Qty: 5 ADJUSTABLE DOSE PRE-FILLED PEN SYRINGE | Refills: 11 | Status: SHIPPED | OUTPATIENT
Start: 2025-03-19

## 2025-03-19 ASSESSMENT — PAIN - FUNCTIONAL ASSESSMENT: PAIN_FUNCTIONAL_ASSESSMENT: NONE - DENIES PAIN

## 2025-03-19 NOTE — DISCHARGE INSTRUCTIONS
Decrease your Fiasp (insulin aspart) to 10units three times daily before meals  Decrease your Basaglar (insulin glargine) to 40units nightly

## 2025-03-19 NOTE — ED TRIAGE NOTES
Pt arrives from home via EMS , pt took her insulin this am and had breakfast, pt was found to have a BS of 50, after oral glucose BS to 75, pt with no complaints , stated she feels better

## 2025-03-19 NOTE — ED PROVIDER NOTES
started to discuss with her PCP.  Here her sugars were fine after she received oral glucose in the field.  She was able to tolerate p.o. here and had 3 subsequent normal blood sugars.  Renal function is okay.  Her electrolytes are fine.  Her vital signs show mildly elevated blood pressure but otherwise stable vital signs.  She is in no acute distress at this time.  I discussed with her PCP and we have made some changes to her insulin dosing.  Patient understands these changes and they were given to her in writing with her paperwork.  Overall she appears well and given reassuring workup she is okay for discharge home at this time.    Problems Addressed:  Hypoglycemia: acute illness or injury    Amount and/or Complexity of Data Reviewed  Labs: ordered. Decision-making details documented in ED Course.    Risk  Prescription drug management.          FINAL IMPRESSION      1. Hypoglycemia          DISPOSITION/PLAN   DISPOSITION Decision To Discharge 03/19/2025 02:53:20 PM          (Please note that portions of this note were completed with a voice recognition program.  Efforts were made to edit the dictations but occasionally words are mis-transcribed.)    Blaine Sanches DO (electronically signed)  Emergency Attending Physician               Blaine Sanches DO  03/19/25 [id number]

## 2025-03-31 RX ORDER — DULOXETIN HYDROCHLORIDE 30 MG/1
CAPSULE, DELAYED RELEASE ORAL DAILY
Qty: 90 CAPSULE | Refills: 3 | Status: SHIPPED | OUTPATIENT
Start: 2025-03-31

## 2025-03-31 RX ORDER — METOPROLOL TARTRATE 25 MG/1
TABLET, FILM COATED ORAL
Qty: 90 TABLET | Refills: 3 | Status: SHIPPED | OUTPATIENT
Start: 2025-03-31

## 2025-03-31 RX ORDER — ERGOCALCIFEROL 1.25 MG/1
50000 CAPSULE, LIQUID FILLED ORAL WEEKLY
Qty: 12 CAPSULE | Refills: 1 | Status: SHIPPED | OUTPATIENT
Start: 2025-03-31

## 2025-05-16 ENCOUNTER — OFFICE VISIT (OUTPATIENT)
Facility: CLINIC | Age: 75
End: 2025-05-16

## 2025-05-16 VITALS
BODY MASS INDEX: 40.11 KG/M2 | RESPIRATION RATE: 16 BRPM | HEART RATE: 59 BPM | OXYGEN SATURATION: 97 % | SYSTOLIC BLOOD PRESSURE: 109 MMHG | HEIGHT: 63 IN | DIASTOLIC BLOOD PRESSURE: 64 MMHG | WEIGHT: 226.4 LBS | TEMPERATURE: 98 F

## 2025-05-16 DIAGNOSIS — I10 PRIMARY HYPERTENSION: ICD-10-CM

## 2025-05-16 DIAGNOSIS — N18.31 TYPE 2 DIABETES MELLITUS WITH STAGE 3A CHRONIC KIDNEY DISEASE, WITHOUT LONG-TERM CURRENT USE OF INSULIN (HCC): ICD-10-CM

## 2025-05-16 DIAGNOSIS — E66.01 MORBID OBESITY WITH BMI OF 40.0-44.9, ADULT (HCC): Primary | ICD-10-CM

## 2025-05-16 DIAGNOSIS — M48.061 SPINAL STENOSIS OF LUMBAR REGION WITHOUT NEUROGENIC CLAUDICATION: ICD-10-CM

## 2025-05-16 DIAGNOSIS — G47.33 OSA ON CPAP: ICD-10-CM

## 2025-05-16 DIAGNOSIS — E11.22 TYPE 2 DIABETES MELLITUS WITH STAGE 3A CHRONIC KIDNEY DISEASE, WITHOUT LONG-TERM CURRENT USE OF INSULIN (HCC): ICD-10-CM

## 2025-05-16 DIAGNOSIS — E78.00 HYPERCHOLESTEROLEMIA: ICD-10-CM

## 2025-05-16 RX ORDER — INSULIN ASPART INJECTION 100 [IU]/ML
14 INJECTION, SOLUTION SUBCUTANEOUS
Qty: 12 ADJUSTABLE DOSE PRE-FILLED PEN SYRINGE | Refills: 11 | Status: SHIPPED | OUTPATIENT
Start: 2025-05-16

## 2025-05-16 SDOH — ECONOMIC STABILITY: FOOD INSECURITY: WITHIN THE PAST 12 MONTHS, THE FOOD YOU BOUGHT JUST DIDN'T LAST AND YOU DIDN'T HAVE MONEY TO GET MORE.: NEVER TRUE

## 2025-05-16 SDOH — ECONOMIC STABILITY: FOOD INSECURITY: WITHIN THE PAST 12 MONTHS, YOU WORRIED THAT YOUR FOOD WOULD RUN OUT BEFORE YOU GOT MONEY TO BUY MORE.: NEVER TRUE

## 2025-05-16 ASSESSMENT — PATIENT HEALTH QUESTIONNAIRE - PHQ9
2. FEELING DOWN, DEPRESSED OR HOPELESS: NOT AT ALL
8. MOVING OR SPEAKING SO SLOWLY THAT OTHER PEOPLE COULD HAVE NOTICED. OR THE OPPOSITE, BEING SO FIGETY OR RESTLESS THAT YOU HAVE BEEN MOVING AROUND A LOT MORE THAN USUAL: NOT AT ALL
5. POOR APPETITE OR OVEREATING: NOT AT ALL
6. FEELING BAD ABOUT YOURSELF - OR THAT YOU ARE A FAILURE OR HAVE LET YOURSELF OR YOUR FAMILY DOWN: NOT AT ALL
10. IF YOU CHECKED OFF ANY PROBLEMS, HOW DIFFICULT HAVE THESE PROBLEMS MADE IT FOR YOU TO DO YOUR WORK, TAKE CARE OF THINGS AT HOME, OR GET ALONG WITH OTHER PEOPLE: NOT DIFFICULT AT ALL
SUM OF ALL RESPONSES TO PHQ QUESTIONS 1-9: 0
7. TROUBLE CONCENTRATING ON THINGS, SUCH AS READING THE NEWSPAPER OR WATCHING TELEVISION: NOT AT ALL
SUM OF ALL RESPONSES TO PHQ QUESTIONS 1-9: 0
3. TROUBLE FALLING OR STAYING ASLEEP: NOT AT ALL
4. FEELING TIRED OR HAVING LITTLE ENERGY: NOT AT ALL
1. LITTLE INTEREST OR PLEASURE IN DOING THINGS: NOT AT ALL
9. THOUGHTS THAT YOU WOULD BE BETTER OFF DEAD, OR OF HURTING YOURSELF: NOT AT ALL

## 2025-05-16 NOTE — PROGRESS NOTES
SPORTS MEDICINE AND PRIMARY CARE  Tim Jesus MD, FACP, CMD  [address] [facility] [address]  Phone:  [phone]  Fax: [phone]       Chief Complaint   Patient presents with    Pre-op Exam   .      SUBJECTIVE:       History of Present Illness  The patient returns today for a preoperative evaluation as she plans to have a right total knee replacement by Dr. Chavez Fernandez on 06/03/2025. She has a known history of morbid obesity, type 2 diabetes, lumbar stenosis, obstructive sleep apnea, dyslipidemia, and primary hypertension and is seen for evaluation today.    She reports no new complaints and is able to ascend a flight of stairs without experiencing chest pain or shortness of breath. Her primary concern at this time is her knee condition. Her hemoglobin A1c was recorded at 6.9% on 03/04/2025, which is acceptable for the procedure.    She has been utilizing the [employer] card for her insulin but believes her body has developed a tolerance to it. She expresses a desire to revert to her previous insulin regimen.    She inquires about the duration of her vitamin D supplementation.    Current Outpatient Medications   Medication Sig Dispense Refill    Insulin Aspart, w/Niacinamide, (FIASP FLEXTOUCH) 100 UNIT/ML SOPN Inject 14 Units into the skin 3 times daily (before meals) 12 Adjustable Dose Pre-filled Pen Syringe 11    DULoxetine (CYMBALTA) 30 MG extended release capsule TAKE 1 CAPSULE BY MOUTH EVERY DAY 90 capsule 3    vitamin D (ERGOCALCIFEROL) 1.25 MG (15105 UT) CAPS capsule TAKE 1 CAPSULE BY MOUTH ONE TIME PER WEEK 12 capsule 1    metoprolol tartrate (LOPRESSOR) 25 MG tablet TAKE 1/2 TABS BY MOUTH TWO (2) TIMES A DAY. 90 tablet 3    BD PEN NEEDLE MICRO U/F 32G X 6 MM MISC       insulin glargine (BASAGLAR KWIKPEN) 100 UNIT/ML injection pen Inject 40 Units into the skin nightly 5 Adjustable Dose Pre-filled Pen Syringe 11    atorvastatin (LIPITOR) 40 MG tablet Take 1 tablet by mouth daily 90 tablet 3

## 2025-05-16 NOTE — PROGRESS NOTES
Chief Complaint   Patient presents with    Pre-op Exam     Patient states she is present for a pre op exam.    Have you been to the ER, urgent care clinic since your last visit?  Hospitalized since your last visit?   NO    Have you seen or consulted any other health care providers outside our system since your last visit?   NO      Have you had a diabetic eye exam?   NO     Date of last diabetic eye exam: 11/30/2023

## 2025-05-19 ENCOUNTER — HOSPITAL ENCOUNTER (OUTPATIENT)
Facility: HOSPITAL | Age: 75
Discharge: HOME | End: 2025-05-22
Payer: MEDICARE

## 2025-05-19 VITALS
HEIGHT: 63 IN | SYSTOLIC BLOOD PRESSURE: 143 MMHG | TEMPERATURE: 98 F | DIASTOLIC BLOOD PRESSURE: 78 MMHG | WEIGHT: 233 LBS | BODY MASS INDEX: 41.29 KG/M2 | RESPIRATION RATE: 18 BRPM | HEART RATE: 57 BPM

## 2025-05-19 LAB
ABO + RH BLD: NORMAL
ANION GAP SERPL CALC-SCNC: 2 MMOL/L (ref 2–12)
APPEARANCE UR: CLEAR
BACTERIA URNS QL MICRO: NEGATIVE /HPF
BILIRUB UR QL: NEGATIVE
BLOOD GROUP ANTIBODIES SERPL: NORMAL
BUN SERPL-MCNC: 20 MG/DL (ref 6–20)
BUN/CREAT SERPL: 19 (ref 12–20)
CALCIUM SERPL-MCNC: 9.1 MG/DL (ref 8.5–10.1)
CHLORIDE SERPL-SCNC: 112 MMOL/L (ref 97–108)
CO2 SERPL-SCNC: 30 MMOL/L (ref 21–32)
COLOR UR: ABNORMAL
CREAT SERPL-MCNC: 1.05 MG/DL (ref 0.55–1.02)
EKG ATRIAL RATE: 56 BPM
EKG DIAGNOSIS: NORMAL
EKG P AXIS: 71 DEGREES
EKG P-R INTERVAL: 142 MS
EKG Q-T INTERVAL: 436 MS
EKG QRS DURATION: 66 MS
EKG QTC CALCULATION (BAZETT): 420 MS
EKG R AXIS: 20 DEGREES
EKG T AXIS: 50 DEGREES
EKG VENTRICULAR RATE: 56 BPM
EPITH CASTS URNS QL MICRO: ABNORMAL /LPF
ERYTHROCYTE [DISTWIDTH] IN BLOOD BY AUTOMATED COUNT: 14.8 % (ref 11.5–14.5)
EST. AVERAGE GLUCOSE BLD GHB EST-MCNC: 151 MG/DL
GLUCOSE SERPL-MCNC: 69 MG/DL (ref 65–100)
GLUCOSE UR STRIP.AUTO-MCNC: 100 MG/DL
HBA1C MFR BLD: 6.9 % (ref 4–5.6)
HCT VFR BLD AUTO: 44.8 % (ref 35–47)
HGB BLD-MCNC: 14.6 G/DL (ref 11.5–16)
HGB UR QL STRIP: NEGATIVE
HYALINE CASTS URNS QL MICRO: ABNORMAL /LPF (ref 0–5)
INR PPP: 1 (ref 0.9–1.1)
KETONES UR QL STRIP.AUTO: NEGATIVE MG/DL
LEUKOCYTE ESTERASE UR QL STRIP.AUTO: NEGATIVE
MCH RBC QN AUTO: 29.3 PG (ref 26–34)
MCHC RBC AUTO-ENTMCNC: 32.6 G/DL (ref 30–36.5)
MCV RBC AUTO: 90 FL (ref 80–99)
NITRITE UR QL STRIP.AUTO: NEGATIVE
NRBC # BLD: 0 K/UL (ref 0–0.01)
NRBC BLD-RTO: 0 PER 100 WBC
PH UR STRIP: 5.5 (ref 5–8)
PLATELET # BLD AUTO: 223 K/UL (ref 150–400)
PMV BLD AUTO: 11.6 FL (ref 8.9–12.9)
POTASSIUM SERPL-SCNC: 4.5 MMOL/L (ref 3.5–5.1)
PROT UR STRIP-MCNC: NEGATIVE MG/DL
PROTHROMBIN TIME: 10.6 SEC (ref 9.2–11.2)
RBC # BLD AUTO: 4.98 M/UL (ref 3.8–5.2)
RBC #/AREA URNS HPF: ABNORMAL /HPF (ref 0–5)
SODIUM SERPL-SCNC: 144 MMOL/L (ref 136–145)
SP GR UR REFRACTOMETRY: 1.02 (ref 1–1.03)
SPECIMEN EXP DATE BLD: NORMAL
URINE CULTURE IF INDICATED: ABNORMAL
UROBILINOGEN UR QL STRIP.AUTO: 0.2 EU/DL (ref 0.2–1)
WBC # BLD AUTO: 6.3 K/UL (ref 3.6–11)
WBC URNS QL MICRO: ABNORMAL /HPF (ref 0–4)

## 2025-05-19 PROCEDURE — 86900 BLOOD TYPING SEROLOGIC ABO: CPT

## 2025-05-19 PROCEDURE — 86901 BLOOD TYPING SEROLOGIC RH(D): CPT

## 2025-05-19 PROCEDURE — 86850 RBC ANTIBODY SCREEN: CPT

## 2025-05-19 PROCEDURE — 85027 COMPLETE CBC AUTOMATED: CPT

## 2025-05-19 PROCEDURE — 83036 HEMOGLOBIN GLYCOSYLATED A1C: CPT

## 2025-05-19 PROCEDURE — 81001 URINALYSIS AUTO W/SCOPE: CPT

## 2025-05-19 PROCEDURE — 85610 PROTHROMBIN TIME: CPT

## 2025-05-19 PROCEDURE — 93005 ELECTROCARDIOGRAM TRACING: CPT | Performed by: ORTHOPAEDIC SURGERY

## 2025-05-19 PROCEDURE — 80048 BASIC METABOLIC PNL TOTAL CA: CPT

## 2025-05-19 RX ORDER — ACETAMINOPHEN 650 MG/1
1300 SUPPOSITORY RECTAL EVERY 4 HOURS PRN
COMMUNITY

## 2025-05-19 ASSESSMENT — PAIN DESCRIPTION - LOCATION: LOCATION: KNEE

## 2025-05-19 ASSESSMENT — PAIN DESCRIPTION - DESCRIPTORS: DESCRIPTORS: ACHING;SORE

## 2025-05-19 ASSESSMENT — PAIN - FUNCTIONAL ASSESSMENT: PAIN_FUNCTIONAL_ASSESSMENT: PREVENTS OR INTERFERES SOME ACTIVE ACTIVITIES AND ADLS

## 2025-05-19 ASSESSMENT — PAIN SCALES - GENERAL: PAINLEVEL_OUTOF10: 7

## 2025-05-19 ASSESSMENT — PAIN DESCRIPTION - ORIENTATION: ORIENTATION: RIGHT

## 2025-05-19 NOTE — PERIOP NOTE
INSTRUCTIONS GIVEN AND REVIEWED, 2 BOTTLES OF SOAP, PT GIVEN TIME TO ASK QUESTIONS     EKG PERFORMED     P/K DONE IN COMPUTER BY PATIENT     E BRIANNA SANTIAGO IN TO TALK WITH PATIENT       
Your Surgery    IMPORTANT INSTRUCTIONS      You play an important role in your health and preparation for surgery. To reduce the germs on your skin you will need to shower with CHG soap (Chorhexidine gluconate 4%) two times before surgery.    CHG soap (Hibiclens, Hex-A-Clens or store brand)  CHG soap will be provided at your Preadmission Testing (PAT) appointment.  If you do not have a PAT appointment before surgery, you may arrange to  CHG soap from our office or purchase CHG soap at a pharmacy, grocery or department store.  You need to purchase TWO 4 ounce bottles to use for your 2 showers.    Shower with CHG soap 2 times before your surgery  The evening before your surgery  The morning of your surgery    Steps to follow:  Wash your hair with your normal shampoo and your body with regular soap and rinse well to remove shampoo and soap from your skin.  Wet a clean washcloth and turn off the shower.  Put CHG soap on washcloth and apply to your entire body from the neck down. Do not use on your head, face or private parts(genitals). Do not use CHG soap on open sores, wounds or areas of skin irritation.  Wash your body gently for 5 minutes. Do not wash your skin too hard. This soap does not create lather. Pay special attention to your underarms and from your belly button to your feet.  Turn the shower back on and rinse well to get CHG soap off your body.  Pat your skin dry with a clean, dry towel. Do not apply lotions or moisturizer.  Put on clean clothes and sleep on fresh bed sheets and do not allow pets to sleep with you.      Tips to help prevent infections after your surgery:  Protect your surgical wound from germs:  Hand washing is the most important thing you and your caregivers can do to prevent infections.  Keep your bandage clean and dry!  Do not touch your surgical wound.  Use clean, freshly washed towels and washcloths every time you shower; do not share bath linens with others.  Until your surgical 
[place], UA 05/19/2025 1.021  1.003 - 1.030   Final    pH, Urine 05/19/2025 5.5  5.0 - 8.0   Final    Protein, UA 05/19/2025 Negative  NEG mg/dL Final    Glucose, Ur 05/19/2025 100 (A)  NEG mg/dL Final    Ketones, Urine 05/19/2025 Negative  NEG mg/dL Final    Bilirubin, Urine 05/19/2025 Negative  NEG   Final    Blood, Urine 05/19/2025 Negative  NEG   Final    Urobilinogen, Urine 05/19/2025 0.2  0.2 - 1.0 EU/dL Final    Nitrite, Urine 05/19/2025 Negative  NEG   Final    Leukocyte Esterase, Urine 05/19/2025 Negative  NEG   Final    WBC, UA 05/19/2025 0-4  0 - 4 /hpf Final    RBC, UA 05/19/2025 0-5  0 - 5 /hpf Final    Epithelial Cells, UA 05/19/2025 FEW  FEW /lpf Final    Epithelial cell category consists of squamous cells and /or transitional urothelial cells. Renal tubular cells, if present, are separately identified as such.    BACTERIA, URINE 05/19/2025 Negative  NEG /hpf Final    Urine Culture if Indicated 05/19/2025 CULTURE NOT INDICATED BY UA RESULT  CNI   Final    Hyaline Casts, UA 05/19/2025 0-2  0 - 5 /lpf Final    Crossmatch expiration date 05/19/2025 06/02/2025,2359   Final    ABO/Rh 05/19/2025 AB POSITIVE   Final    Antibody Screen 05/19/2025 NEG   Final    INR 05/19/2025 1.0  0.9 - 1.1   Final    A single therapeutic range for Vit K antagonists may not be optimal for all indications - see June, 2008 issue of Chest, American College of Chest Physicians Evidence-Based Clinical Practice Guidelines, 8th Edition.    Protime 05/19/2025 10.6  9.2 - 11.2 sec Final    Hemoglobin A1C 05/19/2025 6.9 (H)  4.0 - 5.6 % Final    Comment: (NOTE)  HbA1C Interpretive Ranges  <5.7              Normal  5.7 - 6.4         Consider Prediabetes  >6.5              Consider Diabetes      Estimated Avg Glucose 05/19/2025 151  mg/dL Final                AILIN CHRISTIAN - NP  Available via [employer]

## 2025-05-19 NOTE — PROGRESS NOTES
5/16/2025 patient attended preop class. does not have a dedicate  at home but has help. all questions answered during this time.     FOC dicussed during class and patient provided \"[facility]\"

## 2025-05-20 LAB
BACTERIA SPEC CULT: NORMAL
BACTERIA SPEC CULT: NORMAL
SERVICE CMNT-IMP: NORMAL

## 2025-06-02 ENCOUNTER — ANESTHESIA EVENT (OUTPATIENT)
Facility: HOSPITAL | Age: 75
End: 2025-06-02
Payer: MEDICARE

## 2025-06-02 NOTE — H&P
Tiana Lipscomb (: 1950) is a 74 y.o. female, patient, here for evaluation of the following chief complaint(s):  Knee Pain (Eval of right knee /Physical therapy in the past/No injections )        HPI:     Chief complaint is right knee pain.  Several year history of right knee pain.  She has been evaluated by another orthopedic group and recommended that she consider joint replacement surgery.  Open till now she is really held off from this.  Some of it due to her other medical issues.  At this point in time she feels that the pain has gotten to the point where she would like to consider it again.  She and I discussed all of this in detail.  She has problems walking long distances.  She has problems ascending and descending stairs.     Allergies   No Known Allergies        Current Facility-Administered Medications          Current Outpatient Medications   Medication Sig Dispense Refill    BD PEN NEEDLE MICRO U/F 32G X 6 MM MISC          Insulin Aspart, w/Niacinamide, (FIASP FLEXTOUCH) 100 UNIT/ML SOPN Inject 10 Units into the skin 3 times daily (before meals) 12 Adjustable Dose Pre-filled Pen Syringe 11    insulin glargine (BASAGLAR KWIKPEN) 100 UNIT/ML injection pen Inject 40 Units into the skin nightly 5 Adjustable Dose Pre-filled Pen Syringe 11    DULoxetine (CYMBALTA) 30 MG extended release capsule Take 1 capsule by mouth daily 30 capsule 3    atorvastatin (LIPITOR) 40 MG tablet Take 1 tablet by mouth daily 90 tablet 3    cloNIDine (CATAPRES) 0.3 MG tablet Take 1 tablet by mouth 2 times daily 180 tablet 3    vitamin D (ERGOCALCIFEROL) 1.25 MG (55629 UT) CAPS capsule Take 1 capsule by mouth once a week 12 capsule 1    amLODIPine (NORVASC) 10 MG tablet TAKE 1 TABLET BY MOUTH EVERY DAY 90 tablet 3    pantoprazole (PROTONIX) 40 MG tablet TAKE 1 TABLET BY MOUTH EVERY DAY BEFORE BREAKFAST 90 tablet 3    metoprolol tartrate (LOPRESSOR) 25 MG tablet TAKE 1/2 TABS BY MOUTH TWO (2) TIMES A DAY. 90 tablet 3

## 2025-06-03 ENCOUNTER — HOSPITAL ENCOUNTER (OUTPATIENT)
Facility: HOSPITAL | Age: 75
Setting detail: OBSERVATION
Discharge: HOME | End: 2025-06-04
Attending: ORTHOPAEDIC SURGERY | Admitting: ORTHOPAEDIC SURGERY
Payer: MEDICARE

## 2025-06-03 ENCOUNTER — ANESTHESIA (OUTPATIENT)
Facility: HOSPITAL | Age: 75
End: 2025-06-03
Payer: MEDICARE

## 2025-06-03 DIAGNOSIS — Z96.651 S/P TOTAL KNEE ARTHROPLASTY, RIGHT: Primary | ICD-10-CM

## 2025-06-03 PROBLEM — M17.11 ARTHRITIS OF RIGHT KNEE: Status: ACTIVE | Noted: 2025-06-03

## 2025-06-03 LAB
ABO + RH BLD: NORMAL
BLOOD GROUP ANTIBODIES SERPL: NORMAL
GLUCOSE BLD STRIP.AUTO-MCNC: 142 MG/DL (ref 65–117)
GLUCOSE BLD STRIP.AUTO-MCNC: 160 MG/DL (ref 65–117)
GLUCOSE BLD STRIP.AUTO-MCNC: 193 MG/DL (ref 65–117)
GLUCOSE BLD STRIP.AUTO-MCNC: 266 MG/DL (ref 65–117)
GLUCOSE BLD STRIP.AUTO-MCNC: 277 MG/DL (ref 65–117)
SERVICE CMNT-IMP: ABNORMAL
SPECIMEN EXP DATE BLD: NORMAL

## 2025-06-03 PROCEDURE — 6370000000 HC RX 637 (ALT 250 FOR IP): Performed by: NURSE PRACTITIONER

## 2025-06-03 PROCEDURE — 6360000002 HC RX W HCPCS: Performed by: PHYSICIAN ASSISTANT

## 2025-06-03 PROCEDURE — 6360000002 HC RX W HCPCS: Performed by: ANESTHESIOLOGY

## 2025-06-03 PROCEDURE — 6360000002 HC RX W HCPCS: Performed by: ORTHOPAEDIC SURGERY

## 2025-06-03 PROCEDURE — 20985 CPTR-ASST DIR MS PX: CPT | Performed by: ORTHOPAEDIC SURGERY

## 2025-06-03 PROCEDURE — 2500000003 HC RX 250 WO HCPCS: Performed by: PHYSICIAN ASSISTANT

## 2025-06-03 PROCEDURE — 6370000000 HC RX 637 (ALT 250 FOR IP): Performed by: PHYSICIAN ASSISTANT

## 2025-06-03 PROCEDURE — C1776 JOINT DEVICE (IMPLANTABLE): HCPCS | Performed by: ORTHOPAEDIC SURGERY

## 2025-06-03 PROCEDURE — 86901 BLOOD TYPING SEROLOGIC RH(D): CPT

## 2025-06-03 PROCEDURE — 3700000000 HC ANESTHESIA ATTENDED CARE: Performed by: ORTHOPAEDIC SURGERY

## 2025-06-03 PROCEDURE — 27447 TOTAL KNEE ARTHROPLASTY: CPT | Performed by: ORTHOPAEDIC SURGERY

## 2025-06-03 PROCEDURE — 2580000003 HC RX 258: Performed by: ORTHOPAEDIC SURGERY

## 2025-06-03 PROCEDURE — 97161 PT EVAL LOW COMPLEX 20 MIN: CPT

## 2025-06-03 PROCEDURE — 7100000001 HC PACU RECOVERY - ADDTL 15 MIN: Performed by: ORTHOPAEDIC SURGERY

## 2025-06-03 PROCEDURE — 86850 RBC ANTIBODY SCREEN: CPT

## 2025-06-03 PROCEDURE — 6360000002 HC RX W HCPCS: Performed by: NURSE PRACTITIONER

## 2025-06-03 PROCEDURE — 27447 TOTAL KNEE ARTHROPLASTY: CPT | Performed by: PHYSICIAN ASSISTANT

## 2025-06-03 PROCEDURE — 2580000003 HC RX 258: Performed by: NURSE ANESTHETIST, CERTIFIED REGISTERED

## 2025-06-03 PROCEDURE — C1713 ANCHOR/SCREW BN/BN,TIS/BN: HCPCS | Performed by: ORTHOPAEDIC SURGERY

## 2025-06-03 PROCEDURE — 82962 GLUCOSE BLOOD TEST: CPT

## 2025-06-03 PROCEDURE — G0378 HOSPITAL OBSERVATION PER HR: HCPCS

## 2025-06-03 PROCEDURE — 86900 BLOOD TYPING SEROLOGIC ABO: CPT

## 2025-06-03 PROCEDURE — 2720000010 HC SURG SUPPLY STERILE: Performed by: ORTHOPAEDIC SURGERY

## 2025-06-03 PROCEDURE — 97116 GAIT TRAINING THERAPY: CPT

## 2025-06-03 PROCEDURE — 2500000003 HC RX 250 WO HCPCS: Performed by: NURSE ANESTHETIST, CERTIFIED REGISTERED

## 2025-06-03 PROCEDURE — 7100000000 HC PACU RECOVERY - FIRST 15 MIN: Performed by: ORTHOPAEDIC SURGERY

## 2025-06-03 PROCEDURE — 6370000000 HC RX 637 (ALT 250 FOR IP): Performed by: ANESTHESIOLOGY

## 2025-06-03 PROCEDURE — 6360000002 HC RX W HCPCS: Performed by: NURSE ANESTHETIST, CERTIFIED REGISTERED

## 2025-06-03 PROCEDURE — 3600000004 HC SURGERY LEVEL 4 BASE: Performed by: ORTHOPAEDIC SURGERY

## 2025-06-03 PROCEDURE — 2580000003 HC RX 258: Performed by: PHYSICIAN ASSISTANT

## 2025-06-03 PROCEDURE — 3600000014 HC SURGERY LEVEL 4 ADDTL 15MIN: Performed by: ORTHOPAEDIC SURGERY

## 2025-06-03 PROCEDURE — 97530 THERAPEUTIC ACTIVITIES: CPT

## 2025-06-03 PROCEDURE — 3700000001 HC ADD 15 MINUTES (ANESTHESIA): Performed by: ORTHOPAEDIC SURGERY

## 2025-06-03 PROCEDURE — 2580000003 HC RX 258: Performed by: ANESTHESIOLOGY

## 2025-06-03 PROCEDURE — 64447 NJX AA&/STRD FEMORAL NRV IMG: CPT | Performed by: ANESTHESIOLOGY

## 2025-06-03 PROCEDURE — 2709999900 HC NON-CHARGEABLE SUPPLY: Performed by: ORTHOPAEDIC SURGERY

## 2025-06-03 DEVICE — IMPLANTABLE DEVICE
Type: IMPLANTABLE DEVICE | Site: KNEE | Status: FUNCTIONAL
Brand: PERSONA® VIVACIT-E®

## 2025-06-03 DEVICE — PSN TIB STM 5 DEG SZ E R: Type: IMPLANTABLE DEVICE | Site: KNEE | Status: FUNCTIONAL

## 2025-06-03 DEVICE — KIT KNEE IMPL CAP K1 HEMI CEM VE BEAR VE LNR K1VEVEZIMMERBIOMET: Type: IMPLANTABLE DEVICE | Status: FUNCTIONAL

## 2025-06-03 DEVICE — SMARTSET GHV GENTAMICIN HIGH VISCOSITY BONE CEMENT 40G
Type: IMPLANTABLE DEVICE | Site: KNEE | Status: FUNCTIONAL
Brand: SMARTSET

## 2025-06-03 DEVICE — IMPLANTABLE DEVICE
Type: IMPLANTABLE DEVICE | Site: KNEE | Status: FUNCTIONAL
Brand: PERSONA®

## 2025-06-03 RX ORDER — ACETAMINOPHEN 500 MG
1000 TABLET ORAL ONCE
Status: COMPLETED | OUTPATIENT
Start: 2025-06-03 | End: 2025-06-03

## 2025-06-03 RX ORDER — SODIUM CHLORIDE, SODIUM LACTATE, POTASSIUM CHLORIDE, CALCIUM CHLORIDE 600; 310; 30; 20 MG/100ML; MG/100ML; MG/100ML; MG/100ML
INJECTION, SOLUTION INTRAVENOUS CONTINUOUS
Status: DISCONTINUED | OUTPATIENT
Start: 2025-06-03 | End: 2025-06-03 | Stop reason: HOSPADM

## 2025-06-03 RX ORDER — TRANEXAMIC ACID 100 MG/ML
INJECTION, SOLUTION INTRAVENOUS
Status: DISCONTINUED | OUTPATIENT
Start: 2025-06-03 | End: 2025-06-03 | Stop reason: SDUPTHER

## 2025-06-03 RX ORDER — FENTANYL CITRATE 50 UG/ML
25 INJECTION, SOLUTION INTRAMUSCULAR; INTRAVENOUS EVERY 5 MIN PRN
Status: DISCONTINUED | OUTPATIENT
Start: 2025-06-03 | End: 2025-06-03 | Stop reason: HOSPADM

## 2025-06-03 RX ORDER — ROPIVACAINE HYDROCHLORIDE 5 MG/ML
30 INJECTION, SOLUTION EPIDURAL; INFILTRATION; PERINEURAL ONCE
Status: DISCONTINUED | OUTPATIENT
Start: 2025-06-03 | End: 2025-06-03 | Stop reason: HOSPADM

## 2025-06-03 RX ORDER — FENTANYL CITRATE 50 UG/ML
100 INJECTION, SOLUTION INTRAMUSCULAR; INTRAVENOUS
Status: DISCONTINUED | OUTPATIENT
Start: 2025-06-03 | End: 2025-06-03 | Stop reason: HOSPADM

## 2025-06-03 RX ORDER — SODIUM CHLORIDE 0.9 % (FLUSH) 0.9 %
5-40 SYRINGE (ML) INJECTION EVERY 12 HOURS SCHEDULED
Status: DISCONTINUED | OUTPATIENT
Start: 2025-06-03 | End: 2025-06-03 | Stop reason: HOSPADM

## 2025-06-03 RX ORDER — KETOROLAC TROMETHAMINE 30 MG/ML
15 INJECTION, SOLUTION INTRAMUSCULAR; INTRAVENOUS EVERY 6 HOURS
Status: DISCONTINUED | OUTPATIENT
Start: 2025-06-03 | End: 2025-06-03

## 2025-06-03 RX ORDER — 0.9 % SODIUM CHLORIDE 0.9 %
500 INTRAVENOUS SOLUTION INTRAVENOUS
Status: DISCONTINUED | OUTPATIENT
Start: 2025-06-03 | End: 2025-06-04 | Stop reason: HOSPADM

## 2025-06-03 RX ORDER — MORPHINE SULFATE 4 MG/ML
2 INJECTION, SOLUTION INTRAMUSCULAR; INTRAVENOUS
Status: DISCONTINUED | OUTPATIENT
Start: 2025-06-03 | End: 2025-06-04 | Stop reason: HOSPADM

## 2025-06-03 RX ORDER — PROPOFOL 10 MG/ML
INJECTION, EMULSION INTRAVENOUS
Status: DISCONTINUED | OUTPATIENT
Start: 2025-06-03 | End: 2025-06-03 | Stop reason: SDUPTHER

## 2025-06-03 RX ORDER — SODIUM CHLORIDE 0.9 % (FLUSH) 0.9 %
5-40 SYRINGE (ML) INJECTION EVERY 12 HOURS SCHEDULED
Status: DISCONTINUED | OUTPATIENT
Start: 2025-06-03 | End: 2025-06-04 | Stop reason: HOSPADM

## 2025-06-03 RX ORDER — LIDOCAINE HYDROCHLORIDE 10 MG/ML
1 INJECTION, SOLUTION EPIDURAL; INFILTRATION; INTRACAUDAL; PERINEURAL
Status: DISCONTINUED | OUTPATIENT
Start: 2025-06-03 | End: 2025-06-03 | Stop reason: HOSPADM

## 2025-06-03 RX ORDER — METOPROLOL TARTRATE 25 MG/1
25 TABLET, FILM COATED ORAL 2 TIMES DAILY
Status: DISCONTINUED | OUTPATIENT
Start: 2025-06-03 | End: 2025-06-04

## 2025-06-03 RX ORDER — ONDANSETRON 4 MG/1
4 TABLET, ORALLY DISINTEGRATING ORAL EVERY 8 HOURS PRN
Status: DISCONTINUED | OUTPATIENT
Start: 2025-06-03 | End: 2025-06-04 | Stop reason: HOSPADM

## 2025-06-03 RX ORDER — INSULIN LISPRO 100 [IU]/ML
0-8 INJECTION, SOLUTION INTRAVENOUS; SUBCUTANEOUS
Status: DISCONTINUED | OUTPATIENT
Start: 2025-06-03 | End: 2025-06-03

## 2025-06-03 RX ORDER — ONDANSETRON 2 MG/ML
4 INJECTION INTRAMUSCULAR; INTRAVENOUS EVERY 6 HOURS PRN
Status: DISCONTINUED | OUTPATIENT
Start: 2025-06-03 | End: 2025-06-04 | Stop reason: HOSPADM

## 2025-06-03 RX ORDER — AMLODIPINE BESYLATE 5 MG/1
5 TABLET ORAL DAILY
Status: DISCONTINUED | OUTPATIENT
Start: 2025-06-04 | End: 2025-06-04 | Stop reason: HOSPADM

## 2025-06-03 RX ORDER — DEXAMETHASONE SODIUM PHOSPHATE 4 MG/ML
INJECTION, SOLUTION INTRA-ARTICULAR; INTRALESIONAL; INTRAMUSCULAR; INTRAVENOUS; SOFT TISSUE
Status: DISCONTINUED | OUTPATIENT
Start: 2025-06-03 | End: 2025-06-03 | Stop reason: SDUPTHER

## 2025-06-03 RX ORDER — SODIUM CHLORIDE 0.9 % (FLUSH) 0.9 %
5-40 SYRINGE (ML) INJECTION PRN
Status: DISCONTINUED | OUTPATIENT
Start: 2025-06-03 | End: 2025-06-03 | Stop reason: HOSPADM

## 2025-06-03 RX ORDER — INSULIN LISPRO 100 [IU]/ML
14 INJECTION, SOLUTION INTRAVENOUS; SUBCUTANEOUS
Status: DISCONTINUED | OUTPATIENT
Start: 2025-06-03 | End: 2025-06-04 | Stop reason: HOSPADM

## 2025-06-03 RX ORDER — FENTANYL CITRATE 50 UG/ML
100 INJECTION, SOLUTION INTRAMUSCULAR; INTRAVENOUS ONCE
Refills: 0 | Status: COMPLETED | OUTPATIENT
Start: 2025-06-03 | End: 2025-06-03

## 2025-06-03 RX ORDER — HYDROMORPHONE HYDROCHLORIDE 1 MG/ML
0.5 INJECTION, SOLUTION INTRAMUSCULAR; INTRAVENOUS; SUBCUTANEOUS EVERY 5 MIN PRN
Status: DISCONTINUED | OUTPATIENT
Start: 2025-06-03 | End: 2025-06-03 | Stop reason: HOSPADM

## 2025-06-03 RX ORDER — DEXTROSE MONOHYDRATE 100 MG/ML
INJECTION, SOLUTION INTRAVENOUS CONTINUOUS PRN
Status: DISCONTINUED | OUTPATIENT
Start: 2025-06-03 | End: 2025-06-04 | Stop reason: HOSPADM

## 2025-06-03 RX ORDER — ONDANSETRON 2 MG/ML
4 INJECTION INTRAMUSCULAR; INTRAVENOUS
Status: DISCONTINUED | OUTPATIENT
Start: 2025-06-03 | End: 2025-06-03 | Stop reason: HOSPADM

## 2025-06-03 RX ORDER — OXYCODONE HYDROCHLORIDE 5 MG/1
5 TABLET ORAL
Status: DISCONTINUED | OUTPATIENT
Start: 2025-06-03 | End: 2025-06-03 | Stop reason: HOSPADM

## 2025-06-03 RX ORDER — MIDAZOLAM HYDROCHLORIDE 1 MG/ML
INJECTION, SOLUTION INTRAMUSCULAR; INTRAVENOUS
Status: COMPLETED | OUTPATIENT
Start: 2025-06-03 | End: 2025-06-03

## 2025-06-03 RX ORDER — OXYCODONE HYDROCHLORIDE 5 MG/1
10 TABLET ORAL
Status: DISCONTINUED | OUTPATIENT
Start: 2025-06-03 | End: 2025-06-04 | Stop reason: HOSPADM

## 2025-06-03 RX ORDER — KETOROLAC TROMETHAMINE 30 MG/ML
15 INJECTION, SOLUTION INTRAMUSCULAR; INTRAVENOUS EVERY 6 HOURS
Status: COMPLETED | OUTPATIENT
Start: 2025-06-03 | End: 2025-06-04

## 2025-06-03 RX ORDER — NALOXONE HYDROCHLORIDE 0.4 MG/ML
INJECTION, SOLUTION INTRAMUSCULAR; INTRAVENOUS; SUBCUTANEOUS PRN
Status: DISCONTINUED | OUTPATIENT
Start: 2025-06-03 | End: 2025-06-03 | Stop reason: HOSPADM

## 2025-06-03 RX ORDER — PANTOPRAZOLE SODIUM 40 MG/1
40 TABLET, DELAYED RELEASE ORAL
Status: DISCONTINUED | OUTPATIENT
Start: 2025-06-04 | End: 2025-06-04 | Stop reason: HOSPADM

## 2025-06-03 RX ORDER — ASPIRIN 325 MG
325 TABLET ORAL 2 TIMES DAILY
Qty: 60 TABLET | Refills: 0 | Status: SHIPPED | OUTPATIENT
Start: 2025-06-03

## 2025-06-03 RX ORDER — SODIUM CHLORIDE 9 MG/ML
INJECTION, SOLUTION INTRAVENOUS CONTINUOUS
Status: DISPENSED | OUTPATIENT
Start: 2025-06-03 | End: 2025-06-04

## 2025-06-03 RX ORDER — OXYCODONE HYDROCHLORIDE 5 MG/1
5-10 TABLET ORAL EVERY 4 HOURS PRN
Qty: 42 TABLET | Refills: 0 | Status: SHIPPED | OUTPATIENT
Start: 2025-06-03 | End: 2025-06-10

## 2025-06-03 RX ORDER — BISACODYL 5 MG/1
5 TABLET, DELAYED RELEASE ORAL DAILY
Status: DISCONTINUED | OUTPATIENT
Start: 2025-06-03 | End: 2025-06-04 | Stop reason: HOSPADM

## 2025-06-03 RX ORDER — FAMOTIDINE 20 MG/1
20 TABLET, FILM COATED ORAL 2 TIMES DAILY PRN
Status: DISCONTINUED | OUTPATIENT
Start: 2025-06-03 | End: 2025-06-04 | Stop reason: HOSPADM

## 2025-06-03 RX ORDER — SODIUM CHLORIDE 9 MG/ML
INJECTION, SOLUTION INTRAVENOUS PRN
Status: DISCONTINUED | OUTPATIENT
Start: 2025-06-03 | End: 2025-06-03 | Stop reason: HOSPADM

## 2025-06-03 RX ORDER — AMLODIPINE BESYLATE 10 MG/1
5 TABLET ORAL DAILY
Qty: 1 TABLET | Refills: 0 | Status: SHIPPED
Start: 2025-06-03

## 2025-06-03 RX ORDER — MIDAZOLAM HYDROCHLORIDE 2 MG/2ML
2 INJECTION, SOLUTION INTRAMUSCULAR; INTRAVENOUS ONCE
Status: COMPLETED | OUTPATIENT
Start: 2025-06-03 | End: 2025-06-03

## 2025-06-03 RX ORDER — ROPIVACAINE HYDROCHLORIDE 5 MG/ML
INJECTION, SOLUTION EPIDURAL; INFILTRATION; PERINEURAL
Status: COMPLETED | OUTPATIENT
Start: 2025-06-03 | End: 2025-06-03

## 2025-06-03 RX ORDER — INSULIN GLARGINE 100 [IU]/ML
12 INJECTION, SOLUTION SUBCUTANEOUS NIGHTLY
Status: DISCONTINUED | OUTPATIENT
Start: 2025-06-03 | End: 2025-06-04

## 2025-06-03 RX ORDER — MIDAZOLAM HYDROCHLORIDE 2 MG/2ML
2 INJECTION, SOLUTION INTRAMUSCULAR; INTRAVENOUS PRN
Status: DISCONTINUED | OUTPATIENT
Start: 2025-06-03 | End: 2025-06-03 | Stop reason: HOSPADM

## 2025-06-03 RX ORDER — SENNA AND DOCUSATE SODIUM 50; 8.6 MG/1; MG/1
1 TABLET, FILM COATED ORAL 2 TIMES DAILY
Qty: 60 TABLET | Refills: 0 | Status: SHIPPED | OUTPATIENT
Start: 2025-06-03

## 2025-06-03 RX ORDER — ACETAMINOPHEN 500 MG
1000 TABLET ORAL EVERY 6 HOURS
Status: DISCONTINUED | OUTPATIENT
Start: 2025-06-03 | End: 2025-06-04 | Stop reason: HOSPADM

## 2025-06-03 RX ORDER — SENNA AND DOCUSATE SODIUM 50; 8.6 MG/1; MG/1
1 TABLET, FILM COATED ORAL 2 TIMES DAILY
Status: DISCONTINUED | OUTPATIENT
Start: 2025-06-03 | End: 2025-06-04 | Stop reason: HOSPADM

## 2025-06-03 RX ORDER — OXYCODONE HYDROCHLORIDE 5 MG/1
5 TABLET ORAL
Status: DISCONTINUED | OUTPATIENT
Start: 2025-06-03 | End: 2025-06-04 | Stop reason: HOSPADM

## 2025-06-03 RX ORDER — SODIUM CHLORIDE 9 MG/ML
INJECTION, SOLUTION INTRAVENOUS PRN
Status: DISCONTINUED | OUTPATIENT
Start: 2025-06-03 | End: 2025-06-04 | Stop reason: HOSPADM

## 2025-06-03 RX ORDER — SENNOSIDES 8.6 MG
325 CAPSULE ORAL 2 TIMES DAILY
Status: DISCONTINUED | OUTPATIENT
Start: 2025-06-03 | End: 2025-06-04 | Stop reason: HOSPADM

## 2025-06-03 RX ORDER — SODIUM CHLORIDE 0.9 % (FLUSH) 0.9 %
5-40 SYRINGE (ML) INJECTION PRN
Status: DISCONTINUED | OUTPATIENT
Start: 2025-06-03 | End: 2025-06-04 | Stop reason: HOSPADM

## 2025-06-03 RX ORDER — DULOXETIN HYDROCHLORIDE 30 MG/1
30 CAPSULE, DELAYED RELEASE ORAL DAILY
Status: DISCONTINUED | OUTPATIENT
Start: 2025-06-03 | End: 2025-06-04 | Stop reason: HOSPADM

## 2025-06-03 RX ORDER — HYDROXYZINE HYDROCHLORIDE 10 MG/1
10 TABLET, FILM COATED ORAL EVERY 8 HOURS PRN
Status: DISCONTINUED | OUTPATIENT
Start: 2025-06-03 | End: 2025-06-04 | Stop reason: HOSPADM

## 2025-06-03 RX ORDER — ATORVASTATIN CALCIUM 40 MG/1
40 TABLET, FILM COATED ORAL NIGHTLY
Status: DISCONTINUED | OUTPATIENT
Start: 2025-06-03 | End: 2025-06-04 | Stop reason: HOSPADM

## 2025-06-03 RX ORDER — POLYETHYLENE GLYCOL 3350 17 G/17G
17 POWDER, FOR SOLUTION ORAL DAILY
Status: DISCONTINUED | OUTPATIENT
Start: 2025-06-03 | End: 2025-06-04 | Stop reason: HOSPADM

## 2025-06-03 RX ORDER — FENTANYL CITRATE 50 UG/ML
INJECTION, SOLUTION INTRAMUSCULAR; INTRAVENOUS
Status: COMPLETED | OUTPATIENT
Start: 2025-06-03 | End: 2025-06-03

## 2025-06-03 RX ORDER — INSULIN GLARGINE 100 [IU]/ML
40 INJECTION, SOLUTION SUBCUTANEOUS NIGHTLY
Status: DISCONTINUED | OUTPATIENT
Start: 2025-06-03 | End: 2025-06-03

## 2025-06-03 RX ORDER — HYDRALAZINE HYDROCHLORIDE 20 MG/ML
10 INJECTION INTRAMUSCULAR; INTRAVENOUS ONCE
Status: DISCONTINUED | OUTPATIENT
Start: 2025-06-03 | End: 2025-06-03 | Stop reason: HOSPADM

## 2025-06-03 RX ORDER — PROCHLORPERAZINE EDISYLATE 5 MG/ML
5 INJECTION INTRAMUSCULAR; INTRAVENOUS
Status: DISCONTINUED | OUTPATIENT
Start: 2025-06-03 | End: 2025-06-03 | Stop reason: HOSPADM

## 2025-06-03 RX ADMIN — SODIUM CHLORIDE, SODIUM LACTATE, POTASSIUM CHLORIDE, AND CALCIUM CHLORIDE: .6; .31; .03; .02 INJECTION, SOLUTION INTRAVENOUS at 06:34

## 2025-06-03 RX ADMIN — KETOROLAC TROMETHAMINE 15 MG: 30 INJECTION, SOLUTION INTRAMUSCULAR; INTRAVENOUS at 17:05

## 2025-06-03 RX ADMIN — CEFAZOLIN 2000 MG: 1 INJECTION, POWDER, FOR SOLUTION INTRAMUSCULAR; INTRAVENOUS at 15:11

## 2025-06-03 RX ADMIN — TRANEXAMIC ACID 1000 MG: 100 INJECTION, SOLUTION INTRAVENOUS at 07:47

## 2025-06-03 RX ADMIN — CLONIDINE HYDROCHLORIDE 0.3 MG: 0.2 TABLET ORAL at 20:33

## 2025-06-03 RX ADMIN — FENTANYL CITRATE 25 MCG: 50 INJECTION, SOLUTION INTRAMUSCULAR; INTRAVENOUS at 09:46

## 2025-06-03 RX ADMIN — MEPIVACAINE HYDROCHLORIDE 52.5 MG: 15 INJECTION, SOLUTION EPIDURAL; INFILTRATION at 07:42

## 2025-06-03 RX ADMIN — MIDAZOLAM 2 MG: 1 INJECTION INTRAMUSCULAR; INTRAVENOUS at 07:20

## 2025-06-03 RX ADMIN — ACETAMINOPHEN 1000 MG: 500 TABLET ORAL at 06:19

## 2025-06-03 RX ADMIN — DEXAMETHASONE SODIUM PHOSPHATE 8 MG: 4 INJECTION INTRA-ARTICULAR; INTRALESIONAL; INTRAMUSCULAR; INTRAVENOUS; SOFT TISSUE at 07:45

## 2025-06-03 RX ADMIN — KETOROLAC TROMETHAMINE 15 MG: 30 INJECTION, SOLUTION INTRAMUSCULAR; INTRAVENOUS at 11:49

## 2025-06-03 RX ADMIN — INSULIN LISPRO 14 UNITS: 100 INJECTION, SOLUTION INTRAVENOUS; SUBCUTANEOUS at 17:05

## 2025-06-03 RX ADMIN — FENTANYL CITRATE 50 MCG: 50 INJECTION INTRAMUSCULAR; INTRAVENOUS at 07:20

## 2025-06-03 RX ADMIN — ASPIRIN 325 MG: 325 TABLET, COATED ORAL at 17:08

## 2025-06-03 RX ADMIN — SENNOSIDES AND DOCUSATE SODIUM 1 TABLET: 50; 8.6 TABLET ORAL at 20:33

## 2025-06-03 RX ADMIN — FENTANYL CITRATE 25 MCG: 50 INJECTION, SOLUTION INTRAMUSCULAR; INTRAVENOUS at 10:49

## 2025-06-03 RX ADMIN — ROPIVACAINE HYDROCHLORIDE 30 ML: 5 INJECTION EPIDURAL; INFILTRATION; PERINEURAL at 07:20

## 2025-06-03 RX ADMIN — PROPOFOL 25 MCG/KG/MIN: 10 INJECTION, EMULSION INTRAVENOUS at 07:37

## 2025-06-03 RX ADMIN — POLYETHYLENE GLYCOL 3350 17 G: 17 POWDER, FOR SOLUTION ORAL at 11:48

## 2025-06-03 RX ADMIN — PROPOFOL 25 MG: 10 INJECTION, EMULSION INTRAVENOUS at 07:36

## 2025-06-03 RX ADMIN — SODIUM CHLORIDE, PRESERVATIVE FREE 10 ML: 5 INJECTION INTRAVENOUS at 20:34

## 2025-06-03 RX ADMIN — BISACODYL 5 MG: 5 TABLET, COATED ORAL at 11:48

## 2025-06-03 RX ADMIN — ACETAMINOPHEN 1000 MG: 500 TABLET ORAL at 11:48

## 2025-06-03 RX ADMIN — PHENYLEPHRINE HYDROCHLORIDE 40 MCG/MIN: 10 INJECTION INTRAVENOUS at 07:44

## 2025-06-03 RX ADMIN — METOPROLOL TARTRATE 25 MG: 25 TABLET, FILM COATED ORAL at 20:33

## 2025-06-03 RX ADMIN — SENNOSIDES AND DOCUSATE SODIUM 1 TABLET: 50; 8.6 TABLET ORAL at 11:48

## 2025-06-03 RX ADMIN — FENTANYL CITRATE 50 MCG: 50 INJECTION, SOLUTION INTRAMUSCULAR; INTRAVENOUS at 07:20

## 2025-06-03 RX ADMIN — DULOXETINE HYDROCHLORIDE 30 MG: 30 CAPSULE, DELAYED RELEASE ORAL at 13:44

## 2025-06-03 RX ADMIN — ACETAMINOPHEN 1000 MG: 500 TABLET ORAL at 17:06

## 2025-06-03 RX ADMIN — WATER 2000 MG: 1 INJECTION INTRAMUSCULAR; INTRAVENOUS; SUBCUTANEOUS at 07:47

## 2025-06-03 RX ADMIN — INSULIN GLARGINE 12 UNITS: 100 INJECTION, SOLUTION SUBCUTANEOUS at 22:24

## 2025-06-03 RX ADMIN — SODIUM CHLORIDE: 0.9 INJECTION, SOLUTION INTRAVENOUS at 09:51

## 2025-06-03 RX ADMIN — OXYCODONE 5 MG: 5 TABLET ORAL at 11:48

## 2025-06-03 RX ADMIN — ATORVASTATIN CALCIUM 40 MG: 40 TABLET, FILM COATED ORAL at 20:33

## 2025-06-03 ASSESSMENT — PAIN - FUNCTIONAL ASSESSMENT
PAIN_FUNCTIONAL_ASSESSMENT: NONE - DENIES PAIN
PAIN_FUNCTIONAL_ASSESSMENT: ACTIVITIES ARE NOT PREVENTED
PAIN_FUNCTIONAL_ASSESSMENT: NONE - DENIES PAIN

## 2025-06-03 ASSESSMENT — PAIN SCALES - GENERAL
PAINLEVEL_OUTOF10: 6
PAINLEVEL_OUTOF10: 7
PAINLEVEL_OUTOF10: 7
PAINLEVEL_OUTOF10: 4
PAINLEVEL_OUTOF10: 5
PAINLEVEL_OUTOF10: 3
PAINLEVEL_OUTOF10: 6

## 2025-06-03 ASSESSMENT — PAIN DESCRIPTION - DESCRIPTORS
DESCRIPTORS: ACHING;DISCOMFORT
DESCRIPTORS: ACHING;DISCOMFORT
DESCRIPTORS: ACHING

## 2025-06-03 ASSESSMENT — PAIN DESCRIPTION - LOCATION
LOCATION: KNEE

## 2025-06-03 ASSESSMENT — PAIN DESCRIPTION - ORIENTATION
ORIENTATION: RIGHT

## 2025-06-03 NOTE — PROGRESS NOTES
Ortho NP Note    POD# 0  s/p RIGHT TOTAL KNEE ARTHROPLASTY   Pt seen with no visitors    Pt finishing up with PT, returning to bed with PT  Reports appropriate right knee pain  Tolerating clears. No nausea.   Postop plan reviewed - No complaints/concerns.    VSS Afebrile.    Visit Vitals  BP (!) 173/75   Pulse 74   Temp 97.9 °F (36.6 °C) (Oral)   Resp 18   SpO2 95%       Voiding status: voiding         Labs    Lab Results   Component Value Date/Time    HGB 14.6 05/19/2025 08:20 AM      Lab Results   Component Value Date/Time    INR 1.0 05/19/2025 08:20 AM      Lab Results   Component Value Date/Time     05/19/2025 08:20 AM    K 4.5 05/19/2025 08:20 AM     05/19/2025 08:20 AM    CO2 30 05/19/2025 08:20 AM    BUN 20 05/19/2025 08:20 AM     Recent Glucose Results:   Glucose   Date Value Ref Range Status   05/19/2025 69 65 - 100 mg/dL Final   03/19/2025 65 65 - 100 mg/dL Final   03/04/2025 168 (H) 65 - 100 mg/dL Final           There is no height or weight on file to calculate BMI. : A BMI > 30 is classified as obesity and > 40 is classified as morbid obesity.       Ace wrap dressing c.d.i  Cryotherapy in place over incision  Calves soft and supple; No pain with passive stretch  Sensation and motor intact. +PF/DF/EHL intact   Foot Pumps for mechanical DVT proph while in bed     PLAN:  1) PT BID, OT - WBAT  2) Aspirin 325 mg PO BID for DVT Prophylaxis. Encouraged early mobilization, bed exercises, and SCD use.  3) Pain control - scheduled tylenol and toradol and prn  oxycodone   4) DM2 glucose expectedly elevated. A1C 6.9%. Instructed by PCP to take 2/3 long acting tonight and continue 14 units Lispro with meals. Diet changed to diabetic.  5) Post op care - Continue bowel regimen, encouraged IS. Straight cath per protocol. Aquacel to remain in place x 7 day unless integrity is lost.    6) Readniess for discharge:     [x] Vital Signs stable    [] Hgb stable    [x] + Voiding    [x] Wound intact, drainage

## 2025-06-03 NOTE — ANESTHESIA PRE PROCEDURE
Department of Anesthesiology  Preprocedure Note       Name:  Tiana Lipscomb   Age:  74 y.o.  :  1950                                          MRN:  [id number]         Date:  6/3/2025      Surgeon: Surgeon(s):  Chavez Fernandez MD    Procedure: Procedure(s):  RIGHT TOTAL KNEE ARTHROPLASTY    Medications prior to admission:   Prior to Admission medications    Medication Sig Start Date End Date Taking? Authorizing Provider   Insulin Aspart, w/Niacinamide, (FIASP FLEXTOUCH) 100 UNIT/ML SOPN Inject 14 Units into the skin 3 times daily (before meals) 25  Yes Tim Jesus MD   DULoxetine (CYMBALTA) 30 MG extended release capsule TAKE 1 CAPSULE BY MOUTH EVERY DAY 3/31/25  Yes Tim Jesus MD   metoprolol tartrate (LOPRESSOR) 25 MG tablet TAKE 1/2 TABS BY MOUTH TWO (2) TIMES A DAY. 3/31/25  Yes Tim Jesus MD   insulin glargine (BASAGLAR KWIKPEN) 100 UNIT/ML injection pen Inject 40 Units into the skin nightly 3/19/25  Yes Blaine Sanches DO   atorvastatin (LIPITOR) 40 MG tablet Take 1 tablet by mouth daily  Patient taking differently: Take 1 tablet by mouth nightly 3/4/25  Yes Tim Jesus MD   cloNIDine (CATAPRES) 0.3 MG tablet Take 1 tablet by mouth 2 times daily 3/4/25  Yes Tim Jesus MD   amLODIPine (NORVASC) 10 MG tablet TAKE 1 TABLET BY MOUTH EVERY DAY  Patient taking differently: Take 0.5 tablets by mouth daily 24  Yes Mitch Parish MD   pantoprazole (PROTONIX) 40 MG tablet TAKE 1 TABLET BY MOUTH EVERY DAY BEFORE BREAKFAST 24  Yes Elmer Kwan MD   acetaminophen (TYLENOL) 650 MG suppository Place 2 suppositories rectally every 4 hours as needed for Fever    ProviderMarybel MD   vitamin D (ERGOCALCIFEROL) 1.25 MG (12777 UT) CAPS capsule TAKE 1 CAPSULE BY MOUTH ONE TIME PER WEEK 3/31/25   Tim Jesus MD   BD PEN NEEDLE MICRO U/F 32G X 6 MM MISC  3/19/25   Provider, Marybel, MD   Continuous Blood Gluc

## 2025-06-03 NOTE — OP NOTE
Name: Tiana Lipscomb  MRN:  [id number]  : 1950  Age:  74 y.o.  Surgery Date: 6/3/2025      OPERATIVE REPORT - RIGHT TOTAL KNEE REPLACEMENT    PREOPERATIVE DIAGNOSIS: Osteoarthritis, right knee.    POSTOPERATIVE DIAGNOSIS: Osteoarthritis, right knee.    PROCEDURE PERFORMED: Right total knee arthroplasty.    SURGEON: Chavez Fernandez MD    FIRST ASSISTANT:   Coco MACK    ANESTHESIA: Spinal    PRE-OP ANTIBIOTIC: Ancef 3g    COMPLICATIONS: None.    ESTIMATED BLOOD LOSS: 50 mL.    SPECIMENS REMOVED: None.    COMPONENTS IMPLANTED:   Implant Name Type Inv. Item Serial No.  Lot No. LRB No. Used Action   CEMENT BNE 40GM FULL DOSE PMMA W/ GENT HI VISC RADPQ LNG - SN/A  CEMENT BNE 40GM FULL DOSE PMMA W/ GENT HI VISC RADPQ LNG N/A [facility] [place] [id number] Right 1 Implanted   CEMENT BNE 40GM FULL DOSE PMMA W/ GENT HI VISC RADPQ LNG - SN/A  CEMENT BNE 40GM FULL DOSE PMMA W/ GENT HI VISC RADPQ LNG N/A [facility] [employer] [facility] [id number] Right 1 Implanted   COMPONENT FEM SZ 7 STD R KNEE CO CHROM WES CRUCE RET COR - SN/A  COMPONENT FEM SZ 7 STD R KNEE CO CHROM WES CRUCE RET COR N/A [employer] [facility] [place] [id number] Right 1 Implanted   COMPONENT PAT [id number] THK8.5MM STD KNEE VIVACIT-E WES - SN/A  COMPONENT PAT [id number] THK8.5MM STD KNEE VIVACIT-E WES N/A [employer] [facility] [place] [id number] Right 1 Implanted   PSN TIB STM 5 DEG SZ E R - SN/A  PSN TIB STM 5 DEG SZ E R N/A [employer] [facility] [place] [id number] Right 1 Implanted   PSN MC VE ASF R 13MM 6-7/EF - SN/A  PSN MC VE ASF R 13MM 6-7/EF N/A [employer] [facility] [place] [id number] Right 1 Implanted       INDICATIONS: The patient is an 74 yrs female with progressive debilitating right knee pain due to severe osteoarthritis. Symptoms have progressed despite comprehensive conservative treatment and the patient presents for right total knee replacement. Risks, benefits, alternatives of the procedure were reviewed in detail and

## 2025-06-03 NOTE — ANESTHESIA PROCEDURE NOTES
Peripheral Block    Patient location during procedure: pre-op  Reason for block: post-op pain management and at surgeon's request  Start time: 6/3/2025 7:20 AM  Staffing  Performed: anesthesiologist   Anesthesiologist: Cabrera Putnam MD  Performed by: Cabrera Putnam MD  Authorized by: Cabrera Putnam MD    Preanesthetic Checklist  Completed: patient identified, IV checked, site marked, risks and benefits discussed, surgical/procedural consents, equipment checked, pre-op evaluation, timeout performed, anesthesia consent given, oxygen available, monitors applied/VS acknowledged and fire risk safety assessment completed and verbalized  Peripheral Block   Patient position: supine  Prep: ChloraPrep  Provider prep: mask and sterile gloves  Patient monitoring: cardiac monitor, continuous pulse ox, frequent blood pressure checks, IV access and oxygen  Block type: Saphenous  Laterality: right  Injection technique: single-shot  Guidance: ultrasound guided  Infiltration strength: 0.5 %Dose: 30 mL    Needle   Needle type: insulated echogenic nerve stimulator needle   Needle gauge: 21 G  Needle localization: anatomical landmarks and ultrasound guidance  Needle length: 10 cm  Assessment   Injection assessment: negative aspiration for heme, no paresthesia on injection, local visualized surrounding nerve on ultrasound and no intravascular symptoms  Paresthesia pain: none  Slow fractionated injection: yes  Hemodynamics: stable  Outcomes: uncomplicated and patient tolerated procedure well    Medications Administered  fentaNYL (SUBLIMAZE) injection - IntraVENous   50 mcg - 6/3/2025 7:20:00 AM  midazolam (VERSED) injection 2 mg/2mL - IntraVENous   2 mg - 6/3/2025 7:20:00 AM  ropivacaine (NAROPIN) injection 0.5% - Perineural   30 mL - 6/3/2025 7:20:00 AM

## 2025-06-03 NOTE — H&P
Update History & Physical    The patient's History and Physical of June 2, 2025 was reviewed with the patient and I examined the patient. There was no change. The surgical site was confirmed by the patient and me.       Plan: The risks, benefits, expected outcome, and alternative to the recommended procedure have been discussed with the patient. Patient understands and wants to proceed with the procedure.     Electronically signed by Chavez Fernandez MD on 6/3/2025 at 7:19 AM

## 2025-06-03 NOTE — ANESTHESIA PROCEDURE NOTES
Spinal Block    End time: 6/3/2025 7:42 AM  Reason for block: primary anesthetic  Staffing  Performed: resident/CRNA   Anesthesiologist: Cabrera Putnam MD  Resident/CRNA: Jorge Pugh APRN - CRNA  Performed by: Jorge Pugh APRN - CRNA  Authorized by: Cabrera Putnam MD    Spinal Block  Patient position: sitting  Prep: Betadine  Patient monitoring: continuous pulse ox, continuous capnometry, frequent blood pressure checks and oxygen  Approach: midline  Location: L3/L4  Provider prep: mask and sterile gloves  Local infiltration: lidocaine  Needle  Needle type: pencil-tip   Needle gauge: 25 G  Needle length: 4 in  Assessment  Swirl obtained: Yes  CSF: clear  Attempts: 1  Hemodynamics: stable  Preanesthetic Checklist  Completed: patient identified, IV checked, site marked, risks and benefits discussed, surgical/procedural consents, equipment checked, pre-op evaluation, timeout performed, anesthesia consent given, oxygen available, monitors applied/VS acknowledged, fire risk safety assessment completed and verbalized and blood product R/B/A discussed and consented

## 2025-06-03 NOTE — DISCHARGE INSTRUCTIONS
Post-op Discharge Instructions Following Total Joint Replacement  Raf Laguna MD  [facility]  [phone]  Follow-up Office Visit  See Dr. Laguna approximately 3-4 weeks from date of surgery. Call [phone] to make an appointment.  If you have any postop questions for Dr. Laguna's clinical team, please call [phone].  Activity  Use your walker for ambulation.  Weight bearing as tolerated unless instructed otherwise by the physical therapist. Get up every hour you are awake and take a brief walk. Lengthen walking distance daily as your strength improves.  Continue using your walker until seen in the office for your first follow up visit.  Practice your exercises 3 times daily as instructed by the physical therapist. Ice for 20 minutes after exercising.  No driving until seen in the office for your first follow up visit.  Incision Care  The surgical dressing is waterproof and is to remain on your incision for 7 days. On the 7th day, carefully lift the edge of the dressing to break the adhesive seal and gently peel it off.  If your surgical dressing comes loose or falls off before the 7th day, replace it with a dry sterile gauze dressing and change this dressing daily. Once there is no drainage on the bandage, you mean leave the incision open to air.  You may take a shower with the surgical dressing in place. After you remove the surgical dressing on day 7, you may continue to shower and get your incision wet in the shower. Do not submerge your incision under water in a bathtub, hot tub, swimming pool, etc. until after you have been evaluated at your first office visit.  Medications  Blood Clot Prevention: Take medication as prescribed by your physician for 4 weeks postop.  Pain Management: Take pain medication as prescribed; wean yourself off of pain medication as your pain lessens. Take with food.  You make also take Tylenol every 4-6 hours as needed for pain.  Do not exceed 3 grams (3000mg) per

## 2025-06-03 NOTE — ANESTHESIA POSTPROCEDURE EVALUATION
Department of Anesthesiology  Postprocedure Note    Patient: Tiana Lipscomb  MRN: [id number]  YOB: 1950  Date of evaluation: 6/3/2025    Procedure Summary       Date: 06/03/25 Room / Location: [facility] MAIN OR [address] [place] [address] MAIN OR    Anesthesia Start: 0731 Anesthesia Stop: 0938    Procedure: RIGHT TOTAL KNEE ARTHROPLASTY (Right: Knee) Diagnosis:       Primary osteoarthritis of right knee      (Primary osteoarthritis of right knee [M17.11])    Providers: Chavez Fernandez MD Responsible Provider: Cabrera Putnam MD    Anesthesia Type: MAC, TIVA ASA Status: 3            Anesthesia Type: MAC, TIVA    Nayeli Phase I: Nayeli Score: 10    Nayeli Phase II:      Anesthesia Post Evaluation    Patient location during evaluation: PACU  Patient participation: complete - patient participated  Level of consciousness: awake and alert  Airway patency: patent  Nausea & Vomiting: no nausea  Cardiovascular status: hemodynamically stable  Respiratory status: acceptable  Hydration status: stable  Pain management: adequate    No notable events documented.

## 2025-06-03 NOTE — PROGRESS NOTES
TRANSFER - OUT REPORT:    Verbal report given to GIOVANA Billingsley on Tiana Lipscomb  being transferred to  for routine post-op       Report consisted of patient's Situation, Background, Assessment and   Recommendations(SBAR).     Information from the following report(s) Surgery Report, Intake/Output, MAR, and Recent Results was reviewed with the receiving nurse.           Lines:   Peripheral IV 06/03/25 Right Hand (Active)   Site Assessment Clean, dry & intact 06/03/25 0937   Line Status Flushed;Infusing 06/03/25 0937   Line Care Cap changed;Connections checked and tightened 06/03/25 0937   Phlebitis Assessment No symptoms 06/03/25 0937   Infiltration Assessment 0 06/03/25 0937   Alcohol Cap Used Yes 06/03/25 0937   Dressing Status Clean, dry & intact 06/03/25 0937   Dressing Type Transparent 06/03/25 0937        Opportunity for questions and clarification was provided.      Patient transported with:  Tech

## 2025-06-03 NOTE — PLAN OF CARE
Problem: Chronic Conditions and Co-morbidities  Goal: Patient's chronic conditions and co-morbidity symptoms are monitored and maintained or improved  Outcome: Progressing     Problem: Safety - Adult  Goal: Free from fall injury  Outcome: Progressing     Problem: Discharge Planning  Goal: Discharge to home or other facility with appropriate resources  Outcome: Progressing     Problem: Pain  Goal: Verbalizes/displays adequate comfort level or baseline comfort level  Outcome: Progressing     Problem: Pain  Goal: Verbalizes/displays adequate comfort level or baseline comfort level  Outcome: Progressing

## 2025-06-03 NOTE — PROGRESS NOTES
Humalog interchanged for FIASP unit for unit per [facility] therapeutic Interchange policy.

## 2025-06-03 NOTE — PLAN OF CARE
Problem: Physical Therapy - Adult  Goal: By Discharge: Performs mobility at highest level of function for planned discharge setting.  See evaluation for individualized goals.  Description: FUNCTIONAL STATUS PRIOR TO ADMISSION: Patient was modified independent using a single point cane for functional mobility.    HOME SUPPORT PRIOR TO ADMISSION: The patient lived alone and did not require assistance.  Pt has friends that will stay her as needed.      Physical Therapy Goals  Initiated 6/3/2025  1.  Patient will move from supine to sit and sit to supine and scoot up and down in bed with modified independence within 4 day(s).    2.  Patient will perform sit to stand with modified independence within 4 day(s).  3.  Patient will transfer from bed to chair and chair to bed with modified independence using the least restrictive device within 4 day(s).  4.  Patient will ambulate with modified independence for > 150 feet with the least restrictive device within 4 day(s).   5.  Patient will ascend/descend 4 stairs with one handrail(s) with supervision within 4 day(s).  6. Patient will perform TKR home exercise program per protocol with supervision/set-up within 4 days.  7. Patient will demonstrate AROM 0-90 degrees in operative joint within 4 days.     PHYSICAL THERAPY EVALUATION    Patient: Tiana Lipscomb (74 y.o. female)  Date: 6/3/2025  Primary Diagnosis: Primary osteoarthritis of right knee [M17.11]  Arthritis of right knee [M17.11]  Procedure(s) (LRB):  RIGHT TOTAL KNEE ARTHROPLASTY (Right) * Day of Surgery *   Precautions: Restrictions/Precautions  Restrictions/Precautions: Weight Bearing, Fall Risk  Activity Level: Up with Assist Lower Extremity Weight Bearing Restrictions  Right Lower Extremity Weight Bearing: Weight Bearing As Tolerated          ASSESSMENT :   DEFICITS/IMPAIRMENTS:   The patient presents POD # 0 right TKR with pain right knee, decreased AROM/strength and function right leg, decreased activity

## 2025-06-04 VITALS
DIASTOLIC BLOOD PRESSURE: 63 MMHG | OXYGEN SATURATION: 98 % | SYSTOLIC BLOOD PRESSURE: 127 MMHG | HEART RATE: 88 BPM | TEMPERATURE: 98.3 F | RESPIRATION RATE: 18 BRPM

## 2025-06-04 LAB
ANION GAP SERPL CALC-SCNC: 7 MMOL/L (ref 2–12)
BUN SERPL-MCNC: 20 MG/DL (ref 6–20)
BUN/CREAT SERPL: 16 (ref 12–20)
CALCIUM SERPL-MCNC: 9.4 MG/DL (ref 8.5–10.1)
CHLORIDE SERPL-SCNC: 106 MMOL/L (ref 97–108)
CO2 SERPL-SCNC: 26 MMOL/L (ref 21–32)
CREAT SERPL-MCNC: 1.24 MG/DL (ref 0.55–1.02)
GLUCOSE BLD STRIP.AUTO-MCNC: 214 MG/DL (ref 65–117)
GLUCOSE BLD STRIP.AUTO-MCNC: 254 MG/DL (ref 65–117)
GLUCOSE SERPL-MCNC: 376 MG/DL (ref 65–100)
HCT VFR BLD AUTO: 40.9 % (ref 35–47)
HGB BLD-MCNC: 13.6 G/DL (ref 11.5–16)
POTASSIUM SERPL-SCNC: 4.6 MMOL/L (ref 3.5–5.1)
SERVICE CMNT-IMP: ABNORMAL
SERVICE CMNT-IMP: ABNORMAL
SODIUM SERPL-SCNC: 139 MMOL/L (ref 136–145)

## 2025-06-04 PROCEDURE — 97165 OT EVAL LOW COMPLEX 30 MIN: CPT

## 2025-06-04 PROCEDURE — 6370000000 HC RX 637 (ALT 250 FOR IP): Performed by: PHYSICIAN ASSISTANT

## 2025-06-04 PROCEDURE — 85018 HEMOGLOBIN: CPT

## 2025-06-04 PROCEDURE — APPNB60 APP NON BILLABLE TIME 46-60 MINS: Performed by: NURSE PRACTITIONER

## 2025-06-04 PROCEDURE — 96374 THER/PROPH/DIAG INJ IV PUSH: CPT

## 2025-06-04 PROCEDURE — 6360000002 HC RX W HCPCS: Performed by: NURSE PRACTITIONER

## 2025-06-04 PROCEDURE — 82962 GLUCOSE BLOOD TEST: CPT

## 2025-06-04 PROCEDURE — 80048 BASIC METABOLIC PNL TOTAL CA: CPT

## 2025-06-04 PROCEDURE — G0378 HOSPITAL OBSERVATION PER HR: HCPCS

## 2025-06-04 PROCEDURE — 85014 HEMATOCRIT: CPT

## 2025-06-04 PROCEDURE — 97116 GAIT TRAINING THERAPY: CPT

## 2025-06-04 PROCEDURE — 2500000003 HC RX 250 WO HCPCS: Performed by: PHYSICIAN ASSISTANT

## 2025-06-04 PROCEDURE — 97535 SELF CARE MNGMENT TRAINING: CPT

## 2025-06-04 PROCEDURE — 97530 THERAPEUTIC ACTIVITIES: CPT

## 2025-06-04 PROCEDURE — 6360000002 HC RX W HCPCS: Performed by: PHYSICIAN ASSISTANT

## 2025-06-04 RX ORDER — INSULIN GLARGINE 100 [IU]/ML
40 INJECTION, SOLUTION SUBCUTANEOUS NIGHTLY
Status: DISCONTINUED | OUTPATIENT
Start: 2025-06-04 | End: 2025-06-04 | Stop reason: HOSPADM

## 2025-06-04 RX ORDER — METOPROLOL TARTRATE 25 MG/1
12.5 TABLET, FILM COATED ORAL 2 TIMES DAILY
Status: DISCONTINUED | OUTPATIENT
Start: 2025-06-04 | End: 2025-06-04 | Stop reason: HOSPADM

## 2025-06-04 RX ADMIN — DULOXETINE HYDROCHLORIDE 30 MG: 30 CAPSULE, DELAYED RELEASE ORAL at 09:03

## 2025-06-04 RX ADMIN — POLYETHYLENE GLYCOL 3350 17 G: 17 POWDER, FOR SOLUTION ORAL at 09:03

## 2025-06-04 RX ADMIN — PANTOPRAZOLE SODIUM 40 MG: 40 TABLET, DELAYED RELEASE ORAL at 06:50

## 2025-06-04 RX ADMIN — INSULIN LISPRO 14 UNITS: 100 INJECTION, SOLUTION INTRAVENOUS; SUBCUTANEOUS at 09:04

## 2025-06-04 RX ADMIN — ACETAMINOPHEN 1000 MG: 500 TABLET ORAL at 00:26

## 2025-06-04 RX ADMIN — METOPROLOL TARTRATE 25 MG: 25 TABLET, FILM COATED ORAL at 09:03

## 2025-06-04 RX ADMIN — CLONIDINE HYDROCHLORIDE 0.3 MG: 0.2 TABLET ORAL at 09:03

## 2025-06-04 RX ADMIN — ASPIRIN 325 MG: 325 TABLET, COATED ORAL at 09:04

## 2025-06-04 RX ADMIN — ACETAMINOPHEN 1000 MG: 500 TABLET ORAL at 06:50

## 2025-06-04 RX ADMIN — BISACODYL 5 MG: 5 TABLET, COATED ORAL at 09:04

## 2025-06-04 RX ADMIN — KETOROLAC TROMETHAMINE 15 MG: 30 INJECTION, SOLUTION INTRAMUSCULAR; INTRAVENOUS at 00:26

## 2025-06-04 RX ADMIN — OXYCODONE 5 MG: 5 TABLET ORAL at 09:08

## 2025-06-04 RX ADMIN — AMLODIPINE BESYLATE 5 MG: 5 TABLET ORAL at 09:04

## 2025-06-04 RX ADMIN — SENNOSIDES AND DOCUSATE SODIUM 1 TABLET: 50; 8.6 TABLET ORAL at 09:04

## 2025-06-04 RX ADMIN — INSULIN LISPRO 14 UNITS: 100 INJECTION, SOLUTION INTRAVENOUS; SUBCUTANEOUS at 11:43

## 2025-06-04 RX ADMIN — CEFAZOLIN 2000 MG: 1 INJECTION, POWDER, FOR SOLUTION INTRAMUSCULAR; INTRAVENOUS at 00:26

## 2025-06-04 ASSESSMENT — PAIN SCALES - GENERAL
PAINLEVEL_OUTOF10: 5
PAINLEVEL_OUTOF10: 4
PAINLEVEL_OUTOF10: 5
PAINLEVEL_OUTOF10: 4

## 2025-06-04 ASSESSMENT — PAIN DESCRIPTION - DESCRIPTORS
DESCRIPTORS: ACHING

## 2025-06-04 ASSESSMENT — PAIN DESCRIPTION - LOCATION
LOCATION: KNEE

## 2025-06-04 ASSESSMENT — PAIN DESCRIPTION - ORIENTATION
ORIENTATION: LEFT
ORIENTATION: RIGHT
ORIENTATION: LEFT
ORIENTATION: RIGHT

## 2025-06-04 NOTE — PROGRESS NOTES
Ortho NP Note    POD# 1  s/p RIGHT TOTAL KNEE ARTHROPLASTY   Pt seen with no visitors    Pt resting in recliner chair.   Reports difficulty sleeping due to uncomfortable bed, feels much better up in chair.   C/o appropriate postop knee pain, hesitant to use oxycodone and fill rx. Discussed importance of pain control and positive effects on mobilization, therapy progress, and surgical outcome   Tolerating regular diet.  No nausea.   Postop plan reviewed - No complaints/concerns.    VSS Afebrile.    Visit Vitals  BP (!) 141/61   Pulse 88   Temp 98.3 °F (36.8 °C) (Oral)   Resp 18   SpO2 98%       Voiding status: voiding         Labs    Lab Results   Component Value Date/Time    HGB 13.6 06/04/2025 02:18 AM      Lab Results   Component Value Date/Time    INR 1.0 05/19/2025 08:20 AM      Lab Results   Component Value Date/Time     06/04/2025 02:18 AM    K 4.6 06/04/2025 02:18 AM     06/04/2025 02:18 AM    CO2 26 06/04/2025 02:18 AM    BUN 20 06/04/2025 02:18 AM     Recent Glucose Results:   Glucose   Date Value Ref Range Status   06/04/2025 376 (H) 65 - 100 mg/dL Final   05/19/2025 69 65 - 100 mg/dL Final   03/19/2025 65 65 - 100 mg/dL Final           There is no height or weight on file to calculate BMI. : A BMI > 30 is classified as obesity and > 40 is classified as morbid obesity.       Ace wrap dressing c.d.i  Cryotherapy in place over incision  Calves soft and supple; No pain with passive stretch  Sensation and motor intact. +PF/DF/EHL intact   Foot Pumps for mechanical DVT proph while in bed     PLAN:  1) PT BID, OT - WBAT  2) Aspirin 325 mg PO BID for DVT Prophylaxis. Encouraged early mobilization, bed exercises, and SCD use.  3) Pain control - scheduled tylenol and toradol and prn  oxycodone   4) DM2 glucose expectedly elevated. A1C 6.9%. Managed by PCP, orders adjusted per recommendations. Resume Lantus 40 units nightly,  14 units Lispro with meals. Diabetic diet.   5) Post op care - Continue bowel

## 2025-06-04 NOTE — PLAN OF CARE
Problem: Chronic Conditions and Co-morbidities  Goal: Patient's chronic conditions and co-morbidity symptoms are monitored and maintained or improved  6/3/2025 2311 by Britta Painter RN  Outcome: Progressing  6/3/2025 1211 by Analilia Maguire RN  Outcome: Progressing     Problem: Safety - Adult  Goal: Free from fall injury  6/3/2025 2311 by Britta Painter RN  Outcome: Progressing  6/3/2025 1211 by Analilia Maguire RN  Outcome: Progressing

## 2025-06-04 NOTE — PLAN OF CARE
Problem: Chronic Conditions and Co-morbidities  Goal: Patient's chronic conditions and co-morbidity symptoms are monitored and maintained or improved  6/4/2025 0955 by Analilia Maguire RN  Outcome: Progressing  6/3/2025 2311 by Britta Painter RN  Outcome: Progressing     Problem: Safety - Adult  Goal: Free from fall injury  6/4/2025 0955 by Analilia Maguire RN  Outcome: Progressing  6/3/2025 2311 by Britta Painter RN  Outcome: Progressing     Problem: Discharge Planning  Goal: Discharge to home or other facility with appropriate resources  Outcome: Progressing     Problem: Pain  Goal: Verbalizes/displays adequate comfort level or baseline comfort level  Outcome: Progressing

## 2025-06-04 NOTE — PROGRESS NOTES
OCCUPATIONAL THERAPY EVALUATION/DISCHARGE  Patient: Tiana Lipscomb (74 y.o. female)  Date: 6/4/2025  Primary Diagnosis: Primary osteoarthritis of right knee [M17.11]  Arthritis of right knee [M17.11]  Procedure(s) (LRB):  RIGHT TOTAL KNEE ARTHROPLASTY (Right) 1 Day Post-Op     Precautions: Weight Bearing, Fall Risk Right Lower Extremity Weight Bearing: Weight Bearing As Tolerated                ASSESSMENT :  Pt is POD #1 R TKA. Based on the objective data below, the patient is completing BADLs Mod I-Spv using RW for functional mobility and AE for LB dressing PRN. Pt demonstrates good safety awareness, standing balance, and ability to dress self without assistance. Educated on compensatory strategies for tub transfer with Spv as well as IADLs. Pt verbalized understanding. Pt reporting no further questions or concerns for completing self care tasks in home environment. Pt left seated in recliner with all needs met. Pt is cleared for discharge from an OT standpoint.    Functional Outcome Measure:  The patient scored 23/24 on the [facility] outcome measure which is indicative of lower odds of benefiting from rehab in a facility versus home.      Further skilled acute occupational therapy is not indicated at this time.     PLAN :  Recommend with staff: up ad damaris using RW    Recommendation for discharge: (in order for the patient to meet his/her long term goals):   No skilled occupational therapy    Other factors to consider for discharge: lives alone    IF patient discharges home will need the following DME: patient owns DME required for discharge     SUBJECTIVE:   Patient stated, “I won't be getting in the shower without someone there.”    OBJECTIVE DATA SUMMARY:     Past Medical History:   Diagnosis Date    Asthma     HAS HADAN ISSUE FOR A LONG TIME    Cellulitis of right abdominal wall 12/18/2017    dre anderson md    Chronic obstructive pulmonary disease ([facility]) 2020    Diabetes ([facility]) 1995    TYPE 2    DJD (degenerative

## 2025-06-04 NOTE — CARE COORDINATION
Care Management Initial Assessment       RUR: N/A  Readmission? No  1st IM letter given? No  1st  letter given: No    CM met with patient at bedside to introduce self and explain role. Patient lives alone in a 1 level apartment with 2 steps to enter. Patient states she has good family and friend support. Patient owns a cane and RW. Friends will transport her home at discharge. FOC offered for , [facility] accepted, CM added to AVS.        06/04/25 1053   Service Assessment   Patient Orientation Alert and Oriented;Person;Place;Situation;Self   Cognition Alert   History Provided By Patient   Primary Caregiver Self   Support Systems Family Members;Friends/Neighbors;Temple/[place] Community   Patient's Healthcare Decision Maker is: Named in Scanned ACP Document   PCP Verified by CM Yes   Last Visit to PCP Within last 3 months   Prior Functional Level Independent in ADLs/IADLs   Can patient return to prior living arrangement Yes   Ability to make needs known: Good   Family able to assist with home care needs: Yes   Financial Resources Medicare   Social/Functional History   Lives With Alone   Type of Home Apartment   Home Layout One level   Home Access Stairs to enter with rails   Entrance Stairs - Number of Steps 2   Bathroom Equipment Toilet raiser   Home Equipment Cane;Walker - Rolling   Prior Level of Assist for ADLs Independent   Active  Yes   Occupation Retired   Discharge Planning   Type of Residence Apartment   Living Arrangements Alone   DME Ordered? No   Services At/After Discharge   Services At/After Discharge Home Health   Condition of Participation: Discharge Planning   The Plan for Transition of Care is related to the following treatment goals: home health   The Patient and/or Patient Representative was provided with a Choice of Provider? Patient   Freedom of Choice list was provided with basic dialogue that supports the patient's individualized plan of care/goals, treatment preferences,

## 2025-06-04 NOTE — PLAN OF CARE
Problem: Physical Therapy - Adult  Goal: By Discharge: Performs mobility at highest level of function for planned discharge setting.  See evaluation for individualized goals.  Description: FUNCTIONAL STATUS PRIOR TO ADMISSION: Patient was modified independent using a single point cane for functional mobility.    HOME SUPPORT PRIOR TO ADMISSION: The patient lived alone and did not require assistance.  Pt has friends that will stay her as needed.      Physical Therapy Goals  Initiated 6/3/2025  1.  Patient will move from supine to sit and sit to supine and scoot up and down in bed with modified independence within 4 day(s).    2.  Patient will perform sit to stand with modified independence within 4 day(s).  3.  Patient will transfer from bed to chair and chair to bed with modified independence using the least restrictive device within 4 day(s).  4.  Patient will ambulate with modified independence for > 150 feet with the least restrictive device within 4 day(s).   5.  Patient will ascend/descend 4 stairs with one handrail(s) with supervision within 4 day(s).  6. Patient will perform TKR home exercise program per protocol with supervision/set-up within 4 days.  7. Patient will demonstrate AROM 0-90 degrees in operative joint within 4 days.     Outcome: Progressing     PHYSICAL THERAPY TREATMENT    Patient: Tiana Lipscomb (74 y.o. female)  Date: 6/4/2025  Diagnosis: Primary osteoarthritis of right knee [M17.11]  Arthritis of right knee [M17.11] DJD (degenerative joint disease) of knee  Procedure(s) (LRB):  RIGHT TOTAL KNEE ARTHROPLASTY (Right) 1 Day Post-Op  Precautions: Restrictions/Precautions  Restrictions/Precautions: Weight Bearing, Fall Risk  Activity Level: Up with Assist Lower Extremity Weight Bearing Restrictions  Right Lower Extremity Weight Bearing: Weight Bearing As Tolerated          ASSESSMENT: Patient continues to benefit from skilled PT services and is progressing towards goals. Pt received up in

## 2025-06-04 NOTE — PROGRESS NOTES
Occupational Therapy  06/04/25    Orders received, chart reviewed and patient evaluated by occupational therapy. Pending progression with skilled acute occupational therapy, recommend:    No skilled occupational therapy    Recommend with nursing patient to complete as able in order to maintain strength, endurance and independence: OOB to chair 3x/day, ADLs with Mod I and performing toileting with Mod I using RW assist. Thank you for your assistance.     Full evaluation to follow.   Cathy Guzman, OT

## 2025-06-04 NOTE — DISCHARGE SUMMARY
Ortho Discharge Summary    Patient ID:  Tiana Lipscomb  [id number]  female  74 y.o.  1950    Admit date: 6/3/2025    Discharge date: 6/4/2025    Admitting Physician: Chavez Fernandez MD     Consulting Physician(s):   Treatment Team:   Chavez Fernandez MD Dobzyniak, Matthew, MD Kafle, Amrita, Cathy Calvo, Adrienne Tay, PT  Miriam Barbosa Shannon, RN    Date of Surgery:   6/3/2025     Preoperative Diagnosis:  Primary osteoarthritis of right knee [M17.11]    Postoperative Diagnosis:   * No post-op diagnosis entered *    Procedure(s):   RIGHT TOTAL KNEE ARTHROPLASTY     Anesthesia Type:   Spinal     Surgeon: Chavez Fernandez MD                            HPI:  Pt is a 74 y.o. female who has a history of Primary osteoarthritis of right knee [M17.11]  with pain and limitations of activities of daily living who presents at this time for a RIGHT TOTAL KNEE ARTHROPLASTY following the failure of conservative management.    PMH:   Past Medical History:   Diagnosis Date    Asthma     HAS HADAN ISSUE FOR A LONG TIME    Cellulitis of right abdominal wall 12/18/2017    dre anderson md    Chronic obstructive pulmonary disease ([facility]) 2020    Diabetes ([facility]) 1995    TYPE 2    DJD (degenerative joint disease) of knee     GPato Cuellar md    Fall at home, sequela 07/05/2019    Gastroenteritis     H/O colonoscopy 02/17/2023    md Larry - 5 yrs    History of nuclear stress test 08/03/2020    No evidence is of ischemia or infarction. Left ventricular ejection fraction measures 71%.    Hypercholesterolemia     Hypertension     Kidney damage     DUE TO ANTIBIOTIC WHEN SHE HAD CELLULITIS IN R ABDOMINAL WALL, RESOLVED NOW    Kidney lesion, native, left     Knee pain     LBP (low back pain)     chiorpactor    Lumbar stenosis     Obesity     CHAVEZ on CPAP     Rotator cuff arthropathy of right shoulder 06/22/2018    S/P colonoscopy 12/18/2012    Subcutaneous nodule of right forearm 08/03/2022

## 2025-07-08 ENCOUNTER — TRANSCRIBE ORDERS (OUTPATIENT)
Facility: HOSPITAL | Age: 75
End: 2025-07-08

## 2025-07-08 DIAGNOSIS — Z12.31 VISIT FOR SCREENING MAMMOGRAM: Primary | ICD-10-CM

## 2025-07-11 ASSESSMENT — SLEEP AND FATIGUE QUESTIONNAIRES
DO YOU HAVE DIFFICULTY OPERATING A MOTOR VEHICLE FOR SHORT DISTANCES (LESS THAN 100 MILES) BECAUSE YOU BECOME SLEEPY: NO
ESS TOTAL SCORE: 8
HOW LIKELY ARE YOU TO NOD OFF OR FALL ASLEEP WHILE SITTING QUIETLY AFTER LUNCH WITHOUT ALCOHOL: SLIGHT CHANCE OF DOZING
HOW LIKELY ARE YOU TO NOD OFF OR FALL ASLEEP IN A CAR, WHILE STOPPED FOR A FEW MINUTES IN TRAFFIC: WOULD NEVER DOZE
FOSQ SCORE: 17.5
HOW LIKELY ARE YOU TO NOD OFF OR FALL ASLEEP WHILE SITTING AND TALKING TO SOMEONE: WOULD NEVER DOZE
HOW LIKELY ARE YOU TO NOD OFF OR FALL ASLEEP WHILE LYING DOWN TO REST IN THE AFTERNOON WHEN CIRCUMSTANCES PERMIT: MODERATE CHANCE OF DOZING
HOW LIKELY ARE YOU TO NOD OFF OR FALL ASLEEP WHILE WATCHING TV: MODERATE CHANCE OF DOZING
HOW LIKELY ARE YOU TO NOD OFF OR FALL ASLEEP WHEN YOU ARE A PASSENGER IN A CAR FOR AN HOUR WITHOUT A BREAK: SLIGHT CHANCE OF DOZING
DO YOU HAVE DIFFICULTY VISITING YOUR FAMILY OR FRIENDS IN THEIR HOME BECAUSE YOU BECOME SLEEPY OR TIRED: NO
HOW LIKELY ARE YOU TO NOD OFF OR FALL ASLEEP IN A CAR, WHILE STOPPED FOR A FEW MINUTES IN TRAFFIC: WOULD NEVER DOZE
DO YOU HAVE DIFFICULTY BEING AS ACTIVE AS YOU WANT TO BE IN THE MORNING BECAUSE YOU ARE SLEEPY OR TIRED: NO
DO YOU HAVE DIFFICULTY OPERATING A MOTOR VEHICLE FOR LONG DISTANCES (GREATER THAN 100 MILES) BECAUSE YOU BECOME SLEEPY: YES, A LITTLE
HAS YOUR MOOD BEEN AFFECTED BECAUSE YOU ARE SLEEPY OR TIRED: NO
HOW LIKELY ARE YOU TO NOD OFF OR FALL ASLEEP WHILE SITTING AND READING: MODERATE CHANCE OF DOZING
DO YOU HAVE DIFFICULTY WATCHING A MOVIE OR VIDEO BECAUSE YOU BECOME SLEEPY OR TIRED: YES, MODERATE
HOW LIKELY ARE YOU TO NOD OFF OR FALL ASLEEP WHEN YOU ARE A PASSENGER IN A CAR FOR AN HOUR WITHOUT A BREAK: SLIGHT CHANCE OF DOZING
HOW LIKELY ARE YOU TO NOD OFF OR FALL ASLEEP WHILE SITTING QUIETLY AFTER LUNCH WITHOUT ALCOHOL: SLIGHT CHANCE OF DOZING
HOW LIKELY ARE YOU TO NOD OFF OR FALL ASLEEP WHILE SITTING AND TALKING TO SOMEONE: WOULD NEVER DOZE
DO YOU GENERALLY HAVE DIFFICULTY REMEMBERING THINGS BECAUSE YOU ARE SLEEPY OR TIRED: NO
HAS YOUR RELATIONSHIP WITH FAMILY, FRIENDS OR WORK COLLEAGUES BEEN AFFECTED BECAUSE YOU ARE SLEEPY OR TIRED: NO
HOW LIKELY ARE YOU TO NOD OFF OR FALL ASLEEP WHILE SITTING AND READING: MODERATE CHANCE OF DOZING
HOW LIKELY ARE YOU TO NOD OFF OR FALL ASLEEP WHILE SITTING INACTIVE IN A PUBLIC PLACE: WOULD NEVER DOZE
DO YOU HAVE DIFFICULTY BEING AS ACTIVE AS YOU WANT TO BE IN THE EVENING BECAUSE YOU ARE SLEEPY OR TIRED: YES, LITTLE
HOW LIKELY ARE YOU TO NOD OFF OR FALL ASLEEP WHILE SITTING INACTIVE IN A PUBLIC PLACE: WOULD NEVER DOZE
HOW LIKELY ARE YOU TO NOD OFF OR FALL ASLEEP WHILE WATCHING TV: MODERATE CHANCE OF DOZING
DO YOU HAVE DIFFICULTY CONCENTRATING ON THE THINGS YOU DO BECAUSE YOU ARE SLEEPY OR TIRED: YES, A LITTLE
HOW LIKELY ARE YOU TO NOD OFF OR FALL ASLEEP WHILE LYING DOWN TO REST IN THE AFTERNOON WHEN CIRCUMSTANCES PERMIT: MODERATE CHANCE OF DOZING

## 2025-07-14 ENCOUNTER — OFFICE VISIT (OUTPATIENT)
Age: 75
End: 2025-07-14
Payer: MEDICARE

## 2025-07-14 ENCOUNTER — CLINICAL DOCUMENTATION (OUTPATIENT)
Age: 75
End: 2025-07-14

## 2025-07-14 VITALS
WEIGHT: 234.6 LBS | DIASTOLIC BLOOD PRESSURE: 71 MMHG | BODY MASS INDEX: 41.57 KG/M2 | HEIGHT: 63 IN | HEART RATE: 62 BPM | TEMPERATURE: 97.7 F | SYSTOLIC BLOOD PRESSURE: 125 MMHG | OXYGEN SATURATION: 97 %

## 2025-07-14 DIAGNOSIS — G47.33 OBSTRUCTIVE SLEEP APNEA (ADULT) (PEDIATRIC): Primary | ICD-10-CM

## 2025-07-14 DIAGNOSIS — I10 PRIMARY HYPERTENSION: ICD-10-CM

## 2025-07-14 PROCEDURE — 3078F DIAST BP <80 MM HG: CPT | Performed by: NURSE PRACTITIONER

## 2025-07-14 PROCEDURE — 3074F SYST BP LT 130 MM HG: CPT | Performed by: NURSE PRACTITIONER

## 2025-07-14 PROCEDURE — 1125F AMNT PAIN NOTED PAIN PRSNT: CPT | Performed by: NURSE PRACTITIONER

## 2025-07-14 PROCEDURE — 1160F RVW MEDS BY RX/DR IN RCRD: CPT | Performed by: NURSE PRACTITIONER

## 2025-07-14 PROCEDURE — 99214 OFFICE O/P EST MOD 30 MIN: CPT | Performed by: NURSE PRACTITIONER

## 2025-07-14 PROCEDURE — 1159F MED LIST DOCD IN RCRD: CPT | Performed by: NURSE PRACTITIONER

## 2025-07-14 PROCEDURE — 1123F ACP DISCUSS/DSCN MKR DOCD: CPT | Performed by: NURSE PRACTITIONER

## 2025-07-14 NOTE — PROGRESS NOTES
[address]  [place] [address]  Tel.  [phone]  Fax. [phone] [address]  [place] [address]  Tel.  [phone]  Fax. [phone] [address] [place] [address]  Tel.  [phone]  Fax. [phone]     S>Tiana Lipscomb is a 74 y.o. female seen for a positive airway pressure follow-up and evaluation. She was last seen by Bruna Aguayo NP on 3/8/2024, prior notes and sleep testing reviewed in detail.    In  lab sleep test 10/2020 showed AHI of 16.5/hr with a lowest SpO2 of 86%, duration of SpO2 < 88% 0.1 min.      She reports poor use of PAP for about 2 weeks after her knee replacement surgery early June 2025. She goes to bed at 9 pm and wakes at 7 am. No naps.      Weight at time of sleep testing 244 pounds.      The following problems are identified:    Drowsiness no Problems exhaling no   Snoring no Forget to put on no   Mask Comfortable yes Can't fall asleep no   Dry Mouth no Mask falls off no   Air Leaking no Frequent awakenings no       She admits that her sleep has improved. Therapy Apnea Index averaged over PAP use in interim: 4.8 /hr which reflects improved sleep breathing condition.     Review of device download indicated (6/8/25-7/7/25):  Auto pressure: 5-10 cmH2O;     Peak Avg Pressure: 9.7 cmH2O     Avg. Device Pressure <= 90 %: 8.1 cmH2O    95th Percentile Leak: 0.1% L/min    % Used Days >= 4 hours: 93.3%.    Avg hours used:  7 hrs 37 min.      No Known Allergies    Current Outpatient Medications   Medication Sig Dispense Refill    amLODIPine (NORVASC) 10 MG tablet Take 0.5 tablets by mouth daily 1 tablet 0    sennosides-docusate sodium (SENOKOT-S) 8.6-50 MG tablet Take 1 tablet by mouth in the morning and at bedtime 60 tablet 0    acetaminophen (TYLENOL) 650 MG suppository Place 2 suppositories rectally every 4 hours as needed for Fever      Insulin Aspart, w/Niacinamide, (FIASP FLEXTOUCH) 100 UNIT/ML SOPN Inject 14 Units

## 2025-07-14 NOTE — PATIENT INSTRUCTIONS
[address]  [place] [address]  Tel.  [phone]  Fax. [phone] [address]  [place] [address]  Tel.  [phone]  Fax. [phone] [address] [place] [address]  Tel.  [phone]  Fax. [phone]     Learning About CPAP for Sleep Apnea  What is CPAP?              CPAP is a small machine that you use at home every night while you sleep. It increases air pressure in your throat to keep your airway open. When you have sleep apnea, this can help you sleep better so you feel much better. CPAP stands for \"continuous positive airway pressure.\"  The CPAP machine will have one of the following:  A mask that covers your nose and mouth  Prongs that fit into your nose  A mask that covers your nose only, the most common type. This type is called NCPAP. The N stands for \"nasal.\"  Why is it done?  CPAP is usually the best treatment for obstructive sleep apnea. It is the first treatment choice and the most widely used. Your doctor may suggest CPAP if you have:  Moderate to severe sleep apnea.  Sleep apnea and coronary artery disease (CAD) or heart failure.  How does it help?  CPAP can help you have more normal sleep, so you feel less sleepy and more alert during the daytime.  CPAP may help keep heart failure or other heart problems from getting worse.  NCPAP may help lower your blood pressure.  If you use CPAP, your bed partner may also sleep better because you are not snoring or restless.  What are the side effects?  Some people who use CPAP have:  A dry or stuffy nose and a sore throat.  Irritated skin on the face.  Sore eyes.  Bloating.  If you have any of these problems, work with your doctor to fix them. Here are some things you can try:  Be sure the mask or nasal prongs fit well.  See if your doctor can adjust the pressure of your CPAP.  If your nose is dry, try a humidifier.  If your nose is runny or stuffy, try decongestant medicine or a steroid

## 2025-07-23 RX ORDER — PEN NEEDLE, DIABETIC 32 GX 1/4"
NEEDLE, DISPOSABLE MISCELLANEOUS
Qty: 100 EACH | Refills: 10 | Status: SHIPPED | OUTPATIENT
Start: 2025-07-23

## 2025-07-28 RX ORDER — INSULIN GLARGINE 100 [IU]/ML
40 INJECTION, SOLUTION SUBCUTANEOUS NIGHTLY
Qty: 5 ADJUSTABLE DOSE PRE-FILLED PEN SYRINGE | Refills: 11 | Status: SHIPPED | OUTPATIENT
Start: 2025-07-28

## 2025-07-28 RX ORDER — INSULIN ASPART INJECTION 100 [IU]/ML
14 INJECTION, SOLUTION SUBCUTANEOUS
Qty: 12 ADJUSTABLE DOSE PRE-FILLED PEN SYRINGE | Refills: 11 | Status: SHIPPED | OUTPATIENT
Start: 2025-07-28

## 2025-07-28 RX ORDER — PEN NEEDLE, DIABETIC 32 GX 1/4"
NEEDLE, DISPOSABLE MISCELLANEOUS
Qty: 100 EACH | Refills: 10 | Status: SHIPPED | OUTPATIENT
Start: 2025-07-28

## 2025-08-20 RX ORDER — AMLODIPINE BESYLATE 10 MG/1
10 TABLET ORAL DAILY
Qty: 90 TABLET | Refills: 3 | Status: SHIPPED | OUTPATIENT
Start: 2025-08-20

## 2025-08-28 RX ORDER — AMLODIPINE BESYLATE 10 MG/1
10 TABLET ORAL DAILY
Qty: 90 TABLET | Refills: 3 | Status: SHIPPED | OUTPATIENT
Start: 2025-08-28

## 2025-09-03 ENCOUNTER — HOSPITAL ENCOUNTER (OUTPATIENT)
Facility: HOSPITAL | Age: 75
Discharge: HOME | End: 2025-09-06
Attending: INTERNAL MEDICINE
Payer: MEDICARE

## 2025-09-03 DIAGNOSIS — Z12.31 VISIT FOR SCREENING MAMMOGRAM: ICD-10-CM

## 2025-09-03 PROCEDURE — 77063 BREAST TOMOSYNTHESIS BI: CPT

## 2025-09-05 ENCOUNTER — OFFICE VISIT (OUTPATIENT)
Facility: CLINIC | Age: 75
End: 2025-09-05

## 2025-09-05 VITALS
HEIGHT: 63 IN | WEIGHT: 233.2 LBS | SYSTOLIC BLOOD PRESSURE: 138 MMHG | RESPIRATION RATE: 16 BRPM | DIASTOLIC BLOOD PRESSURE: 80 MMHG | BODY MASS INDEX: 41.32 KG/M2 | HEART RATE: 67 BPM | TEMPERATURE: 97.6 F | OXYGEN SATURATION: 97 %

## 2025-09-05 DIAGNOSIS — M48.061 SPINAL STENOSIS OF LUMBAR REGION WITHOUT NEUROGENIC CLAUDICATION: ICD-10-CM

## 2025-09-05 DIAGNOSIS — G47.33 OSA ON CPAP: ICD-10-CM

## 2025-09-05 DIAGNOSIS — I10 PRIMARY HYPERTENSION: ICD-10-CM

## 2025-09-05 DIAGNOSIS — E78.00 HYPERCHOLESTEROLEMIA: ICD-10-CM

## 2025-09-05 DIAGNOSIS — N18.31 TYPE 2 DIABETES MELLITUS WITH STAGE 3A CHRONIC KIDNEY DISEASE, WITHOUT LONG-TERM CURRENT USE OF INSULIN (HCC): Primary | ICD-10-CM

## 2025-09-05 DIAGNOSIS — G89.29 CHRONIC LEFT SHOULDER PAIN: ICD-10-CM

## 2025-09-05 DIAGNOSIS — E11.22 TYPE 2 DIABETES MELLITUS WITH STAGE 3A CHRONIC KIDNEY DISEASE, WITHOUT LONG-TERM CURRENT USE OF INSULIN (HCC): Primary | ICD-10-CM

## 2025-09-05 DIAGNOSIS — M25.512 CHRONIC LEFT SHOULDER PAIN: ICD-10-CM

## 2025-09-05 DIAGNOSIS — E66.01 MORBID OBESITY WITH BMI OF 40.0-44.9, ADULT (HCC): ICD-10-CM

## 2025-09-05 LAB
ANION GAP SERPL CALC-SCNC: 14 MMOL/L (ref 2–14)
BUN SERPL-MCNC: 19 MG/DL (ref 8–23)
BUN/CREAT SERPL: 18 (ref 12–20)
CALCIUM SERPL-MCNC: 9.7 MG/DL (ref 8.8–10.2)
CHLORIDE SERPL-SCNC: 105 MMOL/L (ref 98–107)
CO2 SERPL-SCNC: 25 MMOL/L (ref 20–29)
CREAT SERPL-MCNC: 1.06 MG/DL (ref 0.6–1)
EST. AVERAGE GLUCOSE BLD GHB EST-MCNC: 164 MG/DL
GLUCOSE SERPL-MCNC: 78 MG/DL (ref 65–100)
HBA1C MFR BLD: 7.3 % (ref 4–5.6)
POTASSIUM SERPL-SCNC: 4.1 MMOL/L (ref 3.5–5.1)
SODIUM SERPL-SCNC: 144 MMOL/L (ref 136–145)

## (undated) DEVICE — BANDAGE COMPR M W6INXL10YD WHT BGE VELC E MTRX HK AND LOOP

## (undated) DEVICE — Device

## (undated) DEVICE — HANDPIECE SET WITH BONE CLEANING TIP AND SUCTION TUBE: Brand: INTERPULSE

## (undated) DEVICE — BASIC PACK: Brand: CONVERTORS

## (undated) DEVICE — SUTURE VICRYL ABSORBABLE BRAIDED 2-0 CT 36 IN DA UD  VCP957H

## (undated) DEVICE — GLOVE SURG SZ 8 L12IN FNGR THK79MIL GRN LTX FREE

## (undated) DEVICE — SET ADMIN 16ML TBNG L100IN 2 Y INJ SITE IV PIGGY BK DISP (ORDER IN MULIPLES OF 48)

## (undated) DEVICE — CATH IV AUTOGRD BC PNK 20GA 25 -- INSYTE

## (undated) DEVICE — BASIN EMSIS 16OZ GRAPHITE PLAS KID SHP MOLD GRAD FOR ORAL

## (undated) DEVICE — Z DISCONTINUED PER MEDLINE LINE GAS SAMPLING O2/CO2 LNG AD 13 FT NSL W/ TBNG FILTERLINE

## (undated) DEVICE — SUTURE STRATAFIX SYMMETRIC PDS + SZ 1 L18IN ABSRB VLT L48MM SXPP1A400

## (undated) DEVICE — TOWEL 4 PLY TISS 19X30 SUE WHT

## (undated) DEVICE — SCRUBIN SCRUB BRUSH DRY STER: Brand: MEDLINE INDUSTRIES, INC.

## (undated) DEVICE — IASSIST PIN AND SCREW PACK

## (undated) DEVICE — SOLIDIFIER FLD 2OZ 1500CC N DISINF IN BTL DISP SAFESORB

## (undated) DEVICE — SPONGE GZ W4XL4IN COT 12 PLY TYP VII WVN C FLD DSGN STERILE

## (undated) DEVICE — PADDING CAST W6INXL4YD NONSTERILE COT RAYON MICROPLEATED

## (undated) DEVICE — KIT STEREOTAXIC POD DISPOSABLE IASSIST

## (undated) DEVICE — INSTRUMENT SCREW BNE L25MM DIA2.5MM KNEE FULL THRD HEX FEM PERSONA

## (undated) DEVICE — SOLUTION IV 1000ML 0.9% SOD CHL

## (undated) DEVICE — TRAP,MUCUS SPECIMEN, 80CC: Brand: MEDLINE

## (undated) DEVICE — HOOD, PEEL-AWAY: Brand: FLYTE

## (undated) DEVICE — GLOVE ORTHO 8   MSG9480

## (undated) DEVICE — GAUZE SPONGES,12 PLY: Brand: CURITY

## (undated) DEVICE — SHEET,DRAPE,53X77,STERILE: Brand: MEDLINE

## (undated) DEVICE — NEONATAL-ADULT SPO2 SENSOR: Brand: NELLCOR

## (undated) DEVICE — PADDING CAST W6INXL4YD ST COT RAYON MICROPLEATED HIGHLY

## (undated) DEVICE — IASSIST KNEE SYSTEM V2 POD KIT

## (undated) DEVICE — STRYKER PERFORMANCE SERIES SAGITTAL BLADE: Brand: STRYKER PERFORMANCE SERIES

## (undated) DEVICE — DEVON™ KNEE AND BODY STRAP 60" X 3" (1.5 M X 7.6 CM): Brand: DEVON

## (undated) DEVICE — SURGICAL PROCEDURE PACK BASIN MAJ SET CUST NO CAUT

## (undated) DEVICE — 2108 SERIES SAGITTAL BLADE, NO OFFSET  (12.4 X 1.19 X 82.1MM)

## (undated) DEVICE — MARKER,SKIN,WI/RULER AND LABELS: Brand: MEDLINE

## (undated) DEVICE — CONTAINER,SPECIMEN,3OZ,OR STRL: Brand: MEDLINE

## (undated) DEVICE — DRESSING HYDROFIBER AQUACEL AG ADVANTAGE 3.5X12 IN

## (undated) DEVICE — SUTURE VICRYL COATED ABSORBABLE BRAIDED 2-0 TP1 54 IN COAT UD VICRYL + VCP880T

## (undated) DEVICE — REM POLYHESIVE ADULT PATIENT RETURN ELECTRODE: Brand: VALLEYLAB

## (undated) DEVICE — SOLUTION SURG PREP 26 CC PURPREP

## (undated) DEVICE — 4-PORT MANIFOLD: Brand: NEPTUNE 2

## (undated) DEVICE — SYR 3ML LL TIP 1/10ML GRAD --

## (undated) DEVICE — GLOVE SURG SZ 65 L12IN FNGR THK79MIL GRN LTX FREE

## (undated) DEVICE — 450 ML BOTTLE OF 0.05% CHLORHEXIDINE GLUCONATE IN 99.95% STERILE WATER FOR IRRIGATION, USP AND APPLICATOR.: Brand: IRRISEPT ANTIMICROBIAL WOUND LAVAGE

## (undated) DEVICE — SYRINGE 20ML LL S/C 50

## (undated) DEVICE — TOTAL JOINT - SMH: Brand: MEDLINE INDUSTRIES, INC.

## (undated) DEVICE — ZIMMER® STERILE DISPOSABLE TOURNIQUET CUFF WITH PLC, DUAL PORT, SINGLE BLADDER, 34 IN. (86 CM)

## (undated) DEVICE — ROCKER SWITCH PENCIL BLADE ELECTRODE, HOLSTER: Brand: EDGE

## (undated) DEVICE — TAPE,CLOTH/SILK,CURAD,3"X10YD,LF,40/CS: Brand: CURAD

## (undated) DEVICE — HYPODERMIC SAFETY NEEDLE: Brand: MAGELLAN

## (undated) DEVICE — 1200 GUARD II KIT W/5MM TUBE W/O VAC TUBE: Brand: GUARDIAN

## (undated) DEVICE — CONTAINER SPEC 20 ML LID NEUT BUFF FORMALIN 10 % POLYPR STS

## (undated) DEVICE — SOL IRR SOD CHL 0.9% TITAN XL CNTNR 3000ML

## (undated) DEVICE — DRESSING AQUACEL ADANTAGE SIL RBBN W2XL45CM STRENGTHENING FBR FOR PK SMALLER

## (undated) DEVICE — GLOVE SURG SZ 65 L12IN FNGR THK94MIL STD WHT LTX FREE

## (undated) DEVICE — STRAINER URIN CALC RNL MSH -- CONVERT TO ITEM 357634

## (undated) DEVICE — SUTURE VICRYL + SZ 0 L36IN ABSRB VLT L40MM CT 1/2 CIR TAPR VCP358H

## (undated) DEVICE — INFECTION CONTROL KIT SYS

## (undated) DEVICE — ELECTRODE,RADIOTRANSLUCENT,FOAM,5PK: Brand: MEDLINE

## (undated) DEVICE — BOWL BNE CEM MIX SPAT CURET SMARTMIX CTS

## (undated) DEVICE — SYRINGE MED 10ML LUERLOCK TIP W/O SFTY DISP

## (undated) DEVICE — SNARE ENDOSCP M L240CM W27MM SHTH DIA2.4MM CHN 2.8MM OVL

## (undated) DEVICE — 2108 SERIES SAGITTAL BLADE (18.6 X 0.8 X 73.8MM)

## (undated) DEVICE — DRAPE,EXTREMITY,89X128,STERILE: Brand: MEDLINE